# Patient Record
Sex: FEMALE | Race: BLACK OR AFRICAN AMERICAN | NOT HISPANIC OR LATINO | Employment: UNEMPLOYED | URBAN - METROPOLITAN AREA
[De-identification: names, ages, dates, MRNs, and addresses within clinical notes are randomized per-mention and may not be internally consistent; named-entity substitution may affect disease eponyms.]

---

## 2017-01-19 ENCOUNTER — OFFICE VISIT (OUTPATIENT)
Dept: URGENT CARE | Facility: URGENT CARE | Age: 52
End: 2017-01-19
Payer: COMMERCIAL

## 2017-01-19 ENCOUNTER — RADIANT APPOINTMENT (OUTPATIENT)
Dept: GENERAL RADIOLOGY | Facility: CLINIC | Age: 52
End: 2017-01-19
Attending: FAMILY MEDICINE
Payer: COMMERCIAL

## 2017-01-19 VITALS
HEART RATE: 76 BPM | WEIGHT: 160.3 LBS | BODY MASS INDEX: 28.4 KG/M2 | SYSTOLIC BLOOD PRESSURE: 110 MMHG | OXYGEN SATURATION: 98 % | DIASTOLIC BLOOD PRESSURE: 76 MMHG | TEMPERATURE: 98 F

## 2017-01-19 DIAGNOSIS — M25.512 ACUTE PAIN OF LEFT SHOULDER: Primary | ICD-10-CM

## 2017-01-19 PROCEDURE — 73030 X-RAY EXAM OF SHOULDER: CPT | Mod: LT

## 2017-01-19 PROCEDURE — 96372 THER/PROPH/DIAG INJ SC/IM: CPT | Performed by: FAMILY MEDICINE

## 2017-01-19 PROCEDURE — 99214 OFFICE O/P EST MOD 30 MIN: CPT | Mod: 25 | Performed by: FAMILY MEDICINE

## 2017-01-19 RX ORDER — HYDROCODONE BITARTRATE AND ACETAMINOPHEN 5; 325 MG/1; MG/1
1 TABLET ORAL EVERY 6 HOURS PRN
Qty: 12 TABLET | Refills: 0 | Status: SHIPPED | OUTPATIENT
Start: 2017-01-19 | End: 2017-01-22

## 2017-01-19 RX ORDER — KETOROLAC TROMETHAMINE 30 MG/ML
60 INJECTION, SOLUTION INTRAMUSCULAR; INTRAVENOUS ONCE
Qty: 2 ML | Refills: 0 | OUTPATIENT
Start: 2017-01-19 | End: 2017-01-19

## 2017-01-19 NOTE — MR AVS SNAPSHOT
After Visit Summary   1/19/2017    Graciela Castorena    MRN: [id number]           Patient Information     Date Of Birth          1965        Visit Information        Provider Department      1/19/2017 2:30 PM Tulio Mahajan,  [facility]        Today's Diagnoses     Acute pain of left shoulder    -  1        Follow-ups after your visit        Additional Services     ORTHOPEDICS ADULT REFERRAL       Your provider has referred you to: [facility]: Orthopaedic Clinic [facility] [phone]   [email or web address]      Please be aware that coverage of these services is subject to the terms and limitations of your health insurance plan.  Call member services at your health plan with any benefit or coverage questions.      Please bring the following to your appointment:    >>   Any x-rays, CTs or MRIs which have been performed.  Contact the facility where they were done to arrange for  prior to your scheduled appointment.    >>   List of current medications   >>   This referral request   >>   Any documents/labs given to you for this referral                  Your next 10 appointments already scheduled     Feb 17, 2017 10:00 AM   LAB with [facility]   [facility]    [address] [address] [phone]           Patient must bring picture ID.  Patient should be prepared to give a urine specimen  Please do not eat 10-12 hours before your appointment if you are coming in fasting for labs on lipids, cholesterol, or glucose (sugar).  Pregnant women should follow their Care Team instructions. Water with medications is okay. Do not drink coffee or other fluids.   If you have concerns about taking  your medications, please ask at office or if scheduling via [employer], send a message by clicking on Secure Messaging, Message Your Care Team.            Feb 24,  "  2:30 PM   PHYSICAL with Clarice Gilman MD   [facility] ([facility]    [address] [address] [phone]              Who to contact     If you have questions or need follow up information about today's clinic visit or your schedule please contact [facility] directly at [phone].  Normal or non-critical lab and imaging results will be communicated to you by Picotek INChart, letter or phone within 4 business days after the clinic has received the results. If you do not hear from us within 7 days, please contact the clinic through MyChart or phone. If you have a critical or abnormal lab result, we will notify you by phone as soon as possible.  Submit refill requests through [employer] or call your pharmacy and they will forward the refill request to us. Please allow 3 business days for your refill to be completed.          Additional Information About Your Visit        Adaptly Information     Adaptly lets you send messages to your doctor, view your test results, renew your prescriptions, schedule appointments and more. To sign up, go to [email or web address] . Click on \"Log in\" on the left side of the screen, which will take you to the Welcome page. Then click on \"Sign up Now\" on the right side of the page.     You will be asked to enter the access code listed below, as well as some personal information. Please follow the directions to create your username and password.     Your access code is: [email or web address]  Expires: 2017  8:27 PM     Your access code will  in 90 days. If you need help or a new code, please call your [facility] clinic or [phone].        Care EveryWhere ID     This is your Care EveryWhere ID. This could be used by other organizations to access your [facility] medical records  [id number]        Your Vitals Were     Pulse Temperature Pulse Oximetry             76 98  F (36.7  C) " (Oral) 98%          Blood Pressure from Last 3 Encounters:   01/19/17 110/76   11/22/16 145/83   06/21/16 108/68    Weight from Last 3 Encounters:   01/19/17 160 lb 4.8 oz (72.712 kg)   11/22/16 163 lb 2.3 oz (74 kg)   06/21/16 165 lb (74.844 kg)              We Performed the Following     INJECTION INTRAMUSCULAR OR SUB-Q     KETOROLAC TROMETHAMINE 15MG     ORTHOPEDICS ADULT REFERRAL     XR Shoulder Left G/E 3 Views          Today's Medication Changes          These changes are accurate as of: 1/19/17  3:28 PM.  If you have any questions, ask your nurse or doctor.               Start taking these medicines.        Dose/Directions    ketorolac 60 MG/2ML Soln injection   Commonly known as:  TORADOL   Used for:  Acute pain of left shoulder   Started by:  Tulio Mahajan DO        Dose:  60 mg   Inject 2 mLs (60 mg) into the muscle once for 1 dose   Quantity:  2 mL   Refills:  0         These medicines have changed or have updated prescriptions.        Dose/Directions    * HYDROcodone-acetaminophen 5-325 MG per tablet   Commonly known as:  NORCO   This may have changed:  Another medication with the same name was added. Make sure you understand how and when to take each.        Dose:  1-2 tablet   Take 1-2 tablets by mouth every 4 hours as needed   Quantity:  30 tablet   Refills:  0       * HYDROcodone-acetaminophen 5-325 MG per tablet   Commonly known as:  NORCO   This may have changed:  Another medication with the same name was added. Make sure you understand how and when to take each.   Used for:  Closed nondisplaced fracture of distal phalanx of right ring finger, initial encounter, Swelling of limb, Finger injury, right, initial encounter   Changed by:  Steven Cody PA-C        Dose:  1 tablet   Take 1 tablet by mouth every 6 hours as needed   Quantity:  15 tablet   Refills:  0       * HYDROcodone-acetaminophen 5-325 MG per tablet   Commonly known as:  NORCO   This may have changed:  You were already taking a  medication with the same name, and this prescription was added. Make sure you understand how and when to take each.   Used for:  Acute pain of left shoulder   Changed by:  Tulio Mahajan,         Dose:  1 tablet   Take 1 tablet by mouth every 6 hours as needed   Quantity:  12 tablet   Refills:  0       * Notice:  This list has 3 medication(s) that are the same as other medications prescribed for you. Read the directions carefully, and ask your doctor or other care provider to review them with you.         Where to get your medicines      Some of these will need a paper prescription and others can be bought over the counter.  Ask your nurse if you have questions.     Bring a paper prescription for each of these medications    - HYDROcodone-acetaminophen 5-325 MG per tablet    You don't need a prescription for these medications    - ketorolac 60 MG/2ML Soln injection             Primary Care Provider Office Phone # Fax #    Clarice Gilman [phone]       [facility] [address] [facility] [address]        Thank you!     Thank you for choosing [facility]  for your care. Our goal is always to provide you with excellent care. Hearing back from our patients is one way we can continue to improve our services. Please take a few minutes to complete the written survey that you may receive in the mail after your visit with us. Thank you!             Your Updated Medication List - Protect others around you: Learn how to safely use, store and throw away your medicines at www.disposemymeds.org.          This list is accurate as of: 1/19/17  3:28 PM.  Always use your most recent med list.                   Brand Name Dispense Instructions for use    Calcium Carb-Cholecalciferol 1000-800 MG-UNIT Tabs    CALCIUM 1000 + D    100 tablet    Take 1 tablet by mouth daily TAKE WITH FOOD, FOR BONE HEALTH AND LOW VITAMIN D (SAKINA ORTIZ)        cholecalciferol 64669 UNITS capsule    VITAMIN D3    40 capsule    Take 1 capsule (50,000 Units) by mouth every 14 days SIG: TAKE ONE CAP EVERY OTHER WEEK, INDICATION: TO TREAT VITAMIN D DEFICIENCY (1ST AND 15TH OF EACH MONTH)       Cyanocobalamin 5000 MCG/ML Liqd    B-12 SUPER STRENGTH    2 Bottle    Place 1 mL under the tongue daily FOR VITAMIN B12 SUPPLEMENTATION, PLEASE PLACE UNDER THE TONGUE       * HYDROcodone-acetaminophen 5-325 MG per tablet    NORCO    30 tablet    Take 1-2 tablets by mouth every 4 hours as needed       * HYDROcodone-acetaminophen 5-325 MG per tablet    NORCO    15 tablet    Take 1 tablet by mouth every 6 hours as needed       * HYDROcodone-acetaminophen 5-325 MG per tablet    NORCO    12 tablet    Take 1 tablet by mouth every 6 hours as needed       ibuprofen 200 MG tablet    ADVIL/MOTRIN    60 tablet    Take 2 tablets (400 mg) by mouth every 8 hours as needed for pain       ketorolac 60 MG/2ML Soln injection    TORADOL    2 mL    Inject 2 mLs (60 mg) into the muscle once for 1 dose       naproxen 500 MG tablet    NAPROSYN    60 tablet    Take 1 tablet (500 mg) by mouth 2 times daily as needed for moderate pain       polyethylene glycol powder    MIRALAX    1 Bottle    Take 17 g (1 capful) by mouth daily INDICATION: CONSTIPATION, TO ACHIEVE 1-2 SOFT BMs PER DAY       senna-docusate 8.6-50 MG per tablet    SENOKOT-S;PERICOLACE    120 tablet    Take 1-2 tablets by mouth 2 times daily INDICATION: CONSTIPATION, TO ACHIEVE 1-2 SOFT BMs PER DAY       * Notice:  This list has 3 medication(s) that are the same as other medications prescribed for you. Read the directions carefully, and ask your doctor or other care provider to review them with you.

## 2017-01-19 NOTE — NURSING NOTE
"Chief Complaint   Patient presents with     Shoulder Pain     C/o left shoulder/ upper back pain, radiating into upper left arm with numbness and tingling, and decreased ROM x 4 + month. Seen in ER on 11/22/16 for same issue.        Initial /76 mmHg  Pulse 76  Temp(Src) 98  F (36.7  C) (Oral)  Wt 160 lb 4.8 oz (72.712 kg)  SpO2 98% Estimated body mass index is 28.4 kg/(m^2) as calculated from the following:    Height as of 6/21/16: 5' 3\" (1.6 m).    Weight as of this encounter: 160 lb 4.8 oz (72.712 kg).  BP completed using cuff size: chacho Gipson CMA      "

## 2017-01-19 NOTE — PROGRESS NOTES
SUBJECTIVE:  Chief Complaint   Patient presents with     Shoulder Pain     C/o left shoulder/ upper back pain, radiating into upper left arm with numbness and tingling, and decreased ROM x 4 + month. Seen in ER on 11/22/16 for same issue.    .tree who presents with a chief complaint of  left shoulder pain.  Symptoms began 1 month(s) ago , are severe andstill present.  Context:Injury: no  Pain exacerbated by movement   Relieved bynothing She treated it initially with Ibuprofen.   This is the first time this type of injury has occurred to this patient.     Past Medical History   Diagnosis Date     Cholelithiasis 2013     Other malaise and fatigue 4/1/2014       History reviewed. No pertinent past surgical history.    Family History   Problem Relation Age of Onset     Family history unknown: Yes       Social History   Substance Use Topics     Smoking status: Never Smoker      Smokeless tobacco: Never Used     Alcohol Use: No       ROS:CONSTITUTIONAL:NEGATIVE for fever, chills, change in weight  INTEGUMENTARY/SKIN: NEGATIVE for worrisome rashes, moles or lesions  RESP:NEGATIVE for significant cough or SOB  CV: NEGATIVE for chest pain, palpitations or peripheral edema    EXAM: /76 mmHg  Pulse 76  Temp(Src) 98  F (36.7  C) (Oral)  Wt 160 lb 4.8 oz (72.712 kg)  SpO2 98%   NAD  Exam:shoulder  tenderness to palpation and pain with rom  GENERAL APPEARANCE: healthy, alert and no distress  EXTREMITIES: peripheral pulses normal  SKIN: no suspicious lesions or rashes  NEURO: Normal strength and tone, sensory exam grossly normal, mentation intact and speech normal    Xray without acute findings, read by Tulio Mahajan D.O.    ASSESSMENT:     ICD-10-CM    1. Acute pain of left shoulder M25.512 ketorolac (TORADOL) 60 MG/2ML SOLN injection     XR Shoulder Left G/E 3 Views     KETOROLAC TROMETHAMINE 15MG     INJECTION INTRAMUSCULAR OR SUB-Q     HYDROcodone-acetaminophen (NORCO) 5-325 MG per tablet     ORTHOPEDICS ADULT  REFERRAL     [place] better after Toradol shot  Cont ice

## 2017-02-16 ENCOUNTER — DOCUMENTATION ONLY (OUTPATIENT)
Dept: LAB | Facility: CLINIC | Age: 52
End: 2017-02-16

## 2017-02-16 DIAGNOSIS — E55.9 VITAMIN D DEFICIENCY: ICD-10-CM

## 2017-02-16 DIAGNOSIS — Z13.6 CARDIOVASCULAR SCREENING; LDL GOAL LESS THAN 160: ICD-10-CM

## 2017-02-16 DIAGNOSIS — E53.8 VITAMIN B12 DEFICIENCY WITHOUT ANEMIA: Primary | ICD-10-CM

## 2017-02-17 ENCOUNTER — DOCUMENTATION ONLY (OUTPATIENT)
Dept: LAB | Facility: CLINIC | Age: 52
End: 2017-02-17

## 2017-02-20 DIAGNOSIS — E53.8 VITAMIN B12 DEFICIENCY WITHOUT ANEMIA: ICD-10-CM

## 2017-02-20 DIAGNOSIS — E55.9 VITAMIN D DEFICIENCY: ICD-10-CM

## 2017-02-20 DIAGNOSIS — Z13.6 CARDIOVASCULAR SCREENING; LDL GOAL LESS THAN 160: ICD-10-CM

## 2017-02-20 LAB
ALBUMIN SERPL-MCNC: 4 G/DL (ref 3.4–5)
ALP SERPL-CCNC: 76 U/L (ref 40–150)
ALT SERPL W P-5'-P-CCNC: 18 U/L (ref 0–50)
ANION GAP SERPL CALCULATED.3IONS-SCNC: 5 MMOL/L (ref 3–14)
AST SERPL W P-5'-P-CCNC: 15 U/L (ref 0–45)
BILIRUB SERPL-MCNC: 0.5 MG/DL (ref 0.2–1.3)
BUN SERPL-MCNC: 11 MG/DL (ref 7–30)
CALCIUM SERPL-MCNC: 9.3 MG/DL (ref 8.5–10.1)
CHLORIDE SERPL-SCNC: 106 MMOL/L (ref 94–109)
CHOLEST SERPL-MCNC: 182 MG/DL
CO2 SERPL-SCNC: 30 MMOL/L (ref 20–32)
CREAT SERPL-MCNC: 0.76 MG/DL (ref 0.52–1.04)
DEPRECATED CALCIDIOL+CALCIFEROL SERPL-MC: 41 UG/L (ref 20–75)
GFR SERPL CREATININE-BSD FRML MDRD: 81 ML/MIN/1.7M2
GLUCOSE SERPL-MCNC: 95 MG/DL (ref 70–99)
HDLC SERPL-MCNC: 52 MG/DL
LDLC SERPL CALC-MCNC: 115 MG/DL
NONHDLC SERPL-MCNC: 130 MG/DL
POTASSIUM SERPL-SCNC: 4.3 MMOL/L (ref 3.4–5.3)
PROT SERPL-MCNC: 7.6 G/DL (ref 6.8–8.8)
SODIUM SERPL-SCNC: 141 MMOL/L (ref 133–144)
T4 FREE SERPL-MCNC: 0.94 NG/DL (ref 0.76–1.46)
TRIGL SERPL-MCNC: 76 MG/DL
TSH SERPL DL<=0.05 MIU/L-ACNC: 2.36 MU/L (ref 0.4–4)
VIT B12 SERPL-MCNC: 501 PG/ML (ref 193–986)

## 2017-02-20 PROCEDURE — 80061 LIPID PANEL: CPT | Performed by: INTERNAL MEDICINE

## 2017-02-20 PROCEDURE — 84439 ASSAY OF FREE THYROXINE: CPT | Performed by: INTERNAL MEDICINE

## 2017-02-20 PROCEDURE — 84443 ASSAY THYROID STIM HORMONE: CPT | Performed by: INTERNAL MEDICINE

## 2017-02-20 PROCEDURE — 80053 COMPREHEN METABOLIC PANEL: CPT | Performed by: INTERNAL MEDICINE

## 2017-02-20 PROCEDURE — 36415 COLL VENOUS BLD VENIPUNCTURE: CPT | Performed by: INTERNAL MEDICINE

## 2017-02-20 PROCEDURE — 82306 VITAMIN D 25 HYDROXY: CPT | Performed by: INTERNAL MEDICINE

## 2017-02-20 PROCEDURE — 82607 VITAMIN B-12: CPT | Performed by: INTERNAL MEDICINE

## 2017-03-24 ENCOUNTER — OFFICE VISIT (OUTPATIENT)
Dept: INTERNAL MEDICINE | Facility: CLINIC | Age: 52
End: 2017-03-24
Payer: MEDICAID

## 2017-03-24 VITALS
TEMPERATURE: 97.5 F | SYSTOLIC BLOOD PRESSURE: 120 MMHG | HEIGHT: 64 IN | DIASTOLIC BLOOD PRESSURE: 80 MMHG | OXYGEN SATURATION: 100 % | BODY MASS INDEX: 26.55 KG/M2 | WEIGHT: 155.5 LBS | HEART RATE: 72 BPM

## 2017-03-24 DIAGNOSIS — G89.29 CHRONIC LEFT SHOULDER PAIN: ICD-10-CM

## 2017-03-24 DIAGNOSIS — Z12.11 SCREENING FOR COLON CANCER: ICD-10-CM

## 2017-03-24 DIAGNOSIS — Z00.01 ENCOUNTER FOR ROUTINE ADULT MEDICAL EXAM WITH ABNORMAL FINDINGS: Primary | ICD-10-CM

## 2017-03-24 DIAGNOSIS — M25.512 CHRONIC LEFT SHOULDER PAIN: ICD-10-CM

## 2017-03-24 DIAGNOSIS — K80.20 CALCULUS OF GALLBLADDER WITHOUT CHOLECYSTITIS WITHOUT OBSTRUCTION: ICD-10-CM

## 2017-03-24 PROCEDURE — 99213 OFFICE O/P EST LOW 20 MIN: CPT | Mod: 25 | Performed by: INTERNAL MEDICINE

## 2017-03-24 PROCEDURE — T1013 SIGN LANG/ORAL INTERPRETER: HCPCS | Mod: U3 | Performed by: INTERNAL MEDICINE

## 2017-03-24 PROCEDURE — 99396 PREV VISIT EST AGE 40-64: CPT | Performed by: INTERNAL MEDICINE

## 2017-03-24 NOTE — NURSING NOTE
"Chief Complaint   Patient presents with     Establish Care     Physical     Pt is fasting today.       Initial /80 (BP Location: Left arm, Patient Position: Chair, Cuff Size: Adult Regular)  Pulse 72  Temp 97.5  F (36.4  C) (Oral)  Ht 5' 4\" (1.626 m)  Wt 155 lb 8 oz (70.5 kg)  SpO2 100%  BMI 26.69 kg/m2 Estimated body mass index is 26.69 kg/(m^2) as calculated from the following:    Height as of this encounter: 5' 4\" (1.626 m).    Weight as of this encounter: 155 lb 8 oz (70.5 kg).  Medication Reconciliation: complete     Jong NDIAYE      "

## 2017-03-24 NOTE — PATIENT INSTRUCTIONS
No need for high-dose vitamin D anymore.    Recommend daily vitamin D 1000-2000units/day. Can get over the counter.    ---    For left shoulder, recommend a course of physical therapy.    They will contact you to schedule an appointment.     ---    You will be contact to schedule a colonoscopy for colon cancer screening.

## 2017-03-24 NOTE — PROGRESS NOTES
SUBJECTIVE:                                                      HPI: Graciela Castorena is a pleasant 52 year old female who presents for a physical.    [employer]  utilized.     Patient complains of left shoulder pain:  - ongoing for the last 6-8 months  - no precipitating trauma or unusual exertion  - indicates that the pain is located posterior shoulder and left trapezius  - is unable to reach overhead to the other shoulder or behind her back due to pain  - also finding it uncomfortable to sleep at night, especially on her left side    - no left upper extremity numbness, tingling, or weakness  - no prior left upper extremity procedures or surgeries  - has not been treated for this condition yet (i.e. physical therapy)    Patient is also interested in possibly getting a cholecystectomy:  - was recommended last year  - was too nervous to proceed with surgery at that time  - would like to be re-referred back to surgery to discuss again    ROS:  Constitutional: denies unintentional weight loss or gain; denies fevers, chills, or sweats     Cardiovascular: denies chest pain, palpitations, or edema  Respiratory: denies cough, wheezing, shortness of breath, or dyspnea on exertion  Gastrointestinal: denies nausea, vomiting, constipation, diarrhea, or abdominal pain  Genitourinary: denies urinary frequency, urgency, dysuria, or hematuria  Integumentary: denies rash or pruritus  Musculoskeletal: denies back pain; see above  Neurologic: denies focal weakness, numbness, or tingling  Hematologic/Immunologic: denies history of anemia or blood transfusions  Endocrine: denies heat or cold intolerance; denies polyuria, polydipsia  Psychiatric: denies anxiety; see preventative health below    Past Medical History:   Diagnosis Date     NO ACTIVE PROBLEMS      Past Surgical History:   Procedure Laterality Date     NO HISTORY OF SURGERY       Family History   Problem Relation Age of Onset     DIABETES No family hx of       "Myocardial Infarction No family hx of      CEREBROVASCULAR DISEASE No family hx of      Coronary Artery Disease Early Onset No family hx of      CANCER No family hx of      no breast, ovarian, colon CA     Occupational History     [employer]      Social History Main Topics     Smoking status: Never Smoker     Smokeless tobacco: Never Used     Alcohol use No     Drug use: No     Sexual activity: Yes     Partners: Male     Social History Narrative    .    8 kids - 7 are here. 1 still in [place].     Walks a lot.      Allergies   Allergen Reactions     Ciprofloxacin Itching     Current Outpatient Prescriptions   Medication Sig     polyethylene glycol (MIRALAX) powder Take 17 g (1 capful) by mouth daily INDICATION: CONSTIPATION, TO ACHIEVE 1-2 SOFT BMs PER DAY     Immunization History   Administered Date(s) Administered     Influenza (IIV3) 10/08/2013     Influenza Vaccine IM 3yrs+ 4 Valent IIV4 09/29/2015     TDAP Vaccine (Adacel) 06/09/2014     OBJECTIVE:                                                      /80 (BP Location: Left arm, Patient Position: Chair, Cuff Size: Adult Regular)  Pulse 72  Temp 97.5  F (36.4  C) (Oral)  Ht 5' 4\" (1.626 m)  Wt 155 lb 8 oz (70.5 kg)  SpO2 100%  BMI 26.69 kg/m2  Constitutional: well-appearing  Eyes: normal conjunctivae and lids; pupils equal, round, and reactive to light  Ears, Nose, Mouth, and Throat: normal ears and nose; tympanic membranes visualized and normal; normal lips, teeth, and gums; no oropharyngeal lesions or ulcers  Neck: supple and symmetric; no lymphadenopathy; no thyromegaly or masses  Respiratory: normal respiratory effort; clear to auscultation bilaterally  Cardiovascular: regular rate and rhythm; pedal pulses palpable; no edema  Breasts: normal appearance; no masses or skin retraction; no nipple discharge or bleeding; no axillary lymphadenopathy  Gastrointestinal: soft, non-tender, non-distended, and bowel sounds present; no organomegaly or " masses  Musculoskeletal: normal gait and station; left trapezius tension and tenderness to palpation  Left shoulder:  Inspection: no swelling, bruising, discoloration, or obvious deformity or asymmetry  Non-tender: AC joint, acromion, anterior capsule, proximal bicep tendon, supraspinatus  and infraspinatus  Active ROM: decreased in all directions due to pain  Strength: normal  Psych: normal judgment and insight; normal mood and affect; recent and remote memory intact; oriented to time, place, and person    PREVENTATIVE HEALTH                                                      BMI: overweight  Blood pressure: within normal limits   Mammogram: last mammogram performed 12/16; report reviewed and was normal  Pap: last pap performed 3/15; report reviewed - ASCUS with neg HR HPV; repeat due in 3/18  Colonoscopy: DUE  Dexa: not medically indicated at this time   Screening HCV: not medically indicated at this time   Screening cholesterol: up to date   Screening diabetes: up to date   STD testing: no risk factors present  Depression screening: PHQ-2 assessment completed and reviewed - no intervention indicated at this time  Alcohol misuse screening: alcohol use reviewed - no intervention indicated at this time  Immunizations: reviewed; up to date     ASSESSMENT/PLAN:                                                       (Z00.01) Encounter for routine adult medical exam with abnormal findings  (primary encounter diagnosis)  Comment: PMH, PSH, FH, SH, medications, allergies, immunizations, and preventative health measures reviewed.    Plan: see below for plans.    (Z12.11) Screening for colon cancer  Plan: colonoscopy ordered - patient to schedule.     (M25.512,  G89.29) Chronic left shoulder pain  Comment:    - suspect adhesive capsulitis or rotator cuff tendonitis, but no tear.   - also suspect component of left trapezius muscle tension.    Plan: referred to PT for further evaluation and treatment.     (K80.20) Calculus  of gallbladder without cholecystitis without obstruction  Comment: patient is reconsidering gallbladder surgery.   Plan: re-referred back to surgery.     The instructions on the AVS were discussed and explained to the patient. Patient expressed understanding of instructions.    Genna Magallanes MD   [address] [address]  T: [phone], F: [phone]

## 2017-03-24 NOTE — MR AVS SNAPSHOT
After Visit Summary   3/24/2017    Graciela Castorena    MRN: [id number]           Patient Information     Date Of Birth          1965        Visit Information        Provider Department      3/24/2017 10:15 AM Genna Magallanes MD; [employer] [facility]        Today's Diagnoses     Screening for colon cancer    -  1    Chronic left shoulder pain          Care Instructions    No need for high-dose vitamin D anymore.    Recommend daily vitamin D 1000-2000units/day. Can get over the counter.    ---    For left shoulder, recommend a course of physical therapy.    They will contact you to schedule an appointment.     ---    You will be contact to schedule a colonoscopy for colon cancer screening.         Follow-ups after your visit        Additional Services     GASTROENTEROLOGY ADULT REF PROCEDURE ONLY       Last Lab Result: Creatinine (mg/dL)       Date                     Value                 02/20/2017               0.76             ----------  Body mass index is 26.69 kg/(m^2).      Patient will be contacted to schedule procedure.     Please be aware that coverage of these services is subject to the terms and limitations of your health insurance plan.  Call member services at your health plan with any benefit or coverage questions.  Any procedures must be performed at a [facility] facility OR coordinated by your clinic's referral office.    Please bring the following with you to your appointment:    (1) Any X-Rays, CTs or MRIs which have been performed.  Contact the facility where they were done to arrange for  prior to your scheduled appointment.    (2) List of current medications   (3) This referral request   (4) Any documents/labs given to you for this referral            [facility] PT, HAND, AND CHIROPRACTIC REFERRAL       **This order will print in the [facility] Scheduling Office**    Physical Therapy, Hand Therapy and Chiropractic Care are available  "through:    *[facility]  *[facility]  *[facility]    Call one number to schedule at any of the above locations: [phone].    Your provider has referred you to: Physical Therapy at [facility] or [facility]    Indication/Reason for Referral: Shoulder Pain  Onset of Illness: 6-8 months  Therapy Orders: Evaluate and Treat  Special Programs: None  Special Request: None    Ronny Jefferson      Additional Comments for the Therapist or Chiropractor: none    Please be aware that coverage of these services is subject to the terms and limitations of your health insurance plan.  Call member services at your health plan with any benefit or coverage questions.      Please bring the following to your appointment:    *Your personal calendar for scheduling future appointments  *Comfortable clothing                  Who to contact     If you have questions or need follow up information about today's clinic visit or your schedule please contact [facility] directly at [phone].  Normal or non-critical lab and imaging results will be communicated to you by MyChart, letter or phone within 4 business days after the clinic has received the results. If you do not hear from us within 7 days, please contact the clinic through [employer] or phone. If you have a critical or abnormal lab result, we will notify you by phone as soon as possible.  Submit refill requests through swabr or call your pharmacy and they will forward the refill request to us. Please allow 3 business days for your refill to be completed.          Additional Information About Your Visit        swabr Information     swabr lets you send messages to your doctor, view your test results, renew your prescriptions, schedule appointments and more. To sign up, go to [email or web address] . Click on \"Log in\" on the left side of the screen, which will take you to the Welcome page. Then click on \"Sign up " "Now\" on the right side of the page.     You will be asked to enter the access code listed below, as well as some personal information. Please follow the directions to create your username and password.     Your access code is: [email or web address]  Expires: 2017  9:18 AM     Your access code will  in 90 days. If you need help or a new code, please call your [facility] or [phone].        Care EveryWhere ID     This is your Care EveryWhere ID. This could be used by other organizations to access your [facility] medical records  [id number]        Your Vitals Were     Pulse Temperature Height Pulse Oximetry BMI (Body Mass Index)       72 97.5  F (36.4  C) (Oral) 5' 4\" (1.626 m) 100% 26.69 kg/m2        Blood Pressure from Last 3 Encounters:   17 120/80   17 110/76   16 145/83    Weight from Last 3 Encounters:   17 155 lb 8 oz (70.5 kg)   17 160 lb 4.8 oz (72.7 kg)   16 163 lb 2.3 oz (74 kg)              We Performed the Following     GASTROENTEROLOGY ADULT REF PROCEDURE ONLY     [facility] PT, HAND, AND CHIROPRACTIC REFERRAL        Primary Care Provider Office Phone # Fax #    Clarice Gilman [phone]       [facility] [address] [facility] [address]        Thank you!     Thank you for choosing [facility]  for your care. Our goal is always to provide you with excellent care. Hearing back from our patients is one way we can continue to improve our services. Please take a few minutes to complete the written survey that you may receive in the mail after your visit with us. Thank you!             Your Updated Medication List - Protect others around you: Learn how to safely use, store and throw away your medicines at www.disposemymeds.org.          This list is accurate as of: 3/24/17 11:02 AM.  Always use your most recent med list.                   Brand Name Dispense Instructions for use    polyethylene glycol powder    " MIRALAX    1 Bottle    Take 17 g (1 capful) by mouth daily INDICATION: CONSTIPATION, TO ACHIEVE 1-2 SOFT BMs PER DAY

## 2017-03-30 ENCOUNTER — THERAPY VISIT (OUTPATIENT)
Dept: PHYSICAL THERAPY | Facility: CLINIC | Age: 52
End: 2017-03-30
Payer: MEDICAID

## 2017-03-30 DIAGNOSIS — M75.00 ADHESIVE CAPSULITIS: Primary | ICD-10-CM

## 2017-03-30 PROCEDURE — 97110 THERAPEUTIC EXERCISES: CPT | Mod: GP | Performed by: PHYSICAL THERAPIST

## 2017-03-30 PROCEDURE — 97161 PT EVAL LOW COMPLEX 20 MIN: CPT | Mod: GP | Performed by: PHYSICAL THERAPIST

## 2017-03-30 NOTE — PROGRESS NOTES
Subjective:                                               Current occupation is Assembly.    Primary job tasks include:  Prolonged standing and lifting.                                Objective:    System    Physical Exam    General     ROS    Assessment/Plan:

## 2017-03-30 NOTE — LETTER
DEPARTMENT OF HEALTH AND HUMAN SERVICES  CENTERS FOR MEDICARE & MEDICAID SERVICES    PLAN/UPDATED PLAN OF PROGRESS FOR OUTPATIENT REHABILITATION    PATIENTS NAME:  Graciela Castorena   : 1965  PROVIDER NUMBER:    [id number]  [id number]  PROVIDER NAME: [facility]  MEDICAL RECORD NUMBER: [id number]   START OF CARE DATE:  SOC Date: 17   TYPE:  PT  PRIMARY/TREATMENT DIAGNOSIS: (Pertinent Medical Diagnosis)  Adhesive capsulitis    VISITS FROM START OF CARE:  Rxs Used: 1     Subjective:  Graciela Castorena is a 52 year old female with a left shoulder condition.  Condition occurred: for unknown reasons.  This is a chronic condition  Pt presents with complaints of left shoulder (UE) pain. Pt reported insidious onset of sx's 4-5 months ago; ER visit on 16 and MD office visit on 17.  Pt reported no previous issues with left shoulder. 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 In addition to shoulder complaints, pt also reports sx's into L UE-to the hand. Pt reported occasional N/T; currently absence of sx's at exam.    Patient reports pain:  Posterior.  Radiates to:  Upper arm.  Pain is described as aching and is constant and reported as 6/10.  Associated symptoms:  Loss of motion/stiffness. Pain is worse during the day and worse during the night.  Symptoms are exacerbated by using arm overhead, using arm behind back, using arm at shoulder level and lying on extremity and relieved by rest.  Since onset symptoms are unchanged.  Special tests:  X-ray.  General health as reported by patient is excellent.  Barriers include:  None as reported by the patient.  Red flags:  None as reported by the patient.  Current occupation is Assembly. Primary job tasks include:  Prolonged standing and  lifting.    Objective:  System  Cervical/Thoracic Evaluation  Cervical AROM: normal     Cervical Myotomes:  normal  DTR's:  normal  Shoulder Evaluation:  ROM:  AROM:    Flexion:  Left:  110      Abduction:  Left: 85     Flexion/External Rotation:  Left:  Able to reach hand to ear      Extension/Internal Rotation:  Left:  Able to reach hand to outside edge of pelvis            PROM:    Flexion:  Left:  110        Abduction:  Left:  85      Internal Rotation:  Left:  45      External Rotation:  Left:  30      Strength:    Internal Rotation:  Left:5/5     Pain:    Right: 5/5     Pain:  External Rotation:   Left:5/5     Pain:   Right:5/5     Pain:      ROS  Assessment/Plan:    Patient is a 52 year old female with left side shoulder complaints.    Patient has the following significant findings with corresponding treatment plan.                Diagnosis 1:  Adhesive capsulitis  Pain -  manual therapy, self management, education and home program  Decreased ROM/flexibility - manual therapy and therapeutic exercise  Decreased joint mobility - manual therapy and therapeutic exercise  Decreased function - therapeutic activities    Therapy Evaluation Codes:   1) History comprised of:   Personal factors that impact the plan of care:      Time since onset of symptoms.    Comorbidity factors that impact the plan of care are:      None.     Medications impacting care: None.  2) Examination of Body Systems comprised of:   Body structures and functions that impact the plan of care:      Shoulder.   Activity limitations that impact the plan of care are:      Sleeping and reaching overhead, behind the back.  3) Clinical presentation characteristics are:   Stable/Uncomplicated.  4) Decision-Making    Low complexity using standardized patient assessment instrument and/or   measureable assessment of functional outcome.  Cumulative Therapy Evaluation is: Low complexity.    Previous and current functional limitations:  (See Goal Flow Sheet  "for this information)    Short term and Long term goals: (See Goal Flow Sheet for this information)   Communication ability:  Patient has an  for communication clarity.  Treatment Explanation - The following has been discussed with the patient:   RX ordered/plan of care  Anticipated outcomes  Possible risks and side effects  This patient would benefit from PT intervention to resume normal activities.   Rehab potential is good.        Frequency:  1 X week, once daily  Duration:  for 6 weeks  Discharge Plan:  Independent in home treatment program.  Reach maximal therapeutic benefit.    Caregiver Signature/Credentials _____________________________ Date ________       Treating Provider: Keri Ramirez, PT   I have reviewed and certified the need for these services and plan of treatment while under my care.        PHYSICIAN'S SIGNATURE:   __________________________________  Date___________     Genna Magallanes    Certification period:  Beginning of Cert date period: 03/30/17 to  End of Cert period date: 06/28/17     Functional Level Progress Report: Please see attached \"Goal Flow sheet for Functional level.\"    ____X____ Continue Services or       ________ DC Services                Service dates: From  SOC Date: 03/30/17 date to present                         "

## 2017-03-30 NOTE — MR AVS SNAPSHOT
"              After Visit Summary   3/30/2017    Graciela Castorena    MRN: [id number]           Patient Information     Date Of Birth          1965        Visit Information        Provider Department      3/30/2017 9:30 AM Keri Ramirez PT [facility]note 2] Physical Therapy        Today's Diagnoses     Adhesive capsulitis    -  1       Follow-ups after your visit        Your next 10 appointments already scheduled     Apr 13, 2017 10:10 AM CDT   EVELIN Extremity with Keri Ramirez PT   [facility] 2[facility]    [address] [id number] [phone]              Who to contact     If you have questions or need follow up information about today's clinic visit or your schedule please contact [facility] directly at [phone].  Normal or non-critical lab and imaging results will be communicated to you by MyChart, letter or phone within 4 business days after the clinic has received the results. If you do not hear from us within 7 days, please contact the clinic through MyChart or phone. If you have a critical or abnormal lab result, we will notify you by phone as soon as possible.  Submit refill requests through Calxeda or call your pharmacy and they will forward the refill request to us. Please allow 3 business days for your refill to be completed.          Additional Information About Your Visit        MyChart Information     Calxeda lets you send messages to your doctor, view your test results, renew your prescriptions, schedule appointments and more. To sign up, go to [email or web address] . Click on \"Log in\" on the left side of the screen, which will take you to the Welcome page. Then click on \"Sign up Now\" on the right side of the page.     You will be asked to enter the access code listed below, as well as some personal information. " Please follow the directions to create your username and password.     Your access code is: [email or web address]  Expires: 2017  9:18 AM     Your access code will  in 90 days. If you need help or a new code, please call your [place] clinic or [phone].        Care EveryWhere ID     This is your Care EveryWhere ID. This could be used by other organizations to access your [place] medical records  [id number]         Blood Pressure from Last 3 Encounters:   17 120/80   17 110/76   16 145/83    Weight from Last 3 Encounters:   17 70.5 kg (155 lb 8 oz)   17 72.7 kg (160 lb 4.8 oz)   16 74 kg (163 lb 2.3 oz)              We Performed the Following     EVELIN CERT REPORT     EVELIN Inital Eval Report     PT Eval, Low Complexity ([id number])     Therapeutic Exercises        Primary Care Provider Office Phone # Fax #    Clarice Gilman [phone]       [facility] [address] [facility] [address]        Thank you!     Thank you for choosing [facility] PHYSICAL THERAPY  for your care. Our goal is always to provide you with excellent care. Hearing back from our patients is one way we can continue to improve our services. Please take a few minutes to complete the written survey that you may receive in the mail after your visit with us. Thank you!             Your Updated Medication List - Protect others around you: Learn how to safely use, store and throw away your medicines at www.disposemymeds.org.          This list is accurate as of: 3/30/17 11:14 AM.  Always use your most recent med list.                   Brand Name Dispense Instructions for use    polyethylene glycol powder    MIRALAX    1 Bottle    Take 17 g (1 capful) by mouth daily INDICATION: CONSTIPATION, TO ACHIEVE 1-2 SOFT BMs PER DAY

## 2017-03-30 NOTE — PROGRESS NOTES
Subjective:    Graciela Castorena is a 52 year old female with a left shoulder condition.    Condition occurred: for unknown reasons.  This is a chronic condition  Pt presents with complaints of left shoulder (UE) pain. Pt reported insidious onset of sx's 4-5 months ago; ER visit on 11-22-16 and MD office visit on 1-22-17.  Pt reported no previous issues with left shoulder. 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 In addition to shoulder complaints, pt also reports sx's into L UE-to the hand. Pt reported occasional N/T; currently absence of sx's at exam.    Patient reports pain:  Posterior.  Radiates to:  Upper arm.  Pain is described as aching and is constant and reported as 6/10.  Associated symptoms:  Loss of motion/stiffness. Pain is worse during the day and worse during the night.  Symptoms are exacerbated by using arm overhead, using arm behind back, using arm at shoulder level and lying on extremity and relieved by rest.  Since onset symptoms are unchanged.  Special tests:  X-ray.      General health as reported by patient is excellent.                  Barriers include:  None as reported by the patient.    Red flags:  None as reported by the patient.                        Objective:    System              Cervical/Thoracic Evaluation  Cervical AROM: normal           Cervical Myotomes:  normal                  DTR's:  normal                           Shoulder Evaluation:  ROM:  AROM:    Flexion:  Left:  110        Abduction:  Left: 85                   Flexion/External Rotation:  Left:  Able to reach hand to ear      Extension/Internal Rotation:  Left:  Able to reach hand to outside edge of pelvis        PROM:    Flexion:  Left:  110          Abduction:  Left:  85        Internal Rotation:  Left:  45      External Rotation:  Left:  30                         Strength:            Internal Rotation:  Left:5/5     Pain:    Right: 5/5     Pain:  External Rotation:   Left:5/5     Pain:   Right:5/5     Pain:                                                     General     ROS    Assessment/Plan:      Patient is a 52 year old female with left side shoulder complaints.    Patient has the following significant findings with corresponding treatment plan.                Diagnosis 1:  Adhesive capsulitis  Pain -  manual therapy, self management, education and home program  Decreased ROM/flexibility - manual therapy and therapeutic exercise  Decreased joint mobility - manual therapy and therapeutic exercise  Decreased function - therapeutic activities    Therapy Evaluation Codes:   1) History comprised of:   Personal factors that impact the plan of care:      Time since onset of symptoms.    Comorbidity factors that impact the plan of care are:      None.     Medications impacting care: None.  2) Examination of Body Systems comprised of:   Body structures and functions that impact the plan of care:      Shoulder.   Activity limitations that impact the plan of care are:      Sleeping and reaching overhead, behind the back.  3) Clinical presentation characteristics are:   Stable/Uncomplicated.  4) Decision-Making    Low complexity using standardized patient assessment instrument and/or measureable assessment of functional outcome.  Cumulative Therapy Evaluation is: Low complexity.    Previous and current functional limitations:  (See Goal Flow Sheet for this information)    Short term and Long term goals: (See Goal Flow Sheet for this information)     Communication ability:  Patient has an  for communication clarity.  Treatment Explanation - The following has been discussed with the patient:   RX ordered/plan of care  Anticipated outcomes  Possible risks and side effects  This patient would benefit from PT intervention to resume normal activities.   Rehab potential is  good.    Frequency:  1 X week, once daily  Duration:  for 6 weeks  Discharge Plan:  Independent in home treatment program.  Reach maximal therapeutic benefit.    Please refer to the daily flowsheet for treatment today, total treatment time and time spent performing 1:1 timed codes.

## 2017-04-06 ENCOUNTER — OFFICE VISIT (OUTPATIENT)
Dept: URGENT CARE | Facility: URGENT CARE | Age: 52
End: 2017-04-06
Payer: MEDICAID

## 2017-04-06 VITALS
OXYGEN SATURATION: 99 % | SYSTOLIC BLOOD PRESSURE: 112 MMHG | BODY MASS INDEX: 26.78 KG/M2 | WEIGHT: 156 LBS | HEART RATE: 76 BPM | DIASTOLIC BLOOD PRESSURE: 78 MMHG | TEMPERATURE: 98.2 F

## 2017-04-06 DIAGNOSIS — R52 BODY ACHES: ICD-10-CM

## 2017-04-06 DIAGNOSIS — R68.83 CHILLS (WITHOUT FEVER): ICD-10-CM

## 2017-04-06 DIAGNOSIS — R07.0 THROAT PAIN: Primary | ICD-10-CM

## 2017-04-06 DIAGNOSIS — R05.9 COUGH: ICD-10-CM

## 2017-04-06 LAB
DEPRECATED S PYO AG THROAT QL EIA: NORMAL
MICRO REPORT STATUS: NORMAL
SPECIMEN SOURCE: NORMAL

## 2017-04-06 PROCEDURE — 99213 OFFICE O/P EST LOW 20 MIN: CPT | Performed by: PHYSICIAN ASSISTANT

## 2017-04-06 PROCEDURE — 87081 CULTURE SCREEN ONLY: CPT | Performed by: PHYSICIAN ASSISTANT

## 2017-04-06 PROCEDURE — 87880 STREP A ASSAY W/OPTIC: CPT | Performed by: PHYSICIAN ASSISTANT

## 2017-04-06 RX ORDER — NAPROXEN 500 MG/1
500 TABLET ORAL 2 TIMES DAILY PRN
Qty: 30 TABLET | Refills: 1 | Status: SHIPPED | OUTPATIENT
Start: 2017-04-06 | End: 2018-01-16

## 2017-04-06 RX ORDER — CODEINE PHOSPHATE AND GUAIFENESIN 10; 100 MG/5ML; MG/5ML
5-10 SOLUTION ORAL
Qty: 120 ML | Refills: 0 | Status: SHIPPED | OUTPATIENT
Start: 2017-04-06 | End: 2017-09-20

## 2017-04-06 NOTE — NURSING NOTE
"Chief Complaint   Patient presents with     Cough     cough, throat pain, pain in ears, running stuffy nose and fever for 2-3 days.        Initial /78 (BP Location: Right arm, Patient Position: Chair, Cuff Size: Adult Regular)  Pulse 76  Temp 98.2  F (36.8  C) (Oral)  Wt 156 lb (70.8 kg)  SpO2 99%  Breastfeeding? No  BMI 26.78 kg/m2 Estimated body mass index is 26.78 kg/(m^2) as calculated from the following:    Height as of 3/24/17: 5' 4\" (1.626 m).    Weight as of this encounter: 156 lb (70.8 kg).  Medication Reconciliation: complete    "

## 2017-04-06 NOTE — MR AVS SNAPSHOT
"              After Visit Summary   4/6/2017    Graciela Castorena    MRN: [id number]           Patient Information     Date Of Birth          1965        Visit Information        Provider Department      4/6/2017 12:30 PM Steven Cody PA-C [facility]        Today's Diagnoses     Throat pain    -  1    Body aches        Chills (without fever)        Cough           Follow-ups after your visit        Your next 10 appointments already scheduled     Apr 13, 2017 10:10 AM CDT   EVELIN Extremity with Keri Ramirez PT   [facility] ([facility]  )    [address] [id number] [phone]              Who to contact     If you have questions or need follow up information about today's clinic visit or your schedule please contact [facility] directly at [phone].  Normal or non-critical lab and imaging results will be communicated to you by MyChart, letter or phone within 4 business days after the clinic has received the results. If you do not hear from us within 7 days, please contact the clinic through [employer] or phone. If you have a critical or abnormal lab result, we will notify you by phone as soon as possible.  Submit refill requests through [employer] or call your pharmacy and they will forward the refill request to us. Please allow 3 business days for your refill to be completed.          Additional Information About Your Visit        [employer] Information     [employer] lets you send messages to your doctor, view your test results, renew your prescriptions, schedule appointments and more. To sign up, go to [email or web address] . Click on \"Log in\" on the left side of the screen, which will take you to the Welcome page. Then click on \"Sign up Now\" on the right side of the page.     You will be asked to enter the access code listed below, as well as some personal " information. Please follow the directions to create your username and password.     Your access code is: [email or web address]  Expires: 2017  9:18 AM     Your access code will  in 90 days. If you need help or a new code, please call your [place] clinic or [phone].        Care EveryWhere ID     This is your Care EveryWhere ID. This could be used by other organizations to access your [facility] medical records  [id number]        Your Vitals Were     Pulse Temperature Pulse Oximetry Breastfeeding? BMI (Body Mass Index)       76 98.2  F (36.8  C) (Oral) 99% No 26.78 kg/m2        Blood Pressure from Last 3 Encounters:   17 112/78   17 120/80   17 110/76    Weight from Last 3 Encounters:   17 156 lb (70.8 kg)   17 155 lb 8 oz (70.5 kg)   17 160 lb 4.8 oz (72.7 kg)              We Performed the Following     Beta strep group A culture     Strep, Rapid Screen          Today's Medication Changes          These changes are accurate as of: 17 11:59 PM.  If you have any questions, ask your nurse or doctor.               Start taking these medicines.        Dose/Directions    guaiFENesin-codeine 100-10 MG/5ML Soln solution   Commonly known as:  ROBITUSSIN AC   Used for:  Cough   Started by:  Steven Cody PA-C        Dose:  5-10 mL   Take 5-10 mLs by mouth nightly as needed for cough   Quantity:  120 mL   Refills:  0       naproxen 500 MG tablet   Commonly known as:  NAPROSYN   Used for:  Throat pain, Body aches, Chills (without fever)   Started by:  Steven Cody PA-C        Dose:  500 mg   Take 1 tablet (500 mg) by mouth 2 times daily as needed for moderate pain   Quantity:  30 tablet   Refills:  1            Where to get your medicines      Some of these will need a paper prescription and others can be bought over the counter.  Ask your nurse if you have questions.     Bring a paper prescription for each of these medications     guaiFENesin-codeine 100-10 MG/5ML Soln  solution    naproxen 500 MG tablet                Primary Care Provider Office Phone # Fax #    Clarice Gilamn -[phone]       [facility] ([facility] [address] [facility] [address]        Thank you!     Thank you for choosing [facility]  for your care. Our goal is always to provide you with excellent care. Hearing back from our patients is one way we can continue to improve our services. Please take a few minutes to complete the written survey that you may receive in the mail after your visit with us. Thank you!             Your Updated Medication List - Protect others around you: Learn how to safely use, store and throw away your medicines at www.disposemymeds.org.          This list is accurate as of: 4/6/17 11:59 PM.  Always use your most recent med list.                   Brand Name Dispense Instructions for use    guaiFENesin-codeine 100-10 MG/5ML Soln solution    ROBITUSSIN AC    120 mL    Take 5-10 mLs by mouth nightly as needed for cough       naproxen 500 MG tablet    NAPROSYN    30 tablet    Take 1 tablet (500 mg) by mouth 2 times daily as needed for moderate pain       polyethylene glycol powder    MIRALAX    1 Bottle    Take 17 g (1 capful) by mouth daily INDICATION: CONSTIPATION, TO ACHIEVE 1-2 SOFT BMs PER DAY       VITAMIN D (CHOLECALCIFEROL) PO      Take by mouth daily

## 2017-04-07 NOTE — PROGRESS NOTES
SUBJECTIVE:   Graciela Castorena is a 52 year old female presenting with a chief complaint of coughing, congestion, throat pain.  Onset of symptoms was few days ago.  Course of illness is same.    Severity moderate  Current and Associated symptoms: throat pain, sinus congestion  Treatment measures tried include OTC meds.  Predisposing factors include recent illness.    Past Medical History:   Diagnosis Date     NO ACTIVE PROBLEMS         Allergies   Allergen Reactions     Ciprofloxacin Itching         Social History   Substance Use Topics     Smoking status: Never Smoker     Smokeless tobacco: Never Used     Alcohol use No       ROS:  CONSTITUTIONAL:NEGATIVE for fever, chills, change in weight  INTEGUMENTARY/SKIN: NEGATIVE for worrisome rashes, moles or lesions  ENT/MOUTH: POSITIVE for nasal congestion, throat pain  RESP:POSITIVE for cough-non productive  CV: NEGATIVE for chest pain, palpitations or peripheral edema  MUSCULOSKELETAL: NEGATIVE for significant arthralgias or myalgia  NEURO: NEGATIVE for weakness, dizziness or paresthesias    OBJECTIVE  :/78 (BP Location: Right arm, Patient Position: Chair, Cuff Size: Adult Regular)  Pulse 76  Temp 98.2  F (36.8  C) (Oral)  Wt 156 lb (70.8 kg)  SpO2 99%  Breastfeeding? No  BMI 26.78 kg/m2  GENERAL APPEARANCE: healthy, alert and no distress  HENT: ear canals and TM's normal.  Nose and mouth without ulcers, erythema or lesions  NECK: supple, nontender, no lymphadenopathy  RESP: lungs clear to auscultation - no rales, rhonchi or wheezes  CV: regular rates and rhythm, normal S1 S2, no murmur noted  NEURO: Normal strength and tone, sensory exam grossly normal,  normal speech and mentation  SKIN: no suspicious lesions or rashes    Results for orders placed or performed in visit on 04/06/17   Strep, Rapid Screen   Result Value Ref Range    Specimen Description Throat     Rapid Strep A Screen       NEGATIVE: No Group A streptococcal antigen detected by immunoassay,  await   culture report.      Micro Report Status FINAL 04/06/2017        ASSESSMENT/PLAN:      ICD-10-CM    1. Throat pain R07.0 Strep, Rapid Screen     Beta strep group A culture     naproxen (NAPROSYN) 500 MG tablet   2. Body aches R52 naproxen (NAPROSYN) 500 MG tablet   3. Chills (without fever) R68.83 naproxen (NAPROSYN) 500 MG tablet   4. Cough R05 guaiFENesin-codeine (ROBITUSSIN AC) 100-10 MG/5ML SOLN solution       Strep culture pending  Fluids, rest  Follow up as needed

## 2017-04-08 LAB
BACTERIA SPEC CULT: NORMAL
MICRO REPORT STATUS: NORMAL
SPECIMEN SOURCE: NORMAL

## 2017-04-24 DIAGNOSIS — M79.10 MYALGIA: ICD-10-CM

## 2017-08-22 ENCOUNTER — APPOINTMENT (OUTPATIENT)
Dept: CT IMAGING | Facility: CLINIC | Age: 52
End: 2017-08-22
Attending: EMERGENCY MEDICINE
Payer: COMMERCIAL

## 2017-08-22 ENCOUNTER — HOSPITAL ENCOUNTER (EMERGENCY)
Facility: CLINIC | Age: 52
Discharge: HOME | End: 2017-08-22
Attending: EMERGENCY MEDICINE | Admitting: EMERGENCY MEDICINE
Payer: COMMERCIAL

## 2017-08-22 VITALS
SYSTOLIC BLOOD PRESSURE: 123 MMHG | DIASTOLIC BLOOD PRESSURE: 84 MMHG | TEMPERATURE: 97.7 F | OXYGEN SATURATION: 99 % | HEIGHT: 63 IN | WEIGHT: 160 LBS | BODY MASS INDEX: 28.35 KG/M2 | RESPIRATION RATE: 16 BRPM

## 2017-08-22 DIAGNOSIS — K80.50 BILIARY COLIC: ICD-10-CM

## 2017-08-22 DIAGNOSIS — R10.9 ABDOMINAL PAIN IN FEMALE: ICD-10-CM

## 2017-08-22 DIAGNOSIS — R19.7 DIARRHEA, UNSPECIFIED TYPE: ICD-10-CM

## 2017-08-22 LAB
ALBUMIN SERPL-MCNC: 3.5 G/DL (ref 3.4–5)
ALBUMIN UR-MCNC: NEGATIVE MG/DL
ALP SERPL-CCNC: 69 U/L (ref 40–150)
ALT SERPL W P-5'-P-CCNC: 27 U/L (ref 0–50)
ANION GAP SERPL CALCULATED.3IONS-SCNC: 9 MMOL/L (ref 3–14)
APPEARANCE UR: CLEAR
AST SERPL W P-5'-P-CCNC: 19 U/L (ref 0–45)
BASOPHILS # BLD AUTO: 0 10E9/L (ref 0–0.2)
BASOPHILS NFR BLD AUTO: 0.3 %
BILIRUB SERPL-MCNC: 0.3 MG/DL (ref 0.2–1.3)
BILIRUB UR QL STRIP: NEGATIVE
BUN SERPL-MCNC: 13 MG/DL (ref 7–30)
CALCIUM SERPL-MCNC: 8.7 MG/DL (ref 8.5–10.1)
CHLORIDE SERPL-SCNC: 107 MMOL/L (ref 94–109)
CO2 SERPL-SCNC: 26 MMOL/L (ref 20–32)
COLOR UR AUTO: YELLOW
CREAT SERPL-MCNC: 0.63 MG/DL (ref 0.52–1.04)
DIFFERENTIAL METHOD BLD: NORMAL
EOSINOPHIL # BLD AUTO: 0.3 10E9/L (ref 0–0.7)
EOSINOPHIL NFR BLD AUTO: 3.6 %
ERYTHROCYTE [DISTWIDTH] IN BLOOD BY AUTOMATED COUNT: 13.2 % (ref 10–15)
GFR SERPL CREATININE-BSD FRML MDRD: >90 ML/MIN/1.7M2
GLUCOSE SERPL-MCNC: 72 MG/DL (ref 70–99)
GLUCOSE UR STRIP-MCNC: NEGATIVE MG/DL
HCT VFR BLD AUTO: 39 % (ref 35–47)
HGB BLD-MCNC: 13.3 G/DL (ref 11.7–15.7)
HGB UR QL STRIP: ABNORMAL
IMM GRANULOCYTES # BLD: 0 10E9/L (ref 0–0.4)
IMM GRANULOCYTES NFR BLD: 0.3 %
KETONES UR STRIP-MCNC: NEGATIVE MG/DL
LEUKOCYTE ESTERASE UR QL STRIP: ABNORMAL
LIPASE SERPL-CCNC: 114 U/L (ref 73–393)
LYMPHOCYTES # BLD AUTO: 1.9 10E9/L (ref 0.8–5.3)
LYMPHOCYTES NFR BLD AUTO: 24.5 %
MCH RBC QN AUTO: 30.2 PG (ref 26.5–33)
MCHC RBC AUTO-ENTMCNC: 34.1 G/DL (ref 31.5–36.5)
MCV RBC AUTO: 88 FL (ref 78–100)
MONOCYTES # BLD AUTO: 0.6 10E9/L (ref 0–1.3)
MONOCYTES NFR BLD AUTO: 7.7 %
NEUTROPHILS # BLD AUTO: 4.8 10E9/L (ref 1.6–8.3)
NEUTROPHILS NFR BLD AUTO: 63.6 %
NITRATE UR QL: NEGATIVE
NRBC # BLD AUTO: 0 10*3/UL
NRBC BLD AUTO-RTO: 0 /100
PH UR STRIP: 6 PH (ref 5–7)
PLATELET # BLD AUTO: 217 10E9/L (ref 150–450)
POTASSIUM SERPL-SCNC: 3.8 MMOL/L (ref 3.4–5.3)
PROT SERPL-MCNC: 7.1 G/DL (ref 6.8–8.8)
RBC # BLD AUTO: 4.41 10E12/L (ref 3.8–5.2)
RBC #/AREA URNS AUTO: NORMAL /HPF
SODIUM SERPL-SCNC: 142 MMOL/L (ref 133–144)
SOURCE: ABNORMAL
SP GR UR STRIP: 1.01 (ref 1–1.03)
UROBILINOGEN UR STRIP-ACNC: 0.2 EU/DL (ref 0.2–1)
WBC # BLD AUTO: 7.6 10E9/L (ref 4–11)
WBC #/AREA URNS AUTO: NORMAL /HPF

## 2017-08-22 PROCEDURE — 99284 EMERGENCY DEPT VISIT MOD MDM: CPT | Mod: 25

## 2017-08-22 PROCEDURE — 74176 CT ABD & PELVIS W/O CONTRAST: CPT

## 2017-08-22 PROCEDURE — 80053 COMPREHEN METABOLIC PANEL: CPT | Performed by: EMERGENCY MEDICINE

## 2017-08-22 PROCEDURE — 96360 HYDRATION IV INFUSION INIT: CPT

## 2017-08-22 PROCEDURE — 25000128 H RX IP 250 OP 636: Performed by: EMERGENCY MEDICINE

## 2017-08-22 PROCEDURE — 81001 URINALYSIS AUTO W/SCOPE: CPT | Performed by: EMERGENCY MEDICINE

## 2017-08-22 PROCEDURE — 83690 ASSAY OF LIPASE: CPT | Performed by: EMERGENCY MEDICINE

## 2017-08-22 PROCEDURE — 85025 COMPLETE CBC W/AUTO DIFF WBC: CPT | Performed by: EMERGENCY MEDICINE

## 2017-08-22 RX ORDER — SODIUM CHLORIDE 9 MG/ML
1000 INJECTION, SOLUTION INTRAVENOUS CONTINUOUS
Status: DISCONTINUED | OUTPATIENT
Start: 2017-08-22 | End: 2017-08-22 | Stop reason: HOSPADM

## 2017-08-22 RX ADMIN — SODIUM CHLORIDE 1000 ML: 9 INJECTION, SOLUTION INTRAVENOUS at 19:15

## 2017-08-22 ASSESSMENT — ENCOUNTER SYMPTOMS
SHORTNESS OF BREATH: 0
ANAL BLEEDING: 1
DYSURIA: 1
VOMITING: 0
HEADACHES: 0
DIARRHEA: 1
ABDOMINAL PAIN: 0
BACK PAIN: 1

## 2017-08-22 NOTE — ED AVS SNAPSHOT
Emergency Department    [address] [facility] [address]    Phone:  [phone]    Fax:  [phone]                                       Graciela Castorena   MRN: [id number]    Department:   Emergency Department   Date of Visit:  8/22/2017           Patient Information     Date Of Birth          1965        Your diagnoses for this visit were:     Abdominal pain in female     Diarrhea, unspecified type     Biliary colic        You were seen by Mac Luque DO.      Follow-up Information     Follow up with Clarice Gilman MD In 2 days.    Specialties:  Internal Medicine, Pediatrics    Contact information:    [address] [facility] [address] [phone]          Follow up with Santiago Cano MD In 1 week.    Specialty:  Surgery    Contact information:    [address]  [address]  [facility] [id number] [phone]          Follow up with  Emergency Department.    Specialty:  EMERGENCY MEDICINE    Why:  If symptoms worsen    Contact information:    [address] [facility] [address] [phone]        Discharge Instructions         Abdominal Pain    Abdominal pain is pain in the stomach or belly area. Everyone has this pain from time to time. In many cases it goes away on its own. But abdominal pain can sometimes be due to a serious problem, such as appendicitis. So it s important to know when to seek help.  Causes of abdominal pain  There are many possible causes of abdominal pain. Common causes in adults include:    Constipation, diarrhea, or gas    Stomach acid flowing back up into the esophagus (acid reflux or heartburn)    Severe acid reflux, called GERD (gastroesophageal reflux disease)    A sore in the lining of the stomach or small intestine (peptic ulcer)    Inflammation of the gallbladder, liver, or pancreas    Gallstones or kidney stones    Appendicitis     Intestinal blockage     An internal organ pushing through a muscle or other tissue  (hernia)    Urinary tract infections    In women, menstrual cramps, fibroids, or endometriosis    Inflammation or infection of the intestines  Diagnosing the cause of abdominal pain  Your healthcare provider will do a physical exam help find the cause of your pain. If needed, tests will be ordered. Belly pain has many possible causes. So it can be hard to find the reason for your pain. Giving details about your pain can help. Tell your provider where and when you feel the pain, and what makes it better or worse. Also let your provider know if you have other symptoms such as:    Fever    Tiredness    Upset stomach (nausea)    Vomiting    Changes in bathroom habits  Treating abdominal pain  Some causes of pain need emergency medical treatment right away. These include appendicitis or a bowel blockage. Other problems can be treated with rest, fluids, or medicines. Your healthcare provider can give you specific instructions for treatment or self-care based on what is causing your pain.  If you have vomiting or diarrhea, sip water or other clear fluids. When you are ready to eat solid foods again, start with small amounts of easy-to-digest, low-fat foods. These include apple sauce, toast, or crackers.   When to seek medical care  Call 911 or go to the hospital right away if you:    Can t pass stool and are vomiting    Are vomiting blood or have bloody diarrhea or black, tarry diarrhea    Have chest, neck, or shoulder pain    Feel like you might pass out    Have pain in your shoulder blades with nausea    Have sudden, severe belly pain    Have new, severe pain unlike any you have felt before    Have a belly that is rigid, hard, and tender to touch  Call your healthcare provider if you have:    Pain for more than 5 days    Bloating for more than 2 days    Diarrhea for more than 5 days    A fever of 100.4 F (38.0 C) or higher, or as directed by your provider    Pain that gets worse    Weight loss for no reason    Continued  lack of appetite    Blood in your stool  How to prevent abdominal pain  Here are some tips to help prevent abdominal pain:    Eat smaller amounts of food at one time.    Avoid greasy, fried, or other high-fat foods.    Avoid foods that give you gas.    Exercise regularly.    Drink plenty of fluids.  To help prevent GERD symptoms:    Quit smoking.    Reduce alcohol and certain foods that increase stomach acid.    Avoid aspirin and over-the-counter pain and fever medicines (NSAIDS or nonsteroidal anti-inflammatory drugs), if possible    Lose extra weight.    Finish eating at least 2 hours before you go to bed or lie down.    Raise the head of your bed.  Date Last Reviewed: 7/1/2016 2000-2017 [employer]. [address] [place] [address], [place] [address]. All rights reserved. This information is not intended as a substitute for professional medical care. Always follow your healthcare professional's instructions.          Uncertain Causes of Diarrhea (Adult)    Diarrhea is when stools are loose and watery. This can be caused by:    Viral infections    Bacterial infections    Food poisoning    Parasites    Irritable bowel syndrome (IBS)    Inflammatory bowel diseases such as ulcerative colitis, Crohn's disease, and celiac disease    Food intolerance, such as to lactose, the sugar found in milk and milk products    Reaction to medicines like antibiotics, laxatives, cancer drugs, and antacids  Along with diarrhea, you may also have:    Abdominal pain and cramping    Nausea and vomiting    Loss of bowel control    Fever and chills    Bloody stools  In some cases, antibiotics may help to treat diarrhea. You may have a stool sample test. This is done to see what is causing your diarrhea, and if antibiotics will help treat it. The results of a stool sample test may take up to 2 days. The healthcare provider may not give you antibiotics until he or she has the stool test results.  Diarrhea can cause dehydration. This  is the loss of too much water and other fluids from the body. When this occurs, body fluid must be replaced. This can be done with oral rehydration solutions. Oral rehydration solutions are available at drugstores and grocery stores without a prescription.  Home care  Follow all instructions given by your healthcare provider. Rest at home for the next 24 hours, or until you feel better. Avoid caffeine, tobacco, and alcohol. These can make diarrhea, cramping, and pain worse.  If taking medicines:    Don t take over-the-counter diarrhea or nausea medicines unless your healthcare provider tells you to.    You may use acetaminophen or NSAID medicines like ibuprofen or naproxen to reduce pain and fever. Don t use these if you have chronic liver or kidney disease, or ever had a stomach ulcer or gastrointestinal bleeding. Don't use NSAID medicines if you are already taking one for another condition (like arthritis) or are on daily aspirin therapy (such as for heart disease or after a stroke). Talk with your healthcare provider first.    If antibiotics were prescribed, be sure you take them until they are finished. Don t stop taking them even when you feel better. Antibiotics must be taken as a full course.  To prevent the spread of illness:    Remember that washing with soap and water and using alcohol-based  is the best way to prevent the spread of infection.    Clean the toilet after each use.    Wash your hands before eating.    Wash your hands before and after preparing food. Keep in mind that people with diarrhea or vomiting should not prepare food for others.    Wash your hands after using cutting boards, countertops, and knives that have been in contact with raw foods.    Wash and then peel fruits and vegetables.    Keep uncooked meats away from cooked and ready-to-eat foods.    Use a food thermometer when cooking. Cook poultry to at least 165 F (74 C). Cook ground meat (beef, veal, pork, lamb) to at least  160 F (71 C). Cook fresh beef, veal, lamb, and pork to at least 145 F (63 C).    Don t eat raw or undercooked eggs (poached or kirt side up), poultry, meat, or unpasteurized milk and juices.  Food and drinks  The main goal while treating vomiting or diarrhea is to prevent dehydration. This is done by taking small amounts of liquids often.    Keep in mind that liquids are more important than food right now.    Drink only small amounts of liquids at a time.    Don t force yourself to eat, especially if you are having cramping, vomiting, or diarrhea. Don t eat large amounts at a time, even if you are hungry.    If you eat, avoid fatty, greasy, spicy, or fried foods.    Don t eat dairy foods or drink milk if you have diarrhea. These can make diarrhea worse.  During the first 24 hours you can try:    Oral rehydration solutions. Do not use sports drinks. They have too much sugar and not enough electrolytes.    Soft drinks without caffeine    Ginger ale    Water (plain or flavored)    Decaf tea or coffee    Clear broth, consommé, or bouillon    Gelatin, popsicles, or frozen fruit juice bars  The second 24 hours, if you are feeling better, you can add:    Hot cereal, plain toast, bread, rolls, or crackers    Plain noodles, rice, mashed potatoes, chicken noodle soup, or rice soup    Unsweetened canned fruit (no pineapple)    Bananas  As you recover:    Limit fat intake to less than 15 grams per day. Don t eat margarine, butter, oils, mayonnaise, sauces, gravies, fried foods, peanut butter, meat, poultry, or fish.    Limit fiber. Don t eat raw or cooked vegetables, fresh fruits except bananas, or bran cereals.    Limit caffeine and chocolate.    Limit dairy.    Don t use spices or seasonings except salt.    Go back to your normal diet over time, as you feel better and your symptoms improve.    If the symptoms come back, go back to a simple diet or clear liquids.  Follow-up care  Follow up with your healthcare provider, or as  advised. If a stool sample was taken or cultures were done, call the healthcare provider for the results as instructed.  Call 911  Call 911 if you have any of these symptoms:    Trouble breathing    Confusion    Extreme drowsiness or trouble walking    Loss of consciousness    Rapid heart rate    Chest pain    Stiff neck    Seizure  When to seek medical advice  Call your healthcare provider right away if any of these occur:    Abdominal pain that gets worse    Constant lower right abdominal pain    Continued vomiting and inability to keep liquids down    Diarrhea more than 5 times a day    Blood in vomit or stool    Dark urine or no urine for 8 hours, dry mouth and tongue, tiredness, weakness, or dizziness    Drowsiness    New rash    You don t get better in 2 to 3 days    Fever of 100.4 F (38 C) or higher that doesn t get lower with medicine  Date Last Reviewed: 1/3/2016    8125-9792 [employer]. [address] [place] [address]. All rights reserved. This information is not intended as a substitute for professional medical care. Always follow your healthcare professional's instructions.        Possible Gallstone with Biliary Colic (Presumed)    Your abdominal pain is may be due to spasm of the gallbladder. This is called gallbladder or biliary colic. The gallbladder is a small sac under the liver, which stores and releases bile. Bile is a fluid that aids in the digestion of fat. Eating fatty food stimulates the gallbladder to contract, and release the bile. A gallstone may form in this sac. Although most people do not have symptoms, when the stone moves and blocks the passage of bile out of the bladder, it can cause pain and even an infection.  To be more certain of the diagnosis, you may need to have an ultrasound, CT-scan or other special test.  A number of things increase the risk for developing gallstones:    Women are more likely    Obesity    Increasing age    Losing or gaining weight  quickly    High calorie diet    Pregnancy    Hormone therapy    Diabetes  The most common symptoms are:    Abdominal pain, cramping, aching    Nausea, vomiting    Fever  Many illnesses can cause these symptoms. This pain usually starts in the upper right side of your abdomen. Sometimes it can radiate to your right shoulder, back and arm. It usually starts suddenly, becomes more intense quickly, and then gradually decreases and disappears over a couple of hours. Elderly people and diabetics may have difficulty showing where the pain is exactly.  Home care    Rest in bed and follow a clear liquid diet until feeling better. If pain or nausea medicine was given to help with your symptoms, take these as directed.    You can take acetaminophen or ibuprofen for pain, unless you were given a different pain medicine to use.Note: If you have chronic liver or kidney disease or ever had a stomach ulcer or GI bleeding or are taking blood thinner medications, talk with your doctor before using these medicines.    Fat in your diet makes the gallbladder contract and may cause increased pain. Therefore, avoid fat in your diet over the next two days and follow a low-fat diet after that. If you are overweight, a low fat diet will help you lose weight.  Follow-up care  If a test was already scheduled for you, keep this appointment. Be sure you know how to prepare yourself for the test. Usually, you will be asked not to eat or drink anything for at least 8 hours before the test. Schedule an appointment with your own doctor after your test is complete to discuss the findings.  When to seek medical advice  Call your healthcare provider if any of the following occur:    Pain gets worse or moves to the right lower abdomen    Repeated vomiting    Swelling of the abdomen    Pain lasts over 6 hours    Fever of 100.4  F (38  C) or higher, or as directed by your healthcare provider    Weakness, dizziness    Dark urine or light colored  stools    Yellow color of the skin or eyes    Chest, arm, back, neck or jaw pain  Call 911  Get emergency medical care right away if any of these occur:    Trouble breathing    Very confused    Very drowsy or trouble awakening    Fainting or loss of consciousness    Rapid heart rate  Date Last Reviewed: 8/23/2015 2000-2017 [employer]. [address] [place] [address]. All rights reserved. This information is not intended as a substitute for professional medical care. Always follow your healthcare professional's instructions.          24 Hour Appointment Hotline       To make an appointment at any [facility], call [phone] ([phone]). If you don't have a family doctor or clinic, we will help you find one. [facility] clinics are conveniently located to serve the needs of you and your family.             Review of your medicines      Our records show that you are taking the medicines listed below. If these are incorrect, please call your family doctor or clinic.        Dose / Directions Last dose taken    Calcium Carb-Cholecalciferol 1000-800 MG-UNIT Tabs   Quantity:  100 tablet        TAKE 1 TABLET BY MOUTH DAILY WITH FOOD   Refills:  3        guaiFENesin-codeine 100-10 MG/5ML Soln solution   Commonly known as:  ROBITUSSIN AC   Dose:  5-10 mL   Quantity:  120 mL        Take 5-10 mLs by mouth nightly as needed for cough   Refills:  0        naproxen 500 MG tablet   Commonly known as:  NAPROSYN   Dose:  500 mg   Quantity:  30 tablet        Take 1 tablet (500 mg) by mouth 2 times daily as needed for moderate pain   Refills:  1        polyethylene glycol powder   Commonly known as:  MIRALAX   Dose:  1 capful   Quantity:  1 Bottle        Take 17 g (1 capful) by mouth daily INDICATION: CONSTIPATION, TO ACHIEVE 1-2 SOFT BMs PER DAY   Refills:  4        VITAMIN D (CHOLECALCIFEROL) PO        Take by mouth daily   Refills:  0                Procedures and tests performed during your  visit     *UA reflex to Microscopic (ED Lab POCT Only 3-11)    CBC with platelets differential    CT Abdomen Pelvis w/o Contrast (stone protocol)    Comprehensive metabolic panel    Lipase    Urine Microscopic      Orders Needing Specimen Collection     None      Pending Results     Date and Time Order Name Status Description    8/22/2017 1856 CT Abdomen Pelvis w/o Contrast (stone protocol) Preliminary             Pending Culture Results     No orders found from 8/20/2017 to 8/23/2017.            Pending Results Instructions     If you had any lab results that were not finalized at the time of your Discharge, you can call the ED Lab Result RN at [phone]. You will be contacted by this team for any positive Lab results or changes in treatment. The nurses are available 7 days a week from 10A to 6:30P.  You can leave a message 24 hours per day and they will return your call.        Test Results From Your Hospital Stay        8/22/2017  7:20 PM      Component Results     Component Value Ref Range & Units Status    WBC 7.6 4.0 - 11.0 10e9/L Final    RBC Count 4.41 3.8 - 5.2 10e12/L Final    Hemoglobin 13.3 11.7 - 15.7 g/dL Final    Hematocrit 39.0 35.0 - 47.0 % Final    MCV 88 78 - 100 fl Final    MCH 30.2 26.5 - 33.0 pg Final    MCHC 34.1 31.5 - 36.5 g/dL Final    RDW 13.2 10.0 - 15.0 % Final    Platelet Count 217 150 - 450 10e9/L Final    Diff Method Automated Method  Final    % Neutrophils 63.6 % Final    % Lymphocytes 24.5 % Final    % Monocytes 7.7 % Final    % Eosinophils 3.6 % Final    % Basophils 0.3 % Final    % Immature Granulocytes 0.3 % Final    Nucleated RBCs 0 0 /100 Final    Absolute Neutrophil 4.8 1.6 - 8.3 10e9/L Final    Absolute Lymphocytes 1.9 0.8 - 5.3 10e9/L Final    Absolute Monocytes 0.6 0.0 - 1.3 10e9/L Final    Absolute Eosinophils 0.3 0.0 - 0.7 10e9/L Final    Absolute Basophils 0.0 0.0 - 0.2 10e9/L Final    Abs Immature Granulocytes 0.0 0 - 0.4 10e9/L Final    Absolute Nucleated RBC 0.0   Final         8/22/2017  7:37 PM      Component Results     Component Value Ref Range & Units Status    Sodium 142 133 - 144 mmol/L Final    Potassium 3.8 3.4 - 5.3 mmol/L Final    Chloride 107 94 - 109 mmol/L Final    Carbon Dioxide 26 20 - 32 mmol/L Final    Anion Gap 9 3 - 14 mmol/L Final    Glucose 72 70 - 99 mg/dL Final    Urea Nitrogen 13 7 - 30 mg/dL Final    Creatinine 0.63 0.52 - 1.04 mg/dL Final    GFR Estimate >90 >60 mL/min/1.7m2 Final    Non  GFR Calc    GFR Estimate If Black >90 >60 mL/min/1.7m2 Final    African American GFR Calc    Calcium 8.7 8.5 - 10.1 mg/dL Final    Bilirubin Total 0.3 0.2 - 1.3 mg/dL Final    Albumin 3.5 3.4 - 5.0 g/dL Final    Protein Total 7.1 6.8 - 8.8 g/dL Final    Alkaline Phosphatase 69 40 - 150 U/L Final    ALT 27 0 - 50 U/L Final    AST 19 0 - 45 U/L Final         8/22/2017  7:35 PM      Component Results     Component Value Ref Range & Units Status    Lipase 114 73 - 393 U/L Final         8/22/2017  7:46 PM      Narrative     CT ABDOMEN AND PELVIS WITHOUT CONTRAST   8/22/2017 7:25 PM     HISTORY: Right-sided flank pain.    COMPARISON: 12/9/2015.    TECHNIQUE: Without intravenous or oral contrast, helical sections were  acquired from the top of the diaphragm through the pubic symphysis.  Coronal reconstructions were generated. Radiation dose for this scan  was reduced using automated exposure control, adjustment of the mA  and/or kV according to the patient's size, or iterative reconstruction  technique. (Renal stone protocol)    FINDINGS:     Right urinary tract: No renal or ureteral calculi. No dilatation of  the intrarenal collecting system or ureter.    Left urinary tract: No renal or ureteral calculi. No dilatation of the  intrarenal collecting system or ureter.    Urinary bladder: No visualized calculi.    Remainder of the abdomen and pelvis: The liver, spleen, pancreas and  adrenal glands are unremarkable to the limits of a noncontrast CT  scan. A  few small gallstones in the gallbladder. The small and large  bowel are normal in caliber. A few colonic diverticula are present  without evidence of diverticulitis. The appendix is unremarkable. No  bowel wall thickening, pneumatosis or free intraperitoneal gas. The  uterus is present. No enlarged lymph nodes or free fluid in the  abdomen or pelvis.    Scan through the lower chest is unremarkable.        Impression     IMPRESSION:   1. No renal or ureteral calculi or evidence of urinary obstruction.  2. No other cause of acute pain identified in the abdomen or pelvis.  The appendix is unremarkable.  3. Cholelithiasis.  4. A few colonic diverticula are present without evidence of  diverticulitis.         8/22/2017  8:07 PM      Component Results     Component Value Ref Range & Units Status    Color Urine Yellow  Final    Appearance Urine Clear  Final    Glucose Urine Negative NEG^Negative mg/dL Final    Bilirubin Urine Negative NEG^Negative Final    Ketones Urine Negative NEG^Negative mg/dL Final    Specific Gravity Urine 1.010 1.003 - 1.035 Final    Blood Urine Small (A) NEG^Negative Final    pH Urine 6.0 5.0 - 7.0 pH Final    Protein Albumin Urine Negative NEG^Negative mg/dL Final    Urobilinogen Urine 0.2 0.2 - 1.0 EU/dL Final    Nitrite Urine Negative NEG^Negative Final    Leukocyte Esterase Urine Trace (A) NEG^Negative Final    Source Midstream Urine  Final         8/22/2017  8:09 PM      Component Results     Component Value Ref Range & Units Status    WBC Urine O - 2 OTO2^O - 2 /HPF Final    RBC Urine O - 2 OTO2^O - 2 /HPF Final                Clinical Quality Measure: Blood Pressure Screening     Your blood pressure was checked while you were in the emergency department today. The last reading we obtained was  BP: 123/84 . Please read the guidelines below about what these numbers mean and what you should do about them.  If your systolic blood pressure (the top number) is less than 120 and your diastolic  "blood pressure (the bottom number) is less than 80, then your blood pressure is normal. There is nothing more that you need to do about it.  If your systolic blood pressure (the top number) is 120-139 or your diastolic blood pressure (the bottom number) is 80-89, your blood pressure may be higher than it should be. You should have your blood pressure rechecked within a year by a primary care provider.  If your systolic blood pressure (the top number) is 140 or greater or your diastolic blood pressure (the bottom number) is 90 or greater, you may have high blood pressure. High blood pressure is treatable, but if left untreated over time it can put you at risk for heart attack, stroke, or kidney failure. You should have your blood pressure rechecked by a primary care provider within the next 4 weeks.  If your provider in the emergency department today gave you specific instructions to follow-up with your doctor or provider even sooner than that, you should follow that instruction and not wait for up to 4 weeks for your follow-up visit.        Thank you for choosing [facility]       Thank you for choosing [facility] for your care. Our goal is always to provide you with excellent care. Hearing back from our patients is one way we can continue to improve our services. Please take a few minutes to complete the written survey that you may receive in the mail after you visit with us. Thank you!        [employer] Information     [employer] lets you send messages to your doctor, view your test results, renew your prescriptions, schedule appointments and more. To sign up, go to [email or web address] Click on \"Log in\" on the left side of the screen, which will take you to the Welcome page. Then click on \"Sign up Now\" on the right side of the page.     You will be asked to enter the access code listed below, as well as some personal information. Please follow the directions to create your username and password.     Your access code " is: [email or web address]  Expires: 2017  8:28 PM     Your access code will  in 90 days. If you need help or a new code, please call your [facility] clinic or [phone].        Care EveryWhere ID     This is your Care EveryWhere ID. This could be used by other organizations to access your [facility] medical records  [id number]        Equal Access to Services     OMARI DUFFY : Hadii kaley reyeso Socindy, waaxda luqadaha, qaybta kaalmada adeegyada, becky multani . So [facility] [phone].    ATENCIÓN: Si habla cindy, tiene a banuelos disposición servicios gratuitos de asistencia lingüística. Llame al [phone].    We comply with applicable federal civil rights laws and [place] laws. We do not discriminate on the basis of race, color, national origin, age, disability sex, sexual orientation or gender identity.            After Visit Summary       This is your record. Keep this with you and show to your community pharmacist(s) and doctor(s) at your next visit.

## 2017-08-22 NOTE — ED PROVIDER NOTES
"  History   Chief Complaint:  Abdominal Pain    HPI   HPI was translated by the daughter.   Graciela Castorena is a 52 year old female with a history of a hemorraghic hemorrhoid and kidney stones brought to the ED by her daughter for evaluation of right sided abdominal pain and diarrhea. The patient reports her hemorrhoid has been bleeding because she has been having some diarrhea over the past 2 days; she is having dysuria and lower back pain too. Her abdominal pain is more in the RUQ. The patient reports she has kidney stones still in her kidney. She denies vomiting, chest pain, shortness of breath, headache, vision loss, or abdominal surgery.    Allergies:  Cipro     Medications:    Calcium Carb-Cholecalciferol 1000-800 MG-UNIT TABS   VITAMIN D, CHOLECALCIFEROL, PO   naproxen (NAPROSYN) 500 MG tablet   guaiFENesin-codeine (ROBITUSSIN AC) 100-10 MG/5ML SOLN solution   polyethylene glycol (MIRALAX) powder     Past Medical History:    Bleeding hemorrhoid  Adhesive capsulitis  Kidney stone    Past Surgical History:    History reviewed. No pertinent surgical history.    Family History:    History reviewed. No pertinent family history.     Social History:  Marital Status:   [2]  PCP Clarice Gilman  Smoking status: no  Alcohol use: no  Arrived to ED with daughter    Review of Systems   Eyes: Negative for visual disturbance.   Respiratory: Negative for shortness of breath.    Cardiovascular: Negative for chest pain.   Gastrointestinal: Positive for anal bleeding and diarrhea. Negative for abdominal pain and vomiting.   Genitourinary: Positive for dysuria.   Musculoskeletal: Positive for back pain.   Neurological: Negative for headaches.   All other systems reviewed and are negative.    Physical Exam   Patient Vitals for the past 24 hrs:   BP Temp Temp src Heart Rate Resp SpO2 Height Weight   08/22/17 1710 112/70 97.7  F (36.5  C) Temporal 81 18 100 % 1.6 m (5' 3\") 72.6 kg (160 lb)     Physical Exam  Physical Exam "   General:  Sitting on bed with daughter at bedside.   HENT:  No obvious trauma to head  Right Ear:  External ear normal.   Left Ear:  External ear normal.   Nose:  Nose normal.   Eyes:  Conjunctivae and EOM are normal. Pupils are equal, round, and reactive.   Neck: Normal range of motion. Neck supple. No tracheal deviation present.   CV:  Normal heart sounds. No murmur heard.  Pulm/Chest: Effort normal and breath sounds normal.   Abd: Soft. No distension. There is no tenderness. There is no rigidity, no rebound and no guarding. positive Grover's sign.  M/S: Normal range of motion. Right CVA tenderness/   Neuro: Alert. GCS 15.  Skin: Skin is warm and dry. No rash noted. Not diaphoretic.   Psych: Normal mood and affect. Behavior is normal.     Emergency Department Course   Imaging:  Radiographic findings were communicated with the patient and family who voiced understanding of the findings.  CT abdomen pelvis w/o contrast:  1. No renal or ureteral calculi or evidence of urinary obstruction.  2. No other cause of acute pain identified in the abdomen or pelvis. The appendix is unremarkable.  3. Cholelithiasis.  4. A few colonic diverticula are present without evidence of diverticulitis.  As read by Radiology.    Laboratory:  CBC: WNL (WBC 7.6, HGB 13.3, )   CMP: WNL (Creatinine 0.63)  Lipase: 114  UA: blood small(A), leukocytes esterase trace(A) o/w WNL    Emergency Department Course:  Past medical records, nursing notes, and vitals reviewed.  1850: I performed an exam of the patient and obtained history, as documented above.  IV inserted and blood drawn.  The patient was sent for an abdominal CT scan while in the emergency department, findings above.  2030: I rechecked the patient.  Findings and plan explained to the Patient and daughter. Patient discharged home with instructions regarding supportive care, medications, and reasons to return. The importance of close follow-up was reviewed.     Impression & Plan     Medical Decision Making:  Graciela Castorena is a very pleasant 52 year old female who presents for evaluation of nausea and diarrhea with mild abdominal pain in a nonfocal abdominal exam.  There are no ill contacts.  I considered a broad differential diagnosis for this patient including viral gastroenteritis, bacterial infection of the large intestine (salmonella, shigella, campylobacter, e coli, etc), bowel obstruction, intra-abdominal infection such as colitis, food poisoning, cholecystitis, UTI, pyelonephritis, appendicitis, kidney stone, etc.  There are no signs of worrisome intra-abdominal pathologies detected during the visit today.  She has a completely benign abdominal exam without rebound, guarding, or marked tenderness to palpation. An abdominal CT scan was obtained for concern of possible kidney stone which is negative. There is evidence of cholelithiasis and the patient has a history of this. I reviewed with her her symptoms may be related to biliary colic. The patient feels significantly better now. I recommended she maintain a bland diet and follow-up with a general surgeon. Supportive outpatient management is therefore indicated.  Abdominal pain precautions are given for home.   No indication for stool studies at this time. She passed oral challenge here in ED. It was discussed to return to the ED for blood in stool, increasing pain, or fevers more than 102.  Incidentally the patient has chronic hemorrhoids and with having the diarrhea she has had slight more blood from the hemorrhoids when she wipes only. She is hemodynamically stable and a hemoglobin is stable as well. I do not believe this represents a GI bleed. I recommended she try over-the-counter medication such as Preparation H. Feels much improved after interventions in ED.     The treatment plan was discussed with the patient and they expressed understanding of this plan and consented to the plan.  In addition, the patient will return to the  emergency department if their symptoms persist, worsen, if new symptoms arise or if there is any concern as other pathology may be present that is not evident at this time. They also understand the importance of close follow up in the clinic and if unable to do so will return to the emergency department for a reevaluation. All questions were answered.    Diagnosis:    ICD-10-CM    1. Abdominal pain in female R10.9    2. Diarrhea, unspecified type R19.7    3. Biliary colic K80.50        Disposition:  Discharge to home with daughter.     Discharge Medications:  New Prescriptions    No medications on file       Lyndsey Echeverria  8/22/2017    EMERGENCY DEPARTMENT    ILyndsey, am serving as a scribe at 6:49 PM on 8/22/2017 to document services personally performed by Mac Luque DO based on my observations and the provider's statements to me.        Mac Luque DO  08/22/17 2042

## 2017-08-22 NOTE — ED AVS SNAPSHOT
Emergency Department    [address] [place] [id number]    Phone:  [phone]    Fax:  [phone]                                       Graciela Castorena   MRN: [id number]    Department:   Emergency Department   Date of Visit:  8/22/2017           After Visit Summary Signature Page     I have received my discharge instructions, and my questions have been answered. I have discussed any challenges I see with this plan with the nurse or doctor.    ..........................................................................................................................................  Patient/Patient Representative Signature      ..........................................................................................................................................  Patient Representative Print Name and Relationship to Patient    ..................................................               ................................................  Date                                            Time    ..........................................................................................................................................  Reviewed by Signature/Title    ...................................................              ..............................................  Date                                                            Time

## 2017-08-23 NOTE — DISCHARGE INSTRUCTIONS
Abdominal Pain    Abdominal pain is pain in the stomach or belly area. Everyone has this pain from time to time. In many cases it goes away on its own. But abdominal pain can sometimes be due to a serious problem, such as appendicitis. So it s important to know when to seek help.  Causes of abdominal pain  There are many possible causes of abdominal pain. Common causes in adults include:    Constipation, diarrhea, or gas    Stomach acid flowing back up into the esophagus (acid reflux or heartburn)    Severe acid reflux, called GERD (gastroesophageal reflux disease)    A sore in the lining of the stomach or small intestine (peptic ulcer)    Inflammation of the gallbladder, liver, or pancreas    Gallstones or kidney stones    Appendicitis     Intestinal blockage     An internal organ pushing through a muscle or other tissue (hernia)    Urinary tract infections    In women, menstrual cramps, fibroids, or endometriosis    Inflammation or infection of the intestines  Diagnosing the cause of abdominal pain  Your healthcare provider will do a physical exam help find the cause of your pain. If needed, tests will be ordered. Belly pain has many possible causes. So it can be hard to find the reason for your pain. Giving details about your pain can help. Tell your provider where and when you feel the pain, and what makes it better or worse. Also let your provider know if you have other symptoms such as:    Fever    Tiredness    Upset stomach (nausea)    Vomiting    Changes in bathroom habits  Treating abdominal pain  Some causes of pain need emergency medical treatment right away. These include appendicitis or a bowel blockage. Other problems can be treated with rest, fluids, or medicines. Your healthcare provider can give you specific instructions for treatment or self-care based on what is causing your pain.  If you have vomiting or diarrhea, sip water or other clear fluids. When you are ready to eat solid foods again,  start with small amounts of easy-to-digest, low-fat foods. These include apple sauce, toast, or crackers.   When to seek medical care  Call 911 or go to the hospital right away if you:    Can t pass stool and are vomiting    Are vomiting blood or have bloody diarrhea or black, tarry diarrhea    Have chest, neck, or shoulder pain    Feel like you might pass out    Have pain in your shoulder blades with nausea    Have sudden, severe belly pain    Have new, severe pain unlike any you have felt before    Have a belly that is rigid, hard, and tender to touch  Call your healthcare provider if you have:    Pain for more than 5 days    Bloating for more than 2 days    Diarrhea for more than 5 days    A fever of 100.4 F (38.0 C) or higher, or as directed by your provider    Pain that gets worse    Weight loss for no reason    Continued lack of appetite    Blood in your stool  How to prevent abdominal pain  Here are some tips to help prevent abdominal pain:    Eat smaller amounts of food at one time.    Avoid greasy, fried, or other high-fat foods.    Avoid foods that give you gas.    Exercise regularly.    Drink plenty of fluids.  To help prevent GERD symptoms:    Quit smoking.    Reduce alcohol and certain foods that increase stomach acid.    Avoid aspirin and over-the-counter pain and fever medicines (NSAIDS or nonsteroidal anti-inflammatory drugs), if possible    Lose extra weight.    Finish eating at least 2 hours before you go to bed or lie down.    Raise the head of your bed.  Date Last Reviewed: 7/1/2016 2000-2017 [employer]. [address] [place] [address], [place] [address]. All rights reserved. This information is not intended as a substitute for professional medical care. Always follow your healthcare professional's instructions.          Uncertain Causes of Diarrhea (Adult)    Diarrhea is when stools are loose and watery. This can be caused by:    Viral infections    Bacterial infections    Food  poisoning    Parasites    Irritable bowel syndrome (IBS)    Inflammatory bowel diseases such as ulcerative colitis, Crohn's disease, and celiac disease    Food intolerance, such as to lactose, the sugar found in milk and milk products    Reaction to medicines like antibiotics, laxatives, cancer drugs, and antacids  Along with diarrhea, you may also have:    Abdominal pain and cramping    Nausea and vomiting    Loss of bowel control    Fever and chills    Bloody stools  In some cases, antibiotics may help to treat diarrhea. You may have a stool sample test. This is done to see what is causing your diarrhea, and if antibiotics will help treat it. The results of a stool sample test may take up to 2 days. The healthcare provider may not give you antibiotics until he or she has the stool test results.  Diarrhea can cause dehydration. This is the loss of too much water and other fluids from the body. When this occurs, body fluid must be replaced. This can be done with oral rehydration solutions. Oral rehydration solutions are available at drugstores and grocery stores without a prescription.  Home care  Follow all instructions given by your healthcare provider. Rest at home for the next 24 hours, or until you feel better. Avoid caffeine, tobacco, and alcohol. These can make diarrhea, cramping, and pain worse.  If taking medicines:    Don t take over-the-counter diarrhea or nausea medicines unless your healthcare provider tells you to.    You may use acetaminophen or NSAID medicines like ibuprofen or naproxen to reduce pain and fever. Don t use these if you have chronic liver or kidney disease, or ever had a stomach ulcer or gastrointestinal bleeding. Don't use NSAID medicines if you are already taking one for another condition (like arthritis) or are on daily aspirin therapy (such as for heart disease or after a stroke). Talk with your healthcare provider first.    If antibiotics were prescribed, be sure you take them  until they are finished. Don t stop taking them even when you feel better. Antibiotics must be taken as a full course.  To prevent the spread of illness:    Remember that washing with soap and water and using alcohol-based  is the best way to prevent the spread of infection.    Clean the toilet after each use.    Wash your hands before eating.    Wash your hands before and after preparing food. Keep in mind that people with diarrhea or vomiting should not prepare food for others.    Wash your hands after using cutting boards, countertops, and knives that have been in contact with raw foods.    Wash and then peel fruits and vegetables.    Keep uncooked meats away from cooked and ready-to-eat foods.    Use a food thermometer when cooking. Cook poultry to at least 165 F (74 C). Cook ground meat (beef, veal, pork, lamb) to at least 160 F (71 C). Cook fresh beef, veal, lamb, and pork to at least 145 F (63 C).    Don t eat raw or undercooked eggs (poached or kirt side up), poultry, meat, or unpasteurized milk and juices.  Food and drinks  The main goal while treating vomiting or diarrhea is to prevent dehydration. This is done by taking small amounts of liquids often.    Keep in mind that liquids are more important than food right now.    Drink only small amounts of liquids at a time.    Don t force yourself to eat, especially if you are having cramping, vomiting, or diarrhea. Don t eat large amounts at a time, even if you are hungry.    If you eat, avoid fatty, greasy, spicy, or fried foods.    Don t eat dairy foods or drink milk if you have diarrhea. These can make diarrhea worse.  During the first 24 hours you can try:    Oral rehydration solutions. Do not use sports drinks. They have too much sugar and not enough electrolytes.    Soft drinks without caffeine    Ginger ale    Water (plain or flavored)    Decaf tea or coffee    Clear broth, consommé, or bouillon    Gelatin, popsicles, or frozen fruit juice  bars  The second 24 hours, if you are feeling better, you can add:    Hot cereal, plain toast, bread, rolls, or crackers    Plain noodles, rice, mashed potatoes, chicken noodle soup, or rice soup    Unsweetened canned fruit (no pineapple)    Bananas  As you recover:    Limit fat intake to less than 15 grams per day. Don t eat margarine, butter, oils, mayonnaise, sauces, gravies, fried foods, peanut butter, meat, poultry, or fish.    Limit fiber. Don t eat raw or cooked vegetables, fresh fruits except bananas, or bran cereals.    Limit caffeine and chocolate.    Limit dairy.    Don t use spices or seasonings except salt.    Go back to your normal diet over time, as you feel better and your symptoms improve.    If the symptoms come back, go back to a simple diet or clear liquids.  Follow-up care  Follow up with your healthcare provider, or as advised. If a stool sample was taken or cultures were done, call the healthcare provider for the results as instructed.  Call 911  Call 911 if you have any of these symptoms:    Trouble breathing    Confusion    Extreme drowsiness or trouble walking    Loss of consciousness    Rapid heart rate    Chest pain    Stiff neck    Seizure  When to seek medical advice  Call your healthcare provider right away if any of these occur:    Abdominal pain that gets worse    Constant lower right abdominal pain    Continued vomiting and inability to keep liquids down    Diarrhea more than 5 times a day    Blood in vomit or stool    Dark urine or no urine for 8 hours, dry mouth and tongue, tiredness, weakness, or dizziness    Drowsiness    New rash    You don t get better in 2 to 3 days    Fever of 100.4 F (38 C) or higher that doesn t get lower with medicine  Date Last Reviewed: 1/3/2016    3179-1208 [employer]. [address] [place] [address], [place] [address]. All rights reserved. This information is not intended as a substitute for professional medical care. Always follow your  healthcare professional's instructions.        Possible Gallstone with Biliary Colic (Presumed)    Your abdominal pain is may be due to spasm of the gallbladder. This is called gallbladder or biliary colic. The gallbladder is a small sac under the liver, which stores and releases bile. Bile is a fluid that aids in the digestion of fat. Eating fatty food stimulates the gallbladder to contract, and release the bile. A gallstone may form in this sac. Although most people do not have symptoms, when the stone moves and blocks the passage of bile out of the bladder, it can cause pain and even an infection.  To be more certain of the diagnosis, you may need to have an ultrasound, CT-scan or other special test.  A number of things increase the risk for developing gallstones:    Women are more likely    Obesity    Increasing age    Losing or gaining weight quickly    High calorie diet    Pregnancy    Hormone therapy    Diabetes  The most common symptoms are:    Abdominal pain, cramping, aching    Nausea, vomiting    Fever  Many illnesses can cause these symptoms. This pain usually starts in the upper right side of your abdomen. Sometimes it can radiate to your right shoulder, back and arm. It usually starts suddenly, becomes more intense quickly, and then gradually decreases and disappears over a couple of hours. Elderly people and diabetics may have difficulty showing where the pain is exactly.  Home care    Rest in bed and follow a clear liquid diet until feeling better. If pain or nausea medicine was given to help with your symptoms, take these as directed.    You can take acetaminophen or ibuprofen for pain, unless you were given a different pain medicine to use.Note: If you have chronic liver or kidney disease or ever had a stomach ulcer or GI bleeding or are taking blood thinner medications, talk with your doctor before using these medicines.    Fat in your diet makes the gallbladder contract and may cause increased  pain. Therefore, avoid fat in your diet over the next two days and follow a low-fat diet after that. If you are overweight, a low fat diet will help you lose weight.  Follow-up care  If a test was already scheduled for you, keep this appointment. Be sure you know how to prepare yourself for the test. Usually, you will be asked not to eat or drink anything for at least 8 hours before the test. Schedule an appointment with your own doctor after your test is complete to discuss the findings.  When to seek medical advice  Call your healthcare provider if any of the following occur:    Pain gets worse or moves to the right lower abdomen    Repeated vomiting    Swelling of the abdomen    Pain lasts over 6 hours    Fever of 100.4  F (38  C) or higher, or as directed by your healthcare provider    Weakness, dizziness    Dark urine or light colored stools    Yellow color of the skin or eyes    Chest, arm, back, neck or jaw pain  Call 911  Get emergency medical care right away if any of these occur:    Trouble breathing    Very confused    Very drowsy or trouble awakening    Fainting or loss of consciousness    Rapid heart rate  Date Last Reviewed: 8/23/2015 2000-2017 [employer]. [address] [place] [address]. All rights reserved. This information is not intended as a substitute for professional medical care. Always follow your healthcare professional's instructions.

## 2017-09-12 ENCOUNTER — APPOINTMENT (OUTPATIENT)
Dept: GENERAL RADIOLOGY | Facility: CLINIC | Age: 52
End: 2017-09-12
Attending: PHYSICIAN ASSISTANT
Payer: COMMERCIAL

## 2017-09-12 ENCOUNTER — APPOINTMENT (OUTPATIENT)
Dept: CT IMAGING | Facility: CLINIC | Age: 52
End: 2017-09-12
Attending: PHYSICIAN ASSISTANT
Payer: COMMERCIAL

## 2017-09-12 ENCOUNTER — HOSPITAL ENCOUNTER (EMERGENCY)
Facility: CLINIC | Age: 52
Discharge: HOME | End: 2017-09-12
Attending: PHYSICIAN ASSISTANT | Admitting: PHYSICIAN ASSISTANT
Payer: COMMERCIAL

## 2017-09-12 VITALS
RESPIRATION RATE: 16 BRPM | HEIGHT: 63 IN | TEMPERATURE: 97.3 F | HEART RATE: 83 BPM | DIASTOLIC BLOOD PRESSURE: 52 MMHG | BODY MASS INDEX: 28.35 KG/M2 | WEIGHT: 160 LBS | SYSTOLIC BLOOD PRESSURE: 118 MMHG | OXYGEN SATURATION: 98 %

## 2017-09-12 DIAGNOSIS — S09.90XA CLOSED HEAD INJURY, INITIAL ENCOUNTER: ICD-10-CM
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
## 2017-09-12 DIAGNOSIS — R07.89 CHEST WALL PAIN: ICD-10-CM

## 2017-09-12 DIAGNOSIS — S00.83XA FACIAL CONTUSION, INITIAL ENCOUNTER: ICD-10-CM

## 2017-09-12 DIAGNOSIS — M25.511 ACUTE PAIN OF RIGHT SHOULDER: ICD-10-CM

## 2017-09-12 PROCEDURE — 70486 CT MAXILLOFACIAL W/O DYE: CPT

## 2017-09-12 PROCEDURE — 99285 EMERGENCY DEPT VISIT HI MDM: CPT | Mod: 25

## 2017-09-12 PROCEDURE — 73030 X-RAY EXAM OF SHOULDER: CPT | Mod: RT

## 2017-09-12 PROCEDURE — 70450 CT HEAD/BRAIN W/O DYE: CPT

## 2017-09-12 PROCEDURE — 25000132 ZZH RX MED GY IP 250 OP 250 PS 637: Performed by: PHYSICIAN ASSISTANT

## 2017-09-12 PROCEDURE — 71101 X-RAY EXAM UNILAT RIBS/CHEST: CPT | Mod: RT

## 2017-09-12 RX ORDER — ACETAMINOPHEN 325 MG/1
650 TABLET ORAL ONCE
Status: COMPLETED | OUTPATIENT
Start: 2017-09-12 | End: 2017-09-12

## 2017-09-12 RX ADMIN — ACETAMINOPHEN 650 MG: 325 TABLET, FILM COATED ORAL at 21:43
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
## 2017-09-12 NOTE — ED AVS SNAPSHOT
Emergency Department    [address] [place] [address]    Phone:  [phone]    Fax:  [phone]                                       Graciela Castorena   MRN: [id number]    Department:   Emergency Department   Date of Visit:  9/12/2017           After Visit Summary Signature Page     I have received my discharge instructions, and my questions have been answered. I have discussed any challenges I see with this plan with the nurse or doctor.    ..........................................................................................................................................  Patient/Patient Representative Signature      ..........................................................................................................................................  Patient Representative Print Name and Relationship to Patient    ..................................................               ................................................  Date                                            Time    ..........................................................................................................................................  Reviewed by Signature/Title    ...................................................              ..............................................  Date                                                            Time
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
## 2017-09-13 NOTE — DISCHARGE INSTRUCTIONS
Rest, ice, tylenol or ibuprofen for pain, follow up with primary care in 3 to 5 days for recheck to ensure improving.   Return if worsening pain, vomiting, fevers, vision loss or any other new concerns.     Discharge Instructions  Head Injury    You have been seen today for a head injury. You were checked for serious problems, like bleeding on the brain, but these problems cannot always be found right away.  Due to this risk, you should not be alone for 24 hours after your injury.  Follow up with your regular physician in 3 days.     Return to the Emergency Department if:    You are confused, have amnesia, or you are not acting right.    Your headache gets worse or you start to have a really bad headache even with your recommended treatment plan.    You vomit more than once.    You have a convulsion or seizure.    You have trouble walking.    You have weakness or paralysis in an arm or a leg.    You have blood or fluid coming from your ears or nose.    You have new symptoms or anything that worries you.    Sleeping:  It is okay for you to sleep, but someone should wake you up as instructed by your doctor, and someone should check on you at your usual time to wake up.     Activity:    Do not drive for at least 24 hours.    Do not drive if you have dizzy spells or trouble concentrating, or remembering things.    Do not return to any contact sports until cleared by your regular doctor.     Follow-up:  It is very important that you make an appointment with your clinic and go to the appointment.  If you do not follow-up with your regular doctor, it may result in missing an important development which could result in permanent injury or disability and/or lasting pain.  If there is any problem keeping your appointment, call your doctor or return to the Emergency Department.    MORE INFORMATION:    Concussion:  A concussion is a minor head injury that may cause temporary problems with the way your brain works.  Some  symptoms include:  confusion, amnesia, nausea and vomiting, dizziness, fatigue, memory or concentration problems, irritability and sleep problems.    CT Scans: Your evaluation today may have included a CT scan (CAT scan) to look for things like bleeding or a skull fracture (break).  CT scans involve radiation and too many CT scans can cause serious health problems like cancer, especially in children.  Because of this, your doctor may not have ordered a CT scan today if they think you are at low risk for a serious or life threatening problem.    If you were given a prescription for medicine here today, be sure to read all of the information (including the package insert) that comes with your prescription.  This will include important information about the medicine, its side effects, and any warnings that you need to know about.  The pharmacist who fills the prescription can provide more information and answer questions you may have about the medicine.  If you have questions or concerns that the pharmacist cannot address, please call or return to the Emergency Department.       Remember that you can always come back to the Emergency Department if you are not able to see your regular doctor in the amount of time listed above, if you get any new symptoms, or if there is anything that worries you.

## 2017-09-20 ENCOUNTER — OFFICE VISIT (OUTPATIENT)
Dept: SURGERY | Facility: CLINIC | Age: 52
End: 2017-09-20
Payer: COMMERCIAL

## 2017-09-20 VITALS
SYSTOLIC BLOOD PRESSURE: 110 MMHG | DIASTOLIC BLOOD PRESSURE: 70 MMHG | HEIGHT: 63 IN | BODY MASS INDEX: 28.35 KG/M2 | WEIGHT: 160 LBS | HEART RATE: 65 BPM

## 2017-09-20 DIAGNOSIS — K80.50 RECURRENT BILIARY COLIC: Primary | ICD-10-CM

## 2017-09-20 PROCEDURE — 99214 OFFICE O/P EST MOD 30 MIN: CPT | Performed by: SURGERY

## 2017-09-20 NOTE — MR AVS SNAPSHOT
"              After Visit Summary   9/20/2017    Graciela Castorena    MRN: [id number]           Patient Information     Date Of Birth          1965        Visit Information        Provider Department      9/20/2017 2:45 PM Santiago Cano MD; [employer] [facility] General Surgery       Follow-ups after your visit        Your next 10 appointments already scheduled     Sep 27, 2017  1:30 PM CDT   Office Visit with Genna Magallanes MD   [facility] ([facility]    [address] [address] [phone]           Bring a current list of meds and any records pertaining to this visit. For Physicals, please bring immunization records and any forms needing to be filled out. Please arrive 10 minutes early to complete paperwork.              Who to contact     If you have questions or need follow up information about today's clinic visit or your schedule please contact [facility] directly at [phone].  Normal or non-critical lab and imaging results will be communicated to you by Hexagohart, letter or phone within 4 business days after the clinic has received the results. If you do not hear from us within 7 days, please contact the clinic through AdverseEventst or phone. If you have a critical or abnormal lab result, we will notify you by phone as soon as possible.  Submit refill requests through Operative Mind or call your pharmacy and they will forward the refill request to us. Please allow 3 business days for your refill to be completed.          Additional Information About Your Visit        MyChart Information     Operative Mind lets you send messages to your doctor, view your test results, renew your prescriptions, schedule appointments and more. To sign up, go to [email or web address] . Click on \"Log in\" on the left side of the screen, which will take you to the Welcome page. Then " "click on \"Sign up Now\" on the right side of the page.     You will be asked to enter the access code listed below, as well as some personal information. Please follow the directions to create your username and password.     Your access code is: [email or web address]  Expires: 2017  8:28 PM     Your access code will  in 90 days. If you need help or a new code, please call your [facility] clinic or [phone].        Care EveryWhere ID     This is your Care EveryWhere ID. This could be used by other organizations to access your [facility] medical records  [id number]        Your Vitals Were     Pulse Height BMI (Body Mass Index)             65 5' 3\" (1.6 m) 28.34 kg/m2          Blood Pressure from Last 3 Encounters:   17 110/70   17 118/52   17 123/84    Weight from Last 3 Encounters:   17 160 lb (72.6 kg)   17 160 lb (72.6 kg)   17 160 lb (72.6 kg)              Today, you had the following     No orders found for display       Primary Care Provider Office Phone # Fax #    Clarice Gilman [phone]       [address] [place] [address]        Equal Access to Services     [facility] AH: Hadii kaley campos hadravindero Socindy, waaxda luqadaha, qaybta kaalmada adeegyada, becky multani . So [facility] [phone].    ATENCIÓN: Si habla español, tiene a banuelos disposición servicios gratuitos de asistencia lingüística. Llame al [phone].    We comply with applicable federal civil rights laws and [place] laws. We do not discriminate on the basis of race, color, national origin, age, disability sex, sexual orientation or gender identity.            Thank you!     Thank you for choosing [facility]  for your care. Our goal is always to provide you with excellent care. Hearing back from our patients is one way we can continue to improve our services. Please take a few minutes to complete the written survey that you may receive in the " mail after your visit with us. Thank you!             Your Updated Medication List - Protect others around you: Learn how to safely use, store and throw away your medicines at www.disposemymeds.org.          This list is accurate as of: 9/20/17  4:05 PM.  Always use your most recent med list.                   Brand Name Dispense Instructions for use Diagnosis    Calcium Carb-Cholecalciferol 1000-800 MG-UNIT Tabs     100 tablet    TAKE 1 TABLET BY MOUTH DAILY WITH FOOD    Myalgia       naproxen 500 MG tablet    NAPROSYN    30 tablet    Take 1 tablet (500 mg) by mouth 2 times daily as needed for moderate pain    Throat pain, Body aches, Chills (without fever)       polyethylene glycol powder    MIRALAX    1 Bottle    Take 17 g (1 capful) by mouth daily INDICATION: CONSTIPATION, TO ACHIEVE 1-2 SOFT BMs PER DAY    Constipation, unspecified constipation type       VITAMIN D (CHOLECALCIFEROL) PO      Take by mouth daily

## 2017-09-20 NOTE — LETTER
" 2017    RE: Graciela Castorena-:  [birth year]    HISTORY OF PRESENT ILLNESS:  Graciela Castorena is a 52 year old female who is seen in consultation at the request of Dr. Luque for evaluation of Recurrent biliary colic secondary to cholelithiasis.  For years Mrs. Castorena has been having biliary colic, she is known to have gallstones but has been afraid to proceed with surgical treatment.  Now she feels the attacks are happening too frequently and is more ready to proceed with surgical management.     REVIEW OF SYSTEMS:  Constitutional:  Negative for chills, fatigue, fever and weight change.  Eyes:  Negative for new vision problems.  ENT:  Negative for ENT pain.  Cardiovascular:  Negative for chest pain, palpitations.  Respiratory:  Negative for cough, dyspnea.  Gastrointestinal:  Postprandial abdominal pain, fearful of eating/anorexia  Musculoskeletal:  Negative for new arthralgias or myalgias.     Vitals: /70  Pulse 65  Ht 5' 3\" (1.6 m)  Wt 160 lb (72.6 kg)  BMI 28.34 kg/m2  BMI= Body mass index is 28.34 kg/(m^2).     EXAM:  GENERAL: healthy, alert and no distress   HEENT: moist mucus membranes, no scleral icterus  CARDIOVASCULAR:  RRR, No JVD  RESPIRATORY: non labored breathing  NECK: Neck supple. No noticeable masses.  ABDOMEN: benign  SKIN: No suspicious lesions or rashes        LABS/Imaging: CT scan and ultrasound were reviewed.     ASSESSMENT:  Graciela Castorena suffers from Recurrent biliary colic secondary to cholelithiasis.     PLAN:  Laparoscopic cholecystectomy.  Graciela Castorena understands the risk, benefits, hopeful outcomes, and possible complications, both in the short and in the long term.  All her questions answered, she will like to proceed with the propose procedure in the near future.     It is my pleasure to participate in the care of Graciela Castorena. Thank you for this consultation.      If you have any questions please give me a call.     Best regards,    Santiago Cano, " MD

## 2017-09-21 NOTE — PROGRESS NOTES
[facility] General Surgery Clinic Consultation    CHIEF COMPLAINT:  Chief Complaint   Patient presents with     Consult     Gallbladder        HISTORY OF PRESENT ILLNESS:  Graciela Castorena is a 52 year old female who is seen in consultation at the request of Dr. Luque for evaluation of Recurrent biliary colic secondary to cholelithiasis.  For years Mrs. Castorena has been having biliary colic, she is known to have gallstones but has been afraid to proceed with surgical treatment.  Now she feels the attacks are happening too frequently and is more ready to proceed with surgical management.    REVIEW OF SYSTEMS:  Constitutional:  Negative for chills, fatigue, fever and weight change.  Eyes:  Negative for new vision problems.  ENT:  Negative for ENT pain.  Cardiovascular:  Negative for chest pain, palpitations.  Respiratory:  Negative for cough, dyspnea.  Gastrointestinal:  Postprandial abdominal pain, fearful of eating/anorexia  Musculoskeletal:  Negative for new arthralgias or myalgias.    Past Medical History:   Diagnosis Date     Bleeding hemorrhoid      NO ACTIVE PROBLEMS        Past Surgical History:   Procedure Laterality Date     NO HISTORY OF SURGERY         Family History   Problem Relation Age of Onset     DIABETES No family hx of      Myocardial Infarction No family hx of      CEREBROVASCULAR DISEASE No family hx of      Coronary Artery Disease Early Onset No family hx of      CANCER No family hx of      no breast, ovarian, colon CA       Social History   Substance Use Topics     Smoking status: Never Smoker     Smokeless tobacco: Never Used     Alcohol use No       Patient Active Problem List   Diagnosis     Adhesive capsulitis       Allergies   Allergen Reactions     Ciprofloxacin Itching       Current Outpatient Prescriptions   Medication Sig Dispense Refill     Calcium Carb-Cholecalciferol 1000-800 MG-UNIT TABS TAKE 1 TABLET BY MOUTH DAILY WITH FOOD 100 tablet 3     VITAMIN D, CHOLECALCIFEROL, PO Take by  "mouth daily       naproxen (NAPROSYN) 500 MG tablet Take 1 tablet (500 mg) by mouth 2 times daily as needed for moderate pain 30 tablet 1     polyethylene glycol (MIRALAX) powder Take 17 g (1 capful) by mouth daily INDICATION: CONSTIPATION, TO ACHIEVE 1-2 SOFT BMs PER DAY 1 Bottle 4       Vitals: /70  Pulse 65  Ht 5' 3\" (1.6 m)  Wt 160 lb (72.6 kg)  BMI 28.34 kg/m2  BMI= Body mass index is 28.34 kg/(m^2).    EXAM:  GENERAL: healthy, alert and no distress   HEENT: moist mucus membranes, no scleral icterus  CARDIOVASCULAR:  RRR, No JVD  RESPIRATORY: non labored breathing  NECK: Neck supple. No noticeable masses.  ABDOMEN: benign  SKIN: No suspicious lesions or rashes      LABS/Imaging: CT scan and ultrasound were reviewed.    ASSESSMENT:  Graciela Castorena suffers from Recurrent biliary colic secondary to cholelithiasis.    PLAN:  Laparoscopic cholecystectomy.  Graciela Castorena understands the risk, benefits, hopeful outcomes, and possible complications, both in the short and in the long term.  All her questions answered, she will like to proceed with the propose procedure in the near future.    It is my pleasure to participate in the care of Graciela Castorena. Thank you for this consultation.     If you have any questions please give me a call.    Best regards,  Santiago Cano MD    Please route or send letter to:  Primary Care Provider (PCP), Referring Provider and Include Progress Note    Total time with patient visit: 30 minutes more than half spent in counseling, explanation of procedures and coordination of care.    "

## 2017-09-27 ENCOUNTER — OFFICE VISIT (OUTPATIENT)
Dept: INTERNAL MEDICINE | Facility: CLINIC | Age: 52
End: 2017-09-27
Payer: COMMERCIAL

## 2017-09-27 VITALS
WEIGHT: 157.8 LBS | HEART RATE: 102 BPM | DIASTOLIC BLOOD PRESSURE: 60 MMHG | BODY MASS INDEX: 27.96 KG/M2 | TEMPERATURE: 98.4 F | SYSTOLIC BLOOD PRESSURE: 102 MMHG | HEIGHT: 63 IN | OXYGEN SATURATION: 96 %

## 2017-09-27 DIAGNOSIS — R42 LIGHT HEADEDNESS: ICD-10-CM

## 2017-09-27 DIAGNOSIS — G44.041 INTRACTABLE CHRONIC PAROXYSMAL HEMICRANIA: Primary | ICD-10-CM

## 2017-09-27 PROCEDURE — 99214 OFFICE O/P EST MOD 30 MIN: CPT | Performed by: INTERNAL MEDICINE

## 2017-09-27 RX ORDER — METHYLPREDNISOLONE 4 MG
TABLET, DOSE PACK ORAL
Qty: 21 TABLET | Refills: 0 | Status: SHIPPED | OUTPATIENT
Start: 2017-09-27 | End: 2018-01-10

## 2017-09-27 NOTE — MR AVS SNAPSHOT
"              After Visit Summary   9/27/2017    Graciela Castorena    MRN: [id number]           Patient Information     Date Of Birth          1965        Visit Information        Provider Department      9/27/2017 1:15 PM Genna Magallanes MD; [employer] [facility]        Today's Diagnoses     Nonintractable episodic headache, unspecified headache type    -  1    Light headedness          Care Instructions    Please schedule head CT on way out.    ---    Medrol dose pack - use as directed.           Follow-ups after your visit        Future tests that were ordered for you today     Open Future Orders        Priority Expected Expires Ordered    CT Head w/o Contrast Routine  9/27/2018 9/27/2017            Who to contact     If you have questions or need follow up information about today's clinic visit or your schedule please contact [facility] directly at [phone].  Normal or non-critical lab and imaging results will be communicated to you by MyChart, letter or phone within 4 business days after the clinic has received the results. If you do not hear from us within 7 days, please contact the clinic through Daylight Digitalhart or phone. If you have a critical or abnormal lab result, we will notify you by phone as soon as possible.  Submit refill requests through ShopReply or call your pharmacy and they will forward the refill request to us. Please allow 3 business days for your refill to be completed.          Additional Information About Your Visit        MyChart Information     ShopReply lets you send messages to your doctor, view your test results, renew your prescriptions, schedule appointments and more. To sign up, go to [email or web address] . Click on \"Log in\" on the left side of the screen, which will take you to the Welcome page. Then click on \"Sign up Now\" on the right side of the page.     You will be asked to enter the access code listed below, " "as well as some personal information. Please follow the directions to create your username and password.     Your access code is: [email or web address]  Expires: 2017  8:28 PM     Your access code will  in 90 days. If you need help or a new code, please call your [facility] clinic or [phone].        Care EveryWhere ID     This is your Care EveryWhere ID. This could be used by other organizations to access your [facility] medical records  [id number]        Your Vitals Were     Pulse Temperature Height Pulse Oximetry BMI (Body Mass Index)       102 98.4  F (36.9  C) (Oral) 5' 3\" (1.6 m) 96% 27.95 kg/m2        Blood Pressure from Last 3 Encounters:   17 102/60   17 110/70   17 118/52    Weight from Last 3 Encounters:   17 157 lb 12.8 oz (71.6 kg)   17 160 lb (72.6 kg)   17 160 lb (72.6 kg)                 Today's Medication Changes          These changes are accurate as of: 17  2:00 PM.  If you have any questions, ask your nurse or doctor.               Start taking these medicines.        Dose/Directions    methylPREDNISolone 4 MG tablet   Commonly known as:  MEDROL DOSEPAK   Used for:  Nonintractable episodic headache, unspecified headache type, Light headedness   Started by:  Genna Magallanes MD        Follow package instructions   Quantity:  21 tablet   Refills:  0            Where to get your medicines      These medications were sent to [facility]/pharmacy #[id number] [address] [address]     Phone:  [phone]     methylPREDNISolone 4 MG tablet                Primary Care Provider Office Phone # Fax #    Clarice Gilman [phone]       [address] [address]        Equal Access to Services     [facility] : Ruperto Flores, waaxda luqadaha, qaybta kaalmabecky brooks. So [facility] [phone].    ATENCIÓN: Si lavelle white, " tiene a banuelos disposición servicios gratuitos de asistencia lingüística. Pretty pederson [phone].    We comply with applicable federal civil rights laws and [place] laws. We do not discriminate on the basis of race, color, national origin, age, disability sex, sexual orientation or gender identity.            Thank you!     Thank you for choosing [facility]  for your care. Our goal is always to provide you with excellent care. Hearing back from our patients is one way we can continue to improve our services. Please take a few minutes to complete the written survey that you may receive in the mail after your visit with us. Thank you!             Your Updated Medication List - Protect others around you: Learn how to safely use, store and throw away your medicines at www.disposemymeds.org.          This list is accurate as of: 9/27/17  2:00 PM.  Always use your most recent med list.                   Brand Name Dispense Instructions for use Diagnosis    Calcium Carb-Cholecalciferol 1000-800 MG-UNIT Tabs     100 tablet    TAKE 1 TABLET BY MOUTH DAILY WITH FOOD    Myalgia       methylPREDNISolone 4 MG tablet    MEDROL DOSEPAK    21 tablet    Follow package instructions    Nonintractable episodic headache, unspecified headache type, Light headedness       naproxen 500 MG tablet    NAPROSYN    30 tablet    Take 1 tablet (500 mg) by mouth 2 times daily as needed for moderate pain    Throat pain, Body aches, Chills (without fever)       polyethylene glycol powder    MIRALAX    1 Bottle    Take 17 g (1 capful) by mouth daily INDICATION: CONSTIPATION, TO ACHIEVE 1-2 SOFT BMs PER DAY    Constipation, unspecified constipation type       VITAMIN D (CHOLECALCIFEROL) PO      Take by mouth daily
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
## 2017-09-28 ENCOUNTER — HOSPITAL ENCOUNTER (OUTPATIENT)
Dept: CT IMAGING | Facility: CLINIC | Age: 52
Discharge: HOME | End: 2017-09-28
Attending: INTERNAL MEDICINE | Admitting: INTERNAL MEDICINE
Payer: COMMERCIAL

## 2017-09-28 DIAGNOSIS — R42 LIGHT HEADEDNESS: ICD-10-CM

## 2017-09-28 PROCEDURE — 70450 CT HEAD/BRAIN W/O DYE: CPT

## 2018-01-10 ENCOUNTER — OFFICE VISIT (OUTPATIENT)
Dept: URGENT CARE | Facility: URGENT CARE | Age: 53
End: 2018-01-10
Payer: COMMERCIAL

## 2018-01-10 ENCOUNTER — RADIANT APPOINTMENT (OUTPATIENT)
Dept: GENERAL RADIOLOGY | Facility: CLINIC | Age: 53
End: 2018-01-10
Attending: PHYSICIAN ASSISTANT
Payer: COMMERCIAL

## 2018-01-10 VITALS
DIASTOLIC BLOOD PRESSURE: 74 MMHG | BODY MASS INDEX: 28.34 KG/M2 | HEART RATE: 74 BPM | TEMPERATURE: 97.9 F | WEIGHT: 160 LBS | SYSTOLIC BLOOD PRESSURE: 120 MMHG | RESPIRATION RATE: 17 BRPM

## 2018-01-10 DIAGNOSIS — R10.11 ABDOMINAL PAIN, RIGHT UPPER QUADRANT: ICD-10-CM

## 2018-01-10 DIAGNOSIS — R11.2 NAUSEA AND VOMITING, INTRACTABILITY OF VOMITING NOT SPECIFIED, UNSPECIFIED VOMITING TYPE: ICD-10-CM

## 2018-01-10 DIAGNOSIS — R10.2 PELVIC PAIN IN FEMALE: ICD-10-CM

## 2018-01-10 DIAGNOSIS — N39.0 ACUTE UTI: Primary | ICD-10-CM

## 2018-01-10 LAB
ALBUMIN SERPL-MCNC: 3.5 G/DL (ref 3.4–5)
ALBUMIN UR-MCNC: NEGATIVE MG/DL
ALP SERPL-CCNC: 77 U/L (ref 40–150)
ALT SERPL W P-5'-P-CCNC: 24 U/L (ref 0–50)
AMYLASE SERPL-CCNC: 102 U/L (ref 30–110)
ANION GAP SERPL CALCULATED.3IONS-SCNC: 5 MMOL/L (ref 3–14)
APPEARANCE UR: CLEAR
AST SERPL W P-5'-P-CCNC: 20 U/L (ref 0–45)
BACTERIA #/AREA URNS HPF: ABNORMAL /HPF
BILIRUB SERPL-MCNC: 0.3 MG/DL (ref 0.2–1.3)
BILIRUB UR QL STRIP: NEGATIVE
BUN SERPL-MCNC: 11 MG/DL (ref 7–30)
CALCIUM SERPL-MCNC: 8.6 MG/DL (ref 8.5–10.1)
CHLORIDE SERPL-SCNC: 105 MMOL/L (ref 94–109)
CO2 SERPL-SCNC: 29 MMOL/L (ref 20–32)
COLOR UR AUTO: YELLOW
CREAT SERPL-MCNC: 0.65 MG/DL (ref 0.52–1.04)
GFR SERPL CREATININE-BSD FRML MDRD: >90 ML/MIN/1.7M2
GLUCOSE SERPL-MCNC: 105 MG/DL (ref 70–99)
GLUCOSE UR STRIP-MCNC: NEGATIVE MG/DL
HGB UR QL STRIP: ABNORMAL
KETONES UR STRIP-MCNC: NEGATIVE MG/DL
LEUKOCYTE ESTERASE UR QL STRIP: NEGATIVE
LIPASE SERPL-CCNC: 124 U/L (ref 73–393)
MUCOUS THREADS #/AREA URNS LPF: PRESENT /LPF
NITRATE UR QL: NEGATIVE
NON-SQ EPI CELLS #/AREA URNS LPF: ABNORMAL /LPF
PH UR STRIP: 6 PH (ref 5–7)
POTASSIUM SERPL-SCNC: 3.5 MMOL/L (ref 3.4–5.3)
PROT SERPL-MCNC: 7 G/DL (ref 6.8–8.8)
RBC #/AREA URNS AUTO: ABNORMAL /HPF
SODIUM SERPL-SCNC: 139 MMOL/L (ref 133–144)
SOURCE: ABNORMAL
SP GR UR STRIP: 1.02 (ref 1–1.03)
SPECIMEN SOURCE: NORMAL
UROBILINOGEN UR STRIP-ACNC: 0.2 EU/DL (ref 0.2–1)
WBC #/AREA URNS AUTO: ABNORMAL /HPF
WET PREP SPEC: NORMAL

## 2018-01-10 PROCEDURE — 80053 COMPREHEN METABOLIC PANEL: CPT | Performed by: PHYSICIAN ASSISTANT

## 2018-01-10 PROCEDURE — 82150 ASSAY OF AMYLASE: CPT | Performed by: PHYSICIAN ASSISTANT

## 2018-01-10 PROCEDURE — 83690 ASSAY OF LIPASE: CPT | Performed by: PHYSICIAN ASSISTANT

## 2018-01-10 PROCEDURE — 74019 RADEX ABDOMEN 2 VIEWS: CPT | Mod: FY

## 2018-01-10 PROCEDURE — 87086 URINE CULTURE/COLONY COUNT: CPT | Performed by: PHYSICIAN ASSISTANT

## 2018-01-10 PROCEDURE — 36415 COLL VENOUS BLD VENIPUNCTURE: CPT | Performed by: PHYSICIAN ASSISTANT

## 2018-01-10 PROCEDURE — 99214 OFFICE O/P EST MOD 30 MIN: CPT | Performed by: PHYSICIAN ASSISTANT

## 2018-01-10 PROCEDURE — 87210 SMEAR WET MOUNT SALINE/INK: CPT | Performed by: PHYSICIAN ASSISTANT

## 2018-01-10 PROCEDURE — 81001 URINALYSIS AUTO W/SCOPE: CPT | Performed by: PHYSICIAN ASSISTANT

## 2018-01-10 RX ORDER — ONDANSETRON 4 MG/1
4 TABLET, FILM COATED ORAL EVERY 8 HOURS PRN
Qty: 18 TABLET | Refills: 0 | Status: SHIPPED | OUTPATIENT
Start: 2018-01-10 | End: 2018-01-16

## 2018-01-10 RX ORDER — SULFAMETHOXAZOLE/TRIMETHOPRIM 800-160 MG
1 TABLET ORAL 2 TIMES DAILY
Qty: 6 TABLET | Refills: 0 | Status: SHIPPED | OUTPATIENT
Start: 2018-01-10 | End: 2018-01-13

## 2018-01-10 NOTE — PATIENT INSTRUCTIONS
(N39.0) Acute UTI  (primary encounter diagnosis)  Comment:   Plan: sulfamethoxazole-trimethoprim (BACTRIM         DS/SEPTRA DS) 800-160 MG per tablet            (R10.2) Pelvic pain in female  Comment:   Plan: UA with Microscopic reflex to Culture, Urine         Culture Aerobic Bacterial, Wet prep            (R10.11) Abdominal pain, right upper quadrant  Comment: in patient with history of gallstones.  Plan: Comprehensive metabolic panel (BMP + Alb, Alk         Phos, ALT, AST, Total. Bili, TP), Lipase,         Amylase, XR Abdomen 2 Views            (R11.2) Nausea and vomiting, intractability of vomiting not specified, unspecified vomiting type  Comment:   Plan: ondansetron (ZOFRAN) 4 MG tablet              What are Gallstones?    The gallbladder stores bile, a fluid made by the liver. Bile helps digest fats in the foods you eat. Gallstones form when certain substances in the bile crystallize and become solid. In some cases, the stones don t cause any symptoms. In others, they irritate the walls of the gallbladder. More serious problems can occur if stones move into nearby ducts (such as the common bile duct) and cause blockages. This can block the flow of bile and lead to pain, nausea, and infection.  Common symptoms  Gallbladder problems can cause painful attacks, often after a meal. Some people have only one attack. Others have many. Common symptoms include:    Severe, steady pain or aching in the upper right belly (abdomen), back, or right shoulder blade, lasting from 30 minutes to several hours    A dull ache beneath the ribs or breastbone    Nausea, upset stomach, or vomiting    A buildup of too much bile in the blood (jaundice), which causes yellowing of the skin and eyes, dark urine, and itching. Call your healthcare provider right away if this occurs.  Risk factors for gallstones  You are more at risk for gallstones if you:    Are a woman    Are obese    Have a history of very fast (rapid) weight loss    Have  certain intestinal diseases, such as Crohn s disease    Have certain blood diseases, such as sickle cell anemia    Have a family background that includes Native Americans or Spanish Americans  Diagnosis  Ultrasound is often used to look at the gallbladder and measure the size and exact location of gallstone.  Blood tests are used to measure liver enzymes and bilirubin. Both of these may be high if the gallstones are blocking bile flow or irritating the liver.  Treating gallstones  If your stones are not causing symptoms, you may choose to delay treatment. But if you ve had one or more painful attacks, your healthcare provider will likely recommend removing your gallbladder. This prevents more stones from forming and causing attacks. It also helps prevent problems, such as stones passing into the ducts and causing infection or pancreatitis. After the gallbladder is removed, your liver will still make bile to aid digestion.  If you re pregnant  Hormone changes during pregnancy can make bile more likely to form stones. If your gallbladder needs to be removed, your doctor will talk with you about the timing for surgery. In some cases, it can be delayed until after childbirth. In others, you may have surgery during pregnancy. This helps protect you and your baby s health.   Date Last Reviewed: 12/1/2016 2000-2017 [employer]. [address] [place] [address], [place] [address]. All rights reserved. This information is not intended as a substitute for professional medical care. Always follow your healthcare professional's instructions.

## 2018-01-10 NOTE — PROGRESS NOTES
SUBJECTIVE  HPI:  Graciela Castorena is a 53 year old female who presents with the CC of abdominal/pelvic pain.  1) She reports that she has had some issues with her gallbladder in the past.    She complains of right mid and upper quadrant discomfort for the past month, worse this week.  Pain is worse after eating.    Intermittent nausea and vomiting after eating.    No fevers.    2) Some intermittent loose stools, not consistent.  Has felt constipated in the past few days.    Pain is sharp and intermittent.      3) Some urinary frequency and burning for the past 3 days.    Not menstruating for the past 3-5 years    Past Medical History:   Diagnosis Date     Bleeding hemorrhoid      NO ACTIVE PROBLEMS      Current Outpatient Prescriptions   Medication Sig Dispense Refill     Calcium Carb-Cholecalciferol 1000-800 MG-UNIT TABS TAKE 1 TABLET BY MOUTH DAILY WITH FOOD (Patient not taking: Reported on 1/10/2018) 100 tablet 3     VITAMIN D, CHOLECALCIFEROL, PO Take by mouth daily       naproxen (NAPROSYN) 500 MG tablet Take 1 tablet (500 mg) by mouth 2 times daily as needed for moderate pain (Patient not taking: Reported on 1/10/2018) 30 tablet 1     polyethylene glycol (MIRALAX) powder Take 17 g (1 capful) by mouth daily INDICATION: CONSTIPATION, TO ACHIEVE 1-2 SOFT BMs PER DAY (Patient not taking: Reported on 1/10/2018) 1 Bottle 4     Social History   Substance Use Topics     Smoking status: Never Smoker     Smokeless tobacco: Never Used     Alcohol use No       ROS:  CONSTITUTIONAL:NEGATIVE for fever, chills, change in weight  INTEGUMENTARY/SKIN: NEGATIVE for worrisome rashes, moles or lesions  ENT/MOUTH: NEGATIVE for ear, mouth and throat problems  RESP:NEGATIVE for significant cough or SOB  CV: NEGATIVE for chest pain, palpitations or peripheral edema  GI: as per HPI  : as per HPI  MUSCULOSKELETAL: NEGATIVE for significant arthralgias or myalgia  NEURO: NEGATIVE for weakness, dizziness or paresthesias  ENDOCRINE:  NEGATIVE for temperature intolerance, skin/hair changes    OBJECTIVE:  /74  Pulse 74  Temp 97.9  F (36.6  C) (Oral)  Resp 17  Wt 160 lb (72.6 kg)  Breastfeeding? No  BMI 28.34 kg/m2  GENERAL APPEARANCE: healthy, alert and no distress  EYES: EOMI,  PERRL, conjunctiva clear  HENT: ear canals and TM's normal.  Nose and mouth without ulcers, erythema or lesions  NECK: supple, nontender, no lymphadenopathy  RESP: lungs clear to auscultation - no rales, rhonchi or wheezes  CV: regular rates and rhythm, normal S1 S2, no murmur noted  ABDOMEN:  Soft, mild right upper quadrant tenderness, no rebound or masses.  NO pelvic tenderness or masses.   no HSM or masses and bowel sounds normal  NEURO: Normal strength and tone, sensory exam grossly normal,  normal speech and mentation  SKIN: no suspicious lesions or rashes    (N39.0) Acute UTI  (primary encounter diagnosis)  Comment:   Plan: sulfamethoxazole-trimethoprim (BACTRIM         DS/SEPTRA DS) 800-160 MG per tablet            (R10.2) Pelvic pain in female  Comment:   Plan: UA with Microscopic reflex to Culture, Urine         Culture Aerobic Bacterial, Wet prep            (R10.11) Abdominal pain, right upper quadrant  Comment: in patient with history of gallstones.  Plan: Comprehensive metabolic panel (BMP + Alb, Alk         Phos, ALT, AST, Total. Bili, TP), Lipase,         Amylase, XR Abdomen 2 Views            (R11.2) Nausea and vomiting, intractability of vomiting not specified, unspecified vomiting type  Comment:   Plan: ondansetron (ZOFRAN) 4 MG tablet          Follow up with PCP for re-check within 1 week.  Sooner should symptoms persist or worsen.    Patient expresses understanding and agreement with the assessment and plan as above.

## 2018-01-10 NOTE — NURSING NOTE
"Chief Complaint   Patient presents with     Abdominal Pain     RLQ abodminal pain and vomiting for a few month, but worse this week.        Initial /74  Pulse 74  Temp 97.9  F (36.6  C) (Oral)  Resp 17  Wt 160 lb (72.6 kg)  Breastfeeding? No  BMI 28.34 kg/m2 Estimated body mass index is 28.34 kg/(m^2) as calculated from the following:    Height as of 9/27/17: 5' 3\" (1.6 m).    Weight as of this encounter: 160 lb (72.6 kg).  Medication Reconciliation: complete    "

## 2018-01-10 NOTE — MR AVS SNAPSHOT
After Visit Summary   1/10/2018    Graciela Castorena    MRN: [id number]           Patient Information     Date Of Birth          1965        Visit Information        Provider Department      1/10/2018 10:50 AM Ronda Schilling PA-C [facility]        Today's Diagnoses     Acute UTI    -  1    Pelvic pain in female        Abdominal pain, right upper quadrant        Nausea and vomiting, intractability of vomiting not specified, unspecified vomiting type          Care Instructions    (N39.0) Acute UTI  (primary encounter diagnosis)  Comment:   Plan: sulfamethoxazole-trimethoprim (BACTRIM         DS/SEPTRA DS) 800-160 MG per tablet            (R10.2) Pelvic pain in female  Comment:   Plan: UA with Microscopic reflex to Culture, Urine         Culture Aerobic Bacterial, Wet prep            (R10.11) Abdominal pain, right upper quadrant  Comment: in patient with history of gallstones.  Plan: Comprehensive metabolic panel (BMP + Alb, Alk         Phos, ALT, AST, Total. Bili, TP), Lipase,         Amylase, XR Abdomen 2 Views            (R11.2) Nausea and vomiting, intractability of vomiting not specified, unspecified vomiting type  Comment:   Plan: ondansetron (ZOFRAN) 4 MG tablet              What are Gallstones?    The gallbladder stores bile, a fluid made by the liver. Bile helps digest fats in the foods you eat. Gallstones form when certain substances in the bile crystallize and become solid. In some cases, the stones don t cause any symptoms. In others, they irritate the walls of the gallbladder. More serious problems can occur if stones move into nearby ducts (such as the common bile duct) and cause blockages. This can block the flow of bile and lead to pain, nausea, and infection.  Common symptoms  Gallbladder problems can cause painful attacks, often after a meal. Some people have only one attack. Others have many. Common symptoms include:    Severe, steady pain or  aching in the upper right belly (abdomen), back, or right shoulder blade, lasting from 30 minutes to several hours    A dull ache beneath the ribs or breastbone    Nausea, upset stomach, or vomiting    A buildup of too much bile in the blood (jaundice), which causes yellowing of the skin and eyes, dark urine, and itching. Call your healthcare provider right away if this occurs.  Risk factors for gallstones  You are more at risk for gallstones if you:    Are a woman    Are obese    Have a history of very fast (rapid) weight loss    Have certain intestinal diseases, such as Crohn s disease    Have certain blood diseases, such as sickle cell anemia    Have a family background that includes Native Americans or Barbadian Americans  Diagnosis  Ultrasound is often used to look at the gallbladder and measure the size and exact location of gallstone.  Blood tests are used to measure liver enzymes and bilirubin. Both of these may be high if the gallstones are blocking bile flow or irritating the liver.  Treating gallstones  If your stones are not causing symptoms, you may choose to delay treatment. But if you ve had one or more painful attacks, your healthcare provider will likely recommend removing your gallbladder. This prevents more stones from forming and causing attacks. It also helps prevent problems, such as stones passing into the ducts and causing infection or pancreatitis. After the gallbladder is removed, your liver will still make bile to aid digestion.  If you re pregnant  Hormone changes during pregnancy can make bile more likely to form stones. If your gallbladder needs to be removed, your doctor will talk with you about the timing for surgery. In some cases, it can be delayed until after childbirth. In others, you may have surgery during pregnancy. This helps protect you and your baby s health.   Date Last Reviewed: 12/1/2016 2000-2017 [employer]. [address] [place] [address], [place] [address]. All  "rights reserved. This information is not intended as a substitute for professional medical care. Always follow your healthcare professional's instructions.                Follow-ups after your visit        Who to contact     If you have questions or need follow up information about today's clinic visit or your schedule please contact [facility] directly at [phone].  Normal or non-critical lab and imaging results will be communicated to you by MyChart, letter or phone within 4 business days after the clinic has received the results. If you do not hear from us within 7 days, please contact the clinic through [employer] or phone. If you have a critical or abnormal lab result, we will notify you by phone as soon as possible.  Submit refill requests through [employer]Clip or call your pharmacy and they will forward the refill request to us. Please allow 3 business days for your refill to be completed.          Additional Information About Your Visit        MyChart Information     [employer] lets you send messages to your doctor, view your test results, renew your prescriptions, schedule appointments and more. To sign up, go to [email or web address] . Click on \"Log in\" on the left side of the screen, which will take you to the Welcome page. Then click on \"Sign up Now\" on the right side of the page.     You will be asked to enter the access code listed below, as well as some personal information. Please follow the directions to create your username and password.     Your access code is: [email or web address]  Expires: 4/10/2018  1:35 PM     Your access code will  in 90 days. If you need help or a new code, please call your [facility] clinic or [phone].        Care EveryWhere ID     This is your Care EveryWhere ID. This could be used by other organizations to access your [facility] medical records  [id number]        Your Vitals Were     Pulse Temperature Respirations Breastfeeding? BMI (Body Mass " Index)       74 97.9  F (36.6  C) (Oral) 17 No 28.34 kg/m2        Blood Pressure from Last 3 Encounters:   01/10/18 120/74   09/27/17 102/60   09/20/17 110/70    Weight from Last 3 Encounters:   01/10/18 160 lb (72.6 kg)   09/27/17 157 lb 12.8 oz (71.6 kg)   09/20/17 160 lb (72.6 kg)              We Performed the Following     Amylase     Comprehensive metabolic panel (BMP + Alb, Alk Phos, ALT, AST, Total. Bili, TP)     Lipase     UA with Microscopic reflex to Culture     Urine Culture Aerobic Bacterial     Wet prep          Today's Medication Changes          These changes are accurate as of: 1/10/18  1:35 PM.  If you have any questions, ask your nurse or doctor.               Start taking these medicines.        Dose/Directions    ondansetron 4 MG tablet   Commonly known as:  ZOFRAN   Used for:  Nausea and vomiting, intractability of vomiting not specified, unspecified vomiting type   Started by:  Ronda Schilling PA-C        Dose:  4 mg   Take 1 tablet (4 mg) by mouth every 8 hours as needed   Quantity:  18 tablet   Refills:  0       sulfamethoxazole-trimethoprim 800-160 MG per tablet   Commonly known as:  BACTRIM DS/SEPTRA DS   Used for:  Acute UTI   Started by:  Ronda Schilling PA-C        Dose:  1 tablet   Take 1 tablet by mouth 2 times daily for 3 days   Quantity:  6 tablet   Refills:  0         Stop taking these medicines if you haven't already. Please contact your care team if you have questions.     methylPREDNISolone 4 MG tablet   Commonly known as:  MEDROL DOSEPAK   Stopped by:  Ronda Schilling PA-C                Where to get your medicines      These medications were sent to [facility]/pharmacy #[id number] - [place], [place] - [address]  [address] [address]     Phone:  [phone]     ondansetron 4 MG tablet    sulfamethoxazole-trimethoprim 800-160 MG per tablet                Primary Care Provider    Mehul Vo       No address on file        Equal  Access to Services     [facility]: Hadii kaley campos jeannine Flores, wadionda luqadaha, qaybta kanathanbecky brooks. So [facility] [phone].    ATENCIÓN: Si habla cindy, tiene a banuelos disposición servicios gratuitos de asistencia lingüística. Llame al [phone].    We comply with applicable federal civil rights laws and [place] laws. We do not discriminate on the basis of race, color, national origin, age, disability, sex, sexual orientation, or gender identity.            Thank you!     Thank you for choosing [facility]  for your care. Our goal is always to provide you with excellent care. Hearing back from our patients is one way we can continue to improve our services. Please take a few minutes to complete the written survey that you may receive in the mail after your visit with us. Thank you!             Your Updated Medication List - Protect others around you: Learn how to safely use, store and throw away your medicines at www.disposemymeds.org.          This list is accurate as of: 1/10/18  1:35 PM.  Always use your most recent med list.                   Brand Name Dispense Instructions for use Diagnosis    Calcium Carb-Cholecalciferol 1000-800 MG-UNIT Tabs     100 tablet    TAKE 1 TABLET BY MOUTH DAILY WITH FOOD    Myalgia       naproxen 500 MG tablet    NAPROSYN    30 tablet    Take 1 tablet (500 mg) by mouth 2 times daily as needed for moderate pain    Throat pain, Body aches, Chills (without fever)       ondansetron 4 MG tablet    ZOFRAN    18 tablet    Take 1 tablet (4 mg) by mouth every 8 hours as needed    Nausea and vomiting, intractability of vomiting not specified, unspecified vomiting type       polyethylene glycol powder    MIRALAX    1 Bottle    Take 17 g (1 capful) by mouth daily INDICATION: CONSTIPATION, TO ACHIEVE 1-2 SOFT BMs PER DAY    Constipation, unspecified constipation type       sulfamethoxazole-trimethoprim 800-160  MG per tablet    BACTRIM DS/SEPTRA DS    6 tablet    Take 1 tablet by mouth 2 times daily for 3 days    Acute UTI       VITAMIN D (CHOLECALCIFEROL) PO      Take by mouth daily

## 2018-01-11 LAB
BACTERIA SPEC CULT: NORMAL
SPECIMEN SOURCE: NORMAL

## 2018-01-16 ENCOUNTER — RADIANT APPOINTMENT (OUTPATIENT)
Dept: MAMMOGRAPHY | Facility: CLINIC | Age: 53
End: 2018-01-16
Attending: INTERNAL MEDICINE
Payer: COMMERCIAL

## 2018-01-16 ENCOUNTER — OFFICE VISIT (OUTPATIENT)
Dept: INTERNAL MEDICINE | Facility: CLINIC | Age: 53
End: 2018-01-16
Payer: MEDICAID

## 2018-01-16 VITALS
SYSTOLIC BLOOD PRESSURE: 110 MMHG | DIASTOLIC BLOOD PRESSURE: 66 MMHG | HEIGHT: 63 IN | TEMPERATURE: 98.5 F | BODY MASS INDEX: 28.56 KG/M2 | HEART RATE: 90 BPM | OXYGEN SATURATION: 100 % | WEIGHT: 161.2 LBS

## 2018-01-16 DIAGNOSIS — R42 DIZZINESS: Primary | ICD-10-CM

## 2018-01-16 DIAGNOSIS — R92.8 ABNORMAL MAMMOGRAM: Primary | ICD-10-CM

## 2018-01-16 DIAGNOSIS — R11.0 NAUSEA: ICD-10-CM

## 2018-01-16 DIAGNOSIS — Z12.39 SCREENING FOR BREAST CANCER: ICD-10-CM

## 2018-01-16 DIAGNOSIS — Z12.11 SCREENING FOR COLON CANCER: ICD-10-CM

## 2018-01-16 DIAGNOSIS — Z76.0 ENCOUNTER FOR MEDICATION REFILL: ICD-10-CM

## 2018-01-16 DIAGNOSIS — Z12.31 VISIT FOR SCREENING MAMMOGRAM: ICD-10-CM

## 2018-01-16 PROCEDURE — 99214 OFFICE O/P EST MOD 30 MIN: CPT | Performed by: INTERNAL MEDICINE

## 2018-01-16 PROCEDURE — 77067 SCR MAMMO BI INCL CAD: CPT | Mod: TC

## 2018-01-16 NOTE — MR AVS SNAPSHOT
After Visit Summary   1/16/2018    Graciela Castorena    MRN: [id number]           Patient Information     Date Of Birth          1965        Visit Information        Provider Department      1/16/2018 12:45 PM Genna Magallanes MD; [employer] [facility]        Today's Diagnoses     Dizziness    -  1    Nausea        Screening for colon cancer        Screening for breast cancer        Encounter for medication refill           Follow-ups after your visit        Additional Services     GASTROENTEROLOGY ADULT REF PROCEDURE ONLY       Last Lab Result: Creatinine (mg/dL)       Date                     Value                 01/10/2018               0.65             ----------  Body mass index is 28.56 kg/(m^2).      Patient will be contacted to schedule procedure.     Please be aware that coverage of these services is subject to the terms and limitations of your health insurance plan.  Call member services at your health plan with any benefit or coverage questions.  Any procedures must be performed at a [facility] facility OR coordinated by your clinic's referral office.    Please bring the following with you to your appointment:    (1) Any X-Rays, CTs or MRIs which have been performed.  Contact the facility where they were done to arrange for  prior to your scheduled appointment.    (2) List of current medications   (3) This referral request   (4) Any documents/labs given to you for this referral                  Your next 10 appointments already scheduled     Mar 05, 2018 10:30 AM CST   PHYSICAL with Genna [facility]anes [facility]    [address] [place] [id number] [phone]              Who to contact     If you have questions or need follow up information about today's clinic visit or your schedule please contact [facility] directly at  "[phone].  Normal or non-critical lab and imaging results will be communicated to you by MyChart, letter or phone within 4 business days after the clinic has received the results. If you do not hear from us within 7 days, please contact the clinic through [employer] or phone. If you have a critical or abnormal lab result, we will notify you by phone as soon as possible.  Submit refill requests through [employer] or call your pharmacy and they will forward the refill request to us. Please allow 3 business days for your refill to be completed.          Additional Information About Your Visit        [employer] Information     [employer]oft lets you send messages to your doctor, view your test results, renew your prescriptions, schedule appointments and more. To sign up, go to [email or web address] . Click on \"Log in\" on the left side of the screen, which will take you to the Welcome page. Then click on \"Sign up Now\" on the right side of the page.     You will be asked to enter the access code listed below, as well as some personal information. Please follow the directions to create your username and password.     Your access code is: [email or web address]  Expires: 4/10/2018  1:35 PM     Your access code will  in 90 days. If you need help or a new code, please call your [place] [facility] clinic or [phone].        Care EveryWhere ID     This is your Care EveryWhere ID. This could be used by other organizations to access your [facility] medical records  [id number]        Your Vitals Were     Pulse Temperature Height Pulse Oximetry BMI (Body Mass Index)       90 98.5  F (36.9  C) (Oral) 5' 3\" (1.6 m) 100% 28.56 kg/m2        Blood Pressure from Last 3 Encounters:   18 110/66   01/10/18 120/74   17 102/60    Weight from Last 3 Encounters:   18 161 lb 3.2 oz (73.1 kg)   01/10/18 160 lb (72.6 kg)   17 157 lb 12.8 oz (71.6 kg)              We Performed the Following     GASTROENTEROLOGY ADULT REF PROCEDURE ONLY        "   Today's Medication Changes          These changes are accurate as of: 1/16/18  2:09 PM.  If you have any questions, ask your nurse or doctor.               Start taking these medicines.        Dose/Directions    calcium carbonate-vitamin D 600-400 MG-UNIT Chew   Used for:  Encounter for medication refill   Started by:  Genna Magallanes MD        Dose:  2 tablet   Take 2 tablets by mouth daily   Quantity:  60 tablet   Refills:  11            Where to get your medicines      These medications were sent to [facility]/pharmacy #[id number] - [place] [id number] [address]     Phone:  [phone]     calcium carbonate-vitamin D 600-400 MG-UNIT Chew                Primary Care Provider    Mehul Vo       No address on file        Equal Access to Services     [employer] : Ruperto campos hadravindero Soyuliaali, waaxda luqadaha, qaybta kaalmada adeegyada, waxay josee multani . So [facility] [phone].    ATENCIÓN: Si habla español, tiene a banuelos disposición servicios gratuitos de asistencia lingüística. [facility] [phone].    We comply with applicable federal civil rights laws and [place] laws. We do not discriminate on the basis of race, color, national origin, age, disability, sex, sexual orientation, or gender identity.            Thank you!     Thank you for choosing [facility]  for your care. Our goal is always to provide you with excellent care. Hearing back from our patients is one way we can continue to improve our services. Please take a few minutes to complete the written survey that you may receive in the mail after your visit with us. Thank you!             Your Updated Medication List - Protect others around you: Learn how to safely use, store and throw away your medicines at www.disposemymeds.org.          This list is accurate as of: 1/16/18  2:09 PM.  Always use your most recent med list.                   Brand Name Dispense  Instructions for use Diagnosis    calcium carbonate-vitamin D 600-400 MG-UNIT Chew     60 tablet    Take 2 tablets by mouth daily    Encounter for medication refill

## 2018-01-16 NOTE — PROGRESS NOTES
"  SUBJECTIVE:                                                      HPI: Graciela Castorena is a pleasant 53 year old female who presents for urgent care follow-up:    [facility]  utilized.    Was seen in urgent care last week for abdominal and pelvic pain, loose stools, and urinary symptoms.    CMP, wet prep, and x-ray of her abdomen were all within normal limits.    UA was mildly abnormal, but urine culture was negative for infection. She completed a course of Bactrim, nonetheless.    Patient reports continued dizziness and nausea, though these were not specifically addressed at her urgent care visit.    Further describes dizziness as lightheadedness, but denies presyncope or syncope.    No vomiting, constipation, or diarrhea and denies abdominal pain today.    No fevers or chills.    Patient's PO intake consists of 2 meals each day, no snacks, and minimal to no fluids.    Lengthy discussion with patient that dehydration and hypoglycemia may cause dizziness and nausea.  Discussed importance of either regular meals a regular snacks and adequate hydration throughout the day.    ---    Of note, patient is also due for her screening colonoscopy and mammogram.    ---    Patient also requests refill of calcium and vitamin D supplement..    ---    The medication, allergy, and problem lists have been reviewed and updated as appropriate.       OBJECTIVE:                                                      /66 (BP Location: Left arm, Patient Position: Chair, Cuff Size: Adult Large)  Pulse 90  Temp 98.5  F (36.9  C) (Oral)  Ht 5' 3\" (1.6 m)  Wt 161 lb 3.2 oz (73.1 kg)  SpO2 100%  BMI 28.56 kg/m2  Constitutional: well-appearing  Respiratory: normal respiratory effort; clear to auscultation bilaterally  Cardiovascular: regular rate and rhythm; no edema  Gastrointestinal: soft, non-tender, non-distended, and bowel sounds present; no organomegaly or masses   Musculoskeletal: normal gait and station  Psych: normal " judgment and insight; normal mood and affect; recent and remote memory intact      ASSESSMENT/PLAN:                                                      (R42) Dizziness  (primary encounter diagnosis)  (R11.0) Nausea  Comment:    - recent labs generally unremarkable.   - suspect that symptoms may be due to dehydration and/or episodes of hypoglycemia.  Plan:    - patient encouraged to remain well-hydrated throughout the day.   - patient also encouraged to eat meals or snacks regularly throughout the day.    - no additional testing indicated at this time.    - if symptoms worsen, change, or do not improve, patient to contact MD.    (Z12.11) Screening for colon cancer  Plan: Screening colonoscopy ordered - patient will be contacted to schedule.    (Z12.31) Screening for breast cancer  Plan: mammogram ordered - patient to schedule.    (Z76.0) Encounter for medication refill  Plan: calcium carbonate-vitamin D 600-400, 2 tablets daily, refilled.     The instructions on the AVS were discussed and explained to the patient. Patient expressed understanding of instructions.    A total of 25 minutes were spent face-to-face with this patient during this encounter and over half of that time was spent on counseling and coordination of care re: above diagnoses and plans of care.     (Chart documentation was completed, in part, with [employer] voice-recognition software. Even though reviewed, some grammatical, spelling, and word errors may remain.)    Genna Magallanes MD   [address] [address]  T: [phone], F: [phone]

## 2018-01-28 RX ORDER — ONDANSETRON 2 MG/ML
4 INJECTION INTRAMUSCULAR; INTRAVENOUS
Status: CANCELLED | OUTPATIENT
Start: 2018-01-28

## 2018-01-28 RX ORDER — LIDOCAINE 40 MG/G
CREAM TOPICAL
Status: CANCELLED | OUTPATIENT
Start: 2018-01-28

## 2018-01-29 ENCOUNTER — SURGERY (OUTPATIENT)
Age: 53
End: 2018-01-29

## 2018-01-29 ENCOUNTER — HOSPITAL ENCOUNTER (OUTPATIENT)
Facility: CLINIC | Age: 53
Discharge: HOME | End: 2018-01-29
Attending: INTERNAL MEDICINE | Admitting: INTERNAL MEDICINE
Payer: MEDICAID

## 2018-02-01 ENCOUNTER — OFFICE VISIT (OUTPATIENT)
Dept: URGENT CARE | Facility: URGENT CARE | Age: 53
End: 2018-02-01
Payer: MEDICAID

## 2018-02-01 ENCOUNTER — RADIANT APPOINTMENT (OUTPATIENT)
Dept: GENERAL RADIOLOGY | Facility: CLINIC | Age: 53
End: 2018-02-01
Attending: PEDIATRICS
Payer: MEDICAID

## 2018-02-01 VITALS
HEART RATE: 80 BPM | SYSTOLIC BLOOD PRESSURE: 114 MMHG | OXYGEN SATURATION: 97 % | TEMPERATURE: 97.1 F | RESPIRATION RATE: 20 BRPM | DIASTOLIC BLOOD PRESSURE: 73 MMHG | BODY MASS INDEX: 28.8 KG/M2 | WEIGHT: 162.6 LBS

## 2018-02-01 DIAGNOSIS — R07.1 PAINFUL RESPIRATION: ICD-10-CM

## 2018-02-01 DIAGNOSIS — R07.1 PAINFUL RESPIRATION: Primary | ICD-10-CM

## 2018-02-01 PROCEDURE — 71046 X-RAY EXAM CHEST 2 VIEWS: CPT | Mod: FY

## 2018-02-01 PROCEDURE — 99213 OFFICE O/P EST LOW 20 MIN: CPT | Performed by: PEDIATRICS

## 2018-02-01 NOTE — MR AVS SNAPSHOT
"              After Visit Summary   2/1/2018    Graciela Castorena    MRN: [id number]           Patient Information     Date Of Birth          1965        Visit Information        Provider Department      2/1/2018 5:20 PM Isaiah Duran MD [facility]        Today's Diagnoses     Painful respiration    -  1       Follow-ups after your visit        Your next 10 appointments already scheduled     Feb 12, 2018   Procedure with Sabas Villegas MD   [facility] ([facility])    [address]  [place] [address]   [phone]           [facility] is located at [address] [place]            Feb 15, 2018 10:15 AM CST   (Arrive by 10:00 AM)   MA DIAGNOSTIC DIGITAL LEFT with [id number]   [facility] ([facility])    [address]  [place] [address] [phone]           Do not use any powder, lotion or deodorant under your arms or on your breast. If you do, we will ask you to remove it before your exam.  Wear comfortable, two-piece clothing.  If you have any allergies, tell your care team.  Bring any previous mammograms from other facilities or have them mailed to the breast center.  Three-dimensional (3D) mammograms are available at [facility] locations in [facility], [facility], [facility], [place], [place], and [place]. [facility] locations include [place] and [facility] in [place]. Benefits of 3D mammograms include: - Improved rate of cancer detection - Decreases your chance of having to go back for more tests, which means fewer: - \"False-positive\" results (This means that there is an abnormal area but it isn't cancer.) - Invasive testing procedures, such as a biopsy or surgery - Can provide clearer images of the breast if you have dense breast tissue. 3D mammography is an optional exam that anyone can have with a 2D mammogram. It " "doesn't replace or take the place of a 2D mammogram. 2D mammograms remain an effective screening test for all women.  Not all insurance companies cover the cost of a 3D mammogram. Check with your insurance.            Mar 05, 2018 10:30 AM CST   PHYSICAL with Genna Magallanes MD   [facility]    [address] [id number] [phone]              Who to contact     If you have questions or need follow up information about today's clinic visit or your schedule please contact [facility] directly at [phone].  Normal or non-critical lab and imaging results will be communicated to you by MyChart, letter or phone within 4 business days after the clinic has received the results. If you do not hear from us within 7 days, please contact the clinic through [employer] or phone. If you have a critical or abnormal lab result, we will notify you by phone as soon as possible.  Submit refill requests through eshtery or call your pharmacy and they will forward the refill request to us. Please allow 3 business days for your refill to be completed.          Additional Information About Your Visit        MyChart Information     eshtery lets you send messages to your doctor, view your test results, renew your prescriptions, schedule appointments and more. To sign up, go to [email or web address] . Click on \"Log in\" on the left side of the screen, which will take you to the Welcome page. Then click on \"Sign up Now\" on the right side of the page.     You will be asked to enter the access code listed below, as well as some personal information. Please follow the directions to create your username and password.     Your access code is: [email or web address]  Expires: 4/10/2018  1:35 PM     Your access code will  in 90 days. If you need help or a new code, please call your [place] clinic or [phone].        Care EveryWhere " ID     This is your Care EveryWhere ID. This could be used by other organizations to access your [facility] medical records  [id number]        Your Vitals Were     Pulse Temperature Respirations Pulse Oximetry BMI (Body Mass Index)       80 97.1  F (36.2  C) (Oral) 20 97% 28.8 kg/m2        Blood Pressure from Last 3 Encounters:   02/01/18 114/73   01/16/18 110/66   01/10/18 120/74    Weight from Last 3 Encounters:   02/01/18 162 lb 9.6 oz (73.8 kg)   01/16/18 161 lb 3.2 oz (73.1 kg)   01/10/18 160 lb (72.6 kg)               Primary Care Provider    Mehul Vo       No address on file        Equal Access to Services     JACOBO DUFFY : Ruperto reyeso Socindy, waaxda luqadaha, qaybta kaalmada adeegyada, becky multani . So [facility] [phone].    ATENCIÓN: Si habla español, tiene a banuelos disposición servicios gratuitos de asistencia lingüística. Llame al [phone].    We comply with applicable federal civil rights laws and [place] laws. We do not discriminate on the basis of race, color, national origin, age, disability, sex, sexual orientation, or gender identity.            Thank you!     Thank you for choosing [facility]  for your care. Our goal is always to provide you with excellent care. Hearing back from our patients is one way we can continue to improve our services. Please take a few minutes to complete the written survey that you may receive in the mail after your visit with us. Thank you!             Your Updated Medication List - Protect others around you: Learn how to safely use, store and throw away your medicines at www.disposemymeds.org.          This list is accurate as of 2/1/18  6:20 PM.  Always use your most recent med list.                   Brand Name Dispense Instructions for use Diagnosis    calcium carbonate-vitamin D 600-400 MG-UNIT Chew     60 tablet    Take 2 tablets by mouth daily    Encounter for medication refill

## 2018-02-01 NOTE — PROGRESS NOTES
SUBJECTIVE:  Chief Complaint   Patient presents with     Breathing Problem     Rt side chest pain when taking deep breath x today      Graciela Castorena is a 53 year old female presents with a chief complaint of right shoulder pain.  The injury occurred 8 hour(s) ago.   The injury happened while at home. How: as aboveimmediate pain.  The patient complained of moderate pain  and has not had decreased ROM.  Pain exacerbated by breathing.  Relieved by nothing.  She treated it initially with no therapy. This is the first time this type of injury has occurred to this patient.     Past Medical History:   Diagnosis Date     NO ACTIVE PROBLEMS      Current Outpatient Prescriptions   Medication Sig Dispense Refill     calcium carbonate-vitamin D 600-400 MG-UNIT CHEW Take 2 tablets by mouth daily 60 tablet 11     Social History   Substance Use Topics     Smoking status: Never Smoker     Smokeless tobacco: Never Used     Alcohol use No       ROS:  CONSTITUTIONAL:NEGATIVE for fever, chills, change in weight  RESP:NEGATIVE for significant cough or SOB    EXAM:   /73  Pulse 80  Temp 97.1  F (36.2  C) (Oral)  Resp 20  Wt 162 lb 9.6 oz (73.8 kg)  SpO2 97%  BMI 28.8 kg/m2  Gen: healthy,alert,no distress  Extremity: shoulder has point tenderness subscapularly.   There is not compromise to the distal circulation.  Pulses are +2 and CRT is brisk  GENERAL APPEARANCE: healthy, alert and no distress  EXTREMITIES: upside down can test positive, lag tests positive  SKIN: no suspicious lesions or rashes  NEURO: Normal strength and tone, sensory exam grossly normal, mentation intact and speech normal    X-RAY was done.    ASSESSMENT:   sprain/strain of shoulder    PLAN:  1) Rest, Ice, Compress, Elevate and Ibuprofen q 6 hrs for 3-5 days    Isaiah Duran MD, MPH

## 2018-02-12 ENCOUNTER — HOSPITAL ENCOUNTER (OUTPATIENT)
Facility: CLINIC | Age: 53
Discharge: HOME | End: 2018-02-12
Attending: INTERNAL MEDICINE | Admitting: INTERNAL MEDICINE
Payer: MEDICAID

## 2018-02-12 VITALS
SYSTOLIC BLOOD PRESSURE: 114 MMHG | RESPIRATION RATE: 11 BRPM | DIASTOLIC BLOOD PRESSURE: 75 MMHG | OXYGEN SATURATION: 100 %

## 2018-02-12 LAB — COLONOSCOPY: NORMAL

## 2018-02-12 PROCEDURE — G0121 COLON CA SCRN NOT HI RSK IND: HCPCS | Performed by: INTERNAL MEDICINE

## 2018-02-12 PROCEDURE — 45378 DIAGNOSTIC COLONOSCOPY: CPT | Performed by: INTERNAL MEDICINE

## 2018-02-12 PROCEDURE — G0500 MOD SEDAT ENDO SERVICE >5YRS: HCPCS | Performed by: INTERNAL MEDICINE

## 2018-02-12 PROCEDURE — 25000128 H RX IP 250 OP 636: Performed by: INTERNAL MEDICINE

## 2018-02-12 RX ORDER — ONDANSETRON 2 MG/ML
4 INJECTION INTRAMUSCULAR; INTRAVENOUS
Status: DISCONTINUED | OUTPATIENT
Start: 2018-02-12 | End: 2018-02-12 | Stop reason: HOSPADM

## 2018-02-12 RX ORDER — LIDOCAINE 40 MG/G
CREAM TOPICAL
Status: DISCONTINUED | OUTPATIENT
Start: 2018-02-12 | End: 2018-02-12 | Stop reason: HOSPADM

## 2018-02-12 RX ORDER — FENTANYL CITRATE 50 UG/ML
INJECTION, SOLUTION INTRAMUSCULAR; INTRAVENOUS PRN
Status: DISCONTINUED | OUTPATIENT
Start: 2018-02-12 | End: 2018-02-12 | Stop reason: HOSPADM

## 2018-02-12 NOTE — H&P
[facility]  Pre-Endoscopy History and Physical     Graciela Castorena MRN# [id number]   YOB: 1965 Age: 53 year old     Date of Procedure: 2/12/2018  Primary care provider: Mehul Vo  Type of Endoscopy: colonoscopy  Reason for Procedure: Screening colonoscopy  Type of Anesthesia Anticipated: Moderate Sedation    HPI:    Grcaiela is a 53 year old female who will be undergoing the above procedure.      A history and physical has been performed. The patient's medications and allergies have been reviewed. The risks and benefits of the procedure and the sedation options and risks were discussed with the patient.  All questions were answered and informed consent was obtained.      She denies a personal or family history of anesthesia complications or bleeding disorders.     Allergies   Allergen Reactions     Ciprofloxacin Itching        Prior to Admission Medications   Prescriptions Last Dose Informant Patient Reported? Taking?   calcium carbonate-vitamin D 600-400 MG-UNIT CHEW 2/11/2018  No Yes   Sig: Take 2 tablets by mouth daily      Facility-Administered Medications: None       Patient Active Problem List   Diagnosis     Adhesive capsulitis        Past Medical History:   Diagnosis Date     NO ACTIVE PROBLEMS         Past Surgical History:   Procedure Laterality Date     NO HISTORY OF SURGERY         Social History   Substance Use Topics     Smoking status: Never Smoker     Smokeless tobacco: Never Used     Alcohol use No       Family History   Problem Relation Age of Onset     DIABETES No family hx of      Myocardial Infarction No family hx of      CEREBROVASCULAR DISEASE No family hx of      Coronary Artery Disease Early Onset No family hx of      CANCER No family hx of      no breast, ovarian, colon CA       REVIEW OF SYSTEMS:     5 point ROS negative except as noted above in HPI, including Gen., Resp., CV, GI &  system review.      PHYSICAL EXAM:   /64  Resp 16  SpO2 98%  "Estimated body mass index is 28.8 kg/(m^2) as calculated from the following:    Height as of 1/16/18: 1.6 m (5' 3\").    Weight as of 2/1/18: 73.8 kg (162 lb 9.6 oz).   GENERAL APPEARANCE: healthy, alert and no distress  MENTAL STATUS: alert  AIRWAY EXAM: Mallampatti Class I (visualization of the soft palate, fauces, uvula, anterior and posterior pillars)  RESP: lungs clear to auscultation - no rales, rhonchi or wheezes  CV: normal S1 S2, no S3 or S4      DIAGNOSTICS:    Not indicated      IMPRESSION   ASA Class 2 - Mild systemic disease        PLAN:       Plan for colonoscopy. We discussed the risks, benefits and alternatives and the patient wished to proceed.    The above has been forwarded to the consulting provider.      Signed Electronically by: Sabas Villegas MD  February 12, 2018  "

## 2018-03-08 ENCOUNTER — OFFICE VISIT (OUTPATIENT)
Dept: INTERNAL MEDICINE | Facility: CLINIC | Age: 53
End: 2018-03-08
Payer: MEDICAID

## 2018-03-08 VITALS
RESPIRATION RATE: 16 BRPM | TEMPERATURE: 97.8 F | WEIGHT: 159.8 LBS | SYSTOLIC BLOOD PRESSURE: 98 MMHG | HEIGHT: 63 IN | HEART RATE: 72 BPM | DIASTOLIC BLOOD PRESSURE: 70 MMHG | OXYGEN SATURATION: 98 % | BODY MASS INDEX: 28.31 KG/M2

## 2018-03-08 DIAGNOSIS — R92.8 ABNORMAL MAMMOGRAM: ICD-10-CM

## 2018-03-08 DIAGNOSIS — Z00.00 ROUTINE HISTORY AND PHYSICAL EXAMINATION OF ADULT: Primary | ICD-10-CM

## 2018-03-08 DIAGNOSIS — L29.9 EAR ITCHING: ICD-10-CM

## 2018-03-08 PROCEDURE — T1013 SIGN LANG/ORAL INTERPRETER: HCPCS | Mod: U3 | Performed by: INTERNAL MEDICINE

## 2018-03-08 PROCEDURE — 99396 PREV VISIT EST AGE 40-64: CPT | Performed by: INTERNAL MEDICINE

## 2018-03-08 RX ORDER — NEOMYCIN SULFATE, POLYMYXIN B SULFATE AND HYDROCORTISONE 10; 3.5; 1 MG/ML; MG/ML; [USP'U]/ML
3 SUSPENSION/ DROPS AURICULAR (OTIC) 4 TIMES DAILY
Qty: 1 BOTTLE | Refills: 3 | Status: SHIPPED | OUTPATIENT
Start: 2018-03-08 | End: 2018-04-04

## 2018-03-08 NOTE — PROGRESS NOTES
SUBJECTIVE:                                                      HPI: Graciela Castorena is a pleasant 53 year old female who presents for a physical.    [place]  utilized, though patient's English is fair to good.     Patient complains of itchy left ear. No pain or pressure. No drainage. No hearing loss or tinnitus    Of note, patient had abnormal screening mammogram last month. She has not yet obtained follow-up imaging.    She is also due for a Pap smear, but would like to defer this for now.    ROS:  Constitutional: denies unintentional weight loss or gain; denies fevers, chills, or sweats     Cardiovascular: denies chest pain, palpitations, or edema  Respiratory: denies cough, wheezing, shortness of breath, or dyspnea on exertion  Gastrointestinal: denies nausea, vomiting, constipation, diarrhea, or abdominal pain  Genitourinary: denies urinary frequency, urgency, dysuria, or hematuria  Integumentary: denies rash or pruritus  Musculoskeletal: denies back pain, muscle pain, joint pain, or joint swelling  Neurologic: denies focal weakness, numbness, or tingling  Hematologic/Immunologic: denies history of anemia or blood transfusions  Endocrine: denies heat or cold intolerance; denies polyuria, polydipsia  Psychiatric: denies anxiety; see preventative health below    Past Medical History:   Diagnosis Date     NO ACTIVE PROBLEMS      Past Surgical History:   Procedure Laterality Date     COLONOSCOPY N/A 2/12/2018    Procedure: COLONOSCOPY;  colonoscopy;  Surgeon: Sabas Villegas MD;  Location:  GI     NO HISTORY OF SURGERY       Family History   Problem Relation Age of Onset     DIABETES No family hx of      Myocardial Infarction No family hx of      CEREBROVASCULAR DISEASE No family hx of      Coronary Artery Disease Early Onset No family hx of      CANCER No family hx of      no breast, ovarian, colon CA     Occupational History     [employer]      Social History Main Topics     Smoking status:  "Never Smoker     Smokeless tobacco: Never Used     Alcohol use No     Drug use: No     Sexual activity: Yes     Partners: Male     Social History Narrative    .    8 kids - 7 are here. 1 still in [place].     Walks a lot.      Allergies   Allergen Reactions     Ciprofloxacin Itching     MEDS: None    Immunization History   Administered Date(s) Administered     Influenza (IIV3) PF 10/08/2013, 10/01/2017     Influenza Vaccine IM 3yrs+ 4 Valent IIV4 09/29/2015     TDAP Vaccine (Adacel) 06/09/2014     OBJECTIVE:                                                      BP 98/70  Pulse 72  Temp 97.8  F (36.6  C) (Oral)  Resp 16  Ht 5' 3\" (1.6 m)  Wt 159 lb 12.8 oz (72.5 kg)  SpO2 98%  BMI 28.31 kg/m2  Constitutional: well-appearing  Eyes: normal conjunctivae and lids; pupils equal, round, and reactive to light  Ears, Nose, Mouth, and Throat: normal ears and nose; tympanic membranes visualized and normal; normal lips, teeth, and gums; no oropharyngeal lesions or ulcers  Neck: supple and symmetric; no lymphadenopathy; no thyromegaly or masses  Respiratory: normal respiratory effort; clear to auscultation bilaterally  Cardiovascular: regular rate and rhythm; pedal pulses palpable; no edema  Breasts: normal appearance; no masses or skin retraction; no nipple discharge or bleeding; no axillary lymphadenopathy  Gastrointestinal: soft, non-tender, non-distended, and bowel sounds present; no organomegaly or masses  Musculoskeletal: normal gait and station  Psych: normal judgment and insight; normal mood and affect; recent and remote memory intact; oriented to time, place, and person    PREVENTATIVE HEALTH                                                      BMI: overweight  Blood pressure: within normal limits   Breast CA screening: see above  Cervical CA screening: last pap performed 2015; report reviewed and was normal; repeat DUE  Colon CA screening: last performed 2018; report reviewed; repeat due in 2028  Lung CA " screening: n/a    Dexa: not medically indicated at this time   Screening HCV: n/a   Screening cholesterol: up to date   Screening diabetes: up to date   STD testing: no risk factors present  Depression screening: PHQ-2 assessment completed and reviewed - no intervention indicated at this time  Alcohol misuse screening: alcohol use reviewed - no intervention indicated at this time  Immunizations: reviewed; up to date     ASSESSMENT/PLAN:                                                       (Z00.00) Routine history and physical examination of adult  (primary encounter diagnosis)  Comment: PMH, PSH, FH, SH, medications, allergies, immunizations, and preventative health measures reviewed.   Plan: patient encouraged to return for pap smear when able.    (R92.8) Abnormal mammogram  Comment: left sided asymmetry.  Plan: left-sided diagnostic mammogram and focused ultrasound ordered - patient will be contacted to schedule.    (L29.9) Ear itching  Comment: Normal exam.  Plan: TRIAL of Cortisporin eardrops.    The instructions on the AVS were discussed and explained to the patient. Patient expressed understanding of instructions.    (Chart documentation was completed, in part, with [employer] voice-recognition software. Even though reviewed, some grammatical, spelling, and word errors may remain.)    Genna Magallanes MD   [address] [address]  T: [phone], F: [phone]

## 2018-03-08 NOTE — MR AVS SNAPSHOT
"              After Visit Summary   3/8/2018    Graciela Castorena    MRN: [id number]           Patient Information     Date Of Birth          1965        Visit Information        Provider Department      3/8/2018 11:15 AM Genna Magallanes MD; [employer] [facility]        Today's Diagnoses     Abnormal mammogram    -  1    Ear itching          Care Instructions    We will call you to schedule left breast imaging this afternoon.    ---    Please come back for pap smear in the near future.     ---    Ear drops sent to pharmacy - 3 drops to left ear 4x/day as needed.           Follow-ups after your visit        Future tests that were ordered for you today     Open Future Orders        Priority Expected Expires Ordered    MA Diagnostic Digital Left Routine  3/8/2019 3/8/2018    US Breast Left Limited 1-3 Quadrants Routine  3/8/2019 3/8/2018            Who to contact     If you have questions or need follow up information about today's clinic visit or your schedule please contact [facility] directly at [phone].  Normal or non-critical lab and imaging results will be communicated to you by MyChart, letter or phone within 4 business days after the clinic has received the results. If you do not hear from us within 7 days, please contact the clinic through Modebohart or phone. If you have a critical or abnormal lab result, we will notify you by phone as soon as possible.  Submit refill requests through iSkoot or call your pharmacy and they will forward the refill request to us. Please allow 3 business days for your refill to be completed.          Additional Information About Your Visit        Modebohart Information     iSkoot lets you send messages to your doctor, view your test results, renew your prescriptions, schedule appointments and more. To sign up, go to [email or web address] . Click on \"Log in\" on the left side of the screen, which will " "take you to the Welcome page. Then click on \"Sign up Now\" on the right side of the page.     You will be asked to enter the access code listed below, as well as some personal information. Please follow the directions to create your username and password.     Your access code is: [email or web address]  Expires: 4/10/2018  1:35 PM     Your access code will  in 90 days. If you need help or a new code, please call your [place] clinic or [phone].        Care EveryWhere ID     This is your Care EveryWhere ID. This could be used by other organizations to access your [place] medical records  [id number]        Your Vitals Were     Pulse Temperature Respirations Height Pulse Oximetry BMI (Body Mass Index)    72 97.8  F (36.6  C) (Oral) 16 5' 3\" (1.6 m) 98% 28.31 kg/m2       Blood Pressure from Last 3 Encounters:   18 98/70   18 114/75   18 114/73    Weight from Last 3 Encounters:   18 159 lb 12.8 oz (72.5 kg)   18 162 lb 9.6 oz (73.8 kg)   18 161 lb 3.2 oz (73.1 kg)                 Today's Medication Changes          These changes are accurate as of 3/8/18 12:08 PM.  If you have any questions, ask your nurse or doctor.               Start taking these medicines.        Dose/Directions    neomycin-polymyxin-hydrocortisone 3.5-90773-6 otic suspension   Commonly known as:  CORTISPORIN   Used for:  Ear itching   Started by:  Genna Magallanes MD        Dose:  3 drop   Place 3 drops Into the left ear 4 times daily   Quantity:  1 Bottle   Refills:  3            Where to get your medicines      These medications were sent to [facility]/pharmacy #[id number] [place], [place] - [address] [address]     Phone:  [phone]     neomycin-polymyxin-hydrocortisone 3.5-94237-3 otic suspension                Primary Care Provider    Mehul Vo       No address on file        Equal Access to Services     JACOBO DUFFY AH: pamela Pizano, " heidi sancheznathanbola brooksrambo fredrickin hayaan adeeg kharash la'aan ah. So [facility] [phone].    ATENCIÓN: Si lavelle white, tiene a banuelos disposición servicios gratuitos de asistencia lingüística. Pretty al [phone].    We comply with applicable federal civil rights laws and [place] laws. We do not discriminate on the basis of race, color, national origin, age, disability, sex, sexual orientation, or gender identity.            Thank you!     Thank you for choosing [facility]  for your care. Our goal is always to provide you with excellent care. Hearing back from our patients is one way we can continue to improve our services. Please take a few minutes to complete the written survey that you may receive in the mail after your visit with us. Thank you!             Your Updated Medication List - Protect others around you: Learn how to safely use, store and throw away your medicines at www.disposemymeds.org.          This list is accurate as of 3/8/18 12:08 PM.  Always use your most recent med list.                   Brand Name Dispense Instructions for use Diagnosis    neomycin-polymyxin-hydrocortisone 3.5-03922-7 otic suspension    CORTISPORIN    1 Bottle    Place 3 drops Into the left ear 4 times daily    Ear itching

## 2018-03-08 NOTE — PATIENT INSTRUCTIONS
We will call you to schedule left breast imaging this afternoon.    ---    Please come back for pap smear in the near future.     ---    Ear drops sent to pharmacy - 3 drops to left ear 4x/day as needed.

## 2018-03-12 ENCOUNTER — HOSPITAL ENCOUNTER (OUTPATIENT)
Dept: MAMMOGRAPHY | Facility: CLINIC | Age: 53
Discharge: HOME | End: 2018-03-12
Attending: INTERNAL MEDICINE | Admitting: INTERNAL MEDICINE
Payer: MEDICAID

## 2018-03-12 DIAGNOSIS — R92.8 ABNORMAL MAMMOGRAM: ICD-10-CM

## 2018-03-12 PROCEDURE — 77065 DX MAMMO INCL CAD UNI: CPT | Mod: LT

## 2018-03-13 ENCOUNTER — HOSPITAL ENCOUNTER (OUTPATIENT)
Dept: MAMMOGRAPHY | Facility: CLINIC | Age: 53
Discharge: HOME | End: 2018-03-13
Attending: INTERNAL MEDICINE | Admitting: INTERNAL MEDICINE
Payer: MEDICAID

## 2018-03-13 ENCOUNTER — HOSPITAL ENCOUNTER (OUTPATIENT)
Dept: MAMMOGRAPHY | Facility: CLINIC | Age: 53
End: 2018-03-13
Attending: INTERNAL MEDICINE
Payer: MEDICAID

## 2018-03-13 DIAGNOSIS — R92.8 ABNORMAL MAMMOGRAM: ICD-10-CM

## 2018-03-13 PROCEDURE — 88342 IMHCHEM/IMCYTCHM 1ST ANTB: CPT | Mod: 26,XU | Performed by: RADIOLOGY

## 2018-03-13 PROCEDURE — 88341 IMHCHEM/IMCYTCHM EA ADD ANTB: CPT | Mod: 26,XU | Performed by: RADIOLOGY

## 2018-03-13 PROCEDURE — 88360 TUMOR IMMUNOHISTOCHEM/MANUAL: CPT | Mod: 26 | Performed by: RADIOLOGY

## 2018-03-13 PROCEDURE — 88360 TUMOR IMMUNOHISTOCHEM/MANUAL: CPT | Performed by: RADIOLOGY

## 2018-03-13 PROCEDURE — 88305 TISSUE EXAM BY PATHOLOGIST: CPT | Performed by: RADIOLOGY

## 2018-03-13 PROCEDURE — 40000986 MA POST PROCEDURE LEFT

## 2018-03-13 PROCEDURE — 88341 IMHCHEM/IMCYTCHM EA ADD ANTB: CPT | Performed by: RADIOLOGY

## 2018-03-13 PROCEDURE — 88305 TISSUE EXAM BY PATHOLOGIST: CPT | Mod: 26 | Performed by: RADIOLOGY

## 2018-03-13 PROCEDURE — 27210207 MA STEREOTACTIC BREAST BIOPSY VACUUM LT

## 2018-03-13 PROCEDURE — 88342 IMHCHEM/IMCYTCHM 1ST ANTB: CPT | Mod: XU | Performed by: RADIOLOGY

## 2018-03-13 PROCEDURE — T1013 SIGN LANG/ORAL INTERPRETER: HCPCS | Mod: U3

## 2018-03-13 PROCEDURE — 25000125 ZZHC RX 250: Performed by: INTERNAL MEDICINE

## 2018-03-13 RX ORDER — LIDOCAINE HYDROCHLORIDE 10 MG/ML
5 INJECTION, SOLUTION INFILTRATION; PERINEURAL ONCE
Status: COMPLETED | OUTPATIENT
Start: 2018-03-13 | End: 2018-03-13

## 2018-03-13 RX ADMIN — LIDOCAINE HYDROCHLORIDE 5 ML: 10 INJECTION, SOLUTION INFILTRATION; PERINEURAL at 13:40

## 2018-03-13 RX ADMIN — LIDOCAINE HYDROCHLORIDE 30 ML: 10; .005 INJECTION, SOLUTION EPIDURAL; INFILTRATION; INTRACAUDAL; PERINEURAL at 13:40

## 2018-03-13 NOTE — DISCHARGE INSTRUCTIONS
After Your Breast Biopsy    Bleeding or bruising: Slight bruising is normal.  If you bleed through the bandage, put direct pressure on the breast.  If you are still bleeding after 20 minutes, call the doctor who ordered the exam.    Bandages: Keep your bandage in place until tomorrow morning.  Do not get it wet.  Leave the tape in place for two days.  On the second day, cover it with a Band-Aid.    Activity: You may shower the morning after the exam.  No heavy activity (lifting, vacuuming) for 24 hours.    Discomfort: Wear your bra overnight to support the breast.  You may take Tylenol (acetaminophen) for pain.  If you had a stereotactic of MR-directed biopsy, you may take aspirin or ibuprofen (Advil, Motrin) the morning after your biopsy, unless your doctor tells you not to.    Infection: Infection is rare.  Symptoms include fever, redness, increasing pain and fluid draining from the biopsy site.  If you have any of these symptoms, please call the doctor who ordered your exam.    Results: Results may take up to three business days.  If you have not heard your results in three days, call the Breast Center Nurse at [phone] or [phone].  In rare cases, we may need to do another biopsy.    Call the doctor who ordered your exam if:    You have bleeding that lasts more than 20 minutes.    You have pain that cannot be controlled.    You have signs of infection (fever, redness, drainage or other signs).    You have not had your results within three days.    Nurse navigator: Our nurse navigator is here to answer your questions and help you set up future clinic visits.  Please call [phone].    Thank you for choosing [facility].  Please call us if you have questions or concerns about your biopsy.

## 2018-03-15 ENCOUNTER — TELEPHONE (OUTPATIENT)
Dept: MAMMOGRAPHY | Facility: CLINIC | Age: 53
End: 2018-03-15

## 2018-03-15 LAB — COPATH REPORT: NORMAL

## 2018-03-15 NOTE — TELEPHONE ENCOUNTER
Spoke with patient via  and pt agrees to come in 3/21 to discuss questions regarding breast cancer treatment . This is a 15 min appt and was the only one available if an earlier one opens up please notify pt .Jesika Christianson RN

## 2018-03-15 NOTE — TELEPHONE ENCOUNTER
Please CALL patient.    Will need a [facility] .    I understand she has some reservations about moving forward with treatment for newly diagnosed breast cancer.    Can she come in with a family member and  to discuss further and answer/address any questions or concerns she might have?    Thank you.

## 2018-03-15 NOTE — TELEPHONE ENCOUNTER
MALIGNANT path  Pathology report reviewed with our breast radiologist Dr. Liana Canales.  With the help of a Zipfit , I phoned and informed patient of results showing Left Breast Ductal Carcinoma, intermediate nuclear grade.  Patient states no problems with biopsy site.  Recommended follow up is Surgical Consultation.  Patient states she is not ready to see a surgeon, and does not want to schedule an appointment at this time.  Patient will agree to let me call her next Monday, March 19, 2018 to follow up with her and see if we can schedule a surgical consultation.   Questions were answered, and I gave patient my phone number to call me back if she has any questions or concerns.   I will follow up with the patient. She has my phone number if she has further questions. Ordering provider notified.  LAURA CoreaN, RN

## 2018-03-19 ENCOUNTER — OFFICE VISIT (OUTPATIENT)
Dept: INTERNAL MEDICINE | Facility: CLINIC | Age: 53
End: 2018-03-19
Payer: MEDICAID

## 2018-03-19 VITALS
SYSTOLIC BLOOD PRESSURE: 124 MMHG | OXYGEN SATURATION: 100 % | RESPIRATION RATE: 21 BRPM | HEIGHT: 63 IN | WEIGHT: 163.9 LBS | TEMPERATURE: 98.7 F | BODY MASS INDEX: 29.04 KG/M2 | HEART RATE: 83 BPM | DIASTOLIC BLOOD PRESSURE: 80 MMHG

## 2018-03-19 DIAGNOSIS — C50.912 MALIGNANT NEOPLASM OF LEFT FEMALE BREAST, UNSPECIFIED ESTROGEN RECEPTOR STATUS, UNSPECIFIED SITE OF BREAST (H): Primary | ICD-10-CM

## 2018-03-19 PROCEDURE — 99213 OFFICE O/P EST LOW 20 MIN: CPT | Performed by: INTERNAL MEDICINE

## 2018-03-19 PROCEDURE — T1013 SIGN LANG/ORAL INTERPRETER: HCPCS | Mod: U3 | Performed by: INTERNAL MEDICINE

## 2018-03-19 ASSESSMENT — PAIN SCALES - GENERAL: PAINLEVEL: NO PAIN (0)

## 2018-03-19 NOTE — MR AVS SNAPSHOT
After Visit Summary   3/19/2018    Graciela Castorena    MRN: [id number]           Patient Information     Date Of Birth          1965        Visit Information        Provider Department      3/19/2018 11:15 AM Genna Magallanes MD; [employer] [facility]        Today's Diagnoses     Malignant neoplasm of left female breast, unspecified estrogen receptor status, unspecified site of breast (H)    -  1      Care Instructions    Referral placed - please call to schedule.           Follow-ups after your visit        Additional Services     GENERAL SURG ADULT REFERRAL       Your provider has referred you to: [facility]: [facility] [place] [phone]   [email or web address]    Please be aware that coverage of these services is subject to the terms and limitations of your health insurance plan.  Call member services at your health plan with any benefit or coverage questions.      Please bring the following with you to your appointment:    (1) Any X-Rays, CTs or MRIs which have been performed.  Contact the facility where they were done to arrange for  prior to your scheduled appointment.   (2) List of current medications   (3) This referral request   (4) Any documents/labs given to you for this referral                  Your next 10 appointments already scheduled     Mar 21, 2018 10:30 AM CDT   SHORT with Genna Magallanes MD   [facility]    [address] [address] [phone]              Who to contact     If you have questions or need follow up information about today's clinic visit or your schedule please contact [facility] directly at [phone].  Normal or non-critical lab and imaging results will be communicated to you by MyChart, letter or phone within 4 business days after the  "clinic has received the results. If you do not hear from us within 7 days, please contact the clinic through Zenedy or phone. If you have a critical or abnormal lab result, we will notify you by phone as soon as possible.  Submit refill requests through Zenedy or call your pharmacy and they will forward the refill request to us. Please allow 3 business days for your refill to be completed.          Additional Information About Your Visit        FrogramsharCogency Software Information     Zenedy lets you send messages to your doctor, view your test results, renew your prescriptions, schedule appointments and more. To sign up, go to [email or web address] . Click on \"Log in\" on the left side of the screen, which will take you to the Welcome page. Then click on \"Sign up Now\" on the right side of the page.     You will be asked to enter the access code listed below, as well as some personal information. Please follow the directions to create your username and password.     Your access code is: [email or web address]  Expires: 4/10/2018  2:35 PM     Your access code will  in 90 days. If you need help or a new code, please call your [place] clinic or [phone].        Care EveryWhere ID     This is your Care EveryWhere ID. This could be used by other organizations to access your [place] medical records  [id number]        Your Vitals Were     Pulse Temperature Respirations Height Pulse Oximetry BMI (Body Mass Index)    83 98.7  F (37.1  C) (Oral) 21 5' 3\" (1.6 m) 100% 29.03 kg/m2       Blood Pressure from Last 3 Encounters:   18 124/80   18 98/70   18 114/75    Weight from Last 3 Encounters:   18 163 lb 14.4 oz (74.3 kg)   18 159 lb 12.8 oz (72.5 kg)   18 162 lb 9.6 oz (73.8 kg)              We Performed the Following     GENERAL SURG ADULT REFERRAL        Primary Care Provider Office Phone # Fax #    Genna Magallanes [phone]       [address] [place] [address]      "   Equal Access to Services     [facility] : Hadii aad ku hadravinderbrea Danielali, wadionda luqadaha, qacliffordta danielnathanbecky brooks. So [facility] [phone].    ATENCIÓN: Si habla español, tiene a banuelos disposición servicios gratuitos de asistencia lingüística. Llame al [phone].    We comply with applicable federal civil rights laws and [place] laws. We do not discriminate on the basis of race, color, national origin, age, disability, sex, sexual orientation, or gender identity.            Thank you!     Thank you for choosing [facility]  for your care. Our goal is always to provide you with excellent care. Hearing back from our patients is one way we can continue to improve our services. Please take a few minutes to complete the written survey that you may receive in the mail after your visit with us. Thank you!             Your Updated Medication List - Protect others around you: Learn how to safely use, store and throw away your medicines at www.disposemymeds.org.          This list is accurate as of 3/19/18 11:55 AM.  Always use your most recent med list.                   Brand Name Dispense Instructions for use Diagnosis    neomycin-polymyxin-hydrocortisone 3.5-02901-7 otic suspension    CORTISPORIN    1 Bottle    Place 3 drops Into the left ear 4 times daily    Ear itching

## 2018-03-19 NOTE — PROGRESS NOTES
"  SUBJECTIVE:                                                      HPI: Graciela Castorena is a pleasant 53 year old female who presents for discussion re: newly diagnosed breast cancer:    Nepalese  utilized.    Accompanied by 2 daughters.    Patient was noted to have a left-sided abnormality on screening mammogram in January.    Follow-up left-sided imaging redemonstrated abnormality.    Biopsy was performed and was positive for ductal carcinoma, intermediate nuclear grade.    Patient was contacted regarding results and referred to breast surgery for further evaluation and management.  She however declines a referral.    I contacted the patient last week and asked her to come in to discuss this again and reconsider seeing a breast specialist.    Discussed pathology findings with patient and her daughters.    Extent of breast cancer is unknown at this time - follow-up imaging is needed.    Whether her breast cancer is local or widespread there are excellent treatments available.  If her cancer is local it is potentially curable with treatment.  If her cancer is widespread the treatments can help improve the quality and length of her life.  If her cancer is local, evaluation and treatment sooner rather than later is recommended to avoid it becoming widespread.  This is why encouraged breast surgery evaluation and management as soon as possible.    Answered patient's questions and concerns to the best of my ability.    Patient is now agreeable to seeing a breast surgeon for further evaluation and management.    ---    The medication, allergy, and problem lists have been reviewed and updated as appropriate.       OBJECTIVE:                                                      /80 (BP Location: Left arm, Patient Position: Chair, Cuff Size: Adult Regular)  Pulse 83  Temp 98.7  F (37.1  C) (Oral)  Resp 21  Ht 5' 3\" (1.6 m)  Wt 163 lb 14.4 oz (74.3 kg)  SpO2 100%  BMI 29.03 kg/m2  Constitutional: " well-appearing  Psych: normal judgment and insight; normal mood and affect; recent and remote memory intact      ASSESSMENT/PLAN:                                                      (C50.580) Malignant neoplasm of left female breast, unspecified estrogen receptor status, unspecified site of breast (H)  (primary encounter diagnosis)  Comment: see discussion above   Plan: patient referred to general surgery/breast surgery Center for further evaluation and management - she will call and schedule today.    The instructions on the AVS were discussed and explained to the patient. Patient expressed understanding of instructions.    (Chart documentation was completed, in part, with V[employer] voice-recognition software. Even though reviewed, some grammatical, spelling, and word errors may remain.)    Genna Magallanes MD   [address] [address]  T: [phone], F: [phone]

## 2018-03-20 NOTE — TELEPHONE ENCOUNTER
Patient is now scheduled to see Dr. Meli Rodriguez for surgical consultation regarding her left breast cancer on Friday, March 23rd @ 0930 @ [facility] office- [address].  I informed patient to check in at 0900, and instructed her on how to get to clinic.  Gave patient the phone number to [facility] in case she needs to call with questions, or change appointment.  LAURA CoreaN, RN

## 2018-03-23 ENCOUNTER — OFFICE VISIT (OUTPATIENT)
Dept: SURGERY | Facility: CLINIC | Age: 53
End: 2018-03-23
Payer: MEDICAID

## 2018-03-23 ENCOUNTER — TELEPHONE (OUTPATIENT)
Dept: SURGERY | Facility: CLINIC | Age: 53
End: 2018-03-23

## 2018-03-23 VITALS
DIASTOLIC BLOOD PRESSURE: 79 MMHG | BODY MASS INDEX: 28.88 KG/M2 | HEIGHT: 63 IN | HEART RATE: 78 BPM | WEIGHT: 163 LBS | SYSTOLIC BLOOD PRESSURE: 128 MMHG

## 2018-03-23 DIAGNOSIS — D05.12 DUCTAL CARCINOMA IN SITU (DCIS) OF LEFT BREAST: Primary | ICD-10-CM

## 2018-03-23 PROCEDURE — 99244 OFF/OP CNSLTJ NEW/EST MOD 40: CPT | Performed by: SURGERY

## 2018-03-23 PROCEDURE — T1013 SIGN LANG/ORAL INTERPRETER: HCPCS | Mod: U3 | Performed by: SURGERY

## 2018-03-23 NOTE — TELEPHONE ENCOUNTER
Type of surgery: Seed localized left breast lumpectomy  Location of surgery: [facility]  Date and time of surgery: 4/18/18 at 2:20pm  Anesthesia: General  Surgical Soap: Given to patient  Surgeon: Dr. Meli Rodriguez  Pre-Op Appt Date: Patient to schedule  Post-Op Appt Date: Patient to schedule   Packet sent out: Yes  Pre-cert/Authorization completed:  Not Applicable  Date: 3/23/18    Seed localization at 1pm (Scheduled with Abbie)

## 2018-03-23 NOTE — NURSING NOTE
Breast Patients    BREAST PATIENTS (ALL)    1-Do you have any of the following symptoms? Lump(s) or Mass(es)  2-In which breast are you having the symptoms? left  3-Do you use hormones?  No  4-Have you had a Mammogram? Yes  Where[facility]   Date: 3/13/18  5-Have you ever had a breast cyst drained? No  6-Have you ever had a breast biopsy? Yes  Side: left  Date: 3/13/18  7-Have you ever had a Breast Cancer? No   8-Is there a history of Breast Cancer in your family? No  9-Have you ever had Ovarian Cancer? No  10-Is there a history of Ovarian Cancer in your family? No  11-Summarize your caffeine intake (i.e. coffee, tea, chocolate, soda etc.): 1 cup coffee    BREAST PATIENTS (FEMALE)    12-What age did your periods begin? 16    13-Date your last menstrual period began? 9 years ago  14-Number of full-term pregnancies: 8  15-Your age when your first child was born? 18  16-Did you nurse your children? No  17-Are you pregnant now? No  18-Have you begun menopause? Yes  Age Menopause began:  45  19-Have you had either ovary removed?No  20-Do you have breast implants? No

## 2018-03-23 NOTE — LETTER
2018    Re: Graciela Castorena - 1965    Graciela Castorena is a 53 year old female who is seen in consultation at the request of Dr. 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 She had a screening mammogram back in January which revealed an area of microcalcifications which were concerning and biopsy was recommended. She underwent a stereotactic biopsy in March and this revealed ductal carcinoma in situ, intermediate grade.  She tolerated the biopsy well.  She has no history of prior breast concerns.  She is otherwise very healthy.  No prior surgeries.     She reached menarche at 16 years old.  She had 8 children and was 18 years old with her first child. She went through menopause beginning at 45 years old.  She did not have any need for infertility treatment and she has not used hormone wrist replacement medications.     She has no family history for breast cancer, ovarian cancer, colon cancer, prostate cancer or melanoma.     Imaging:   Recent Results (from the past 744 hour(s))   MA Diagnostic Digital Left     Narrative     DIAGNOSTIC MAMMOGRAM LEFT DIGITAL w/CAD , 3/12/2018 11:15 AM.     HISTORY: ; Abnormal mammogram     BREAST DENSITY: Scattered fibroglandular densities.     FINDINGS: Indeterminant cluster of microcalcifications in the  retroareolar portion of the left breast at about 11:00 involving an  area of about 1 cm.     Impression     IMPRESSION: BI-RADS CATEGORY: 4 - Suspicious.     RECOMMENDED FOLLOW-UP: Biopsy.      ANGELA ROSENBERG MD MA Stereotactic Breast Biopsy Vacuum Lt     Addendum: 3/16/2018     Pathology Results:  Intermediate nuclear grade ductal carcinoma in situ.  Please see full pathology report for further details.      Results are concordant with imaging findings.      Recommendation: Surgical consultation.     JOSE NG MD     Narrative     STEREOTACTIC-GUIDED LEFT BREAST CORE BIOPSY;   SPECIMEN RADIOGRAPH;   CLIP PLACEMENT;   POSTBIOPSY DIGITAL  MAMMOGRAM LEFT BREAST;  3/13/2018 1:53 PM     INDICATION FOR PROCEDURE: Left breast calcifications at 11:00.     PROCEDURE: Following discussion of risks and benefits, consent was  obtained. The patient was placed on the stereotactic table and the  calcifications were identified and targeted in the craniocaudal  projection. 1% lidocaine without and with epinephrine were  administered for local anesthesia. Digital images were obtained to  assure correct targeting before needle introduction. A skin nick was  made and 9-gauge Suros biopsy needle was introduced via superior  approach. Repeat digital images showed needle tip in appropriate  position relative to the lesion. A series of 8 vacuum-assisted samples  were obtained.  There was less than 5 cc of blood loss.     Specimen radiograph showed calcifications within the obtained core  tissue samples.  A metal tissue marker clip was then deployed to aida  the site. The patient tolerated the procedure without difficulty and  there was no significant pain or immediate complication at the end of  the procedure.      Postbiopsy digital mammogram showed the marker clip at the expected  biopsy site.     Impression     IMPRESSION: Successful stereotactic biopsy of left breast  calcifications.  Final pathology is pending.       PEARL MERCEDES MD MA Post Procedure Left     Narrative     STEREOTACTIC-GUIDED LEFT BREAST CORE BIOPSY;   SPECIMEN RADIOGRAPH;   CLIP PLACEMENT;   POSTBIOPSY DIGITAL MAMMOGRAM LEFT BREAST;  3/13/2018 1:53 PM     INDICATION FOR PROCEDURE: Left breast calcifications at 11:00.     PROCEDURE: Following discussion of risks and benefits, consent was  obtained. The patient was placed on the stereotactic table and the  calcifications were identified and targeted in the craniocaudal  projection. 1% lidocaine without and with epinephrine were  administered for local anesthesia. Digital images were obtained to  assure correct targeting before needle introduction.  A skin nick was  made and 9-gauge Suros biopsy needle was introduced via superior  approach. Repeat digital images showed needle tip in appropriate  position relative to the lesion. A series of 8 vacuum-assisted samples  were obtained.  There was less than 5 cc of blood loss.     Specimen radiograph showed calcifications within the obtained core  tissue samples.  A metal tissue marker clip was then deployed to aida  the site. The patient tolerated the procedure without difficulty and  there was no significant pain or immediate complication at the end of  the procedure.      Postbiopsy digital mammogram showed the marker clip at the expected  biopsy site.     Impression     IMPRESSION: Successful stereotactic biopsy of left breast  calcifications.  Final pathology is pending.       PEARL MERCEDES MD      Percutaneous core needle biopsy, left: Ductal carcinoma in situ, intermediate grade, estrogen receptor positive and progesterone receptor positive.      Past Medical History:  Has a past medical history of NO ACTIVE PROBLEMS.      Current Outpatient Prescriptions:      neomycin-polymyxin-hydrocortisone (CORTISPORIN) 3.5-29610-6 otic suspension, Place 3 drops Into the left ear 4 times daily, Disp: 1 Bottle, Rfl: 3     ROS:  The 10 point review of systems is negative other than noted in the HPI and above.     PE:  Vitals: There were no vitals taken for this visit.  General appearance: well-nourished, sitting comfortably, no apparent distress  Psych: normal affect, pleasant  HEENT:  Head normocephalic and atraumatic, pupils equal and round, conjunctivae clear, mucous membranes moist, external ears and nose normal  Neck: Supple without thyromegaly or masses  Lungs: Respirations unlabored  Lymphatic: No cervical, or supraclavicular lymphadenopathy  Extremities: Without edema  Musculoskeletal:  Normal station and gait  Neurologic: nonfocal, grossly intact times four extremities, alert and oriented times three  Psychiatric:  Mood and affect are appropriate  Skin: Without lesions or rashes     Breast:  A bilateral breast exam was performed in the supine position.. Bilateral breasts were palpated in a circumferential clockwise fashion including the supraclavicular and axillary areas.   Breasts: Breasts are symmetrical with no skin or nipple changes.  Contour is normal.  Parenchyma is moderately dense throughout.  There is a biopsy site on the left breast at 10:00 near the lateral aspect of the areola with a small hematoma surrounding.      Lymph:    No supraclavicular/infraclavicular adenopathy.                   Axillary adenopathy: none     Assessment and Plan:      Ms. Castorena is a very pleasant 53-year-old female who has been found to have DCIS in the left breast associated with a small area of calcification seen on mammogram.  I reviewed the imaging and pathology reports with her and explained the findings.  We talked about the fact that this is non-invasive, or stage 0, breast cancer that was found on screening mammogram.  We also talked about the fact that the tumor has favorable features (ER strongly positive) and should be quite treatable and not life threatening.     We next discussed the surgical options for treatment.  I described the procedures for lumpectomy and mastectomy including the details of the procedures, the risks, anesthesia and expected recovery.  We talked about post-lumpectomy radiation, the course and usual side effects.       We also talked about post-mastectomy reconstruction and the stages involved.  I reviewed the data regarding lumpectomy and radiation vs mastectomy that shows that the local recurrence risk is slightly higher for lumpectomy and radiation vs mastectomy (5-10% vs. 1-2%), but the survival at 20 years is the same.  I advised that lymph node biopsy is recommended whenever we are dealing with invasive breast cancer, but that if this is just DCIS, no lymph node biopsy is needed.  If she were to  have a lumpectomy, I would not do a lymph node biopsy, but if she were to have a mastectomy I would because I would not be able to go back and do a sentinel lymph node biopsy in the event that invasive cancer is found on final pathology.  I described the procedure for sentinel lymph node biopsy.  We talked about the risk for lymphedema which is small with removal of only a few nodes, but certainly not zero.     She would like to proceed with scheduling a seed localized left breast lumpectomy and we will plan to do this within the next month.        Meli Rodriguez MD

## 2018-03-23 NOTE — PROGRESS NOTES
[facility] Breast Surgery Consultation    HPI:   Graciela Castorena is a 53 year old female who is seen in consultation at the request of Dr. 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 She had a screening mammogram back in January which revealed an area of microcalcifications which were concerning and biopsy was recommended.  She underwent a stereotactic biopsy in March and this revealed ductal carcinoma in situ, intermediate grade.  She tolerated the biopsy well.  She has no history of prior breast concerns.  She is otherwise very healthy.  No prior surgeries.    She reached menarche at 16 years old.  She had 8 children and was 18 years old with her first child.  She went through menopause beginning at 45 years old.  She did not have any need for infertility treatment and she has not used hormone wrist replacement medications.    She has no family history for breast cancer, ovarian cancer, colon cancer, prostate cancer or melanoma.    Imaging:     Recent Results (from the past 744 hour(s))   MA Diagnostic Digital Left    Narrative    DIAGNOSTIC MAMMOGRAM LEFT DIGITAL w/CAD , 3/12/2018 11:15 AM.    HISTORY: ; Abnormal mammogram    BREAST DENSITY: Scattered fibroglandular densities.    FINDINGS: Indeterminant cluster of microcalcifications in the  retroareolar portion of the left breast at about 11:00 involving an  area of about 1 cm.      Impression    IMPRESSION: BI-RADS CATEGORY: 4 - Suspicious.    RECOMMENDED FOLLOW-UP: Biopsy.        ANGELA ROSENBERG MD MA Stereotactic Breast Biopsy Vacuum Lt    Addendum: 3/16/2018    Pathology Results:  Intermediate nuclear grade ductal carcinoma in situ.  Please see full pathology report for further details.     Results are concordant with imaging findings.     Recommendation: Surgical consultation.    JOSE NG MD      Narrative    STEREOTACTIC-GUIDED LEFT BREAST CORE BIOPSY;   SPECIMEN RADIOGRAPH;   CLIP PLACEMENT;   POSTBIOPSY DIGITAL MAMMOGRAM  LEFT BREAST;  3/13/2018 1:53 PM    INDICATION FOR PROCEDURE: Left breast calcifications at 11:00.    PROCEDURE: Following discussion of risks and benefits, consent was  obtained. The patient was placed on the stereotactic table and the  calcifications were identified and targeted in the craniocaudal  projection. 1% lidocaine without and with epinephrine were  administered for local anesthesia. Digital images were obtained to  assure correct targeting before needle introduction. A skin nick was  made and 9-gauge Suros biopsy needle was introduced via superior  approach. Repeat digital images showed needle tip in appropriate  position relative to the lesion. A series of 8 vacuum-assisted samples  were obtained.  There was less than 5 cc of blood loss.    Specimen radiograph showed calcifications within the obtained core  tissue samples.  A metal tissue marker clip was then deployed to aida  the site. The patient tolerated the procedure without difficulty and  there was no significant pain or immediate complication at the end of  the procedure.     Postbiopsy digital mammogram showed the marker clip at the expected  biopsy site.      Impression    IMPRESSION: Successful stereotactic biopsy of left breast  calcifications.  Final pathology is pending.      PEARL MERCEDES MD MA Post Procedure Left    Narrative    STEREOTACTIC-GUIDED LEFT BREAST CORE BIOPSY;   SPECIMEN RADIOGRAPH;   CLIP PLACEMENT;   POSTBIOPSY DIGITAL MAMMOGRAM LEFT BREAST;  3/13/2018 1:53 PM    INDICATION FOR PROCEDURE: Left breast calcifications at 11:00.    PROCEDURE: Following discussion of risks and benefits, consent was  obtained. The patient was placed on the stereotactic table and the  calcifications were identified and targeted in the craniocaudal  projection. 1% lidocaine without and with epinephrine were  administered for local anesthesia. Digital images were obtained to  assure correct targeting before needle introduction. A skin nick  was  made and 9-gauge Suros biopsy needle was introduced via superior  approach. Repeat digital images showed needle tip in appropriate  position relative to the lesion. A series of 8 vacuum-assisted samples  were obtained.  There was less than 5 cc of blood loss.    Specimen radiograph showed calcifications within the obtained core  tissue samples.  A metal tissue marker clip was then deployed to aida  the site. The patient tolerated the procedure without difficulty and  there was no significant pain or immediate complication at the end of  the procedure.     Postbiopsy digital mammogram showed the marker clip at the expected  biopsy site.      Impression    IMPRESSION: Successful stereotactic biopsy of left breast  calcifications.  Final pathology is pending.      PEARL MERCEDES MD       Percutaneous core needle biopsy, left: Ductal carcinoma in situ, intermediate grade, estrogen receptor positive and progesterone receptor positive.      Past Medical History:   has a past medical history of NO ACTIVE PROBLEMS.      Current Outpatient Prescriptions:      neomycin-polymyxin-hydrocortisone (CORTISPORIN) 3.5-15111-2 otic suspension, Place 3 drops Into the left ear 4 times daily, Disp: 1 Bottle, Rfl: 3    Past Surgical History:  Past Surgical History:   Procedure Laterality Date     COLONOSCOPY N/A 2/12/2018    Procedure: COLONOSCOPY;  colonoscopy;  Surgeon: Sabas Villegas MD;  Location:  GI     NO HISTORY OF SURGERY             Allergies   Allergen Reactions     Ciprofloxacin Itching         Social History:  Social History     Social History     Marital status:      Spouse name: N/A     Number of children: 9     Years of education: N/A     Occupational History     [employer]      Social History Main Topics     Smoking status: Never Smoker     Smokeless tobacco: Never Used     Alcohol use No     Drug use: No     Sexual activity: Yes     Partners: Male     Other Topics Concern     Not on file     Social  History Narrative    .    8 kids - 7 are here. 1 still in [place].     Walks a lot.             ROS:  The 10 point review of systems is negative other than noted in the HPI and above.    PE:  Vitals: There were no vitals taken for this visit.  General appearance: well-nourished, sitting comfortably, no apparent distress  Psych: normal affect, pleasant  HEENT:  Head normocephalic and atraumatic, pupils equal and round, conjunctivae clear, mucous membranes moist, external ears and nose normal  Neck: Supple without thyromegaly or masses  Lungs: Respirations unlabored  Lymphatic: No cervical, or supraclavicular lymphadenopathy  Extremities: Without edema  Musculoskeletal:  Normal station and gait  Neurologic: nonfocal, grossly intact times four extremities, alert and oriented times three  Psychiatric: Mood and affect are appropriate  Skin: Without lesions or rashes    Breast:  A bilateral breast exam was performed in the supine position.. Bilateral breasts were palpated in a circumferential clockwise fashion including the supraclavicular and axillary areas.   Breasts: Breasts are symmetrical with no skin or nipple changes.  Contour is normal.  Parenchyma is moderately dense throughout.  There is a biopsy site on the left breast at 10:00 near the lateral aspect of the areola with a small hematoma surrounding.     Lymph:       No supraclavicular/infraclavicular adenopathy.   Axillary adenopathy: none    Assessment and Plan:     Ms. Castorena is a very pleasant 53-year-old female who has been found to have DCIS in the left breast associated with a small area of calcification seen on mammogram.  I reviewed the imaging and pathology reports with her and explained the findings.  We talked about the fact that this is non-invasive, or stage 0, breast cancer that was found on screening mammogram.  We also talked about the fact that the tumor has favorable features (ER strongly positive) and should be quite treatable and not  life threatening.    We next discussed the surgical options for treatment.  I described the procedures for lumpectomy and mastectomy including the details of the procedures, the risks, anesthesia and expected recovery.  We talked about post-lumpectomy radiation, the course and usual side effects.       We also talked about post-mastectomy reconstruction and the stages involved.  I reviewed the data regarding lumpectomy and radiation vs mastectomy that shows that the local recurrence risk is slightly higher for lumpectomy and radiation vs mastectomy (5-10% vs. 1-2%), but the survival at 20 years is the same.  I advised that lymph node biopsy is recommended whenever we are dealing with invasive breast cancer, but that if this is just DCIS, no lymph node biopsy is needed.  If she were to have a lumpectomy, I would not do a lymph node biopsy, but if she were to have a mastectomy I would because I would not be able to go back and do a sentinel lymph node biopsy in the event that invasive cancer is found on final pathology.  I described the procedure for sentinel lymph node biopsy.  We talked about the risk for lymphedema which is small with removal of only a few nodes, but certainly not zero.    She would like to proceed with scheduling a seed localized left breast lumpectomy and we will plan to do this within the next month.      Meli Rodriguez MD      Please route or send letter to:  Primary Care Provider (PCP) and Referring Provider

## 2018-03-23 NOTE — MR AVS SNAPSHOT
"              After Visit Summary   3/23/2018    Graciela Castorena    MRN: [id number]           Patient Information     Date Of Birth          1965        Visit Information        Provider Department      3/23/2018 9:15 AM Meli Rodriguez MD; [employer] [facility]      Today's Diagnoses     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 breast    -  1       Follow-ups after your visit        Future tests that were ordered for you today     Open Future Orders        Priority Expected Expires Ordered    MA Radioactive Seed Placement, 1St Lesion Left Routine 3/23/2018 3/23/2019 3/23/2018    MA Breast Specimen Left OR Routine 3/23/2018 3/23/2019 3/23/2018            Who to contact     If you have questions or need follow up information about today's clinic visit or your schedule please contact [facility] directly at [phone].  Normal or non-critical lab and imaging results will be communicated to you by [employer], letter or phone within 4 business days after the clinic has received the results. If you do not hear from us within 7 days, please contact the clinic through [employer] or phone. If you have a critical or abnormal lab result, we will notify you by phone as soon as possible.  Submit refill requests through [employer] or call your pharmacy and they will forward the refill request to us. Please allow 3 business days for your refill to be completed.          Additional Information About Your Visit        MyChart Information     [employer] lets you send messages to your doctor, view your test results, renew your prescriptions, schedule appointments and more. To sign up, go to [email or web address] . Click on \"Log in\" on the left side of the screen, which will take you to the Welcome page. Then click on \"Sign up Now\" on the right side of the page.     You will be asked to enter the access code listed below, as well as some " "personal information. Please follow the directions to create your username and password.     Your access code is: [email or web address]  Expires: 4/10/2018  2:35 PM     Your access code will  in 90 days. If you need help or a new code, please call your [facility] clinic or [phone].        Care EveryWhere ID     This is your Care EveryWhere ID. This could be used by other organizations to access your [facility] medical records  [id number]        Your Vitals Were     Pulse Height BMI (Body Mass Index)             78 5' 3\" (1.6 m) 28.87 kg/m2          Blood Pressure from Last 3 Encounters:   18 128/79   18 124/80   18 98/70    Weight from Last 3 Encounters:   18 163 lb (73.9 kg)   18 163 lb 14.4 oz (74.3 kg)   18 159 lb 12.8 oz (72.5 kg)               Primary Care Provider Office Phone # Fax #    Genna Magallanes [phone]       [address] [facility] [address]        Equal Access to Services     OMARI DUFFY : Hadii aad ku hadasho Soomaali, waaxda luqadaha, qaybta kaalmada adeegyada, becky toddn yamila multani ah. So [facility] [phone].    ATENCIÓN: Si habla español, tiene a banuelos disposición servicios gratuitos de asistencia lingüística. Ll[facility] [phone].    We comply with applicable federal civil rights laws and [place] laws. We do not discriminate on the basis of race, color, national origin, age, disability, sex, sexual orientation, or gender identity.            Thank you!     Thank you for choosing [facility]  for your care. Our goal is always to provide you with excellent care. Hearing back from our patients is one way we can continue to improve our services. Please take a few minutes to complete the written survey that you may receive in the mail after your visit with us. Thank you!             Your Updated Medication List - Protect others around you: Learn how to safely use, store and throw away your medicines at " www.disposemymeds.org.          This list is accurate as of 3/23/18 10:16 AM.  Always use your most recent med list.                   Brand Name Dispense Instructions for use Diagnosis    neomycin-polymyxin-hydrocortisone 3.5-84077-3 otic suspension    CORTISPORIN    1 Bottle    Place 3 drops Into the left ear 4 times daily    Ear itching

## 2018-03-26 ENCOUNTER — TELEPHONE (OUTPATIENT)
Dept: SURGERY | Facility: CLINIC | Age: 53
End: 2018-03-26

## 2018-03-26 NOTE — TELEPHONE ENCOUNTER
----- Message from Kailey Moreland sent at 3/26/2018 12:29 PM CDT -----  Graciela Espino saw you last week and scheduled a lumpectomy for April 18th.  She is calling today to talk to you about the possibilty of postponing surgery until May or June.      Her son is having some medical issues right now and her daughter is graduating soon, so she would prefer to focus on her children if you think she can hold off on surgery.     She can be reached at #[phone]    Thanks,  Kailey

## 2018-03-26 NOTE — TELEPHONE ENCOUNTER
I called Graciela and left her a voicemail. She had called wondering if she could postpone her surgery for lumpectomy for DCIS until May or June. She had her initial mammogram in January and follow up imaging and biopsy in March and there was no change. With her biopsy the calcifications were largely removed. I would prefer she keep her scheduled surgery date in April, but the likelihood of her disease progressing prior to May is low. It would be fine if she needs to re-schedule to May. I would not recommend waiting beyond that.     Meli Rodriguez MD  [facility]  [phone]

## 2018-04-04 ENCOUNTER — OFFICE VISIT (OUTPATIENT)
Dept: INTERNAL MEDICINE | Facility: CLINIC | Age: 53
End: 2018-04-04
Payer: MEDICAID

## 2018-04-04 VITALS
HEIGHT: 63 IN | DIASTOLIC BLOOD PRESSURE: 80 MMHG | RESPIRATION RATE: 20 BRPM | BODY MASS INDEX: 28.58 KG/M2 | WEIGHT: 161.3 LBS | SYSTOLIC BLOOD PRESSURE: 120 MMHG | OXYGEN SATURATION: 100 % | TEMPERATURE: 97.8 F | HEART RATE: 77 BPM

## 2018-04-04 DIAGNOSIS — D05.12 DUCTAL CARCINOMA IN SITU (DCIS) OF LEFT BREAST: ICD-10-CM

## 2018-04-04 DIAGNOSIS — Z01.818 PREOPERATIVE EXAMINATION: Primary | ICD-10-CM

## 2018-04-04 DIAGNOSIS — R30.0 DYSURIA: ICD-10-CM

## 2018-04-04 DIAGNOSIS — R31.29 MICROSCOPIC HEMATURIA: Primary | ICD-10-CM

## 2018-04-04 PROBLEM — M75.00 ADHESIVE CAPSULITIS: Status: RESOLVED | Noted: 2017-03-30 | Resolved: 2018-04-04

## 2018-04-04 LAB
ALBUMIN SERPL-MCNC: 3.9 G/DL (ref 3.4–5)
ALBUMIN UR-MCNC: NEGATIVE MG/DL
ALP SERPL-CCNC: 78 U/L (ref 40–150)
ALT SERPL W P-5'-P-CCNC: 17 U/L (ref 0–50)
ANION GAP SERPL CALCULATED.3IONS-SCNC: 4 MMOL/L (ref 3–14)
APPEARANCE UR: CLEAR
AST SERPL W P-5'-P-CCNC: 17 U/L (ref 0–45)
BILIRUB SERPL-MCNC: 0.4 MG/DL (ref 0.2–1.3)
BILIRUB UR QL STRIP: NEGATIVE
BUN SERPL-MCNC: 12 MG/DL (ref 7–30)
CALCIUM SERPL-MCNC: 9.2 MG/DL (ref 8.5–10.1)
CHLORIDE SERPL-SCNC: 106 MMOL/L (ref 94–109)
CO2 SERPL-SCNC: 31 MMOL/L (ref 20–32)
COLOR UR AUTO: YELLOW
CREAT SERPL-MCNC: 0.63 MG/DL (ref 0.52–1.04)
ERYTHROCYTE [DISTWIDTH] IN BLOOD BY AUTOMATED COUNT: 13.1 % (ref 10–15)
GFR SERPL CREATININE-BSD FRML MDRD: >90 ML/MIN/1.7M2
GLUCOSE SERPL-MCNC: 92 MG/DL (ref 70–99)
GLUCOSE UR STRIP-MCNC: NEGATIVE MG/DL
HCT VFR BLD AUTO: 41.8 % (ref 35–47)
HGB BLD-MCNC: 13.8 G/DL (ref 11.7–15.7)
HGB UR QL STRIP: ABNORMAL
KETONES UR STRIP-MCNC: NEGATIVE MG/DL
LEUKOCYTE ESTERASE UR QL STRIP: NEGATIVE
MCH RBC QN AUTO: 30.2 PG (ref 26.5–33)
MCHC RBC AUTO-ENTMCNC: 33 G/DL (ref 31.5–36.5)
MCV RBC AUTO: 92 FL (ref 78–100)
MRSA DNA SPEC QL NAA+PROBE: NEGATIVE
MUCOUS THREADS #/AREA URNS LPF: PRESENT /LPF
NITRATE UR QL: NEGATIVE
PH UR STRIP: 6.5 PH (ref 5–7)
PLATELET # BLD AUTO: 216 10E9/L (ref 150–450)
POTASSIUM SERPL-SCNC: 3.9 MMOL/L (ref 3.4–5.3)
PROT SERPL-MCNC: 7.7 G/DL (ref 6.8–8.8)
RBC # BLD AUTO: 4.57 10E12/L (ref 3.8–5.2)
RBC #/AREA URNS AUTO: ABNORMAL /HPF
SODIUM SERPL-SCNC: 141 MMOL/L (ref 133–144)
SOURCE: ABNORMAL
SP GR UR STRIP: 1.01 (ref 1–1.03)
SPECIMEN SOURCE: NORMAL
UROBILINOGEN UR STRIP-ACNC: 0.2 EU/DL (ref 0.2–1)
WBC # BLD AUTO: 6.8 10E9/L (ref 4–11)
WBC #/AREA URNS AUTO: ABNORMAL /HPF

## 2018-04-04 PROCEDURE — 80053 COMPREHEN METABOLIC PANEL: CPT | Performed by: INTERNAL MEDICINE

## 2018-04-04 PROCEDURE — 36415 COLL VENOUS BLD VENIPUNCTURE: CPT | Performed by: INTERNAL MEDICINE

## 2018-04-04 PROCEDURE — 85027 COMPLETE CBC AUTOMATED: CPT | Performed by: INTERNAL MEDICINE

## 2018-04-04 PROCEDURE — 87640 STAPH A DNA AMP PROBE: CPT | Mod: 59 | Performed by: INTERNAL MEDICINE

## 2018-04-04 PROCEDURE — 87641 MR-STAPH DNA AMP PROBE: CPT | Performed by: INTERNAL MEDICINE

## 2018-04-04 PROCEDURE — T1013 SIGN LANG/ORAL INTERPRETER: HCPCS | Mod: U3 | Performed by: INTERNAL MEDICINE

## 2018-04-04 PROCEDURE — 81001 URINALYSIS AUTO W/SCOPE: CPT | Performed by: INTERNAL MEDICINE

## 2018-04-04 PROCEDURE — 99214 OFFICE O/P EST MOD 30 MIN: CPT | Mod: 25 | Performed by: INTERNAL MEDICINE

## 2018-04-04 PROCEDURE — 93000 ELECTROCARDIOGRAM COMPLETE: CPT | Performed by: INTERNAL MEDICINE

## 2018-04-04 ASSESSMENT — PAIN SCALES - GENERAL: PAINLEVEL: MILD PAIN (2)

## 2018-04-04 NOTE — MR AVS SNAPSHOT
After Visit Summary   4/4/2018    Graciela Castorena    MRN: [id number]           Patient Information     Date Of Birth          1965        Visit Information        Provider Department      4/4/2018 10:15 AM Genna Magallanes MD; [employer] [facility]        Today's Diagnoses     Dysuria    -  1    Preoperative examination          Care Instructions    EKG and nasal swab today.    ---    Labs downstairs.    ---              Follow-ups after your visit        Your next 10 appointments already scheduled     Apr 18, 2018  1:00 PM CDT   MA RADIOACTIVE SEED LOCALIZATION LEFT with SHBCMA3,  BREAST NURSE, SH BREAST RAD   [facility] ([facility])    [address], [address]  [facility] [id number] [phone]           Do not use any powder, lotion or deodorant under your arms or on your breast. If you do, we will ask you to remove it before your exam.  Wear comfortable clothing and a well-fitted bra to the clinic. (A sports bra is best.) Bring a list of your medicines, including vitamins, minerals and over-the-counter drugs. It is best to leave valuables at home.  Tell your care team if:   You have any allergies.   You are taking aspirin, ibuprofen, naproxen or any drug that could increase bleeding.  Bring any previous mammograms from other facilities or have them mailed to the breast center.    Stop taking Coumadin (warfarin) 5 days before treatment. Restart the day after treatment.   If you take Plavix, Ticlid, Pletal or Persantine, ask your doctor if you should stop these medicines. You may need extra tests on the morning of your scan.            Apr 18, 2018  1:30 PM CDT   MA POST PROCEDURE LEFT with SHBCMA6   [facility] ([facility])    [address], [address]  [place] [id number] [phone]           Do not use any powder, lotion or deodorant under your  arms or on your breast. If you do, we will ask you to remove it before your exam.  Wear comfortable, two-piece clothing.  If you have any allergies, tell your care team.  Bring any previous mammograms from other facilities or have them mailed to the breast center.            Apr 18, 2018   Procedure with Meli Rodriguez MD   [facility] (--)    [address], [address]  [facility] [id number] [phone]            Apr 18, 2018  2:00 PM CDT   MA BREAST SPECIMEN LEFT OR with SHMASP2   [facility] ([facility])    [address] [place] [address], [address]  [facility] [id number] [phone]           Do not use any powder, lotion or deodorant under your arms or on your breast. If you do, we will ask you to remove it before your exam.  Wear comfortable, two-piece clothing.  If you have any allergies, tell your care team.  Bring any previous mammograms from other facilities or have them mailed to the breast center.            Apr 18, 2018  2:15 PM CDT   [facility] Same Day Surgery with Meli Rodriguez MD   [facility]    [facility]   [phone]            Apr 19, 2018  1:15 PM CDT   (Arrive by 1:00 PM)   US BREAST RADIOACTIVE SEED LOCALIZATION INITIAL LEFT with SHBCUS1,  BREAST NURSE, SH BREAST RAD   [facility] ([facility])    [address] [place] [address], [address]  [facility] [id number] [phone]           Please bring a list of your medicines (including vitamins, minerals and over-the-counter drugs). Also, tell your doctor about any allergies you may have. Wear comfortable clothes and leave your valuables at home.  You do not need to do anything special to prepare for your exam.  Please call the Imaging Department at your exam site with any questions.              Who to contact     If you have  "questions or need follow up information about today's clinic visit or your schedule please contact [facility] directly at [phone].  Normal or non-critical lab and imaging results will be communicated to you by MyChart, letter or phone within 4 business days after the clinic has received the results. If you do not hear from us within 7 days, please contact the clinic through [employer] or phone. If you have a critical or abnormal lab result, we will notify you by phone as soon as possible.  Submit refill requests through [employer] or call your pharmacy and they will forward the refill request to us. Please allow 3 business days for your refill to be completed.          Additional Information About Your Visit        [employer] Information     [employer] lets you send messages to your doctor, view your test results, renew your prescriptions, schedule appointments and more. To sign up, go to [email or web address] . Click on \"Log in\" on the left side of the screen, which will take you to the Welcome page. Then click on \"Sign up Now\" on the right side of the page.     You will be asked to enter the access code listed below, as well as some personal information. Please follow the directions to create your username and password.     Your access code is: [email or web address]  Expires: 4/10/2018  2:35 PM     Your access code will  in 90 days. If you need help or a new code, please call your [place] clinic or [phone].        Care EveryWhere ID     This is your Care EveryWhere ID. This could be used by other organizations to access your [place] medical records  [id number]        Your Vitals Were     Pulse Temperature Respirations Height Pulse Oximetry BMI (Body Mass Index)    77 97.8  F (36.6  C) (Oral) 20 5' 3\" (1.6 m) 100% 28.57 kg/m2       Blood Pressure from Last 3 Encounters:   18 120/80   18 128/79   18 124/80    Weight from Last 3 Encounters:   18 161 lb 4.8 oz (73.2 " kg)   03/23/18 163 lb (73.9 kg)   03/19/18 163 lb 14.4 oz (74.3 kg)              We Performed the Following     CBC with platelets     Comprehensive metabolic panel     EKG 12-lead complete w/read - Clinics     Methicillin Resist/Sens S. aureus PCR     UA reflex to Microscopic and Culture        Primary Care Provider Office Phone # Fax #    Genna Magallanes [phone]       [address] [facility] [address]        Equal Access to Services     JACOBO DUFFY : Hadii aad ku hadasho Soomaali, waaxda luqadaha, qaybta kaalmada adeegyada, waxay idiin hayaan adeeg jeninferaraana multani . So [facility] [phone].    ATENCIÓN: Si habla español, tiene a banuelos disposición servicios gratuitos de asistencia lingüística. [facility] [phone].    We comply with applicable federal civil rights laws and [place] laws. We do not discriminate on the basis of race, color, national origin, age, disability, sex, sexual orientation, or gender identity.            Thank you!     Thank you for choosing [facility]  for your care. Our goal is always to provide you with excellent care. Hearing back from our patients is one way we can continue to improve our services. Please take a few minutes to complete the written survey that you may receive in the mail after your visit with us. Thank you!             Your Updated Medication List - Protect others around you: Learn how to safely use, store and throw away your medicines at www.disposemymeds.org.      Notice  As of 4/4/2018 11:00 AM    You have not been prescribed any medications.

## 2018-04-04 NOTE — PROGRESS NOTES
SUBJECTIVE:                                                      HPI: Graciela Castorena is a pleasant 53 year old female who presents for preoperative evaluation.    [employer]  utilized.    Surgeon: Jennifer  Date: 4/18/18  Location: [facility]  Surgery: left lumpectomy with seed localization (low risk)  Indication: DCIS of left breast    Past Medical History:   Diagnosis Date     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 breast 2018     Personal or family history of excessive or prolonged bleeding? No  Personal or family history of blood clots? No  History of snoring/Sleep Apnea? Snores, but has never been tested for MALLORY  History of blood transfusions? No    Clinical Predictors of Increased Risk: none    Past Surgical History:   Procedure Laterality Date     NO HISTORY OF SURGERY       Artificial assistive devices, such as pacemakers, artificial cardiac valves, or artificial joints? No    Allergies   Allergen Reactions     Ciprofloxacin Itching     Personal or family history of allergy to anesthesia? No  Allergy to local or topical analgesics? No  Allergy to latex? No  Allergy to adhesives/skin preparations (chlorhexadine)? No    MEDS: None    Over the counter medications? No  Vitamin Supplements? No  Herbal Supplements? No    Family History   Problem Relation Age of Onset     DIABETES No family hx of      Myocardial Infarction No family hx of      CEREBROVASCULAR DISEASE No family hx of      Coronary Artery Disease Early Onset No family hx of      Colon Cancer No family hx of      Breast Cancer No family hx of      Ovarian Cancer No family hx of      Occupational History     [employer]      Social History Main Topics     Smoking status: Never Smoker     Smokeless tobacco: Never Used     Alcohol use No     Drug use: No     Sexual activity: Yes     Partners: Male     Social History Narrative    .    8 kids - 7 are here. 1 still in [place].     Walks a lot.      Functional capacity: Can do heavy work around the  "house such as scrubbing floors or lifting or moving heavy furniture (4-10 METs)    ROS:  Constitutional: denies unintentional weight loss or gain; denies fevers, chills, or sweats     Cardiovascular: denies chest pain, palpitations, or edema  Respiratory: denies cough, wheezing, shortness of breath, or dyspnea on exertion  Gastrointestinal: denies nausea, vomiting, constipation, diarrhea, or abdominal pain  Genitourinary: endorses dysuria; denies urinary frequency, urgency, or hematuria  Integumentary: denies rash or pruritus  Musculoskeletal: denies back pain, muscle pain, joint pain, or joint swelling  Neurologic: denies focal weakness, numbness, or tingling  Hematologic/Immunologic: denies history of anemia or blood transfusions  Endocrine: denies heat or cold intolerance; denies polyuria, polydipsia  Psychiatric: denies depression or anxiety    OBJECTIVE:                                                      /80 (BP Location: Left arm, Patient Position: Chair, Cuff Size: Adult Large)  Pulse 77  Temp 97.8  F (36.6  C) (Oral)  Resp 20  Ht 5' 3\" (1.6 m)  Wt 161 lb 4.8 oz (73.2 kg)  SpO2 100%  BMI 28.57 kg/m2  Constitutional: well-appearing  Eyes: normal conjunctivae and lids; pupils equal, round, and reactive to light  Ears, Nose, Mouth, and Throat: normal ears and nose; tympanic membranes visualized and normal; normal lips, teeth, and gums; no oropharyngeal lesions or ulcers  Neck: supple and symmetric; no lymphadenopathy; no thyromegaly or masses  Respiratory: normal respiratory effort; clear to auscultation bilaterally  Cardiovascular: regular rate and rhythm; pedal pulses palpable; no edema  Gastrointestinal: soft, non-tender, non-distended, and bowel sounds present; no organomegaly or masses  Musculoskeletal: normal gait and station  Psych: normal judgment and insight; normal mood and affect; recent and remote memory intact; oriented to time, place, and person    ASSESSMENT/PLAN:                    "                                   Graciela Castorena is a pleasant 53 year old female who presents for a preoperative evaluation. She is at low clinical risk for a low risk procedure.    (Z01.818) Preoperative examination  (primary encounter diagnosis)  (D05.12) 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 breast  (R30.0) Dysuria  Comment: see above for pertinent positives.  Plan:    - MRSA nasal swab, EKG, and UA reflex today.   - CBC and CMP today.    RECOMMEND PROCEEDING WITH SURGERY: YES.     Thank you for referring Graciela Castorena to me for consultation and allowing me to participate in her care.    The instructions on the AVS were discussed and explained to the patient. Patient expressed understanding of instructions.    (Chart documentation was completed, in part, with Open[employer] voice-recognition software. Even though reviewed, some grammatical, spelling, and word errors may remain.)    Genna Magallanes MD   [address] [address]  T: [phone], F: [phone]

## 2018-04-09 ENCOUNTER — HOSPITAL ENCOUNTER (OUTPATIENT)
Dept: CT IMAGING | Facility: CLINIC | Age: 53
Discharge: HOME | End: 2018-04-09
Attending: INTERNAL MEDICINE | Admitting: INTERNAL MEDICINE
Payer: MEDICAID

## 2018-04-09 DIAGNOSIS — R31.29 MICROSCOPIC HEMATURIA: ICD-10-CM

## 2018-04-09 PROCEDURE — 25000128 H RX IP 250 OP 636: Performed by: INTERNAL MEDICINE

## 2018-04-09 PROCEDURE — 74178 CT ABD&PLV WO CNTR FLWD CNTR: CPT

## 2018-04-09 PROCEDURE — 25000125 ZZHC RX 250: Performed by: INTERNAL MEDICINE

## 2018-04-09 RX ORDER — IOPAMIDOL 755 MG/ML
100 INJECTION, SOLUTION INTRAVASCULAR ONCE
Status: COMPLETED | OUTPATIENT
Start: 2018-04-09 | End: 2018-04-09

## 2018-04-09 RX ADMIN — SODIUM CHLORIDE 60 ML: 9 INJECTION, SOLUTION INTRAVENOUS at 15:57

## 2018-04-09 RX ADMIN — IOPAMIDOL 100 ML: 755 INJECTION, SOLUTION INTRAVENOUS at 15:57

## 2018-04-10 ENCOUNTER — OFFICE VISIT (OUTPATIENT)
Dept: UROLOGY | Facility: CLINIC | Age: 53
End: 2018-04-10
Attending: INTERNAL MEDICINE
Payer: MEDICAID

## 2018-04-10 VITALS
HEART RATE: 105 BPM | HEIGHT: 63 IN | DIASTOLIC BLOOD PRESSURE: 74 MMHG | BODY MASS INDEX: 28.53 KG/M2 | SYSTOLIC BLOOD PRESSURE: 98 MMHG | WEIGHT: 161 LBS | OXYGEN SATURATION: 97 %

## 2018-04-10 DIAGNOSIS — R31.21 ASYMPTOMATIC MICROSCOPIC HEMATURIA: Primary | ICD-10-CM

## 2018-04-10 LAB
ALBUMIN UR-MCNC: NEGATIVE MG/DL
APPEARANCE UR: CLEAR
BILIRUB UR QL STRIP: NEGATIVE
COLOR UR AUTO: YELLOW
GLUCOSE UR STRIP-MCNC: NEGATIVE MG/DL
HGB UR QL STRIP: ABNORMAL
HYALINE CASTS #/AREA URNS LPF: 1 /LPF (ref 0–2)
KETONES UR STRIP-MCNC: NEGATIVE MG/DL
LEUKOCYTE ESTERASE UR QL STRIP: NEGATIVE
MUCOUS THREADS #/AREA URNS LPF: PRESENT /LPF
NITRATE UR QL: NEGATIVE
PH UR STRIP: 5.5 PH (ref 5–7)
RBC #/AREA URNS AUTO: 10 /HPF (ref 0–2)
SOURCE: ABNORMAL
SP GR UR STRIP: 1.02 (ref 1–1.03)
SQUAMOUS #/AREA URNS AUTO: 1 /HPF (ref 0–1)
UROBILINOGEN UR STRIP-ACNC: 0.2 EU/DL (ref 0.2–1)
WBC #/AREA URNS AUTO: 1 /HPF (ref 0–5)

## 2018-04-10 PROCEDURE — 99202 OFFICE O/P NEW SF 15 MIN: CPT | Performed by: PHYSICIAN ASSISTANT

## 2018-04-10 PROCEDURE — 81003 URINALYSIS AUTO W/O SCOPE: CPT | Performed by: PHYSICIAN ASSISTANT

## 2018-04-10 PROCEDURE — 88112 CYTOPATH CELL ENHANCE TECH: CPT | Performed by: PHYSICIAN ASSISTANT

## 2018-04-10 ASSESSMENT — PAIN SCALES - GENERAL: PAINLEVEL: NO PAIN (0)

## 2018-04-10 NOTE — LETTER
4/10/2018       RE: Graciela Castorena  [address]   [facility] [address]     Dear Colleague,    Thank you for referring your patient, Graciela Castorena, to the [facility] at [facility]. Please see a copy of my visit note below.    CC: Hematuria.    HPI: It is a pleasure to see Ms. Graciela Castorena, a 53 year old female seen today in the urology clinic in consultation from Dr. Magallanes for evaluation of micro hematuria. This was first detected on her pre-op.    The patient denies any gross hematuria. no smoking history. Voids without difficulty, and reports nocturia of 0.  She currently denies any dysuria, pyuria, hesitancy, intermittency, feelings of incomplete emptying, or any recent history of urinary tract infections or stones.    Has mastectomy planned for 4/18/18.    The patient presents today with an  who aids in obtaining the history.    Hematuria Risk Factors:  Age >40: Yes   Smoking history: no  Occupational exposure to chemicals or dyes (ie, benzenes, aromatic amines): no  History of urologic disorder or disease: no  History of irritative voiding symptoms: no  History of urinary tract infection: no  Analgesic abuse: no  History of pelvic irradiation: no    Past Medical History:   Diagnosis Date     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 breast 2018     Past Surgical History:   Procedure Laterality Date     COLONOSCOPY N/A 2/12/2018    Procedure: COLONOSCOPY;  colonoscopy;  Surgeon: Sabas Villegas MD;  Location:  GI     NO HISTORY OF SURGERY       No current outpatient prescriptions on file.     Allergies   Allergen Reactions     Ciprofloxacin Itching     FAMILY HISTORY: There is no reported history of genitourinary carcinoma.  There is no history of urolithiasis.      SOCIAL HISTORY: The patient does not smoke cigarettes. Denies ETOH and illicit drug abuse.      ROS: A comprehensive 14 point ROS was  "obtained and  otherwise negative except for that outlined above in the HPI.    PHYSICAL EXAM:   Vitals:    04/10/18 1021   BP: 98/74   BP Location: Left arm   Patient Position: Sitting   Cuff Size: Adult Regular   Pulse: 105   SpO2: 97%   Weight: 73 kg (161 lb)   Height: 1.6 m (5' 3\")     GENERAL: Well groomed, well developed, well nourished female in NAD.  HEENT: AT, NC, EOMI bilaterally.  SKIN: Warm to touch, dry.  No visible rashes or lesions on examined areas.  RESP: No increased respiratory effort.  MS: Full ROM in extremities.  : Deferred for now.  CARLOS: Deferred for now.  NEURO: Alert and oriented x 3.  PSYCH: Normal mood and affect, pleasant and agreeable during interview and exam.    LABS: moderate blood    IMAGING:     ASSESSMENT and PLAN:    Ms. Graciela Castorena is a pleasant 53 year old female with micro hematuria.      At this time, recommend proceeding with comprehensive hematuria evaluation to include:  - Urinalysis and micro.   - Urine cytology to look for cells concerning for malignancy.  - CT urogram for upper tract imaging reviewed with her.   - Cystoscopy with the first available urologist to evaluate the interior of the bladder. Follow up for hematuria as recommended by urologist performing cystoscopic evaluation.    Thank you for allowing me to participate in Ms. Castorena's care.      20 minutes were spent with the patient today, > 50% in counseling and coordination of care of hematuria.      Again, thank you for allowing me to participate in the care of your patient.      Sincerely,    Lorena Cabrera PA-C, CODY      "

## 2018-04-10 NOTE — NURSING NOTE
Chief Complaint   Patient presents with     Hematuria     Pt. here today for microhematuria         UA RESULTS:  Recent Labs   Lab Test  04/10/18   1013  04/04/18   1126   COLOR  Yellow  Yellow   APPEARANCE  Clear  Clear   URINEGLC  Negative  Negative   URINEBILI  Negative  Negative   URINEKETONE  Negative  Negative   SG  1.025  1.015   UBLD  Moderate*  Moderate*   URINEPH  5.5  6.5   PROTEIN  Negative  Negative   UROBILINOGEN  0.2  0.2   NITRITE  Negative  Negative   LEUKEST  Negative  Negative   RBCU   --   25-50*   WBCU   --   0 - 5     Ua done today; urine showed moderate blood.  Michelle Miller, CMA

## 2018-04-10 NOTE — MR AVS SNAPSHOT
After Visit Summary   4/10/2018    Graciela Castorena    MRN: [id number]           Patient Information     Date Of Birth          1965        Visit Information        Provider Department      4/10/2018 10:15 AM Lorena Cabrera PA-C; [employer] [facility] [place]        Today's Diagnoses     Hematuria    -  1      Care Instructions     -Urinalysis and micro.   - Urine cytology to look for abnormal cells.  - CT scan (done)  - Cystoscopy with the first available urologist to evaluate the interior of the bladder. Follow up as recommended by urologist performing evaluation.            Follow-ups after your visit        Your next 10 appointments already scheduled     Apr 18, 2018  1:00 PM CDT   MA RADIOACTIVE SEED LOCALIZATION LEFT with RICK3,  BREAST NURSE,  BREAST RAD   [facility] ([facility])    [address] [place] [address], [address]  [facility] [address] [phone]           Do not use any powder, lotion or deodorant under your arms or on your breast. If you do, we will ask you to remove it before your exam.  Wear comfortable clothing and a well-fitted bra to the clinic. (A sports bra is best.) Bring a list of your medicines, including vitamins, minerals and over-the-counter drugs. It is best to leave valuables at home.  Tell your care team if:   You have any allergies.   You are taking aspirin, ibuprofen, naproxen or any drug that could increase bleeding.  Bring any previous mammograms from other facilities or have them mailed to the breast center.    Stop taking Coumadin (warfarin) 5 days before treatment. Restart the day after treatment.   If you take Plavix, Ticlid, Pletal or Persantine, ask your doctor if you should stop these medicines. You may need extra tests on the morning of your scan.            Apr 18, 2018  1:30 PM CDT   MA POST PROCEDURE LEFT with SHBCMA6   [facility]  ([facility])    [address], [address]  [facility] [address]   [phone]           Do not use any powder, lotion or deodorant under your arms or on your breast. If you do, we will ask you to remove it before your exam.  Wear comfortable, two-piece clothing.  If you have any allergies, tell your care team.  Bring any previous mammograms from other facilities or have them mailed to the breast center.            Apr 18, 2018   Procedure with Meli Rodriguez MD   [facility] (--)    [address], [address]  [facility] [address]   [phone]            Apr 18, 2018  2:00 PM CDT   MA BREAST SPECIMEN LEFT OR with SHMASP2   [facility] ([facility])    [address] [place] [address], [address]  [facility] [address] [phone]           Do not use any powder, lotion or deodorant under your arms or on your breast. If you do, we will ask you to remove it before your exam.  Wear comfortable, two-piece clothing.  If you have any allergies, tell your care team.  Bring any previous mammograms from other facilities or have them mailed to the breast center.            Apr 18, 2018  2:15 PM CDT   [facility] Same Day Surgery with Meli Rodriguez MD   [facility] ([facility])    [facility] ([facility])   [phone]            Apr 19, 2018  1:15 PM CDT   (Arrive by 1:00 PM)   US BREAST RADIOACTIVE SEED LOCALIZATION INITIAL LEFT with SHBCUS1,  BREAST NURSE, SH BREAST RAD   [facility] ([facility])    [address], [address]  [facility] [address]   [phone]           Please bring a list of your medicines (including vitamins, minerals and over-the-counter drugs). Also, tell your doctor about any allergies you may have. Wear comfortable clothes and leave your valuables at home.  You do not need to  "do anything special to prepare for your exam.  Please call the Imaging Department at your exam site with any questions.              Who to contact     If you have questions or need follow up information about today's clinic visit or your schedule please contact [facility] [place] directly at [phone].  Normal or non-critical lab and imaging results will be communicated to you by MyChart, letter or phone within 4 business days after the clinic has received the results. If you do not hear from us within 7 days, please contact the clinic through [employer] or phone. If you have a critical or abnormal lab result, we will notify you by phone as soon as possible.  Submit refill requests through [employer] or call your pharmacy and they will forward the refill request to us. Please allow 3 business days for your refill to be completed.          Additional Information About Your Visit        MyChart Information     [employer] lets you send messages to your doctor, view your test results, renew your prescriptions, schedule appointments and more. To sign up, go to [email or web address] . Click on \"Log in\" on the left side of the screen, which will take you to the Welcome page. Then click on \"Sign up Now\" on the right side of the page.     You will be asked to enter the access code listed below, as well as some personal information. Please follow the directions to create your username and password.     Your access code is: [email or web address]  Expires: 4/10/2018  2:35 PM     Your access code will  in 90 days. If you need help or a new code, please call your [facility] clinic or [phone].        Care EveryWhere ID     This is your Care EveryWhere ID. This could be used by other organizations to access your [facility] medical records  [id number]        Your Vitals Were     Pulse Height Pulse Oximetry BMI (Body Mass Index)          105 1.6 m (5' 3\") 97% 28.52 kg/m2         Blood Pressure from Last " 3 Encounters:   04/10/18 98/74   04/04/18 120/80   03/23/18 128/79    Weight from Last 3 Encounters:   04/10/18 73 kg (161 lb)   04/04/18 73.2 kg (161 lb 4.8 oz)   03/23/18 73.9 kg (163 lb)              We Performed the Following     Cytology non gyn     UA without Microscopic     Urine Micro Urologic Phys        Primary Care Provider Office Phone # Fax #    Genna Magallanes [phone]       [address] [facility] [address]        Equal Access to Services     [facility]: Hadii aad ku hadasho Soomaali, waaxda luqadaha, qaybta kaalmada karlie, becky multani . So [facility] [phone].    ATENCIÓN: Si habla español, tiene a banuelos disposición servicios gratuitos de asistencia lingüística. Llame al [phone].    We comply with applicable federal civil rights laws and [place] laws. We do not discriminate on the basis of race, color, national origin, age, disability, sex, sexual orientation, or gender identity.            Thank you!     Thank you for choosing [facility] [place]  for your care. Our goal is always to provide you with excellent care. Hearing back from our patients is one way we can continue to improve our services. Please take a few minutes to complete the written survey that you may receive in the mail after your visit with us. Thank you!             Your Updated Medication List - Protect others around you: Learn how to safely use, store and throw away your medicines at www.disposemymeds.org.      Notice  As of 4/10/2018 10:48 AM    You have not been prescribed any medications.

## 2018-04-10 NOTE — PATIENT INSTRUCTIONS
-Urinalysis and micro.   - Urine cytology to look for abnormal cells.  - CT scan (done)  - Cystoscopy with the first available urologist to evaluate the interior of the bladder. Follow up as recommended by urologist performing evaluation.

## 2018-04-10 NOTE — PROGRESS NOTES
"CC: Hematuria.    HPI: It is a pleasure to see Ms. Graciela Castorena, a 53 year old female seen today in the urology clinic in consultation from Dr. Magallanes for evaluation of micro hematuria. This was first detected on her pre-op.    The patient denies any gross hematuria. no smoking history. Voids without difficulty, and reports nocturia of 0.  She currently denies any dysuria, pyuria, hesitancy, intermittency, feelings of incomplete emptying, or any recent history of urinary tract infections or stones.    Has mastectomy planned for 4/18/18.    The patient presents today with an  who aids in obtaining the history.    Hematuria Risk Factors:  Age >40: Yes   Smoking history: no  Occupational exposure to chemicals or dyes (ie, benzenes, aromatic amines): no  History of urologic disorder or disease: no  History of irritative voiding symptoms: no  History of urinary tract infection: no  Analgesic abuse: no  History of pelvic irradiation: no    Past Medical History:   Diagnosis Date     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 breast 2018     Past Surgical History:   Procedure Laterality Date     COLONOSCOPY N/A 2/12/2018    Procedure: COLONOSCOPY;  colonoscopy;  Surgeon: Sabas Villegas MD;  Location: [facility]     NO HISTORY OF SURGERY       No current outpatient prescriptions on file.     Allergies   Allergen Reactions     Ciprofloxacin Itching     FAMILY HISTORY: There is no reported history of genitourinary carcinoma.  There is no history of urolithiasis.      SOCIAL HISTORY: The patient does not smoke cigarettes. Denies ETOH and illicit drug abuse.      ROS: A comprehensive 14 point ROS was obtained and  otherwise negative except for that outlined above in the HPI.    PHYSICAL EXAM:   Vitals:    04/10/18 1021   BP: 98/74   BP Location: Left arm   Patient Position: Sitting   Cuff Size: Adult Regular   Pulse: 105   SpO2: 97%   Weight: 73 kg (161 lb)   Height: 1.6 m (5' 3\")     GENERAL: Well groomed, well " developed, well nourished female in NAD.  HEENT: AT, NC, EOMI bilaterally.  SKIN: Warm to touch, dry.  No visible rashes or lesions on examined areas.  RESP: No increased respiratory effort.  MS: Full ROM in extremities.  : Deferred for now.  CARLOS: Deferred for now.  NEURO: Alert and oriented x 3.  PSYCH: Normal mood and affect, pleasant and agreeable during interview and exam.    LABS: moderate blood    IMAGING:     ASSESSMENT and PLAN:    Ms. Graciela Castorena is a pleasant 53 year old female with micro hematuria.      At this time, recommend proceeding with comprehensive hematuria evaluation to include:  - Urinalysis and micro.   - Urine cytology to look for cells concerning for malignancy.  - CT urogram for upper tract imaging reviewed with her.   - Cystoscopy with the first available urologist to evaluate the interior of the bladder. Follow up for hematuria as recommended by urologist performing cystoscopic evaluation.    Thank you for allowing me to participate in Ms. Castorena's care.      20 minutes were spent with the patient today, > 50% in counseling and coordination of care of hematuria.    Lorena Cabrera PA-C  [facility]

## 2018-04-12 LAB — COPATH REPORT: NORMAL

## 2018-04-13 NOTE — H&P (VIEW-ONLY)
SUBJECTIVE:                                                      HPI: Graciela Castorena is a pleasant 53 year old female who presents for preoperative evaluation.    [employer]  utilized.    Surgeon: Jennifer  Date: 4/18/18  Location: [facility]  Surgery: left lumpectomy with seed localization (low risk)  Indication: DCIS of left breast    Past Medical History:   Diagnosis Date     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 breast 2018     Personal or family history of excessive or prolonged bleeding? No  Personal or family history of blood clots? No  History of snoring/Sleep Apnea? Snores, but has never been tested for MALLORY  History of blood transfusions? No    Clinical Predictors of Increased Risk: none    Past Surgical History:   Procedure Laterality Date     NO HISTORY OF SURGERY       Artificial assistive devices, such as pacemakers, artificial cardiac valves, or artificial joints? No    Allergies   Allergen Reactions     Ciprofloxacin Itching     Personal or family history of allergy to anesthesia? No  Allergy to local or topical analgesics? No  Allergy to latex? No  Allergy to adhesives/skin preparations (chlorhexadine)? No    MEDS: None    Over the counter medications? No  Vitamin Supplements? No  Herbal Supplements? No    Family History   Problem Relation Age of Onset     DIABETES No family hx of      Myocardial Infarction No family hx of      CEREBROVASCULAR DISEASE No family hx of      Coronary Artery Disease Early Onset No family hx of      Colon Cancer No family hx of      Breast Cancer No family hx of      Ovarian Cancer No family hx of      Occupational History     [employer]      Social History Main Topics     Smoking status: Never Smoker     Smokeless tobacco: Never Used     Alcohol use No     Drug use: No     Sexual activity: Yes     Partners: Male     Social History Narrative    .    8 kids - 7 are here. 1 still in [place].     Walks a lot.      Functional capacity: Can do heavy work around the  "house such as scrubbing floors or lifting or moving heavy furniture (4-10 METs)    ROS:  Constitutional: denies unintentional weight loss or gain; denies fevers, chills, or sweats     Cardiovascular: denies chest pain, palpitations, or edema  Respiratory: denies cough, wheezing, shortness of breath, or dyspnea on exertion  Gastrointestinal: denies nausea, vomiting, constipation, diarrhea, or abdominal pain  Genitourinary: endorses dysuria; denies urinary frequency, urgency, or hematuria  Integumentary: denies rash or pruritus  Musculoskeletal: denies back pain, muscle pain, joint pain, or joint swelling  Neurologic: denies focal weakness, numbness, or tingling  Hematologic/Immunologic: denies history of anemia or blood transfusions  Endocrine: denies heat or cold intolerance; denies polyuria, polydipsia  Psychiatric: denies depression or anxiety    OBJECTIVE:                                                      /80 (BP Location: Left arm, Patient Position: Chair, Cuff Size: Adult Large)  Pulse 77  Temp 97.8  F (36.6  C) (Oral)  Resp 20  Ht 5' 3\" (1.6 m)  Wt 161 lb 4.8 oz (73.2 kg)  SpO2 100%  BMI 28.57 kg/m2  Constitutional: well-appearing  Eyes: normal conjunctivae and lids; pupils equal, round, and reactive to light  Ears, Nose, Mouth, and Throat: normal ears and nose; tympanic membranes visualized and normal; normal lips, teeth, and gums; no oropharyngeal lesions or ulcers  Neck: supple and symmetric; no lymphadenopathy; no thyromegaly or masses  Respiratory: normal respiratory effort; clear to auscultation bilaterally  Cardiovascular: regular rate and rhythm; pedal pulses palpable; no edema  Gastrointestinal: soft, non-tender, non-distended, and bowel sounds present; no organomegaly or masses  Musculoskeletal: normal gait and station  Psych: normal judgment and insight; normal mood and affect; recent and remote memory intact; oriented to time, place, and person    ASSESSMENT/PLAN:                    "                                   Graciela Castorena is a pleasant 53 year old female who presents for a preoperative evaluation. She is at low clinical risk for a low risk procedure.    (Z01.818) Preoperative examination  (primary encounter diagnosis)  (D05.12) 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 breast  (R30.0) Dysuria  Comment: see above for pertinent positives.  Plan:    - MRSA nasal swab, EKG, and UA reflex today.   - CBC and CMP today.    RECOMMEND PROCEEDING WITH SURGERY: YES.     Thank you for referring Graciela Castorena to me for consultation and allowing me to participate in her care.    The instructions on the AVS were discussed and explained to the patient. Patient expressed understanding of instructions.    (Chart documentation was completed, in part, with [employer] voice-recognition software. Even though reviewed, some grammatical, spelling, and word errors may remain.)    Genna Magallanes MD   [address] [address]  T: [phone], F: [phone]

## 2018-04-18 ENCOUNTER — ANESTHESIA (OUTPATIENT)
Dept: SURGERY | Facility: CLINIC | Age: 53
End: 2018-04-18
Payer: MEDICAID

## 2018-04-18 ENCOUNTER — SURGERY (OUTPATIENT)
Age: 53
End: 2018-04-18

## 2018-04-18 ENCOUNTER — HOSPITAL ENCOUNTER (OUTPATIENT)
Dept: MAMMOGRAPHY | Facility: CLINIC | Age: 53
End: 2018-04-18
Attending: SURGERY
Payer: MEDICAID

## 2018-04-18 ENCOUNTER — ANESTHESIA EVENT (OUTPATIENT)
Dept: SURGERY | Facility: CLINIC | Age: 53
End: 2018-04-18
Payer: MEDICAID

## 2018-04-18 ENCOUNTER — APPOINTMENT (OUTPATIENT)
Dept: SURGERY | Facility: PHYSICIAN GROUP | Age: 53
End: 2018-04-18
Payer: MEDICAID

## 2018-04-18 ENCOUNTER — HOSPITAL ENCOUNTER (OUTPATIENT)
Facility: CLINIC | Age: 53
Discharge: HOME | End: 2018-04-18
Attending: SURGERY | Admitting: SURGERY
Payer: MEDICAID

## 2018-04-18 VITALS
DIASTOLIC BLOOD PRESSURE: 86 MMHG | OXYGEN SATURATION: 98 % | RESPIRATION RATE: 14 BRPM | WEIGHT: 161 LBS | TEMPERATURE: 96.8 F | SYSTOLIC BLOOD PRESSURE: 126 MMHG | BODY MASS INDEX: 28.52 KG/M2

## 2018-04-18 DIAGNOSIS — D05.12 DUCTAL CARCINOMA IN SITU (DCIS) OF LEFT BREAST: ICD-10-CM

## 2018-04-18 DIAGNOSIS — D05.12 DUCTAL CARCINOMA IN SITU (DCIS) OF LEFT BREAST: Primary | ICD-10-CM

## 2018-04-18 PROCEDURE — 25000128 H RX IP 250 OP 636: Performed by: SURGERY

## 2018-04-18 PROCEDURE — 36000054 ZZH SURGERY LEVEL 2 W FLUORO 1ST 30 MIN: Performed by: SURGERY

## 2018-04-18 PROCEDURE — 25000128 H RX IP 250 OP 636: Performed by: NURSE ANESTHETIST, CERTIFIED REGISTERED

## 2018-04-18 PROCEDURE — 71000012 ZZH RECOVERY PHASE 1 LEVEL 1 FIRST HR: Performed by: SURGERY

## 2018-04-18 PROCEDURE — 27210995 ZZH RX 272: Performed by: SURGERY

## 2018-04-18 PROCEDURE — 19301 PARTIAL MASTECTOMY: CPT | Performed by: SURGERY

## 2018-04-18 PROCEDURE — 25000132 ZZH RX MED GY IP 250 OP 250 PS 637: Performed by: SURGERY

## 2018-04-18 PROCEDURE — 40000170 ZZH STATISTIC PRE-PROCEDURE ASSESSMENT II: Performed by: SURGERY

## 2018-04-18 PROCEDURE — 37000009 ZZH ANESTHESIA TECHNICAL FEE, EACH ADDTL 15 MIN: Performed by: SURGERY

## 2018-04-18 PROCEDURE — 40000986 MA POST PROCEDURE LEFT

## 2018-04-18 PROCEDURE — 37000008 ZZH ANESTHESIA TECHNICAL FEE, 1ST 30 MIN: Performed by: SURGERY

## 2018-04-18 PROCEDURE — 88307 TISSUE EXAM BY PATHOLOGIST: CPT | Mod: 26 | Performed by: SURGERY

## 2018-04-18 PROCEDURE — 25000125 ZZHC RX 250: Performed by: NURSE ANESTHETIST, CERTIFIED REGISTERED

## 2018-04-18 PROCEDURE — 40000268 MA BREAST SPECIMEN LEFT OR

## 2018-04-18 PROCEDURE — 27210794 ZZH OR GENERAL SUPPLY STERILE: Performed by: SURGERY

## 2018-04-18 PROCEDURE — A4641 RADIOPHARM DX AGENT NOC: HCPCS

## 2018-04-18 PROCEDURE — 88305 TISSUE EXAM BY PATHOLOGIST: CPT | Performed by: SURGERY

## 2018-04-18 PROCEDURE — 36000052 ZZH SURGERY LEVEL 2 EA 15 ADDTL MIN: Performed by: SURGERY

## 2018-04-18 PROCEDURE — 88341 IMHCHEM/IMCYTCHM EA ADD ANTB: CPT | Mod: 26 | Performed by: SURGERY

## 2018-04-18 PROCEDURE — 88342 IMHCHEM/IMCYTCHM 1ST ANTB: CPT | Performed by: SURGERY

## 2018-04-18 PROCEDURE — 88341 IMHCHEM/IMCYTCHM EA ADD ANTB: CPT | Performed by: SURGERY

## 2018-04-18 PROCEDURE — 88305 TISSUE EXAM BY PATHOLOGIST: CPT | Mod: 26 | Performed by: SURGERY

## 2018-04-18 PROCEDURE — 25000125 ZZHC RX 250: Performed by: SURGERY

## 2018-04-18 PROCEDURE — 38525 BIOPSY/REMOVAL LYMPH NODES: CPT | Mod: 51 | Performed by: SURGERY

## 2018-04-18 PROCEDURE — 71000027 ZZH RECOVERY PHASE 2 EACH 15 MINS: Performed by: SURGERY

## 2018-04-18 PROCEDURE — 88342 IMHCHEM/IMCYTCHM 1ST ANTB: CPT | Mod: 26 | Performed by: SURGERY

## 2018-04-18 PROCEDURE — 88307 TISSUE EXAM BY PATHOLOGIST: CPT | Performed by: SURGERY

## 2018-04-18 PROCEDURE — 71000013 ZZH RECOVERY PHASE 1 LEVEL 1 EA ADDTL HR: Performed by: SURGERY

## 2018-04-18 PROCEDURE — 25000128 H RX IP 250 OP 636: Performed by: ANESTHESIOLOGY

## 2018-04-18 RX ORDER — HYDROCODONE BITARTRATE AND ACETAMINOPHEN 5; 325 MG/1; MG/1
1-2 TABLET ORAL EVERY 4 HOURS PRN
Qty: 15 TABLET | Refills: 0 | Status: SHIPPED | OUTPATIENT
Start: 2018-04-18 | End: 2018-05-10

## 2018-04-18 RX ORDER — DEXAMETHASONE SODIUM PHOSPHATE 4 MG/ML
INJECTION, SOLUTION INTRA-ARTICULAR; INTRALESIONAL; INTRAMUSCULAR; INTRAVENOUS; SOFT TISSUE PRN
Status: DISCONTINUED | OUTPATIENT
Start: 2018-04-18 | End: 2018-04-18

## 2018-04-18 RX ORDER — FENTANYL CITRATE 50 UG/ML
25-50 INJECTION, SOLUTION INTRAMUSCULAR; INTRAVENOUS
Status: DISCONTINUED | OUTPATIENT
Start: 2018-04-18 | End: 2018-04-18 | Stop reason: HOSPADM

## 2018-04-18 RX ORDER — SODIUM CHLORIDE, SODIUM LACTATE, POTASSIUM CHLORIDE, CALCIUM CHLORIDE 600; 310; 30; 20 MG/100ML; MG/100ML; MG/100ML; MG/100ML
INJECTION, SOLUTION INTRAVENOUS CONTINUOUS PRN
Status: DISCONTINUED | OUTPATIENT
Start: 2018-04-18 | End: 2018-04-18

## 2018-04-18 RX ORDER — FENTANYL CITRATE 50 UG/ML
INJECTION, SOLUTION INTRAMUSCULAR; INTRAVENOUS PRN
Status: DISCONTINUED | OUTPATIENT
Start: 2018-04-18 | End: 2018-04-18

## 2018-04-18 RX ORDER — CEFAZOLIN SODIUM 1 G/3ML
1 INJECTION, POWDER, FOR SOLUTION INTRAMUSCULAR; INTRAVENOUS SEE ADMIN INSTRUCTIONS
Status: DISCONTINUED | OUTPATIENT
Start: 2018-04-18 | End: 2018-04-18 | Stop reason: HOSPADM

## 2018-04-18 RX ORDER — NALOXONE HYDROCHLORIDE 0.4 MG/ML
.1-.4 INJECTION, SOLUTION INTRAMUSCULAR; INTRAVENOUS; SUBCUTANEOUS
Status: DISCONTINUED | OUTPATIENT
Start: 2018-04-18 | End: 2018-04-18 | Stop reason: HOSPADM

## 2018-04-18 RX ORDER — HYDROMORPHONE HYDROCHLORIDE 1 MG/ML
.3-.5 INJECTION, SOLUTION INTRAMUSCULAR; INTRAVENOUS; SUBCUTANEOUS EVERY 10 MIN PRN
Status: DISCONTINUED | OUTPATIENT
Start: 2018-04-18 | End: 2018-04-18 | Stop reason: HOSPADM

## 2018-04-18 RX ORDER — ONDANSETRON 4 MG/1
4 TABLET, ORALLY DISINTEGRATING ORAL EVERY 30 MIN PRN
Status: DISCONTINUED | OUTPATIENT
Start: 2018-04-18 | End: 2018-04-18 | Stop reason: HOSPADM

## 2018-04-18 RX ORDER — AMOXICILLIN 250 MG
1-2 CAPSULE ORAL 2 TIMES DAILY
Qty: 30 TABLET | Refills: 0 | Status: SHIPPED | OUTPATIENT
Start: 2018-04-18 | End: 2018-05-10

## 2018-04-18 RX ORDER — LIDOCAINE HYDROCHLORIDE 20 MG/ML
INJECTION, SOLUTION INFILTRATION; PERINEURAL PRN
Status: DISCONTINUED | OUTPATIENT
Start: 2018-04-18 | End: 2018-04-18

## 2018-04-18 RX ORDER — BUPIVACAINE HYDROCHLORIDE 2.5 MG/ML
INJECTION, SOLUTION EPIDURAL; INFILTRATION; INTRACAUDAL
Status: DISCONTINUED
Start: 2018-04-18 | End: 2018-04-18 | Stop reason: HOSPADM

## 2018-04-18 RX ORDER — MEPERIDINE HYDROCHLORIDE 25 MG/ML
12.5 INJECTION INTRAMUSCULAR; INTRAVENOUS; SUBCUTANEOUS
Status: DISCONTINUED | OUTPATIENT
Start: 2018-04-18 | End: 2018-04-18 | Stop reason: HOSPADM

## 2018-04-18 RX ORDER — MAGNESIUM HYDROXIDE 1200 MG/15ML
LIQUID ORAL PRN
Status: DISCONTINUED | OUTPATIENT
Start: 2018-04-18 | End: 2018-04-18 | Stop reason: HOSPADM

## 2018-04-18 RX ORDER — HYDROCODONE BITARTRATE AND ACETAMINOPHEN 5; 325 MG/1; MG/1
1 TABLET ORAL
Status: DISCONTINUED | OUTPATIENT
Start: 2018-04-18 | End: 2018-04-18 | Stop reason: HOSPADM

## 2018-04-18 RX ORDER — ONDANSETRON 2 MG/ML
INJECTION INTRAMUSCULAR; INTRAVENOUS PRN
Status: DISCONTINUED | OUTPATIENT
Start: 2018-04-18 | End: 2018-04-18

## 2018-04-18 RX ORDER — SODIUM CHLORIDE, SODIUM LACTATE, POTASSIUM CHLORIDE, CALCIUM CHLORIDE 600; 310; 30; 20 MG/100ML; MG/100ML; MG/100ML; MG/100ML
INJECTION, SOLUTION INTRAVENOUS CONTINUOUS
Status: DISCONTINUED | OUTPATIENT
Start: 2018-04-18 | End: 2018-04-18 | Stop reason: HOSPADM

## 2018-04-18 RX ORDER — LIDOCAINE HYDROCHLORIDE 10 MG/ML
INJECTION, SOLUTION EPIDURAL; INFILTRATION; INTRACAUDAL; PERINEURAL
Status: DISCONTINUED
Start: 2018-04-18 | End: 2018-04-18 | Stop reason: HOSPADM

## 2018-04-18 RX ORDER — PROPOFOL 10 MG/ML
INJECTION, EMULSION INTRAVENOUS CONTINUOUS PRN
Status: DISCONTINUED | OUTPATIENT
Start: 2018-04-18 | End: 2018-04-18

## 2018-04-18 RX ORDER — PROPOFOL 10 MG/ML
INJECTION, EMULSION INTRAVENOUS PRN
Status: DISCONTINUED | OUTPATIENT
Start: 2018-04-18 | End: 2018-04-18

## 2018-04-18 RX ORDER — HYDROCODONE BITARTRATE AND ACETAMINOPHEN 5; 325 MG/1; MG/1
1 TABLET ORAL ONCE
Status: COMPLETED | OUTPATIENT
Start: 2018-04-18 | End: 2018-04-18

## 2018-04-18 RX ORDER — CEFAZOLIN SODIUM 2 G/100ML
2 INJECTION, SOLUTION INTRAVENOUS
Status: COMPLETED | OUTPATIENT
Start: 2018-04-18 | End: 2018-04-18

## 2018-04-18 RX ORDER — ONDANSETRON 2 MG/ML
4 INJECTION INTRAMUSCULAR; INTRAVENOUS EVERY 30 MIN PRN
Status: DISCONTINUED | OUTPATIENT
Start: 2018-04-18 | End: 2018-04-18 | Stop reason: HOSPADM

## 2018-04-18 RX ORDER — FENTANYL CITRATE 50 UG/ML
25-50 INJECTION, SOLUTION INTRAMUSCULAR; INTRAVENOUS EVERY 5 MIN PRN
Status: DISCONTINUED | OUTPATIENT
Start: 2018-04-18 | End: 2018-04-18 | Stop reason: HOSPADM

## 2018-04-18 RX ADMIN — DEXAMETHASONE SODIUM PHOSPHATE 4 MG: 4 INJECTION, SOLUTION INTRA-ARTICULAR; INTRALESIONAL; INTRAMUSCULAR; INTRAVENOUS; SOFT TISSUE at 14:37

## 2018-04-18 RX ADMIN — LIDOCAINE HYDROCHLORIDE 5 ML: 10 INJECTION, SOLUTION INFILTRATION; PERINEURAL at 13:31

## 2018-04-18 RX ADMIN — PROPOFOL 200 MCG/KG/MIN: 10 INJECTION, EMULSION INTRAVENOUS at 14:30

## 2018-04-18 RX ADMIN — BUPIVACAINE HYDROCHLORIDE 20 ML: 2.5 INJECTION, SOLUTION EPIDURAL; INFILTRATION; INTRACAUDAL; PERINEURAL at 15:19

## 2018-04-18 RX ADMIN — MIDAZOLAM 2 MG: 1 INJECTION INTRAMUSCULAR; INTRAVENOUS at 14:29

## 2018-04-18 RX ADMIN — LIDOCAINE HYDROCHLORIDE 100 MG: 20 INJECTION, SOLUTION INFILTRATION; PERINEURAL at 14:30

## 2018-04-18 RX ADMIN — SODIUM CHLORIDE, POTASSIUM CHLORIDE, SODIUM LACTATE AND CALCIUM CHLORIDE: 600; 310; 30; 20 INJECTION, SOLUTION INTRAVENOUS at 16:29

## 2018-04-18 RX ADMIN — FENTANYL CITRATE 50 MCG: 50 INJECTION, SOLUTION INTRAMUSCULAR; INTRAVENOUS at 14:43

## 2018-04-18 RX ADMIN — SODIUM CHLORIDE 1000 ML: 900 IRRIGANT IRRIGATION at 14:56

## 2018-04-18 RX ADMIN — ONDANSETRON 4 MG: 2 INJECTION INTRAMUSCULAR; INTRAVENOUS at 14:37

## 2018-04-18 RX ADMIN — CEFAZOLIN SODIUM 2 G: 2 INJECTION, SOLUTION INTRAVENOUS at 14:35

## 2018-04-18 RX ADMIN — HYDROMORPHONE HYDROCHLORIDE 0.5 MG: 1 INJECTION, SOLUTION INTRAMUSCULAR; INTRAVENOUS; SUBCUTANEOUS at 16:14

## 2018-04-18 RX ADMIN — HYDROCODONE BITARTRATE AND ACETAMINOPHEN 1 TABLET: 5; 325 TABLET ORAL at 16:27

## 2018-04-18 RX ADMIN — FENTANYL CITRATE 50 MCG: 50 INJECTION, SOLUTION INTRAMUSCULAR; INTRAVENOUS at 14:29

## 2018-04-18 RX ADMIN — PROPOFOL 200 MG: 10 INJECTION, EMULSION INTRAVENOUS at 14:30

## 2018-04-18 RX ADMIN — SODIUM CHLORIDE, POTASSIUM CHLORIDE, SODIUM LACTATE AND CALCIUM CHLORIDE: 600; 310; 30; 20 INJECTION, SOLUTION INTRAVENOUS at 14:29

## 2018-04-18 NOTE — IP AVS SNAPSHOT
[facility]    [address]    [place] [address]    Phone:  [phone]    Fax:  [phone]                                       After Visit Summary   4/18/2018    Graciela Castorena    MRN: [id number]           After Visit Summary Signature Page     I have received my discharge instructions, and my questions have been answered. I have discussed any challenges I see with this plan with the nurse or doctor.    ..........................................................................................................................................  Patient/Patient Representative Signature      ..........................................................................................................................................  Patient Representative Print Name and Relationship to Patient    ..................................................               ................................................  Date                                            Time    ..........................................................................................................................................  Reviewed by Signature/Title    ...................................................              ..............................................  Date                                                            Time

## 2018-04-18 NOTE — ANESTHESIA PREPROCEDURE EVALUATION
Anesthesia Evaluation     . Pt has had prior anesthetic. Type: MAC    No history of anesthetic complications          ROS/MED HX    ENT/Pulmonary:       Neurologic:       Cardiovascular:  - neg cardiovascular ROS       METS/Exercise Tolerance:     Hematologic:         Musculoskeletal:         GI/Hepatic:  - neg GI/hepatic ROS       Renal/Genitourinary:         Endo:         Psychiatric:         Infectious Disease:         Malignancy:   (+) Malignancy History of Breast  Breast CA status post Surgery.         Other:                     Physical Exam  Normal systems: cardiovascular, pulmonary and dental    Airway   Mallampati: I  TM distance: >3 FB  Neck ROM: full    Dental     Cardiovascular   Rhythm and rate: regular and normal      Pulmonary    breath sounds clear to auscultation                    Anesthesia Plan      History & Physical Review  History and physical reviewed and following examination; no interval change.    ASA Status:  1 .    NPO Status:  > 8 hours    Plan for General and LMA with Propofol induction. Maintenance will be TIVA.    PONV prophylaxis:  Ondansetron (or other 5HT-3) and Dexamethasone or Solumedrol       Postoperative Care  Postoperative pain management:  IV analgesics and Oral pain medications.      Consents  Anesthetic plan, risks, benefits and alternatives discussed with:  Patient..                          .

## 2018-04-18 NOTE — DISCHARGE INSTRUCTIONS
Same Day Surgery Discharge Instructions for  Sedation and General Anesthesia       It's not unusual to feel dizzy, light-headed or faint for up to 24 hours after surgery or while taking pain medication.  If you have these symptoms: sit for a few minutes before standing and have someone assist you when you get up to walk or use the bathroom.      You should rest and relax for the next 24 hours. We recommend you make arrangements to have an adult stay with you for at least 24 hours after your discharge.  Avoid hazardous and strenuous activity.      DO NOT DRIVE any vehicle or operate mechanical equipment for 24 hours following the end of your surgery.  Even though you may feel normal, your reactions may be affected by the medication you have received.      Do not drink alcoholic beverages for 24 hours following surgery.       Slowly progress to your regular diet as you feel able. It's not unusual to feel nauseated and/or vomit after receiving anesthesia.  If you develop these symptoms, drink clear liquids (apple juice, ginger ale, broth, 7-up, etc. ) until you feel better.  If your nausea and vomiting persists for 24 hours, please notify your surgeon.        All narcotic pain medications, along with inactivity and anesthesia, can cause constipation. Drinking plenty of liquids and increasing fiber intake will help.      For any questions of a medical nature, call your surgeon.      Do not make important decisions for 24 hours.      If you had general anesthesia, you may have a sore throat for a couple of days related to the breathing tube used during surgery.  You may use Cepacol lozenges to help with this discomfort.  If it worsens or if you develop a fever, contact your surgeon.       If you feel your pain is not well managed with the pain medications prescribed by your surgeon, please contact your surgeon's office to let them know so they can address your concerns.         [facility]  Discharge Instructions: Post-Operative Breast Surgery    ACTIVITY    Take frequent short walks and increase your activity gradually.      Avoid strenuous physical activity or heavy lifting greater than 15-20 lbs. for 1-2 weeks with arm on the surgery side.  You may climb stairs.    Gentle rotation and stretching of your arms and shoulders will prevent joint stiffness.    You may drive without restrictions when you are not using any prescription pain medication and feel comfortable in a car.    You may return to work/school when you are comfortable without any prescription pain medication.    WOUND CARE    You may remove your outer dressing and shower 48 hours after the surgery.  Pat your incisions dry and leave them open to air.  Re-apply dressing (Band-Aids or gauze/tape) as needed for drainage.    You may have steri-strips (looks like white tape) or Dermabond (looks like glue) on your incisions.  You may peel off the steri-strips 2 weeks after your surgery if they have not peeled off on their own.  If you have Dermabond, it will peel up and fall off on its own.    Do not soak your incisions in a tub or pool for 2 weeks.     Do not apply any lotions, creams, or ointments to your incisions.    A ridge under your incisions is normal and will gradually resolve.    Wear a supportive bra for 1-2 weeks, day and night.    DIET    Start with liquids, then gradually resume your regular diet as tolerated.     Drink plenty of liquids to stay hydrated.    PAIN    Expect some tenderness and discomfort at the incision site(s).  Use the prescribed pain medication at your discretion.  Expect gradual resolution of your pain over several days.    You may take ibuprofen with food (unless you have been told not to) instead of or in addition to your prescribed pain medication.  If you are taking Norco or Percocet, do not take any additional acetaminophen/APAP/Tylenol.    Do not drink alcohol or drive while you are taking  pain medications.    You may apply ice to your incisions in 20 minute intervals as needed for the next 48 hours.  After that time, consider switching to heat if you prefer.    EXPECTATIONS    Pain medications can cause constipation.  Limit use when possible.  Take over the counter stool softener/stimulant, such as Colace or Senna, 1-2 times a day with plenty of water.  You may take a mild over the counter laxative, such as Miralax or a suppository, as needed.      You may discontinue these medications once you are having regular bowel movements and/or are no longer taking your narcotic pain medication.      RETURN APPOINTMENT    Follow up with your surgeon in 2 weeks.  Please call the office at [phone] to schedule your appointment.      CALL OUR OFFICE [phone] IF YOU HAVE:     Chills or fever above 101 F.    Increased redness, warmth, or drainage at your incisions.    Significant bleeding.    Pain not relieved by your pain medication or rest.    Increasing pain after the first 48 hours.    Any other concerns or questions.    Revised January 2018

## 2018-04-18 NOTE — OP NOTE
[facility] Breast Surgery Operative Note      Pre-operative diagnosis: Left breast DCIS   Post-operative diagnosis: Left breast DCIS     Procedure: 1.  LEFT SEED LOCALIZED LUMPECTOMY       Surgeon: Meli Rodriguez MD   Assistant(s):  Danilo Mahajan PA-C  The PA s assistance was medically necessary to provide adequate exposure in the operating field, maintain hemostasis, cutting suture, clamping and ligating bleeding vessels, and visualization of anatomic structures throughout the surgical procedure.      Anesthesia: General    Estimated blood loss:   5 cc     Specimens:   ID Type Source Tests Collected by Time Destination   A : Left Breast Lumpectomy Tissue Breast, Left SURGICAL PATHOLOGY EXAM Meli Rodriguez MD 4/18/2018  3:03 PM    B : Left breast inferior margin Tissue Breast, Left SURGICAL PATHOLOGY EXAM Meli Rodriguez MD 4/18/2018  3:04 PM         INDICATION:  Ms. Castorena is a 53yof who presented after screening mammogram revealed an area of calcifications. This was biopsied and found to be intermediate grade DCIS. She elected to proceed with seed localized lumpectomy.   DESCRIPTION OF PROCEDURE: The patient was placed on the table in supine position. General anesthetic was induced. Perioperative antibiotics were given.  The left breast and axilla were prepped and draped in standard sterile fashion.  We used the seed placed in the [facility] as well as the post-seed mammograms to localize the area of interest. We made a periareolar incision centered at the 12 o'clock position. We carried the incision down using electrocautery into the breast tissue and excised the area of interest, including the seed.  The neoprobe was used to guide the dissection. There was a palpable mass present at the seed location which was directly retroareolar. The retroareolar ductal tissue was divided immediately posterior to the nipple/areola. The mass was removed in its entirety with some surrounding benign appearing breast  tissue.  After the specimen was removed it had a high signal with the neoprobe. Once the mass was removed, it was oriented with Yi dyes. A specimen mammogram was obtained and revealed the clip and seed in the middle of the specimen. The inferior border was somewhat thickened and looked close and this was excised. This was oriented with a long suture laterally, short at the new inferior margin and double tails anteriorly. The specimen were then sent to pathology for review.  The wound was then examined for bleeding and hemostasis was achieved using electrocautery.    Hemostasis was maintained throughout with electrocautery. The field was irrigated with sterile saline.     Clips were placed at the 12, 3, 6, and 9 o'clock positions of the lumpectomy cavity as well as posterior and anterior.  The lumpectomy cavity was reapproximated with several interrupted 3-0 vicryl sutures. The skin was closed with a deep dermal 3-0 vicryl and running 4-0 Monocryl subcuticular suture and steri strips.   The patient tolerated the procedure well.  Sponge and instrument counts were correct.    Meli Rodriguez MD  [facility]  [phone]

## 2018-04-18 NOTE — ANESTHESIA POSTPROCEDURE EVALUATION
Patient: Graciela Castorena    Procedure(s):  SEED LOCALIZED LEFT BREAST LUMPECTOMY  - Wound Class: I-Clean    Diagnosis:LEFT BREAST CANCER   Diagnosis Additional Information: No value filed.    Anesthesia Type:  General, LMA    Note:  Anesthesia Post Evaluation    Patient location during evaluation: PACU  Patient participation: Able to fully participate in evaluation  Level of consciousness: awake  Pain management: adequate  Airway patency: patent  Cardiovascular status: acceptable  Respiratory status: acceptable  Hydration status: acceptable  PONV: none     Anesthetic complications: None          Last vitals:  Vitals:    04/18/18 1351 04/18/18 1420 04/18/18 1529   BP:  144/81 119/67   Resp:  18 18   Temp: 36.7  C (98  F)  36.4  C (97.6  F)   SpO2:  100% 100%         Electronically Signed By: Matthew Howell MD  April 18, 2018  3:49 PM

## 2018-04-18 NOTE — IP AVS SNAPSHOT
MRN:[id number]                      After Visit Summary   4/18/2018    Graciela Castorena    MRN: [id number]           Thank you!     Thank you for choosing [facility] for your care. Our goal is always to provide you with excellent care. Hearing back from our patients is one way we can continue to improve our services. Please take a few minutes to complete the written survey that you may receive in the mail after you visit with us. Thank you!        Patient Information     Date Of Birth          1965        About your hospital stay     You were admitted on:  April 18, 2018 You last received care in the:  [facility] Same Day Surgery    You were discharged on:  April 18, 2018       Who to Call     For medical emergencies, please call 911.  For non-urgent questions about your medical care, please call your primary care provider or clinic, [phone]  For questions related to your surgery, please call your surgery clinic        Attending Provider     Provider Specialty    Meli Rodriguez MD Surgery       Primary Care Provider Office Phone # Fax #    Genna Magallanes [phone]      After Care Instructions     Diet Instructions       Resume pre-procedure diet            Discharge Instructions       Follow up with Dr. Rodriguez at [facility] in about 2 weeks.  Call [phone] to schedule an appointment.            Dressing       Keep dressing clean and dry.  Dressing / incisional care as instructed by RN and or Surgeon            Shower       No shower for 24 hours post procedure. May shower Postoperative Day (POD)  2                  Your next 10 appointments already scheduled     May 25, 2018 10:00 AM CDT   Cystoscopy with Sin Cummins MD, [facility] [place] ([facility])    [address]  [address]  [facility] [id number] [phone]              Further instructions from your care team        Same Day Surgery Discharge Instructions for  Sedation and General Anesthesia       It's not unusual to feel dizzy, light-headed or faint for up to 24 hours after surgery or while taking pain medication.  If you have these symptoms: sit for a few minutes before standing and have someone assist you when you get up to walk or use the bathroom.      You should rest and relax for the next 24 hours. We recommend you make arrangements to have an adult stay with you for at least 24 hours after your discharge.  Avoid hazardous and strenuous activity.      DO NOT DRIVE any vehicle or operate mechanical equipment for 24 hours following the end of your surgery.  Even though you may feel normal, your reactions may be affected by the medication you have received.      Do not drink alcoholic beverages for 24 hours following surgery.       Slowly progress to your regular diet as you feel able. It's not unusual to feel nauseated and/or vomit after receiving anesthesia.  If you develop these symptoms, drink clear liquids (apple juice, ginger ale, broth, 7-up, etc. ) until you feel better.  If your nausea and vomiting persists for 24 hours, please notify your surgeon.        All narcotic pain medications, along with inactivity and anesthesia, can cause constipation. Drinking plenty of liquids and increasing fiber intake will help.      For any questions of a medical nature, call your surgeon.      Do not make important decisions for 24 hours.      If you had general anesthesia, you may have a sore throat for a couple of days related to the breathing tube used during surgery.  You may use Cepacol lozenges to help with this discomfort.  If it worsens or if you develop a fever, contact your surgeon.       If you feel your pain is not well managed with the pain medications prescribed by your surgeon, please contact your surgeon's office to let them know so they can address your concerns.         [facility]  Discharge Instructions: Post-Operative Breast Surgery    ACTIVITY    Take frequent short walks and increase your activity gradually.      Avoid strenuous physical activity or heavy lifting greater than 15-20 lbs. for 1-2 weeks with arm on the surgery side.  You may climb stairs.    Gentle rotation and stretching of your arms and shoulders will prevent joint stiffness.    You may drive without restrictions when you are not using any prescription pain medication and feel comfortable in a car.    You may return to work/school when you are comfortable without any prescription pain medication.    WOUND CARE    You may remove your outer dressing and shower 48 hours after the surgery.  Pat your incisions dry and leave them open to air.  Re-apply dressing (Band-Aids or gauze/tape) as needed for drainage.    You may have steri-strips (looks like white tape) or Dermabond (looks like glue) on your incisions.  You may peel off the steri-strips 2 weeks after your surgery if they have not peeled off on their own.  If you have Dermabond, it will peel up and fall off on its own.    Do not soak your incisions in a tub or pool for 2 weeks.     Do not apply any lotions, creams, or ointments to your incisions.    A ridge under your incisions is normal and will gradually resolve.    Wear a supportive bra for 1-2 weeks, day and night.    DIET    Start with liquids, then gradually resume your regular diet as tolerated.     Drink plenty of liquids to stay hydrated.    PAIN    Expect some tenderness and discomfort at the incision site(s).  Use the prescribed pain medication at your discretion.  Expect gradual resolution of your pain over several days.    You may take ibuprofen with food (unless you have been told not to) instead of or in addition to your prescribed pain medication.  If you are taking Norco or Percocet, do not take any additional acetaminophen/APAP/Tylenol.    Do not drink alcohol or drive while you are taking  "pain medications.    You may apply ice to your incisions in 20 minute intervals as needed for the next 48 hours.  After that time, consider switching to heat if you prefer.    EXPECTATIONS    Pain medications can cause constipation.  Limit use when possible.  Take over the counter stool softener/stimulant, such as Colace or Senna, 1-2 times a day with plenty of water.  You may take a mild over the counter laxative, such as Miralax or a suppository, as needed.      You may discontinue these medications once you are having regular bowel movements and/or are no longer taking your narcotic pain medication.      RETURN APPOINTMENT    Follow up with your surgeon in 2 weeks.  Please call the office at [phone] to schedule your appointment.      CALL OUR OFFICE -[phone] IF YOU HAVE:     Chills or fever above 101 F.    Increased redness, warmth, or drainage at your incisions.    Significant bleeding.    Pain not relieved by your pain medication or rest.    Increasing pain after the first 48 hours.    Any other concerns or questions.    Revised January 2018        Pending Results     Date and Time Order Name Status Description    4/18/2018 1507 Surgical pathology exam In process             Admission Information     Date & Time Provider Department Dept. Phone    4/18/2018 Meli Rodriguez MD [facility] Same Day Surgery [phone]      Your Vitals Were     Blood Pressure Temperature Respirations Weight Pulse Oximetry BMI (Body Mass Index)    116/80 97.5  F (36.4  C) (Temporal) 15 73 kg (161 lb) 100% 28.52 kg/m2      [employer] Information     [employer] lets you send messages to your doctor, view your test results, renew your prescriptions, schedule appointments and more. To sign up, go to [email or web address] . Click on \"Log in\" on the left side of the screen, which will take you to the Welcome page. Then click on \"Sign up Now\" on the right side of the page.     You will be asked to enter the access code " listed below, as well as some personal information. Please follow the directions to create your username and password.     Your access code is: [email or web address]  Expires: 2018  4:29 PM     Your access code will  in 90 days. If you need help or a new code, please call your [place] clinic or [phone].        Care EveryWhere ID     This is your Care EveryWhere ID. This could be used by other organizations to access your [place] medical records  [id number]        Equal Access to Services     OMARI DUFFY : Hadii kaley campos hadasho Soomaali, waaxda luqadaha, qaybta kaalmada adeegyalisseth, becky multani . So [facility] [phone].    ATENCIÓN: Si habla español, tiene a banuelos disposición servicios gratuitos de asistencia lingüística. Llame al [phone].    We comply with applicable federal civil rights laws and [place] laws. We do not discriminate on the basis of race, color, national origin, age, disability, sex, sexual orientation, or gender identity.               Review of your medicines      START taking        Dose / Directions    HYDROcodone-acetaminophen 5-325 MG per tablet   Commonly known as:  NORCO   Used for:  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 breast   Notes to Patient:  You were given 1 tablet at 4:30 pm at the hospital.        Dose:  1-2 tablet   Take 1-2 tablets by mouth every 4 hours as needed for other (Moderate to Severe Pain)   Quantity:  15 tablet   Refills:  0       senna-docusate 8.6-50 MG per tablet   Commonly known as:  SENOKOT-S;PERICOLACE   Used for:  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 breast        Dose:  1-2 tablet   Take 1-2 tablets by mouth 2 times daily Take while on oral narcotics to prevent or treat constipation.   Quantity:  30 tablet   Refills:  0            Where to get your medicines      These medications were sent to [facility] - [address]  [address] [address]     Phone:  [phone]      senna-docusate 8.6-50 MG per tablet         Some of these will need a paper prescription and others can be bought over the counter. Ask your nurse if you have questions.     Bring a paper prescription for each of these medications     HYDROcodone-acetaminophen 5-325 MG per tablet                Protect others around you: Learn how to safely use, store and throw away your medicines at www.disposemymeds.org.        Information about OPIOIDS     PRESCRIPTION OPIOIDS: WHAT YOU NEED TO KNOW   You have a prescription for an opioid (narcotic) pain medicine. Opioids can cause addiction. If you have a history of chemical dependency of any type, you are at a higher risk of becoming addicted to opioids. Only take this medicine after all other options have been tried. Take it for as short a time and as few doses as possible.     Do not:    Drive. If you drive while taking these medicines, you could be arrested for driving under the influence (DUI).    Operate heavy machinery    Do any other dangerous activities while taking these medicines.     Drink any alcohol while taking these medicines.      Take with any other medicines that contain acetaminophen. Read all labels carefully. Look for the word  acetaminophen  or  Tylenol.  Ask your pharmacist if you have questions or are unsure.    Store your pills in a secure place, locked if possible. We will not replace any lost or stolen medicine. If you don t finish your medicine, please throw away (dispose) as directed by your pharmacist. The [place] Pollution Control Agency has more information about safe disposal: https://www.pca.[facility].[place].us/living-green/managing-unwanted-medications    All opioids tend to cause constipation. Drink plenty of water and eat foods that have a lot of fiber, such as fruits, vegetables, prune juice, apple juice and high-fiber cereal. Take a laxative (Miralax, milk of magnesia, Colace, Senna) if you don t move your bowels at least every other day.               Medication List: This is a list of all your medications and when to take them. Check marks below indicate your daily home schedule. Keep this list as a reference.      Medications           Morning Afternoon Evening Bedtime As Needed    HYDROcodone-acetaminophen 5-325 MG per tablet   Commonly known as:  NORCO   Take 1-2 tablets by mouth every 4 hours as needed for other (Moderate to Severe Pain)   Last time this was given:  1 tablet on 4/18/2018  4:27 PM   Notes to Patient:  You were given 1 tablet at 4:30 pm at the hospital.                                senna-docusate 8.6-50 MG per tablet   Commonly known as:  SENOKOT-S;PERICOLACE   Take 1-2 tablets by mouth 2 times daily Take while on oral narcotics to prevent or treat constipation.

## 2018-04-18 NOTE — OR NURSING
Pt discharged to the care of family.   Daughter verbalizes understanding of care.  VSS.  Meets discharge criteria.

## 2018-04-18 NOTE — ANESTHESIA CARE TRANSFER NOTE
Patient: Graciela Castorena    Procedure(s):  SEED LOCALIZED LEFT BREAST LUMPECTOMY  - Wound Class: I-Clean    Diagnosis: LEFT BREAST CANCER   Diagnosis Additional Information: No value filed.    Anesthesia Type:   General, LMA     Note:  Airway :Face Mask  Patient transferred to:PACU  Handoff Report: Identifed the Patient, Identified the Reponsible Provider, Reviewed the pertinent medical history, Discussed the surgical course, Reviewed Intra-OP anesthesia mangement and issues during anesthesia, Set expectations for post-procedure period and Allowed opportunity for questions and acknowledgement of understanding      Vitals: (Last set prior to Anesthesia Care Transfer)    CRNA VITALS  4/18/2018 1458 - 4/18/2018 1532      4/18/2018             Pulse: 103    SpO2: 97 %    Resp Rate (set): 10                Electronically Signed By: CHRIS Wiley CRNA  April 18, 2018  3:32 PM

## 2018-04-18 NOTE — PROGRESS NOTES
SBAR Seed Localization    SITUATION:  Patient to breast imaging center for imaging guided seed localizations before breast lumpectomy or excision biopsy with sentinel node injection.    BACKGROUND:  Breast imaging cancer, breast abnormality  Ordered procedure completed: Yes  Special needs identified: Yes     ASSESSMENT:  SBAR report called to patient care unit because of unexpected event in radiology: No  Allergies and medication list reviewed prior to procedure. Yes  Skin cleansed with ChloraPrep One-Step.  Anesthesia: approximately 5ml of 1% Lidocaine injection subcutaneous before seed insertion administered by the radiologist.   Gauze dressing over insertion site(s).  Post procedure mammogram completed: Yes    Patient tolerance:well    RECOMMENDATIONS:  Patient transferred to Same Day Surgery in stable condition via wheelchair with Breast Imaging Staff.  Copy of note given to patient and instructions to hand this note to surgery staff.    Please call [facility] [phone] if there are any questions.

## 2018-04-23 ENCOUNTER — TELEPHONE (OUTPATIENT)
Dept: SURGERY | Facility: CLINIC | Age: 53
End: 2018-04-23

## 2018-04-23 LAB — COPATH REPORT: NORMAL

## 2018-04-23 NOTE — TELEPHONE ENCOUNTER
Patient called requesting pathology results. Informed patient pathology is still pending. Dr. Rodriguez will call patient once pathology results have been finalized. Patient verbalized understanding.    Ashleigh Cervantes RN, BSN  Nurse Navigator   [facility]  [phone]

## 2018-04-23 NOTE — TELEPHONE ENCOUNTER
Name of caller: Patient    Reason for Call:  Looking for lab or pathology results    Surgeon:  Dr. Rodriguez    Recent Surgery:  Yes.    If yes, when & what type:  4/18/18 lumpectomy      Best phone number to reach pt at is: [phone]  Ok to leave a message with medical info? Yes.    Pharmacy preferred (if calling for a refill):

## 2018-04-24 ENCOUNTER — TELEPHONE (OUTPATIENT)
Dept: SURGERY | Facility: CLINIC | Age: 53
End: 2018-04-24

## 2018-04-24 NOTE — TELEPHONE ENCOUNTER
I called Graciela with her pathology result - it revealed DCIS. The margins are negative. There was no invasive cancer seen. She will follow up with me in clinic in 2 weeks to discuss next steps.     Meli Rodriguez MD  [facility]  [phone]

## 2018-05-01 ENCOUNTER — OFFICE VISIT (OUTPATIENT)
Dept: SURGERY | Facility: CLINIC | Age: 53
End: 2018-05-01

## 2018-05-01 DIAGNOSIS — D05.12 BREAST NEOPLASM, TIS (DCIS), LEFT: ICD-10-CM

## 2018-05-01 DIAGNOSIS — Z09 SURGERY FOLLOW-UP: Primary | ICD-10-CM

## 2018-05-01 PROCEDURE — 99024 POSTOP FOLLOW-UP VISIT: CPT | Performed by: SURGERY

## 2018-05-01 NOTE — PROGRESS NOTES
[facility] Breast Surgery Postoperative Note    S: Graciela is doing well after her left breast lumpectomy. No pain at breast incision. No concerns. She was seen with an interpretor today.     Breasts: left periareolar incision is healing well. No erythema or induration. No seroma/hematoma.     Pathology:   SPECIMEN(S):   A: Breast lumpectomy, left   B: Inferior margin, left breast     FINAL DIAGNOSIS:   A: Breast, left, lumpectomy: Ductal carcinoma in situ (DCIS) and biopsy   site changes identified.  See synoptic   summary.     % positive on initial biopsy.     B: Breast, left, inferior margin, excision: Negative for malignancy.       A/P  Graciela Castorena is recovering from a left breast lumpectomy for DCIS. She is doing well.   Referrals were placed to radiation oncology and oncology for follow up.   She should follow up with me in 6 months with left breast mammogram.     Thank you for the opportunity to help in her care.    Meli Rodriguez  [facility]  [phone]    Please route or send letter to:  Primary Care Provider (PCP) and Referring Provider

## 2018-05-01 NOTE — MR AVS SNAPSHOT
After Visit Summary   5/1/2018    Graciela Castorena    MRN: [id number]           Patient Information     Date Of Birth          1965        Visit Information        Provider Department      5/1/2018 9:45 AM Meli Rodriguez MD; [employer] [facility] [place] [facility] General Surgery      Today's Diagnoses     Surgery follow-up    -  1    Breast neoplasm, Tis (DCIS), left          Care Instructions    You are scheduled for the following appointments:   1) Dr. Samreen Barahona at [facility] on 5/10/18 at 9:20am   2) Dr. Shun Tracy at [facility] Radiation Therapy on 5/11/18 at 10am    Please contact Kailey at [facility] with any questions or concerns.          Follow-ups after your visit        Additional Services     ONC/HEME ADULT REFERRAL       Your provider has referred you to: [facility]: Cancer Care/Hematology (All Cancer Related Services) - [place] [phone]   [email or web address]    Please be aware that coverage of these services is subject to the terms and limitations of your health insurance plan.  Call member services at your health plan with any benefit or coverage questions.      Please bring the following with you to your appointment:    (1) Any X-Rays, CTs or MRIs which have been performed.  Contact the facility where they were done to arrange for  prior to your scheduled appointment.   (2) List of current medications  (3) This referral request   (4) Any documents/labs given to you for this referral            RAD ONCOLOGY REFERRAL       Your provider has referred you to: radiation oncology, Dr. Tracy    Please be aware that coverage of these services is subject to the terms and limitations of your health insurance plan.  Call member services at your health plan with any benefit or coverage questions.      Please bring the following with you to your  "appointment:    (1) Any X-Rays, CTs or MRIs which have been performed.  Contact the facility where they were done to arrange for  prior to your scheduled appointment.    (2) List of current medications   (3) This referral request   (4) Any documents/labs given to you for this referral                  Your next 10 appointments already scheduled     May 10, 2018  9:20 AM CDT   New Visit with Samreen Barahona MD   [facility] ([facility])    [facility]  [address]  [facility] [id number] [phone]            May 25, 2018 10:00 AM CDT   Cystoscopy with Sin Cummins MD, UA CYF   [facility] ([facility])    [address]  [address]  [facility] [id number] [phone]              Who to contact     If you have questions or need follow up information about today's clinic visit or your schedule please contact [facility] directly at [phone].  Normal or non-critical lab and imaging results will be communicated to you by [employer], letter or phone within 4 business days after the clinic has received the results. If you do not hear from us within 7 days, please contact the clinic through [employer] or phone. If you have a critical or abnormal lab result, we will notify you by phone as soon as possible.  Submit refill requests through [facility] or call your pharmacy and they will forward the refill request to us. Please allow 3 business days for your refill to be completed.          Additional Information About Your Visit        [facility] Information     [facility] lets you send messages to your doctor, view your test results, renew your prescriptions, schedule appointments and more. To sign up, go to [email or web address] . Click on \"Log in\" on the left side of the screen, which will take you to the Welcome page. Then click on \"Sign up Now\" on the right side of the page.     You " will be asked to enter the access code listed below, as well as some personal information. Please follow the directions to create your username and password.     Your access code is: [email or web address]  Expires: 2018  4:29 PM     Your access code will  in 90 days. If you need help or a new code, please call your [place] clinic or [phone].        Care EveryWhere ID     This is your Care EveryWhere ID. This could be used by other organizations to access your [place] medical records  [id number]         Blood Pressure from Last 3 Encounters:   18 126/86   04/10/18 98/74   18 120/80    Weight from Last 3 Encounters:   18 161 lb (73 kg)   04/10/18 161 lb (73 kg)   18 161 lb 4.8 oz (73.2 kg)              We Performed the Following     ONC/HEME ADULT REFERRAL     RAD ONCOLOGY REFERRAL        Primary Care Provider Office Phone # Fax #    Genna Magallanes [phone]       [address] [place] [address]        Equal Access to Services     [facility]: Hadii aad ku hadasho Socindy, waaxda luqadaha, qaybta kaalmada adebrooke, becky multani . So [facility] [phone].    ATENCIÓN: Si habla español, tiene a banuelos disposición servicios gratuitos de asistencia lingüística. [facility] [phone].    We comply with applicable federal civil rights laws and [place] laws. We do not discriminate on the basis of race, color, national origin, age, disability, sex, sexual orientation, or gender identity.            Thank you!     Thank you for choosing [facility]  for your care. Our goal is always to provide you with excellent care. Hearing back from our patients is one way we can continue to improve our services. Please take a few minutes to complete the written survey that you may receive in the mail after your visit with us. Thank you!             Your Updated Medication List - Protect others around you: Learn how to safely use, store and  throw away your medicines at www.disposemymeds.org.          This list is accurate as of 5/1/18 11:07 AM.  Always use your most recent med list.                   Brand Name Dispense Instructions for use Diagnosis    HYDROcodone-acetaminophen 5-325 MG per tablet    NORCO    15 tablet    Take 1-2 tablets by mouth every 4 hours as needed for other (Moderate to Severe Pain)    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 breast       senna-docusate 8.6-50 MG per tablet    SENOKOT-S;PERICOLACE    30 tablet    Take 1-2 tablets by mouth 2 times daily Take while on oral narcotics to prevent or treat constipation.    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 breast

## 2018-05-01 NOTE — LETTER
May 1, 2018    Re: Graceila Castorena, : 1965    [facility] Breast Surgery Postoperative Note     S: Graciela is doing well after her left breast lumpectomy. No pain at breast incision. No concerns. She was seen with an interpretor today.      Breasts: left periareolar incision is healing well. No erythema or induration. No seroma/hematoma.      Pathology:   SPECIMEN(S):   A: Breast lumpectomy, left   B: Inferior margin, left breast     FINAL DIAGNOSIS:   A: Breast, left, lumpectomy: Ductal carcinoma in situ (DCIS) and biopsy   site changes identified.  See synoptic   summary.      % positive on initial biopsy.     B: Breast, left, inferior margin, excision: Negative for malignancy.      A/P  Graciela Castorena is recovering from a left breast lumpectomy for DCIS. She is doing well.   Referrals were placed to radiation oncology and oncology for follow up.   She should follow up with me in 6 months with left breast mammogram.      Thank you for the opportunity to help in her care.     Meli Rodriguez  [facility]  [phone]

## 2018-05-01 NOTE — PATIENT INSTRUCTIONS
You are scheduled for the following appointments:   1) Dr. Samreen Barahona at [facility] on 5/10/18 at 9:20am   2) Dr. Shun Tracy at [facility] Radiation Therapy on 5/11/18 at 10am    Please contact Kailey at [facility] with any questions or concerns.

## 2018-05-09 ENCOUNTER — TELEPHONE (OUTPATIENT)
Dept: ONCOLOGY | Facility: CLINIC | Age: 53
End: 2018-05-09

## 2018-05-10 ENCOUNTER — ONCOLOGY VISIT (OUTPATIENT)
Dept: ONCOLOGY | Facility: CLINIC | Age: 53
End: 2018-05-10
Attending: SURGERY
Payer: COMMERCIAL

## 2018-05-10 VITALS
RESPIRATION RATE: 16 BRPM | HEART RATE: 76 BPM | TEMPERATURE: 97.8 F | DIASTOLIC BLOOD PRESSURE: 76 MMHG | BODY MASS INDEX: 29.41 KG/M2 | OXYGEN SATURATION: 99 % | WEIGHT: 166 LBS | SYSTOLIC BLOOD PRESSURE: 112 MMHG

## 2018-05-10 DIAGNOSIS — D05.12 DUCTAL CARCINOMA IN SITU (DCIS) OF LEFT BREAST: Primary | ICD-10-CM

## 2018-05-10 PROCEDURE — G0463 HOSPITAL OUTPT CLINIC VISIT: HCPCS

## 2018-05-10 PROCEDURE — 99204 OFFICE O/P NEW MOD 45 MIN: CPT | Performed by: INTERNAL MEDICINE

## 2018-05-10 RX ORDER — TAMOXIFEN CITRATE 20 MG/1
20 TABLET ORAL DAILY
Qty: 30 TABLET | Refills: 11 | Status: SHIPPED | OUTPATIENT
Start: 2018-05-10 | End: 2019-05-16

## 2018-05-10 ASSESSMENT — PAIN SCALES - GENERAL: PAINLEVEL: NO PAIN (0)

## 2018-05-10 NOTE — PATIENT INSTRUCTIONS
Side-effect of tamoxifen. Start tamoxifen 20 mg a day. DONE-AG  Follow up in 3 months.   Scheduled - Hattie    AVS given to patient - Hattie

## 2018-05-10 NOTE — LETTER
"    5/10/2018         RE: Graciela Castorena  [address]   [place] [facility] [place] [address]        Dear Colleague,    Thank you for referring your patient, Garciela Castorena, to the [facility]. Please see a copy of my visit note below.    Oncology Rooming Note    May 10, 2018 8:26 AM   Graciela Castorena is a 53 year old female who presents for:    Chief Complaint   Patient presents with     Oncology Clinic Visit     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 breast     Initial Vitals: /76 (BP Location: Left arm, Patient Position: Sitting, Cuff Size: Adult Large)  Pulse 76  Temp 97.8  F (36.6  C) (Oral)  Resp 16  Wt 75.3 kg (166 lb)  SpO2 99%  BMI 29.41 kg/m2 Estimated body mass index is 29.41 kg/(m^2) as calculated from the following:    Height as of 4/10/18: 1.6 m (5' 3\").    Weight as of this encounter: 75.3 kg (166 lb). Body surface area is 1.83 meters squared.  No Pain (0) Comment: Data Unavailable   No LMP recorded. Patient is postmenopausal.  Allergies reviewed: Yes  Medications reviewed: Yes    Medications: Medication refills not needed today.  Pharmacy name entered into [employer]: [id number] - [place], [place] - [address]    Clinical concerns: None                  4 minutes for nursing intake (face to face time)     Geena Pacheco MA          EDUCATION: Tamoxifen    I provided the patient with two copies of patient education from [employer] on Tamoxifen one in English and one in Somalian. I reviewed common and less common side effects. I encourage patient to call with any questions or concerns. Patient did not have any further questions.    Visit Date:   05/10/2018      This consult has been requested by Dr. Rodriguez for DCIS.      Mrs. Castorena is a 53-year-old female, originally from [place] [place] [facility], with DCIS.  She is status post lumpectomy.  Investigations are reviewed and summarized below.      1.  Screening mammogram on 01/16/2018 revealed a new calcification cluster " behind the left nipple.   2.  Left diagnostic mammogram on 2018 revealed an indeterminate cluster of microcalcification in retroareolar portion of left breast at 11:00.   3.  Stereotactic left breast biopsy on 2018 reveals intermediate grade DCIS with microcalcification and comedonecrosis.  % positive and  DE 25% positive.   4.  Patient underwent left breast lumpectomy on 2018.  Pathology reveals grade 2 DCIS measuring 3 mm.  Margins are negative.      Patient has recovered well from surgery.  She is not having any pain or nipple discharge.      Patient denies any previous history of breast cancer.  Denies any previous history of breast biopsy.  She had menarche at the age of 16.  She went into menopause around age of 47.  She was never on any hormonal or estrogen containing medications.      REVIEW OF SYSTEMS:    She is doing good.  No headache or dizziness.  No chest pain or difficulty breathing.  No abdominal pain, nausea or vomiting.  Appetite is good.  No urinary or bowel complaints.  No bleeding.  No fever, chills or night sweats.      All other review of systems negative.      ALLERGIES:  REVIEWED.        MEDICATIONS:  Reviewed.      PAST MEDICAL HISTORY:  Not significant.      PAST SURGICAL HISTORY:  Not significant.      SOCIAL HISTORY:     - No smoking.     - No alcohol.      FAMILY HISTORY:     - Father is .  Cause not known.   - Mother is living in [place].  Overall, doing well for her age.   - Patient has 2 brothers and 1 sister.  They have diabetes.   - She has 5 daughters and 3 sons.  They are in good health.   - No family history of breast or ovarian cancer or any other cancer.      PHYSICAL EXAMINATION:   GENERAL:  She was alert, oriented x3.   VITAL SIGNS:  Reviewed.      Rest of the systems not examined.      LABORATORY DATA:  Reviewed.  On 2018, CBC, CMP are all normal.      CT abdomen and pelvis on 2018 was essentially normal except for cholelithiasis.       ASSESSMENT:   1.  A 53-year-old female with left breast ductal carcinoma in situ status post lumpectomy on 2018.  Estrogen-receptor positive.   2.  Cholelithiasis.      RECOMMENDATIONS:   1.  I had a long discussion with the patient.  She was alone.  I reviewed the result of her mammogram and breast biopsy.  Surgical path was reviewed in detail.  I explained to her that she has ductal carcinoma in situ.  It is grade 2.  Difference between invasive and ductal carcinoma discussed.  Tumor is strongly ER positive.     Discussed with her regarding adjuvant treatment.  It is given to reduce the risk of recurrence.     She had lumpectomy.  She will benefit from radiation.  She has an appointment to see the radiation oncologist tomorrow.     Discussed regarding hormonal treatment.  Tumor is ER positive.  She will benefit from antiestrogen therapy. Discussed regarding tamoxifen.  It will help to reduce the risk of recurrence.  It is recommended for 5 years.  Side effects of tamoxifen including hot flashes, aches and pain, blood clot, vaginal bleeding, endometrial carcinoma and other side effects were discussed.  Patient agreeable for it.  She wants to start it after radiation.  Prescription of tamoxifen 20 mg a day sent to pharmacy.     2.  I will plan on seeing her in 3 months.  I advised her to call us if there is any significant side effects from tamoxifen.  Advised her to go to emergency room if she has chest pain, shortness of breath, pain/swelling/redness in the extremities or any other concerns.      Thanks for the consult.      Total face to face time spent 45 minutes, more than 50% of time spent in counseling and coordination of care.         JESSIE BENSON MD             D: 05/10/2018   T: 05/10/2018   MT: JOSE      Name:     MACKENZIE REAGAN   MRN:      [id number]        Account:      [id number]   :      1965           Visit Date:   05/10/2018      Document: [id number]       cc: Genna Magallanes  MD Meli Rodriguez MD       Again, thank you for allowing me to participate in the care of your patient.        Sincerely,        Samreen Barahona MD

## 2018-05-10 NOTE — PROGRESS NOTES
Visit Date:   05/10/2018      This consult has been requested by Dr. Rodriguez for DCIS.      Mrs. Castorena is a 53-year-old female, originally from [facility] [place], with DCIS.  She is status post lumpectomy.  Investigations are reviewed and summarized below.      1.  Screening mammogram on 2018 revealed a new calcification cluster behind the left nipple.   2.  Left diagnostic mammogram on 2018 revealed an indeterminate cluster of microcalcification in retroareolar portion of left breast at 11:00.   3.  Stereotactic left breast biopsy on 2018 reveals intermediate grade DCIS with microcalcification and comedonecrosis.  % positive and  FL 25% positive.   4.  Patient underwent left breast lumpectomy on 2018.  Pathology reveals grade 2 DCIS measuring 3 mm.  Margins are negative.      Patient has recovered well from surgery.  She is not having any pain or nipple discharge.      Patient denies any previous history of breast cancer.  Denies any previous history of breast biopsy.  She had menarche at the age of 16.  She went into menopause around age of 47.  She was never on any hormonal or estrogen containing medications.      REVIEW OF SYSTEMS:    She is doing good.  No headache or dizziness.  No chest pain or difficulty breathing.  No abdominal pain, nausea or vomiting.  Appetite is good.  No urinary or bowel complaints.  No bleeding.  No fever, chills or night sweats.      All other review of systems negative.      ALLERGIES:  REVIEWED.        MEDICATIONS:  Reviewed.      PAST MEDICAL HISTORY:  Not significant.      PAST SURGICAL HISTORY:  Not significant.      SOCIAL HISTORY:     - No smoking.     - No alcohol.      FAMILY HISTORY:     - Father is .  Cause not known.   - Mother is living in [facility] [place].  Overall, doing well for her age.   - Patient has 2 brothers and 1 sister.  They have diabetes.   - She has 5 daughters and 3 sons.  They are in good health.   - No family history of breast or  ovarian cancer or any other cancer.      PHYSICAL EXAMINATION:   GENERAL:  She was alert, oriented x3.   VITAL SIGNS:  Reviewed.      Rest of the systems not examined.      LABORATORY DATA:  Reviewed.  On 04/04/2018, CBC, CMP are all normal.      CT abdomen and pelvis on 04/09/2018 was essentially normal except for cholelithiasis.      ASSESSMENT:   1.  A 53-year-old female with left breast ductal carcinoma in situ status post lumpectomy on 04/18/2018.  Estrogen-receptor positive.   2.  Cholelithiasis.      RECOMMENDATIONS:   1.  I had a long discussion with the patient.  She was alone.  I reviewed the result of her mammogram and breast biopsy.  Surgical path was reviewed in detail.  I explained to her that she has ductal carcinoma in situ.  It is grade 2.  Difference between invasive and ductal carcinoma discussed.  Tumor is strongly ER positive.     Discussed with her regarding adjuvant treatment.  It is given to reduce the risk of recurrence.     She had lumpectomy.  She will benefit from radiation.  She has an appointment to see the radiation oncologist tomorrow.     Discussed regarding hormonal treatment.  Tumor is ER positive.  She will benefit from antiestrogen therapy. Discussed regarding tamoxifen.  It will help to reduce the risk of recurrence.  It is recommended for 5 years.  Side effects of tamoxifen including hot flashes, aches and pain, blood clot, vaginal bleeding, endometrial carcinoma and other side effects were discussed.  Patient agreeable for it.  She wants to start it after radiation.  Prescription of tamoxifen 20 mg a day sent to pharmacy.     2.  I will plan on seeing her in 3 months.  I advised her to call us if there is any significant side effects from tamoxifen.  Advised her to go to emergency room if she has chest pain, shortness of breath, pain/swelling/redness in the extremities or any other concerns.      Thanks for the consult.      Total face to face time spent 45 minutes, more than  50% of time spent in counseling and coordination of care.         JESSIE BENSON MD             D: 05/10/2018   T: 05/10/2018   MT: JOSE      Name:     MACKENZIE REAGAN   MRN:      [id number]        Account:      [id number]   :      1965           Visit Date:   05/10/2018      Document: [id number]       cc: Genna Rodriguez MD

## 2018-05-10 NOTE — PROGRESS NOTES
"Oncology Rooming Note    May 10, 2018 8:26 AM   Graciela Castorena is a 53 year old female who presents for:    Chief Complaint   Patient presents with     Oncology Clinic Visit     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 breast     Initial Vitals: /76 (BP Location: Left arm, Patient Position: Sitting, Cuff Size: Adult Large)  Pulse 76  Temp 97.8  F (36.6  C) (Oral)  Resp 16  Wt 75.3 kg (166 lb)  SpO2 99%  BMI 29.41 kg/m2 Estimated body mass index is 29.41 kg/(m^2) as calculated from the following:    Height as of 4/10/18: 1.6 m (5' 3\").    Weight as of this encounter: 75.3 kg (166 lb). Body surface area is 1.83 meters squared.  No Pain (0) Comment: Data Unavailable   No LMP recorded. Patient is postmenopausal.  Allergies reviewed: Yes  Medications reviewed: Yes    Medications: Medication refills not needed today.  Pharmacy name entered into [facility]: [id number] [address]    Clinical concerns: None                  4 minutes for nursing intake (face to face time)     Geena Pacheco MA          EDUCATION: Tamoxifen    I provided the patient with two copies of patient education from [employer] on Tamoxifen one in English and one in Somalian. I reviewed common and less common side effects. I encourage patient to call with any questions or concerns. Patient did not have any further questions.  "

## 2018-05-10 NOTE — MR AVS SNAPSHOT
After Visit Summary   5/10/2018    Graciela Castorena    MRN: [id number]           Patient Information     Date Of Birth          1965        Visit Information        Provider Department      5/10/2018 9:05 AM Samreen Barahona MD; [employer] [facility]        Today's Diagnoses     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 breast    -  1      Care Instructions    Side-effect of tamoxifen. Start tamoxifen 20 mg a day. DONE-AG  Follow up in 3 months.          Follow-ups after your visit        Your next 10 appointments already scheduled     May 25, 2018 10:00 AM CDT   Cystoscopy with Sin Cummins MD, [facility] [place] ([facility])    [address]  [address]  [facility] [id number] [phone]            Aug 13, 2018 10:40 AM CDT   Return Visit with Samreen Barahona MD   [facility])    [facility]  [address]  [facility] [id number] [phone]              Who to contact     If you have questions or need follow up information about today's clinic visit or your schedule please contact [facility] directly at [phone].  Normal or non-critical lab and imaging results will be communicated to you by Teez.mobihart, letter or phone within 4 business days after the clinic has received the results. If you do not hear from us within 7 days, please contact the clinic through Teez.mobihart or phone. If you have a critical or abnormal lab result, we will notify you by phone as soon as possible.  Submit refill requests through LogoGarden or call your pharmacy and they will forward the refill request to us. Please allow 3 business days for your refill to be completed.          Additional Information About Your Visit        Teez.mobiharSageCloud Information     LogoGarden lets you send messages to your doctor, view your test results, renew your prescriptions,  "schedule appointments and more. To sign up, go to [email or web address] . Click on \"Log in\" on the left side of the screen, which will take you to the Welcome page. Then click on \"Sign up Now\" on the right side of the page.     You will be asked to enter the access code listed below, as well as some personal information. Please follow the directions to create your username and password.     Your access code is: [email or web address]  Expires: 2018  4:29 PM     Your access code will  in 90 days. If you need help or a new code, please call your [place] clinic or [phone].        Care EveryWhere ID     This is your Care EveryWhere ID. This could be used by other organizations to access your [place] medical records  [id number]        Your Vitals Were     Pulse Temperature Respirations Pulse Oximetry BMI (Body Mass Index)       76 97.8  F (36.6  C) (Oral) 16 99% 29.41 kg/m2        Blood Pressure from Last 3 Encounters:   05/10/18 112/76   18 126/86   04/10/18 98/74    Weight from Last 3 Encounters:   05/10/18 75.3 kg (166 lb)   18 73 kg (161 lb)   04/10/18 73 kg (161 lb)              Today, you had the following     No orders found for display         Today's Medication Changes          These changes are accurate as of 5/10/18  9:13 AM.  If you have any questions, ask your nurse or doctor.               Start taking these medicines.        Dose/Directions    tamoxifen 20 MG tablet   Commonly known as:  NOLVADEX   Used for:  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 breast        Dose:  20 mg   Take 1 tablet (20 mg) by mouth daily   Quantity:  30 tablet   Refills:  11            Where to get your medicines      These medications were sent to [facility]/pharmacy #[id number] - [place], [place] - [address] [address]     Phone:  [phone]     tamoxifen 20 MG tablet                Primary Care Provider Office Phone # Fax #    Genna Magallanes [phone] "       [address] [facility] [address]        Equal Access to Services     JACOBO DUFFY : Hadii aad ku hadravinderbrea Flores, wadionlisseth josé, shonairina sancheznathanlisseth ziegler, becky rodriguezgurpreetana hendrickson. So [facility] [phone].    ATENCIÓN: Si habla español, tiene a banuelos disposición servicios gratuitos de asistencia lingüística. Llame al [phone].    We comply with applicable federal civil rights laws and [place] laws. We do not discriminate on the basis of race, color, national origin, age, disability, sex, sexual orientation, or gender identity.            Thank you!     Thank you for choosing [facility]  for your care. Our goal is always to provide you with excellent care. Hearing back from our patients is one way we can continue to improve our services. Please take a few minutes to complete the written survey that you may receive in the mail after your visit with us. Thank you!             Your Updated Medication List - Protect others around you: Learn how to safely use, store and throw away your medicines at www.disposemymeds.org.          This list is accurate as of 5/10/18  9:13 AM.  Always use your most recent med list.                   Brand Name Dispense Instructions for use Diagnosis    tamoxifen 20 MG tablet    NOLVADEX    30 tablet    Take 1 tablet (20 mg) by mouth daily    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 breast

## 2018-05-11 ENCOUNTER — TRANSFERRED RECORDS (OUTPATIENT)
Dept: HEALTH INFORMATION MANAGEMENT | Facility: CLINIC | Age: 53
End: 2018-05-11

## 2018-05-18 DIAGNOSIS — R31.9 HEMATURIA: Primary | ICD-10-CM

## 2018-05-25 ENCOUNTER — OFFICE VISIT (OUTPATIENT)
Dept: UROLOGY | Facility: CLINIC | Age: 53
End: 2018-05-25
Payer: COMMERCIAL

## 2018-05-25 VITALS
DIASTOLIC BLOOD PRESSURE: 88 MMHG | WEIGHT: 166 LBS | HEIGHT: 63 IN | SYSTOLIC BLOOD PRESSURE: 122 MMHG | BODY MASS INDEX: 29.41 KG/M2

## 2018-05-25 DIAGNOSIS — R31.1 BENIGN ESSENTIAL MICROSCOPIC HEMATURIA: ICD-10-CM

## 2018-05-25 LAB
ALBUMIN UR-MCNC: NEGATIVE MG/DL
APPEARANCE UR: CLEAR
BILIRUB UR QL STRIP: NEGATIVE
COLOR UR AUTO: YELLOW
GLUCOSE UR STRIP-MCNC: NEGATIVE MG/DL
HGB UR QL STRIP: ABNORMAL
KETONES UR STRIP-MCNC: NEGATIVE MG/DL
LEUKOCYTE ESTERASE UR QL STRIP: NEGATIVE
NITRATE UR QL: NEGATIVE
PH UR STRIP: 5.5 PH (ref 5–7)
SOURCE: ABNORMAL
SP GR UR STRIP: 1.02 (ref 1–1.03)
UROBILINOGEN UR STRIP-ACNC: 0.2 EU/DL (ref 0.2–1)

## 2018-05-25 PROCEDURE — 99212 OFFICE O/P EST SF 10 MIN: CPT | Mod: 25 | Performed by: UROLOGY

## 2018-05-25 PROCEDURE — 81003 URINALYSIS AUTO W/O SCOPE: CPT | Performed by: UROLOGY

## 2018-05-25 PROCEDURE — 52000 CYSTOURETHROSCOPY: CPT | Performed by: UROLOGY

## 2018-05-25 RX ORDER — ONDANSETRON 4 MG/1
TABLET, FILM COATED ORAL
COMMUNITY
Start: 2018-01-10 | End: 2018-07-10

## 2018-05-25 RX ORDER — METHYLPREDNISOLONE 4 MG
TABLET, DOSE PACK ORAL
COMMUNITY
Start: 2017-10-16 | End: 2018-07-10

## 2018-05-25 RX ORDER — NITROFURANTOIN 25; 75 MG/1; MG/1
100 CAPSULE ORAL ONCE
Qty: 1 CAPSULE | Refills: 0 | Status: SHIPPED | OUTPATIENT
Start: 2018-05-25 | End: 2018-05-25

## 2018-05-25 ASSESSMENT — PAIN SCALES - GENERAL: PAINLEVEL: NO PAIN (0)

## 2018-05-25 NOTE — LETTER
5/25/2018      RE: Graciela Castorena  [address]  [facility] [address]       Graciela Castorena is a pleasant 53-year-old female with recent surgery for ductal carcinoma in situ of the left breast. She is undergoing evaluation for microhematuria and has a normal CT scan and urine cytology. She returns for cystoscopy.  She has been postmenopausal for 5-6 years and has not used hormone replacement therapy  She notices vaginal and periurethral itching, rare vaginal discharge  Other past medical history: Nonsmoker  Medications: Tamoxifen  Allergies: Cipro  Exam: Normal appearance, alert and oriented. Normal external genitalia and urethral meatus  Flexible cystoscopy-mild erythema of the urethra with no polyps and no diverticulum. Normal bladder mucosa without tumors or stones or raised erythema. Both ureteral orifices normal  Assessment: Asymptomatic microhematuria-benign, ductal carcinoma in situ of breast  Plan: Macrobid ×1 today. If not contraindicated with her breast cancer, patient would benefit from 0.01% Estrace vaginal cream-1 g 3 times weekly long-term.  Will ask Dr. Barahona to decide; Dr. Magallanes can prescribe    Sin Cummins MD

## 2018-05-25 NOTE — PROGRESS NOTES
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 She is undergoing evaluation for microhematuria and has a normal CT scan and urine cytology. She returns for cystoscopy.  She has been postmenopausal for 5-6 years and has not used hormone replacement therapy  She notices vaginal and periurethral itching, rare vaginal discharge  Other past medical history: Nonsmoker  Medications: Tamoxifen  Allergies: Cipro  Exam: Normal appearance, alert and oriented. Normal external genitalia and urethral meatus  Flexible cystoscopy-mild erythema of the urethra with no polyps and no diverticulum. Normal bladder mucosa without tumors or stones or raised erythema. Both ureteral orifices normal  Assessment: Asymptomatic microhematuria-benign, ductal carcinoma in situ of breast  Plan: Macrobid ×1 today. If not contraindicated with her breast cancer, patient would benefit from 0.01% Estrace vaginal cream-1 g 3 times weekly long-term.  Will ask Dr. Barahona to decide; Dr. Magallanes can prescribe

## 2018-05-25 NOTE — MR AVS SNAPSHOT
"              After Visit Summary   5/25/2018    Graciela Castorena    MRN: [id number]           Patient Information     Date Of Birth          1965        Visit Information        Provider Department      5/25/2018 9:45 AM Sin Cummins MD; [employer]; [facility] [place]        Today's Diagnoses     Benign essential microscopic hematuria          Care Instructions         AFTER YOUR CYSTOSCOPY         You have just completed a cystoscopy, or \"cysto\", which allowed your physician to learn more about your bladder (or to remove a stent placed after surgery). We suggest that you continue to avoid caffeine, fruit juice, and alcohol for the next 24 hours, however, you are encouraged to return to your normal activities.       A few things that are considered normal after your cystoscopy:    * small amount of bleeding (or spotting) that clears within the next 24 hours    * slight burning sensation with urination    * sensation to of needing to avoid more frequently    * the feeling of \"air\" in your urine    * mild discomfort that is relieved with Tylonol        Please contact our office promptly if you:    * develop a fever above 101 degrees    * are unable to urinate    * develop bright red blood that does not stop    * severe pain or swelling        And of course, please contact our office with any concerns or questions [phone]                Follow-ups after your visit        Your next 10 appointments already scheduled     Aug 13, 2018 10:40 AM CDT   Return Visit with Samreen Barahona MD   [facility])    [facility]  [address]  [facility] [id number] [phone]              Who to contact     If you have questions or need follow up information about today's clinic visit or your schedule please contact [facility] directly at " "[phone].  Normal or non-critical lab and imaging results will be communicated to you by MyChart, letter or phone within 4 business days after the clinic has received the results. If you do not hear from us within 7 days, please contact the clinic through Motion Enginehart or phone. If you have a critical or abnormal lab result, we will notify you by phone as soon as possible.  Submit refill requests through Luxtera or call your pharmacy and they will forward the refill request to us. Please allow 3 business days for your refill to be completed.          Additional Information About Your Visit        Motion EngineharCRAiLAR Information     Luxtera lets you send messages to your doctor, view your test results, renew your prescriptions, schedule appointments and more. To sign up, go to [email or web address] . Click on \"Log in\" on the left side of the screen, which will take you to the Welcome page. Then click on \"Sign up Now\" on the right side of the page.     You will be asked to enter the access code listed below, as well as some personal information. Please follow the directions to create your username and password.     Your access code is: [email or web address]  Expires: 2018  4:29 PM     Your access code will  in 90 days. If you need help or a new code, please call your [place] clinic or [phone].        Care EveryWhere ID     This is your Care EveryWhere ID. This could be used by other organizations to access your [place] medical records  [id number]        Your Vitals Were     Height BMI (Body Mass Index)                1.6 m (5' 3\") 29.41 kg/m2           Blood Pressure from Last 3 Encounters:   18 122/88   05/10/18 112/76   18 126/86    Weight from Last 3 Encounters:   18 75.3 kg (166 lb)   05/10/18 75.3 kg (166 lb)   18 73 kg (161 lb)              We Performed the Following     CYSTOURETHROSCOPY (18808)     UA without Microscopic          Today's Medication Changes          These changes are " accurate as of 5/25/18 11:17 AM.  If you have any questions, ask your nurse or doctor.               Start taking these medicines.        Dose/Directions    nitroFURantoin (macrocrystal-monohydrate) 100 MG capsule   Commonly known as:  MACROBID   Used for:  Benign essential microscopic hematuria        Dose:  100 mg   Take 1 capsule (100 mg) by mouth once for 1 dose   Quantity:  1 capsule   Refills:  0            Where to get your medicines      These medications were sent to [facility]/pharmacy #[id number] [facility] [address] [address]     Phone:  [phone]     nitroFURantoin (macrocrystal-monohydrate) 100 MG capsule                Primary Care Provider Office Phone # Fax #    Genna Magallanes [phone]       [address] [facility] [address]        Equal Access to Services     OM[facility]I [facility] : Hadii kaley ku hadasho Soomaali, waaxda luqadaha, qaybta kaalmada adeegyada, becky alcalain hayaan yamila multani . So [facility] [phone].    ATENCIÓN: Si habla español, tiene a banuelos disposición servicios gratuitos de asistencia lingüística. Llame al [phone].    We comply with applicable federal civil rights laws and [place] laws. We do not discriminate on the basis of race, color, national origin, age, disability, sex, sexual orientation, or gender identity.            Thank you!     Thank you for choosing [facility] [place]  for your care. Our goal is always to provide you with excellent care. Hearing back from our patients is one way we can continue to improve our services. Please take a few minutes to complete the written survey that you may receive in the mail after your visit with us. Thank you!             Your Updated Medication List - Protect others around you: Learn how to safely use, store and throw away your medicines at www.disposemymeds.org.          This list is accurate as of 5/25/18 11:17 AM.  Always use  your most recent med list.                   Brand Name Dispense Instructions for use Diagnosis    methylPREDNISolone 4 MG tablet    MEDROL DOSEPAK          nitroFURantoin (macrocrystal-monohydrate) 100 MG capsule    MACROBID    1 capsule    Take 1 capsule (100 mg) by mouth once for 1 dose    Benign essential microscopic hematuria       ondansetron 4 MG tablet    ZOFRAN          tamoxifen 20 MG tablet    NOLVADEX    30 tablet    Take 1 tablet (20 mg) by mouth daily    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 breast

## 2018-05-25 NOTE — LETTER
5/25/2018     RE: Graciela Castorena  [address]  [facility] [address]     Dear Colleague,    Thank you for referring your patient, Graciela Castorena, to the [facility] [place] [facility]. Please see a copy of my visit note below.    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 She is undergoing evaluation for microhematuria and has a normal CT scan and urine cytology. She returns for cystoscopy.  She has been postmenopausal for 5-6 years and has not used hormone replacement therapy  She notices vaginal and periurethral itching, rare vaginal discharge  Other past medical history: Nonsmoker  Medications: Tamoxifen  Allergies: Cipro  Exam: Normal appearance, alert and oriented. Normal external genitalia and urethral meatus  Flexible cystoscopy-mild erythema of the urethra with no polyps and no diverticulum. Normal bladder mucosa without tumors or stones or raised erythema. Both ureteral orifices normal  Assessment: Asymptomatic microhematuria-benign, ductal carcinoma in situ of breast  Plan: Macrobid ×1 today. If not contraindicated with her breast cancer, patient would benefit from 0.01% Estrace vaginal cream-1 g 3 times weekly long-term.  Will ask Dr. Barahona to decide; Dr. Magallanes can prescribe    Again, thank you for allowing me to participate in the care of your patient.      Sincerely,    Sin Cummins MD

## 2018-05-25 NOTE — PATIENT INSTRUCTIONS
"     AFTER YOUR CYSTOSCOPY         You have just completed a cystoscopy, or \"cysto\", which allowed your physician to learn more about your bladder (or to remove a stent placed after surgery). We suggest that you continue to avoid caffeine, fruit juice, and alcohol for the next 24 hours, however, you are encouraged to return to your normal activities.       A few things that are considered normal after your cystoscopy:    * small amount of bleeding (or spotting) that clears within the next 24 hours    * slight burning sensation with urination    * sensation to of needing to avoid more frequently    * the feeling of \"air\" in your urine    * mild discomfort that is relieved with Tylonol        Please contact our office promptly if you:    * develop a fever above 101 degrees    * are unable to urinate    * develop bright red blood that does not stop    * severe pain or swelling        And of course, please contact our office with any concerns or questions [phone]        "

## 2018-05-25 NOTE — NURSING NOTE
Invasive Procedure Safety Checklist:    Procedure:     Action: Complete sections and checkboxes as appropriate.    Pre-procedure:  1. Patient ID Verified with 2 identifiers (Rhiannon and  or MRN) : YES    2. Procedure and site verified with patient/designee (when able) : YES    3. Accurate consent documentation in medical record : YES    4. H&P (or appropriate assessment) documented in medical record : YES  H&P must be up to 30 days prior to procedure an updated within 24 hours of                 Procedure as applicable.     5. Relevant diagnostic and radiology test results appropriately labeled and displayed as applicable : YES    6. Blood products, implants, devices, and/or special equipment available for the procedure as applicable : YES    7. Procedure site(s) marked with provider initials [Exclusions: N/A] : NO    8. Marking not required. Reason : Yes  Procedure does not require site marking    Time Out:     Time-Out performed immediately prior to starting procedure, including verbal and active participation of all team members addressing: YES    1. Correct patient identity.  2. Confirmed that the correct side and site are marked.  3. An accurate procedure to be done.  4. Agreement on the procedure to be done.  5. Correct patient position.  6. Relevant images and results are properly labeled and appropriately displayed.  7. The need to administer antibiotics or fluids for irrigation purposes during the procedure as applicable.  8. Safety precautions based on patient history or medication use.    During Procedure: Verification of correct person, site, and procedure occurs any time the responsibility for care of the patient is transferred to another member of the care team.    The following medication was given:     MEDICATION: Uro-jet   ROUTE: Urethral meatus   SITE: Urethra   DOSE: 5 mL  LOT #: [id number]  :  International medication systems, limited   EXPIRATION DATE:    NDC#:  [address]-3

## 2018-06-04 ENCOUNTER — HEALTH MAINTENANCE LETTER (OUTPATIENT)
Age: 53
End: 2018-06-04

## 2018-06-12 ENCOUNTER — TELEPHONE (OUTPATIENT)
Dept: BEHAVIORAL HEALTH | Facility: CLINIC | Age: 53
End: 2018-06-12

## 2018-06-27 ENCOUNTER — TRANSFERRED RECORDS (OUTPATIENT)
Dept: HEALTH INFORMATION MANAGEMENT | Facility: CLINIC | Age: 53
End: 2018-06-27

## 2018-06-30 ENCOUNTER — HOSPITAL ENCOUNTER (EMERGENCY)
Facility: CLINIC | Age: 53
Discharge: HOME | End: 2018-06-30
Attending: EMERGENCY MEDICINE | Admitting: EMERGENCY MEDICINE
Payer: COMMERCIAL

## 2018-06-30 ENCOUNTER — APPOINTMENT (OUTPATIENT)
Dept: GENERAL RADIOLOGY | Facility: CLINIC | Age: 53
End: 2018-06-30
Attending: EMERGENCY MEDICINE
Payer: COMMERCIAL

## 2018-06-30 VITALS
BODY MASS INDEX: 29.58 KG/M2 | OXYGEN SATURATION: 99 % | TEMPERATURE: 97.8 F | RESPIRATION RATE: 17 BRPM | WEIGHT: 167 LBS | DIASTOLIC BLOOD PRESSURE: 77 MMHG | SYSTOLIC BLOOD PRESSURE: 109 MMHG

## 2018-06-30 DIAGNOSIS — S39.012A STRAIN OF LUMBAR REGION, INITIAL ENCOUNTER: ICD-10-CM

## 2018-06-30 LAB
ALBUMIN SERPL-MCNC: 3.5 G/DL (ref 3.4–5)
ALBUMIN UR-MCNC: NEGATIVE MG/DL
ALP SERPL-CCNC: 60 U/L (ref 40–150)
ALT SERPL W P-5'-P-CCNC: 22 U/L (ref 0–50)
ANION GAP SERPL CALCULATED.3IONS-SCNC: 8 MMOL/L (ref 3–14)
APPEARANCE UR: CLEAR
AST SERPL W P-5'-P-CCNC: 14 U/L (ref 0–45)
BACTERIA #/AREA URNS HPF: ABNORMAL /HPF
BASOPHILS # BLD AUTO: 0 10E9/L (ref 0–0.2)
BASOPHILS NFR BLD AUTO: 0.5 %
BILIRUB SERPL-MCNC: 0.4 MG/DL (ref 0.2–1.3)
BILIRUB UR QL STRIP: NEGATIVE
BUN SERPL-MCNC: 13 MG/DL (ref 7–30)
CALCIUM SERPL-MCNC: 8.9 MG/DL (ref 8.5–10.1)
CHLORIDE SERPL-SCNC: 107 MMOL/L (ref 94–109)
CO2 SERPL-SCNC: 28 MMOL/L (ref 20–32)
COLOR UR AUTO: ABNORMAL
CREAT SERPL-MCNC: 0.64 MG/DL (ref 0.52–1.04)
DIFFERENTIAL METHOD BLD: NORMAL
EOSINOPHIL # BLD AUTO: 0.2 10E9/L (ref 0–0.7)
EOSINOPHIL NFR BLD AUTO: 5.5 %
ERYTHROCYTE [DISTWIDTH] IN BLOOD BY AUTOMATED COUNT: 12.9 % (ref 10–15)
GFR SERPL CREATININE-BSD FRML MDRD: >90 ML/MIN/1.7M2
GLUCOSE SERPL-MCNC: 146 MG/DL (ref 70–99)
GLUCOSE UR STRIP-MCNC: NEGATIVE MG/DL
HCG UR QL: NEGATIVE
HCT VFR BLD AUTO: 39.2 % (ref 35–47)
HGB BLD-MCNC: 13.2 G/DL (ref 11.7–15.7)
HGB UR QL STRIP: ABNORMAL
IMM GRANULOCYTES # BLD: 0 10E9/L (ref 0–0.4)
IMM GRANULOCYTES NFR BLD: 0 %
INR PPP: 1.01 (ref 0.86–1.14)
KETONES UR STRIP-MCNC: NEGATIVE MG/DL
LEUKOCYTE ESTERASE UR QL STRIP: NEGATIVE
LYMPHOCYTES # BLD AUTO: 0.9 10E9/L (ref 0.8–5.3)
LYMPHOCYTES NFR BLD AUTO: 23.1 %
MCH RBC QN AUTO: 30.1 PG (ref 26.5–33)
MCHC RBC AUTO-ENTMCNC: 33.7 G/DL (ref 31.5–36.5)
MCV RBC AUTO: 89 FL (ref 78–100)
MONOCYTES # BLD AUTO: 0.3 10E9/L (ref 0–1.3)
MONOCYTES NFR BLD AUTO: 7 %
NEUTROPHILS # BLD AUTO: 2.6 10E9/L (ref 1.6–8.3)
NEUTROPHILS NFR BLD AUTO: 63.9 %
NITRATE UR QL: NEGATIVE
NRBC # BLD AUTO: 0 10*3/UL
NRBC BLD AUTO-RTO: 0 /100
PH UR STRIP: 5 PH (ref 5–7)
PLATELET # BLD AUTO: 185 10E9/L (ref 150–450)
POTASSIUM SERPL-SCNC: 3.5 MMOL/L (ref 3.4–5.3)
PROT SERPL-MCNC: 6.8 G/DL (ref 6.8–8.8)
RBC # BLD AUTO: 4.39 10E12/L (ref 3.8–5.2)
RBC #/AREA URNS AUTO: 2 /HPF (ref 0–2)
SODIUM SERPL-SCNC: 143 MMOL/L (ref 133–144)
SOURCE: ABNORMAL
SP GR UR STRIP: 1 (ref 1–1.03)
SQUAMOUS #/AREA URNS AUTO: <1 /HPF (ref 0–1)
UROBILINOGEN UR STRIP-MCNC: NORMAL MG/DL (ref 0–2)
WBC # BLD AUTO: 4 10E9/L (ref 4–11)
WBC #/AREA URNS AUTO: 0 /HPF (ref 0–5)

## 2018-06-30 PROCEDURE — 81001 URINALYSIS AUTO W/SCOPE: CPT | Performed by: EMERGENCY MEDICINE

## 2018-06-30 PROCEDURE — 99284 EMERGENCY DEPT VISIT MOD MDM: CPT

## 2018-06-30 PROCEDURE — 25000132 ZZH RX MED GY IP 250 OP 250 PS 637: Performed by: EMERGENCY MEDICINE

## 2018-06-30 PROCEDURE — 80053 COMPREHEN METABOLIC PANEL: CPT | Performed by: EMERGENCY MEDICINE

## 2018-06-30 PROCEDURE — 81025 URINE PREGNANCY TEST: CPT | Performed by: EMERGENCY MEDICINE

## 2018-06-30 PROCEDURE — 85025 COMPLETE CBC W/AUTO DIFF WBC: CPT | Performed by: EMERGENCY MEDICINE

## 2018-06-30 PROCEDURE — 85610 PROTHROMBIN TIME: CPT | Performed by: EMERGENCY MEDICINE

## 2018-06-30 PROCEDURE — 72100 X-RAY EXAM L-S SPINE 2/3 VWS: CPT

## 2018-06-30 RX ORDER — HYDROCODONE BITARTRATE AND ACETAMINOPHEN 5; 325 MG/1; MG/1
2 TABLET ORAL ONCE
Status: COMPLETED | OUTPATIENT
Start: 2018-06-30 | End: 2018-06-30

## 2018-06-30 RX ORDER — HYDROCODONE BITARTRATE AND ACETAMINOPHEN 5; 325 MG/1; MG/1
1 TABLET ORAL EVERY 6 HOURS PRN
Qty: 10 TABLET | Refills: 0 | Status: SHIPPED | OUTPATIENT
Start: 2018-06-30 | End: 2018-07-10

## 2018-06-30 RX ADMIN — HYDROCODONE BITARTRATE AND ACETAMINOPHEN 2 TABLET: 5; 325 TABLET ORAL at 12:06

## 2018-06-30 ASSESSMENT — ENCOUNTER SYMPTOMS
ABDOMINAL PAIN: 0
BLOOD IN STOOL: 1
FEVER: 0
BACK PAIN: 1
HEADACHES: 1
WEAKNESS: 1
ANAL BLEEDING: 1

## 2018-06-30 NOTE — ED AVS SNAPSHOT
Emergency Department    [address] [place] [address]    Phone:  [phone]    Fax:  [phone]                                       Graciela Castorena   MRN: [id number]    Department:   Emergency Department   Date of Visit:  6/30/2018           After Visit Summary Signature Page     I have received my discharge instructions, and my questions have been answered. I have discussed any challenges I see with this plan with the nurse or doctor.    ..........................................................................................................................................  Patient/Patient Representative Signature      ..........................................................................................................................................  Patient Representative Print Name and Relationship to Patient    ..................................................               ................................................  Date                                            Time    ..........................................................................................................................................  Reviewed by Signature/Title    ...................................................              ..............................................  Date                                                            Time

## 2018-06-30 NOTE — ED PROVIDER NOTES
History     Chief Complaint:  Back Pain    HPI   Graciela Castorena is a 53 year old female with a history of breast cancer currently undergoing radiation and hemorrhoids who presents to the emergency department today for evaluation of back pain. The patient reports an onset of lower midline back pain with a headache and generalized weakness for the past two days. She notes she cannot move due to the pain especially when lifting objects. She has been taking tylenol without relief. Additionally, she has noticed yvette red blood in her stool since yesterday. This has happened in the past and was told this was hemorrhoids in the past and feels this is similar. She was concerned about the blood stools and back pain prompting her visit to the emergency department. She denies recent fall or trauma, fever, and abdominal pain.     Allergies:  Ciprofloxacin    Medications:    ondansetron (ZOFRAN) 4 MG tablet  tamoxifen (NOLVADEX) 20 MG tablet    Past Medical History:    Ductal carcinoma in situ of left breast    Past Surgical History:    Colonoscopy  Lumpectomy breast with seed localization    Family History:    Diabetes    Social History:  The patient was alone.  Smoking Status: Never  Smokeless Tobacco: Never  Alcohol Use: No  Marital Status:       Review of Systems   Constitutional: Negative for fever.   Gastrointestinal: Positive for anal bleeding and blood in stool. Negative for abdominal pain.   Musculoskeletal: Positive for back pain.   Neurological: Positive for weakness and headaches.   All other systems reviewed and are negative.    Physical Exam     Patient Vitals for the past 24 hrs:   BP Temp Heart Rate Resp SpO2 Weight   06/30/18 1158 109/77 - - - 99 % -   06/30/18 1114 - - - - 100 % -   06/30/18 1019 - - - - - 75.8 kg (167 lb)   06/30/18 1018 124/80 97.8  F (36.6  C) 82 17 97 % -         Physical Exam  General: Alert and Interactive.   Head: No signs of trauma.   Mouth/Throat: Oropharynx is clear and  moist.   Eyes: Conjunctivae are normal. Pupils are equal, round, and reactive to light.   Neck: Normal range of motion. No nuchal rigidity.   CV: Normal rate and regular rhythm.    Resp: Effort normal and breath sounds normal. No respiratory distress.   GI: Soft. There is no tenderness or guarding.   MSK: Normal range of motion. no edema. Low lumbar pain, no deformity.  Neuro: The patient is alert and oriented to person, place, and time.  PERRLA, EOMI, strength in upper/lower extremities normal and symmetrical.   Sensation normal. Speech normal.  GCS eye subscore is 4. GCS verbal subscore is 5. GCS motor subscore is 6.   Skin: Skin is warm and dry. No rash noted.   Psych: normal mood and affect. behavior is normal.     Emergency Department Course   Imaging:  Radiology findings were communicated with the patient who voiced understanding of the findings.  Lumbar spine XR, 2-3 views  IMPRESSION: There is some mild leftward lateral curvature the lumbar  spine which could just be positional. The exam is otherwise negative.  Report per radiology     Laboratory:  Laboratory findings were communicated with the patient who voiced understanding of the findings.  CBC: WNL. (WBC 4.0, HGB 13.2, )   CMP: glucose 146 (H) o/w WNL (Creatinine 0.64)  INR: 1.01  HCG Qualitative Urine: Negative  UA: clear, straw urine, blood small, bacteria few o/w WNL    Interventions:  1206 Norco 5-325mg 2 Tablets PO    Emergency Department Course:  Nursing notes and vitals reviewed.  IV was inserted and blood was drawn for laboratory testing, results above.  The patient provided a urine sample here in the emergency department. This was sent for laboratory testing, findings above.  The patient was sent for a Lumbar spine XR, 2-3 views while in the emergency department, results above.   1055: I performed an exam of the patient as documented above.   1246 Patient rechecked and updated.   Findings and plan explained to the Patient. Patient  discharged home with instructions regarding supportive care, medications, and reasons to return. The importance of close follow-up was reviewed. The patient was prescribed Norco.   I personally reviewed the laboratory and imaging results with the Patient and answered all related questions prior to discharge.    Impression & Plan    Medical Decision Making:  Graciela Castorena presented with back pain.  The pain has improved with interventions in the emergency department. She did not sustain any focal trauma. X-rays were obtained that were unremarkable for any acute findings. She has not had a fever, saddle/perineal anesthesia, bilateral foot numbness, or bowel or bladder dysfunction.  There is no clinical evidence of cauda equina syndrome, discitis, spinal/epidural space hematoma or abscess. The neurological exam is normal and her symptoms are consistent with a musculoskeletal strain. There is no current evidence of radiculopathy or myelopathy. The patient will be discharged with pain medications to use as directed.  Graciela was directed to avoid heavy lifting, bending or twisting. She was told to return for:  increasing pain, numbness, weakness, or bowel or bladder dysfunction.  She  was advised to schedule follow-up with his/her primary doctor within 3 days to re-assess symptoms. She was given a referral with a back specialist as well.     Diagnosis:    ICD-10-CM    1. Strain of lumbar region, initial encounter S39.012A        Disposition:  discharged to home    Discharge Medications:  New Prescriptions    HYDROCODONE-ACETAMINOPHEN (NORCO) 5-325 MG PER TABLET    Take 1 tablet by mouth every 6 hours as needed for severe pain       Scribe Disclosure:  Oskar SIERRA, am serving as a scribe at 10:16 AM on 6/30/2018 to document services personally performed by Abdiel Mondragon MD based on my observations and the provider's statements to me.     6/30/2018    EMERGENCY DEPARTMENT       Abdiel Mondragon,  MD  06/30/18 2197

## 2018-06-30 NOTE — ED AVS SNAPSHOT
Emergency Department    [address] [facility] [address]    Phone:  [phone]    Fax:  [phone]                                       Graciela Castorena   MRN: [id number]    Department:   Emergency Department   Date of Visit:  6/30/2018           Patient Information     Date Of Birth          1965        Your diagnoses for this visit were:     Strain of lumbar region, initial encounter        You were seen by Abdiel Mondragon MD.      Follow-up Information     Follow up with Silvano Hernández MD. Call in 2 days.    Specialty:  Physical Medicine and Rehab    Contact information:    SILVANO HERNÁNDEZ  [address] [facility] [address]  [facility] [address]  [phone]          Discharge Instructions         Understanding Lumbosacral Strain    Lumbosacral strain is a medical term for an injury that causes low back pain. The lumbosacral area (low back) is between the bottom of the ribcage and the top of the buttocks. A strain is tearing of muscles and tendons. These tears can be very small but still cause pain.  How a lumbosacral strain happens  Muscles and tendons connected to the spine can be strained in a number of ways:    Sitting or standing in the same position for long periods of time. This can harm the low back over time. Poor posture can make low back pain more likely.    Moving the muscles and tendons past their usual range of motion. This can cause a sudden injury. This can happen when you twist, bend over, or lift something heavy. Not using correct technique for sports or tasks like lifting can make back injury more likely.    Accidents or falls  Lumbosacral strain can be caused by other problems, but these are less common.  Symptoms of lumbosacral strain  Symptoms may include:    Pain in the back, often on one side    Pain that gets worse with movement and gets better with rest    Inability to move as freely as usual    Swelling, slight redness, and skin warmth in the painful area  Treatment for  lumbosacral strain  Low back pain often goes away by itself within several weeks. But it often comes back. Treatment focuses on reducing pain and avoiding further injury. Bed rest is usually not recommended for low back pain. Treatments may include:    Avoiding or changing the action that caused the problem. This helps prevent injuring the tissues again.    Prescription or over-the-counter pain medicines. These help reduce inflammation, swelling, and pain.    Cold or heat packs. These help reduce pain and swelling.    Stretching and other exercises. These improve flexibility and strength.    Physical therapy. This usually includes exercises and other treatments.    Injections of medicine. This may relieve symptoms.  If these treatments do not relieve symptoms, your healthcare provider may order imaging tests to learn more about the problem. Sometimes you may need surgery.  Possible complications of lumbosacral strain  If the cause of the pain is not addressed, symptoms may return or get worse. Follow your healthcare provider s instructions on lifestyle changes and treating your back.     When to call your healthcare provider  Call your healthcare provider right away if you have any of these:    Fever of 100.4 F (38 C) or higher, or as directed    Numbness, tingling, or weakness    Problems with bowel or bladder control, or problems having sex    Pain that does not go away, or gets worse    New symptoms   Date Last Reviewed: 3/10/2016    [id number] [employer]. [address] [place] [address], [place] [address]. All rights reserved. This information is not intended as a substitute for professional medical care. Always follow your healthcare professional's instructions.          Back Sprain or Strain    Injury to the muscles (strain) or ligaments (sprain) around the spine can be troubling. Injury may occur after a sudden forceful twisting or bending force such as in a car accident, after a simple awkward movement,  or after lifting something heavy with poor body positioning. In any case, muscle spasm is often present and adds to the pain.  Thankfully, most people feel better in 1 to 2 weeks, and most of the rest in 1 to 2 months. Most people can remain active. Unless you had a forceful or traumatic physical injury such as a car accident or fall, X-rays may not be ordered for the first evaluation of a back sprain or strain. If pain continues and does not respond to medical treatment, your healthcare provider may then order X-rays and other tests.  Home care  The following guidelines will help you care for your injury at home:    When in bed, try to find a comfortable position. A firm mattress is best. Try lying flat on your back with pillows under your knees. You can also try lying on your side with your knees bent up toward your chest and a pillow between your knees.    Don't sit for long periods. Try not to take long car rides or take other trips that have you sitting for a long time. This puts more stress on the lower back than standing or walking.    During the first 24 to 72 hours after an injury or flare-up, apply an ice pack to the painful area for 20 minutes. Then remove it for 20 minutes. Do this for 60 to 90 minutes, or several times a day. This will reduce swelling and pain. Be sure to wrap the ice pack in a thin towel or plastic to protect your skin.    You can start with ice, then switch to heat. Heat from a hot shower, hot bath, or heating pad reduces pain and works well for muscle spasms. Put heat on the painful area for 20 minutes, then remove for 20 minutes. Do this for 60 to 90 minutes, or several times a day. Do not use a heating pad while sleeping. It can burn the skin.    You can alternate the ice and heat. Talk with your healthcare provider to find out the best treatment or therapy for your back pain.    Therapeutic massage will help relax the back muscles without stretching them.    Be aware of safe  lifting methods. Do not lift anything over 15 pounds until all of the pain is gone.  Medicines  Talk to your healthcare provider before using medicines, especially if you have other health problems or are taking other medicines.    You may use acetaminophen or ibuprofen to control pain, unless another pain medicine was prescribed. If you have chronic conditions like diabetes, liver or kidney disease, stomach ulcers, or gastrointestinal bleeding, or are taking blood-thinner medicines, talk with your doctor before taking any medicines.    Be careful if you are given prescription medicines, narcotics, or medicine for muscle spasm. They can cause drowsiness, and affect your coordination, reflexes, and judgment. Do not drive or operate heavy machinery when taking these types of medicines. Only take pain medicine as prescribed by your healthcare provider.  Follow-up care  Follow up with your healthcare provider, or as advised. You may need physical therapy or more tests if your symptoms get worse.  If you had X-rays your healthcare provider may be checking for any broken bones, breaks, or fractures. Bruises and sprains can sometimes hurt as much as a fracture. These injuries can take time to heal completely. If your symptoms don t improve or they get worse, talk with your healthcare provider. You may need a repeat X-ray or other tests.  Call 911  Call 911 if any of the following occur:    Trouble breathing    Confused    Very drowsy or trouble awakening    Fainting or loss of consciousness    Rapid or very slow heart rate    Loss of bowel or bladder control  When to seek medical advice  Call your healthcare provider right away if any of the following occur:    Pain gets worse or spreads to your arms or legs    Weakness or numbness in one or both arms or legs    Numbness in the groin or genital area  Date Last Reviewed: 6/1/2016 2000-2017 [employer]. [address] [place] [address]. All rights  reserved. This information is not intended as a substitute for professional medical care. Always follow your healthcare professional's instructions.          Your next 10 appointments already scheduled     Aug 13, 2018 10:40 AM CDT   Return Visit with Samreen Barahona MD   [facility] ([facility])    [facility] [place]  [address]  [place] [address] [phone]              24 Hour Appointment Hotline       To make an appointment at any [facility], call [phone] ([phone]). If you don't have a family doctor or clinic, we will help you find one. [facility] clinics are conveniently located to serve the needs of you and your family.             Review of your medicines      START taking        Dose / Directions Last dose taken    HYDROcodone-acetaminophen 5-325 MG per tablet   Commonly known as:  NORCO   Dose:  1 tablet   Quantity:  10 tablet        Take 1 tablet by mouth every 6 hours as needed for severe pain   Refills:  0          Our records show that you are taking the medicines listed below. If these are incorrect, please call your family doctor or clinic.        Dose / Directions Last dose taken    methylPREDNISolone 4 MG tablet   Commonly known as:  MEDROL DOSEPAK        Refills:  0        ondansetron 4 MG tablet   Commonly known as:  ZOFRAN        Refills:  0        tamoxifen 20 MG tablet   Commonly known as:  NOLVADEX   Dose:  20 mg   Quantity:  30 tablet        Take 1 tablet (20 mg) by mouth daily   Refills:  11                Information about OPIOIDS     PRESCRIPTION OPIOIDS: WHAT YOU NEED TO KNOW   We gave you an opioid (narcotic) pain medicine. It is important to manage your pain, but opioids are not always the best choice. You should first try all the other options your care team gave you. Take this medicine for as short a time (and as few doses) as possible.     These medicines have risks:    DO NOT drive when on new or higher  doses of pain medicine. These medicines can affect your alertness and reaction times, and you could be arrested for driving under the influence (DUI). If you need to use opioids long-term, talk to your care team about driving.    DO NOT operate heave machinery    DO NOT do any other dangerous activities while taking these medicines.     DO NOT drink any alcohol while taking these medicines.      If the opioid prescribed includes acetaminophen, DO NOT take with any other medicines that contain acetaminophen. Read all labels carefully. Look for the word  acetaminophen  or  Tylenol.  Ask your pharmacist if you have questions or are unsure.    You can get addicted to pain medicines, especially if you have a history of addiction (chemical, alcohol or substance dependence). Talk to your care team about ways to reduce this risk.    Store your pills in a secure place, locked if possible. We will not replace any lost or stolen medicine. If you don t finish your medicine, please throw away (dispose) as directed by your pharmacist. The [place] [employer] has more information about safe disposal: https://www.pca.[facility].[place].us/living-green/managing-unwanted-medications.     All opioids tend to cause constipation. Drink plenty of water and eat foods that have a lot of fiber, such as fruits, vegetables, prune juice, apple juice and high-fiber cereal. Take a laxative (Miralax, milk of magnesia, Colace, Senna) if you don t move your bowels at least every other day.         Prescriptions were sent or printed at these locations (1 Prescription)                   Other Prescriptions                Printed at Department/Unit printer (1 of 1)         HYDROcodone-acetaminophen (NORCO) 5-325 MG per tablet                Procedures and tests performed during your visit     CBC with platelets + differential    Comprehensive metabolic panel    HCG qualitative urine    INR    Lumbar spine XR, 2-3 views    UA with Microscopic  reflex to Culture      Orders Needing Specimen Collection     None      Pending Results     No orders found from 6/28/2018 to 7/1/2018.            Pending Culture Results     No orders found from 6/28/2018 to 7/1/2018.            Pending Results Instructions     If you had any lab results that were not finalized at the time of your Discharge, you can call the ED Lab Result RN at [phone]. You will be contacted by this team for any positive Lab results or changes in treatment. The nurses are available 7 days a week from 10A to 6:30P.  You can leave a message 24 hours per day and they will return your call.        Test Results From Your Hospital Stay        6/30/2018 10:52 AM      Component Results     Component Value Ref Range & Units Status    WBC 4.0 4.0 - 11.0 10e9/L Final    RBC Count 4.39 3.8 - 5.2 10e12/L Final    Hemoglobin 13.2 11.7 - 15.7 g/dL Final    Hematocrit 39.2 35.0 - 47.0 % Final    MCV 89 78 - 100 fl Final    MCH 30.1 26.5 - 33.0 pg Final    MCHC 33.7 31.5 - 36.5 g/dL Final    RDW 12.9 10.0 - 15.0 % Final    Platelet Count 185 150 - 450 10e9/L Final    Diff Method Automated Method  Final    % Neutrophils 63.9 % Final    % Lymphocytes 23.1 % Final    % Monocytes 7.0 % Final    % Eosinophils 5.5 % Final    % Basophils 0.5 % Final    % Immature Granulocytes 0.0 % Final    Nucleated RBCs 0 0 /100 Final    Absolute Neutrophil 2.6 1.6 - 8.3 10e9/L Final    Absolute Lymphocytes 0.9 0.8 - 5.3 10e9/L Final    Absolute Monocytes 0.3 0.0 - 1.3 10e9/L Final    Absolute Eosinophils 0.2 0.0 - 0.7 10e9/L Final    Absolute Basophils 0.0 0.0 - 0.2 10e9/L Final    Abs Immature Granulocytes 0.0 0 - 0.4 10e9/L Final    Absolute Nucleated RBC 0.0  Final         6/30/2018 11:13 AM      Component Results     Component Value Ref Range & Units Status    Sodium 143 133 - 144 mmol/L Final    Potassium 3.5 3.4 - 5.3 mmol/L Final    Chloride 107 94 - 109 mmol/L Final    Carbon Dioxide 28 20 - 32 mmol/L Final    Anion Gap 8  3 - 14 mmol/L Final    Glucose 146 (H) 70 - 99 mg/dL Final    Urea Nitrogen 13 7 - 30 mg/dL Final    Creatinine 0.64 0.52 - 1.04 mg/dL Final    GFR Estimate >90 >60 mL/min/1.7m2 Final    Non  GFR Calc    GFR Estimate If Black >90 >60 mL/min/1.7m2 Final    African American GFR Calc    Calcium 8.9 8.5 - 10.1 mg/dL Final    Bilirubin Total 0.4 0.2 - 1.3 mg/dL Final    Albumin 3.5 3.4 - 5.0 g/dL Final    Protein Total 6.8 6.8 - 8.8 g/dL Final    Alkaline Phosphatase 60 40 - 150 U/L Final    ALT 22 0 - 50 U/L Final    AST 14 0 - 45 U/L Final         6/30/2018 11:08 AM      Component Results     Component Value Ref Range & Units Status    INR 1.01 0.86 - 1.14 Final         6/30/2018 10:55 AM      Component Results     Component Value Ref Range & Units Status    Color Urine Straw  Final    Appearance Urine Clear  Final    Glucose Urine Negative NEG^Negative mg/dL Final    Bilirubin Urine Negative NEG^Negative Final    Ketones Urine Negative NEG^Negative mg/dL Final    Specific Gravity Urine 1.003 1.003 - 1.035 Final    Blood Urine Small (A) NEG^Negative Final    pH Urine 5.0 5.0 - 7.0 pH Final    Protein Albumin Urine Negative NEG^Negative mg/dL Final    Urobilinogen mg/dL Normal 0.0 - 2.0 mg/dL Final    Nitrite Urine Negative NEG^Negative Final    Leukocyte Esterase Urine Negative NEG^Negative Final    Source Midstream Urine  Final    WBC Urine 0 0 - 5 /HPF Final    RBC Urine 2 0 - 2 /HPF Final    Bacteria Urine Few (A) NEG^Negative /HPF Final    Squamous Epithelial /HPF Urine <1 0 - 1 /HPF Final         6/30/2018 10:58 AM      Component Results     Component Value Ref Range & Units Status    HCG Qual Urine Negative NEG^Negative Final    This test is for screening purposes.  Results should be interpreted along with   the clinical picture.  Confirmation testing is available if warranted by   ordering [id number], HCG Quantitative Pregnancy.           6/30/2018 11:26 AM      Narrative     LUMBAR SPINE TWO OR  THREE VIEWS   6/30/2018 11:15 AM     HISTORY: Pain.     COMPARISON: None.        Impression     IMPRESSION: There is some mild leftward lateral curvature the lumbar  spine which could just be positional. The exam is otherwise negative.    CUONG BALTAZAR MD                Clinical Quality Measure: Blood Pressure Screening     Your blood pressure was checked while you were in the emergency department today. The last reading we obtained was  BP: 109/77 . Please read the guidelines below about what these numbers mean and what you should do about them.  If your systolic blood pressure (the top number) is less than 120 and your diastolic blood pressure (the bottom number) is less than 80, then your blood pressure is normal. There is nothing more that you need to do about it.  If your systolic blood pressure (the top number) is 120-139 or your diastolic blood pressure (the bottom number) is 80-89, your blood pressure may be higher than it should be. You should have your blood pressure rechecked within a year by a primary care provider.  If your systolic blood pressure (the top number) is 140 or greater or your diastolic blood pressure (the bottom number) is 90 or greater, you may have high blood pressure. High blood pressure is treatable, but if left untreated over time it can put you at risk for heart attack, stroke, or kidney failure. You should have your blood pressure rechecked by a primary care provider within the next 4 weeks.  If your provider in the emergency department today gave you specific instructions to follow-up with your doctor or provider even sooner than that, you should follow that instruction and not wait for up to 4 weeks for your follow-up visit.        Thank you for choosing [facility]       Thank you for choosing [facility] for your care. Our goal is always to provide you with excellent care. Hearing back from our patients is one way we can continue to improve our services. Please take a few minutes to  "complete the written survey that you may receive in the mail after you visit with us. Thank you!        [facility] [employer] Information     [employer] lets you send messages to your doctor, view your test results, renew your prescriptions, schedule appointments and more. To sign up, go to [email or web address] . Click on \"Log in\" on the left side of the screen, which will take you to the Welcome page. Then click on \"Sign up Now\" on the right side of the page.     You will be asked to enter the access code listed below, as well as some personal information. Please follow the directions to create your username and password.     Your access code is: [email or web address]  Expires: 2018  4:29 PM     Your access code will  in 90 days. If you need help or a new code, please call your [facility] clinic or [phone].        Care EveryWhere ID     This is your Care EveryWhere ID. This could be used by other organizations to access your [facility] medical records  [id number]        Equal Access to Services     OMARI DUFFY : Hadii kaley reyeso Socindy, waaxda luqadaha, qaybta kaalmalisseth ziegler, becky multani . So [facility] [phone].    ATENCIÓN: Si habla español, tiene a banuelos disposición servicios gratuitos de asistencia lingüística. Llame al [phone].    We comply with applicable federal civil rights laws and [place] laws. We do not discriminate on the basis of race, color, national origin, age, disability, sex, sexual orientation, or gender identity.            After Visit Summary       This is your record. Keep this with you and show to your community pharmacist(s) and doctor(s) at your next visit.                  "

## 2018-06-30 NOTE — DISCHARGE INSTRUCTIONS
Understanding Lumbosacral Strain    Lumbosacral strain is a medical term for an injury that causes low back pain. The lumbosacral area (low back) is between the bottom of the ribcage and the top of the buttocks. A strain is tearing of muscles and tendons. These tears can be very small but still cause pain.  How a lumbosacral strain happens  Muscles and tendons connected to the spine can be strained in a number of ways:    Sitting or standing in the same position for long periods of time. This can harm the low back over time. Poor posture can make low back pain more likely.    Moving the muscles and tendons past their usual range of motion. This can cause a sudden injury. This can happen when you twist, bend over, or lift something heavy. Not using correct technique for sports or tasks like lifting can make back injury more likely.    Accidents or falls  Lumbosacral strain can be caused by other problems, but these are less common.  Symptoms of lumbosacral strain  Symptoms may include:    Pain in the back, often on one side    Pain that gets worse with movement and gets better with rest    Inability to move as freely as usual    Swelling, slight redness, and skin warmth in the painful area  Treatment for lumbosacral strain  Low back pain often goes away by itself within several weeks. But it often comes back. Treatment focuses on reducing pain and avoiding further injury. Bed rest is usually not recommended for low back pain. Treatments may include:    Avoiding or changing the action that caused the problem. This helps prevent injuring the tissues again.    Prescription or over-the-counter pain medicines. These help reduce inflammation, swelling, and pain.    Cold or heat packs. These help reduce pain and swelling.    Stretching and other exercises. These improve flexibility and strength.    Physical therapy. This usually includes exercises and other treatments.    Injections of medicine. This may relieve  symptoms.  If these treatments do not relieve symptoms, your healthcare provider may order imaging tests to learn more about the problem. Sometimes you may need surgery.  Possible complications of lumbosacral strain  If the cause of the pain is not addressed, symptoms may return or get worse. Follow your healthcare provider s instructions on lifestyle changes and treating your back.     When to call your healthcare provider  Call your healthcare provider right away if you have any of these:    Fever of 100.4 F (38 C) or higher, or as directed    Numbness, tingling, or weakness    Problems with bowel or bladder control, or problems having sex    Pain that does not go away, or gets worse    New symptoms   Date Last Reviewed: 3/10/2016    4983-4884 [employer]. [address] [place] [address], [place] [address]. All rights reserved. This information is not intended as a substitute for professional medical care. Always follow your healthcare professional's instructions.          Back Sprain or Strain    Injury to the muscles (strain) or ligaments (sprain) around the spine can be troubling. Injury may occur after a sudden forceful twisting or bending force such as in a car accident, after a simple awkward movement, or after lifting something heavy with poor body positioning. In any case, muscle spasm is often present and adds to the pain.  Thankfully, most people feel better in 1 to 2 weeks, and most of the rest in 1 to 2 months. Most people can remain active. Unless you had a forceful or traumatic physical injury such as a car accident or fall, X-rays may not be ordered for the first evaluation of a back sprain or strain. If pain continues and does not respond to medical treatment, your healthcare provider may then order X-rays and other tests.  Home care  The following guidelines will help you care for your injury at home:    When in bed, try to find a comfortable position. A firm mattress is best. Try lying flat  on your back with pillows under your knees. You can also try lying on your side with your knees bent up toward your chest and a pillow between your knees.    Don't sit for long periods. Try not to take long car rides or take other trips that have you sitting for a long time. This puts more stress on the lower back than standing or walking.    During the first 24 to 72 hours after an injury or flare-up, apply an ice pack to the painful area for 20 minutes. Then remove it for 20 minutes. Do this for 60 to 90 minutes, or several times a day. This will reduce swelling and pain. Be sure to wrap the ice pack in a thin towel or plastic to protect your skin.    You can start with ice, then switch to heat. Heat from a hot shower, hot bath, or heating pad reduces pain and works well for muscle spasms. Put heat on the painful area for 20 minutes, then remove for 20 minutes. Do this for 60 to 90 minutes, or several times a day. Do not use a heating pad while sleeping. It can burn the skin.    You can alternate the ice and heat. Talk with your healthcare provider to find out the best treatment or therapy for your back pain.    Therapeutic massage will help relax the back muscles without stretching them.    Be aware of safe lifting methods. Do not lift anything over 15 pounds until all of the pain is gone.  Medicines  Talk to your healthcare provider before using medicines, especially if you have other health problems or are taking other medicines.    You may use acetaminophen or ibuprofen to control pain, unless another pain medicine was prescribed. If you have chronic conditions like diabetes, liver or kidney disease, stomach ulcers, or gastrointestinal bleeding, or are taking blood-thinner medicines, talk with your doctor before taking any medicines.    Be careful if you are given prescription medicines, narcotics, or medicine for muscle spasm. They can cause drowsiness, and affect your coordination, reflexes, and judgment. Do  not drive or operate heavy machinery when taking these types of medicines. Only take pain medicine as prescribed by your healthcare provider.  Follow-up care  Follow up with your healthcare provider, or as advised. You may need physical therapy or more tests if your symptoms get worse.  If you had X-rays your healthcare provider may be checking for any broken bones, breaks, or fractures. Bruises and sprains can sometimes hurt as much as a fracture. These injuries can take time to heal completely. If your symptoms don t improve or they get worse, talk with your healthcare provider. You may need a repeat X-ray or other tests.  Call 911  Call 911 if any of the following occur:    Trouble breathing    Confused    Very drowsy or trouble awakening    Fainting or loss of consciousness    Rapid or very slow heart rate    Loss of bowel or bladder control  When to seek medical advice  Call your healthcare provider right away if any of the following occur:    Pain gets worse or spreads to your arms or legs    Weakness or numbness in one or both arms or legs    Numbness in the groin or genital area  Date Last Reviewed: 6/1/2016 2000-2017 [employer]. [address] [place] [address]. All rights reserved. This information is not intended as a substitute for professional medical care. Always follow your healthcare professional's instructions.

## 2018-07-10 ENCOUNTER — OFFICE VISIT (OUTPATIENT)
Dept: INTERNAL MEDICINE | Facility: CLINIC | Age: 53
End: 2018-07-10
Payer: COMMERCIAL

## 2018-07-10 VITALS
WEIGHT: 164.2 LBS | HEART RATE: 82 BPM | TEMPERATURE: 98.1 F | RESPIRATION RATE: 18 BRPM | SYSTOLIC BLOOD PRESSURE: 100 MMHG | OXYGEN SATURATION: 98 % | DIASTOLIC BLOOD PRESSURE: 70 MMHG | BODY MASS INDEX: 29.09 KG/M2

## 2018-07-10 DIAGNOSIS — M54.50 ACUTE BILATERAL LOW BACK PAIN WITHOUT SCIATICA: ICD-10-CM

## 2018-07-10 DIAGNOSIS — N89.8 VAGINAL DISCHARGE: ICD-10-CM

## 2018-07-10 DIAGNOSIS — R41.3 MEMORY CHANGES: Primary | ICD-10-CM

## 2018-07-10 DIAGNOSIS — Z11.4 SCREENING FOR HUMAN IMMUNODEFICIENCY VIRUS WITHOUT PRESENCE OF RISK FACTORS: ICD-10-CM

## 2018-07-10 DIAGNOSIS — L58.9 RADIATION DERMATITIS: ICD-10-CM

## 2018-07-10 LAB
ALBUMIN SERPL-MCNC: 4 G/DL (ref 3.4–5)
ALP SERPL-CCNC: 63 U/L (ref 40–150)
ALT SERPL W P-5'-P-CCNC: 24 U/L (ref 0–50)
ANION GAP SERPL CALCULATED.3IONS-SCNC: 6 MMOL/L (ref 3–14)
AST SERPL W P-5'-P-CCNC: 17 U/L (ref 0–45)
BILIRUB SERPL-MCNC: 0.2 MG/DL (ref 0.2–1.3)
BUN SERPL-MCNC: 17 MG/DL (ref 7–30)
CALCIUM SERPL-MCNC: 9.3 MG/DL (ref 8.5–10.1)
CHLORIDE SERPL-SCNC: 109 MMOL/L (ref 94–109)
CO2 SERPL-SCNC: 27 MMOL/L (ref 20–32)
CREAT SERPL-MCNC: 0.64 MG/DL (ref 0.52–1.04)
ERYTHROCYTE [DISTWIDTH] IN BLOOD BY AUTOMATED COUNT: 13.2 % (ref 10–15)
GFR SERPL CREATININE-BSD FRML MDRD: >90 ML/MIN/1.7M2
GLUCOSE SERPL-MCNC: 86 MG/DL (ref 70–99)
HCT VFR BLD AUTO: 42.4 % (ref 35–47)
HGB BLD-MCNC: 14.1 G/DL (ref 11.7–15.7)
MCH RBC QN AUTO: 30.6 PG (ref 26.5–33)
MCHC RBC AUTO-ENTMCNC: 33.3 G/DL (ref 31.5–36.5)
MCV RBC AUTO: 92 FL (ref 78–100)
PLATELET # BLD AUTO: 206 10E9/L (ref 150–450)
POTASSIUM SERPL-SCNC: 4.1 MMOL/L (ref 3.4–5.3)
PROT SERPL-MCNC: 7.8 G/DL (ref 6.8–8.8)
RBC # BLD AUTO: 4.61 10E12/L (ref 3.8–5.2)
SODIUM SERPL-SCNC: 142 MMOL/L (ref 133–144)
SPECIMEN SOURCE: NORMAL
T4 FREE SERPL-MCNC: 1.19 NG/DL (ref 0.76–1.46)
TSH SERPL DL<=0.005 MIU/L-ACNC: 0.34 MU/L (ref 0.4–4)
WBC # BLD AUTO: 7.3 10E9/L (ref 4–11)
WET PREP SPEC: NORMAL

## 2018-07-10 PROCEDURE — 82306 VITAMIN D 25 HYDROXY: CPT | Performed by: INTERNAL MEDICINE

## 2018-07-10 PROCEDURE — 86780 TREPONEMA PALLIDUM: CPT | Performed by: INTERNAL MEDICINE

## 2018-07-10 PROCEDURE — 84443 ASSAY THYROID STIM HORMONE: CPT | Performed by: INTERNAL MEDICINE

## 2018-07-10 PROCEDURE — 82607 VITAMIN B-12: CPT | Performed by: INTERNAL MEDICINE

## 2018-07-10 PROCEDURE — 80053 COMPREHEN METABOLIC PANEL: CPT | Performed by: INTERNAL MEDICINE

## 2018-07-10 PROCEDURE — 87210 SMEAR WET MOUNT SALINE/INK: CPT | Performed by: INTERNAL MEDICINE

## 2018-07-10 PROCEDURE — 99214 OFFICE O/P EST MOD 30 MIN: CPT | Performed by: INTERNAL MEDICINE

## 2018-07-10 PROCEDURE — 87389 HIV-1 AG W/HIV-1&-2 AB AG IA: CPT | Performed by: INTERNAL MEDICINE

## 2018-07-10 PROCEDURE — 84439 ASSAY OF FREE THYROXINE: CPT | Performed by: INTERNAL MEDICINE

## 2018-07-10 PROCEDURE — 36415 COLL VENOUS BLD VENIPUNCTURE: CPT | Performed by: INTERNAL MEDICINE

## 2018-07-10 PROCEDURE — 85027 COMPLETE CBC AUTOMATED: CPT | Performed by: INTERNAL MEDICINE

## 2018-07-10 RX ORDER — TRIAMCINOLONE ACETONIDE 1 MG/G
CREAM TOPICAL
Qty: 80 G | Refills: 0 | Status: SHIPPED | OUTPATIENT
Start: 2018-07-10 | End: 2019-06-13

## 2018-07-10 NOTE — MR AVS SNAPSHOT
After Visit Summary   7/10/2018    Graciela Castorena    MRN: [id number]           Patient Information     Date Of Birth          1965        Visit Information        Provider Department      7/10/2018 3:00 PM Genna Magallanes MD; [employer] [facility]        Today's Diagnoses     Vaginal discharge    -  1    Radiation dermatitis        Acute bilateral low back pain without sciatica        Memory loss          Care Instructions    Wet prep takes ~1 hour to come back.    ---    Triamcinolone cream to left breast area daily after radiation therapy.     ---    Naproxen twice daily with meals as needed for back pain.    Heat will help as well.    Physical therapy is also an option if no improvement with above.    ---    Labs today to evaluate memory changes.           Follow-ups after your visit        Your next 10 appointments already scheduled     Jul 11, 2018  2:40 PM CDT   Return Visit with Samreen Barahona MD   [facility] ([facility])    [facility]  [address] [facility] [address]  [facility] [id number] [phone]              Who to contact     If you have questions or need follow up information about today's clinic visit or your schedule please contact [facility] directly at [phone].  Normal or non-critical lab and imaging results will be communicated to you by MyChart, letter or phone within 4 business days after the clinic has received the results. If you do not hear from us within 7 days, please contact the clinic through MyChart or phone. If you have a critical or abnormal lab result, we will notify you by phone as soon as possible.  Submit refill requests through [employer] or call your pharmacy and they will forward the refill request to us. Please allow 3 business days for your refill to be completed.          Additional Information About Your Visit        Care EveryWhere ID      This is your Care EveryWhere ID. This could be used by other organizations to access your [place] medical records  [id number]        Your Vitals Were     Pulse Temperature Respirations Pulse Oximetry BMI (Body Mass Index)       82 98.1  F (36.7  C) (Oral) 18 98% 29.09 kg/m2        Blood Pressure from Last 3 Encounters:   07/10/18 100/70   06/30/18 109/77   05/25/18 122/88    Weight from Last 3 Encounters:   07/10/18 164 lb 3.2 oz (74.5 kg)   06/30/18 167 lb (75.8 kg)   05/25/18 166 lb (75.3 kg)              We Performed the Following     CBC with platelets     Comprehensive metabolic panel     Treponema Abs w Reflex to RPR and Titer     TSH with free T4 reflex     Vitamin B12     Vitamin D Deficiency     Wet prep          Today's Medication Changes          These changes are accurate as of 7/10/18  3:44 PM.  If you have any questions, ask your nurse or doctor.               Start taking these medicines.        Dose/Directions    naproxen 375 MG tablet   Commonly known as:  NAPROSYN   Used for:  Acute bilateral low back pain without sciatica   Started by:  Genna Magallanes MD        Dose:  375 mg   Take 1 tablet (375 mg) by mouth 2 times daily (with meals)   Quantity:  30 tablet   Refills:  0       triamcinolone 0.1 % cream   Commonly known as:  KENALOG   Used for:  Radiation dermatitis   Started by:  Genna Magallanes MD        Apply sparingly to affected area three times daily for 14 days.   Quantity:  80 g   Refills:  0            Where to get your medicines      These medications were sent to [facility]/pharmacy #[id number] [place] [id number] [address]     Phone:  [phone]     naproxen 375 MG tablet    triamcinolone 0.1 % cream                Primary Care Provider Office Phone # Fax #    Genna Magallanes [phone]       [address] [facility] [address]        Equal Access to Services     JACOBO DUFFY AH: Ruperto Flores,  wadionda arnav, qaybta kachano ziegler, becky liuaaem ah. So [facility] [phone].    ATENCIÓN: Si lavelle white, tiene a banuelos disposición servicios gratuitos de asistencia lingüística. Pretty al [phone].    We comply with applicable federal civil rights laws and [place] laws. We do not discriminate on the basis of race, color, national origin, age, disability, sex, sexual orientation, or gender identity.            Thank you!     Thank you for choosing [facility]  for your care. Our goal is always to provide you with excellent care. Hearing back from our patients is one way we can continue to improve our services. Please take a few minutes to complete the written survey that you may receive in the mail after your visit with us. Thank you!             Your Updated Medication List - Protect others around you: Learn how to safely use, store and throw away your medicines at www.disposemymeds.org.          This list is accurate as of 7/10/18  3:44 PM.  Always use your most recent med list.                   Brand Name Dispense Instructions for use Diagnosis    naproxen 375 MG tablet    NAPROSYN    30 tablet    Take 1 tablet (375 mg) by mouth 2 times daily (with meals)    Acute bilateral low back pain without sciatica       tamoxifen 20 MG tablet    NOLVADEX    30 tablet    Take 1 tablet (20 mg) by mouth daily    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 breast       triamcinolone 0.1 % cream    KENALOG    80 g    Apply sparingly to affected area three times daily for 14 days.    Radiation dermatitis

## 2018-07-10 NOTE — PROGRESS NOTES
"  SUBJECTIVE:                                                      HPI: Graciela Castorena is a pleasant 53 year old female who presents with multiple concerns.    [place] citizen  utilized, but patient does speak English reasonably well.    History is challenging, however - patient frequently jumps from one topic to another.    PMH significant for recently diagnosed breast CA s/p lumpectomy and radiation therapy. On Tamoxifen currently.     Reports memory changes since surgery for above several months ago. Has difficulty identifying specific examples, but says she is not as sharp or quick as she was before. She sometimes forgets what she was about to do. Feels \"cloudy\" at times. Not forgetting important people, places, or dates. Not getting lost or wandering.     Patient also complains of vaginal discharge. Clear-white. More than usual. Ongoing for last couple of months. No associated vaginal irritation or itching. No abdominal or pelvic pain. No fevers or chills. No soaps, lotions, or douches to vulvar area.     Patient also complains of rash along left lateral breast. Started during radiation therapy to that area. Has continued and worsened since completing radiation therapy. Rash is itchy and uncomfortable. Has not tried any products on area.     Patient also complains of bilateral lower back pain. Chronic in nature - ongoing months to years. No radiation down legs. Dull in quality. Mild to moderate in severity. Worse with position changes. Better with heat application. No significant change with Tylenol use. Patient is overweight and does not exercise.     The medication, allergy, and problem lists have been reviewed and updated as appropriate.       OBJECTIVE:                                                      /70  Pulse 82  Temp 98.1  F (36.7  C) (Oral)  Resp 18  Wt 164 lb 3.2 oz (74.5 kg)  SpO2 98%  BMI 29.09 kg/m2  Constitutional: well-appearing  Lumbar spine: no deformity, crepitus, or " step-off; no spinal tenderness to palpation; + bilateral perispinal tenderness to palpation; negative straight leg raise bilaterally  Integumentary: well-circumscribed patch, ~8x6cm, of hyperpigmented, thickened skin left lateral breast  Genitourinary: external genitalia, urethral meatus, and vagina normal; no abnormal discharge noted  Musculoskeletal: normal gait and station, but transfers with difficulty  Psych: normal judgment and insight; normal mood and affect; recent and remote memory intact      ASSESSMENT/PLAN:                                                      (R41.3) Memory changes  (primary encounter diagnosis)  Comment:    - memory generally within normal limits today.   - discussed with patient that patients often report memory changes...    - after general anesthesia    - during cancer treatment    - changes may improve over time, but not always  Plan:    - CBC, CMP, TSH reflex, vitamin D and B12 levels, and screen for syphilis.   - if labs unrevealing, would continue to monitor for now.    - if symptoms worsen or change, patient to contact MD for referral to neuropsychiatry.     (N89.8) Vaginal discharge  Comment: suspect side effect of Tamoxifen.   Plan:    - wet prep today.   - if negative, patient would still like to see ob/gyn to confirm above suspicions.    - referral placed in case of negative wet prep.     (L58.9) Radiation dermatitis  Comment: left lateral breast.   Plan:    - TRIAL triamcinolone 0.1 % cream to area twice daily.         - if symptoms worsen, change, or do not improve, patient to contact MD.    - contacted radiation oncology or dermatology would be next steps.     (M54.5) Acute bilateral low back pain without sciatica  Comment: suspect lumbar strain.  Plan:    - naproxen sparingly as needed.   - heat application as needed.   - patient encouraged to exercise regularly and to lose weight.   - discussed PT - patient will hold off for now, but will consider if pain is not  improving.     (Z11.4) Screening for human immunodeficiency virus without presence of risk factors  Plan: HIV screen today.     The instructions on the AVS were discussed and explained to the patient. Patient expressed understanding of instructions.    A total of 28 minutes were spent face-to-face with this patient during this encounter and over half of that time was spent on counseling and coordination of care re: above diagnoses and plans of care.     (Chart documentation was completed, in part, with [employer] voice-recognition software. Even though reviewed, some grammatical, spelling, and word errors may remain.)    Genna Magallanes MD   [address] [address]  T: [phone], F: [phone]

## 2018-07-10 NOTE — PATIENT INSTRUCTIONS
Wet prep takes ~1 hour to come back.    ---    Triamcinolone cream to left breast area daily after radiation therapy.     ---    Naproxen twice daily with meals as needed for back pain.    Heat will help as well.    Physical therapy is also an option if no improvement with above.    ---    Labs today to evaluate memory changes.

## 2018-07-11 LAB
DEPRECATED CALCIDIOL+CALCIFEROL SERPL-MC: 35 UG/L (ref 20–75)
HIV 1+2 AB+HIV1 P24 AG SERPL QL IA: NONREACTIVE
T PALLIDUM AB SER QL: NONREACTIVE
VIT B12 SERPL-MCNC: 452 PG/ML (ref 193–986)

## 2018-07-16 ENCOUNTER — ONCOLOGY VISIT (OUTPATIENT)
Dept: ONCOLOGY | Facility: CLINIC | Age: 53
End: 2018-07-16
Attending: INTERNAL MEDICINE
Payer: COMMERCIAL

## 2018-07-16 VITALS
TEMPERATURE: 98.2 F | OXYGEN SATURATION: 100 % | SYSTOLIC BLOOD PRESSURE: 112 MMHG | DIASTOLIC BLOOD PRESSURE: 74 MMHG | WEIGHT: 165.8 LBS | RESPIRATION RATE: 16 BRPM | HEART RATE: 94 BPM | BODY MASS INDEX: 29.37 KG/M2

## 2018-07-16 DIAGNOSIS — D05.12 DUCTAL CARCINOMA IN SITU (DCIS) OF LEFT BREAST: Primary | ICD-10-CM

## 2018-07-16 PROCEDURE — 99214 OFFICE O/P EST MOD 30 MIN: CPT | Performed by: INTERNAL MEDICINE

## 2018-07-16 PROCEDURE — G0463 HOSPITAL OUTPT CLINIC VISIT: HCPCS

## 2018-07-16 ASSESSMENT — PAIN SCALES - GENERAL: PAINLEVEL: WORST PAIN (10)

## 2018-07-16 NOTE — Clinical Note
"    7/16/2018         RE: Graciela Castorena  [address]  [facility] [address]        Dear Colleague,    Thank you for referring your patient, Graciela Castorena, to the [facility]. Please see a copy of my visit note below.    Oncology Rooming Note    July 16, 2018 1:44 PM   Graciela Castorena is a 53 year old female who presents for:    Chief Complaint   Patient presents with     Oncology Clinic Visit     Initial Vitals: /74 (BP Location: Left arm, Patient Position: Chair, Cuff Size: Adult Regular)  Pulse 94  Temp 98.2  F (36.8  C) (Oral)  Resp 16  Wt 75.2 kg (165 lb 12.8 oz)  SpO2 100%  BMI 29.37 kg/m2 Estimated body mass index is 29.37 kg/(m^2) as calculated from the following:    Height as of 5/25/18: 1.6 m (5' 3\").    Weight as of this encounter: 75.2 kg (165 lb 12.8 oz). Body surface area is 1.83 meters squared.  Worst Pain (10) Comment: back pain   No LMP recorded. Patient is postmenopausal.  Allergies reviewed: Yes  Medications reviewed: Yes    Medications: Medication refills not needed today.  Pharmacy name entered into [employer]: [id number] - [place], [place] - [address] [facility]    Clinical concerns: None     5 minutes for nursing intake (face to face time)     Ember Joe CMA                Visit Date:   07/16/2018      SUBJECTIVE:  Ms. Castorena is a 53-year-old female with a left breast DCIS.  She had a lumpectomy done in 04/2018.  She completed radiation to the left breast.  She had 5056 cGy between 05/31/2018 and 06/27/2018.      The patient is currently on tamoxifen since 05/2018.  The patient has noticed some side effects.  She has noticed a little bit of forgetfulness.  The patient says that sometimes she does not remember things well.  She also sometimes gets aches and pain, especially in the back.      No headache.  Occasional dizziness.  No neck pain.  No chest pain.  She has some skin toxicity from radiation.  She is going to meet with a radiation " oncologist today regarding it.  No abdominal pain, nausea or vomiting.  Appetite fairly good.  No urinary or bowel complaints.  No bleeding.      PHYSICAL EXAMINATION:   GENERAL:  She is alert, oriented x 3.   VITAL SIGNS:  Reviewed.   The rest of the system not examined.      LABORATORY DATA:  Reviewed.      ASSESSMENT:   1.  A 53-year-old female with left breast DCIS, status post lumpectomy and radiation, ER positive.   2.  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   3.  Forgetfulness, most likely from tamoxifen.     4.  Aches and pain which would be from tamoxifen.      PLAN:   1.  I discussed regarding DCIS.  The patient is doing well.  She had lumpectomy.  Now she has finished radiation.  She is currently on tamoxifen.  Overall, she is tolerating it well.  She will continue tamoxifen.   2.  The patient has some forgetfulness.  She was recently evaluated by Dr. Magallanes.  I told the patient that her forgetfulness could be simply from tamoxifen.  It is one of the side effects.  Her forgetfulness is not bad.  Hopefully, her memory will improve somewhat in the next few months.  For now, she will continue on tamoxifen.   3.  Discussed regarding aches and pain.  I told the patient that this is secondary to tamoxifen.  She will take over-the-counter pain medicine as needed.   4.  The patient has some skin toxicity from radiation.  She is going to see the radiation oncologist today.   5.  She had multiple other questions, which were all answered.  I will see her in December with left breast mammogram.  I advised her to see a physician sooner if she has worsening forgetfulness, chest pain, shortness of breath, bleeding, pain/swelling/redness in the extremities or any other concerns.         JESSIE BENSON MD             D: 2018   T: 2018   MT: SONIA      Name:     MACKENIZE REAGAN   MRN:      [id number]        Account:      [id number]   :      1965           Visit Date:   2018      Document:  [id number]       Again, thank you for allowing me to participate in the care of your patient.        Sincerely,        Samreen Barahona MD

## 2018-07-16 NOTE — PATIENT INSTRUCTIONS
Left breast mammogram in December 2018. Scheduled/evette  Continue tamoxifen.  Take calcium and vitamin D twice a day.  Follow up in january 2018.  Scheduled/evette      AVS printed & given to patient/evette

## 2018-07-16 NOTE — MR AVS SNAPSHOT
After Visit Summary   7/16/2018    Graciela Castorena    MRN: [id number]           Patient Information     Date Of Birth          1965        Visit Information        Provider Department      7/16/2018 1:40 PM Samreen Barahona MD [facility]        Today's Diagnoses     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 breast    -  1      Care Instructions    Left breast mammogram in December 2018. Scheduled/evette  Continue tamoxifen.  Take calcium and vitamin D twice a day.  Follow up in january 2018.  Scheduled/evette      AVS printed & given to patient/evette          Follow-ups after your visit        Your next 10 appointments already scheduled     Dec 27, 2018 10:00 AM CST   MA DIAGNOSTIC DIGITAL LEFT with SHBCMA3   [facility] ([facility])    [address] [place] [address], [address] [place] [address] [phone]           How do I prepare for my exam? (Food and drink instructions) No Food and Drink Restrictions.  How do I prepare for my exam? (Other instructions) Do not use any powder, lotion or deodorant under your arms or on your breast. If you do, we will ask you to remove it before your exam.  What should I wear: Wear comfortable, two-piece clothing.  How long does the exam take: Most scans will take 15 minutes.  What should I bring: Bring any previous mammograms from other facilities or have them mailed to the breast center.  Do I need a :  No  is needed.  What do I need to tell my doctor: If you have any allergies, tell your care team.  What should I do after the exam: No restrictions, You may resume normal activities.  What is this test: This test is an x-ray of the breast to look for breast disease. The breast is pressed between two plates to flatten and spread the tissue. An X-ray is taken of the breast from different angles.  Who should I call with questions: If you have any questions, please call the Imaging Department where you  "will have your exam. Directions, parking instructions, and other information is available on our website, [email or web address].  Other information about my exam Three-dimensional (3D) mammograms are available at [facility] locations in [facility], [place], and [place].  Health locations include [place] and the [facility] in [place].  Benefits of 3D mammograms include: * Improved rate of cancer detection * Decreases your chance of having to go back for more tests, which means fewer: * \"False-positive\" results (This means that there is an abnormal area but it isn't cancer.) * Invasive testing procedures, such as a biopsy or surgery * Can provide clearer images of the breast if you have dense breast tissue.  *3D mammography is an optional exam that anyone can have with a 2D mammogram. It doesn't replace or take the place of a 2D mammogram. 2D mammograms remain an effective screening test for all women.  Not all insurance companies cover the cost of a 3D mammogram. Check with your insurance.            Dec 27, 2018 10:30 AM CST   US BREAST LEFT LIMITED 1-3 QUAD with SHBCUS1   [facility] ([facility])    [address], [address]  [facility] [address] [phone]           How do I prepare for my exam? (Food and drink instructions) No Food and Drink Restrictions.  How do I prepare for my exam? (Other instructions) You do not need to do anything special to prepare for your exam.  What should I wear: Wear comfortable clothes.  How long does the exam take: Most ultrasounds take 30 to 60 minutes.  What should I bring: Bring a list of your medicines, including vitamins, minerals and over-the-counter drugs. It is safest to leave personal items at home.  Do I need a :  No  is needed.  What do I need to tell my doctor: Tell your doctor about any allergies you may have.  What should I do after the " "exam: No restrictions, You may resume normal activities.  What is this test: An ultrasound uses sound waves to make pictures of the body. Sound waves do not cause pain. The only discomfort may be the pressure of the wand against your skin or full bladder.  Who should I call with questions: If you have any questions, please call the Imaging Department where you will have your exam. Directions, parking instructions, and other information is available on our website, [email or web address].            Jan 08, 2019  1:40 PM CST   Return Visit with Samreen Barahona MD   [facility] ([facility])    [facility]  [address] [facility] [address]  [facility] [address] [phone]              Who to contact     If you have questions or need follow up information about today's clinic visit or your schedule please contact [facility] directly at [phone].  Normal or non-critical lab and imaging results will be communicated to you by MyChart, letter or phone within 4 business days after the clinic has received the results. If you do not hear from us within 7 days, please contact the clinic through [employer] or phone. If you have a critical or abnormal lab result, we will notify you by phone as soon as possible.  Submit refill requests through [employer] or call your pharmacy and they will forward the refill request to us. Please allow 3 business days for your refill to be completed.          Additional Information About Your Visit        [employer] Information     [employer] lets you send messages to your doctor, view your test results, renew your prescriptions, schedule appointments and more. To sign up, go to [email or web address] . Click on \"Log in\" on the left side of the screen, which will take you to the Welcome page. Then click on \"Sign up Now\" on the right side of the page.     You will be asked to enter the access code listed below, as well as some personal information. " Please follow the directions to create your username and password.     Your access code is: [email or web address]  Expires: 2019  8:51 PM     Your access code will  in 90 days. If you need help or a new code, please call your [facility] clinic or [phone].        Care EveryWhere ID     This is your Care EveryWhere ID. This could be used by other organizations to access your [facility] medical records  [id number]        Your Vitals Were     Pulse Temperature Respirations Pulse Oximetry BMI (Body Mass Index)       94 98.2  F (36.8  C) (Oral) 16 100% 29.37 kg/m2        Blood Pressure from Last 3 Encounters:   18 134/84   18 114/66   18 110/76    Weight from Last 3 Encounters:   18 76.7 kg (169 lb 2 oz)   18 76.2 kg (168 lb)   18 75.3 kg (166 lb 1.6 oz)                 Today's Medication Changes          These changes are accurate as of 18 11:59 PM.  If you have any questions, ask your nurse or doctor.               Start taking these medicines.        Dose/Directions    calcium carbonate-vitamin D 600-400 MG-UNIT Chew   Used for:  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 breast   Started by:  Samreen Barahona MD        Dose:  1 chew tab   Take 1 chew tab by mouth 2 times daily   Quantity:  180 tablet   Refills:  3            Where to get your medicines      These medications were sent to [facility]/pharmacy #[id number] [address] [address]     Phone:  [phone]     calcium carbonate-vitamin D 600-400 MG-UNIT Chew                Primary Care Provider Office Phone # Fax #    Genna Magallanes [phone]       [address] [facility] [address]        Equal Access to Services     JACOBO DUFFY : Ruperto Flores, wadionda luqadaha, qaybta kaalmalisseth ziegler, becky hendrickson. [facility] [phone].    ATENCIÓN: Si habla español, tiene a banuelos disposición servicios gratuitos de  asistencia lingüística. Pretty al [phone].    We comply with applicable federal civil rights laws and [place] laws. We do not discriminate on the basis of race, color, national origin, age, disability, sex, sexual orientation, or gender identity.            Thank you!     Thank you for choosing [facility]  for your care. Our goal is always to provide you with excellent care. Hearing back from our patients is one way we can continue to improve our services. Please take a few minutes to complete the written survey that you may receive in the mail after your visit with us. Thank you!             Your Updated Medication List - Protect others around you: Learn how to safely use, store and throw away your medicines at www.disposemymeds.org.          This list is accurate as of 7/16/18 11:59 PM.  Always use your most recent med list.                   Brand Name Dispense Instructions for use Diagnosis    calcium carbonate-vitamin D 600-400 MG-UNIT Chew     180 tablet    Take 1 chew tab by mouth 2 times daily    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 breast       naproxen 375 MG tablet    NAPROSYN    30 tablet    Take 1 tablet (375 mg) by mouth 2 times daily (with meals)    Acute bilateral low back pain without sciatica       tamoxifen 20 MG tablet    NOLVADEX    30 tablet    Take 1 tablet (20 mg) by mouth daily    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 breast       triamcinolone 0.1 % cream    KENALOG    80 g    Apply sparingly to affected area three times daily for 14 days.    Radiation dermatitis

## 2018-07-16 NOTE — PROGRESS NOTES
"Oncology Rooming Note    July 16, 2018 1:44 PM   Graciela Castorena is a 53 year old female who presents for:    Chief Complaint   Patient presents with     Oncology Clinic Visit     Initial Vitals: /74 (BP Location: Left arm, Patient Position: Chair, Cuff Size: Adult Regular)  Pulse 94  Temp 98.2  F (36.8  C) (Oral)  Resp 16  Wt 75.2 kg (165 lb 12.8 oz)  SpO2 100%  BMI 29.37 kg/m2 Estimated body mass index is 29.37 kg/(m^2) as calculated from the following:    Height as of 5/25/18: 1.6 m (5' 3\").    Weight as of this encounter: 75.2 kg (165 lb 12.8 oz). Body surface area is 1.83 meters squared.  Worst Pain (10) Comment: back pain   No LMP recorded. Patient is postmenopausal.  Allergies reviewed: Yes  Medications reviewed: Yes    Medications: Medication refills not needed today.  Pharmacy name entered into [facility]: [id number] - [place], [place] - [address]    Clinical concerns: None     5 minutes for nursing intake (face to face time)     Ember Joe CMA              "

## 2018-07-16 NOTE — PROGRESS NOTES
Visit Date:   07/16/2018     ONCOLOGY HISTORY: Mrs. 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      1.  Screening mammogram on 01/16/2018 revealed a new calcification cluster behind the left nipple.   -Left diagnostic mammogram on 03/12/2018 revealed an indeterminate cluster of microcalcification in retroareolar portion of left breast at 11:00.     2. Stereotactic left breast biopsy on 03/13/2018 reveals intermediate grade DCIS with microcalcification and comedonecrosis.  % positive and  [place] 25% positive.     3.  Patient underwent left breast lumpectomy on 04/18/2018.  Pathology reveals grade 2 DCIS measuring 3 mm.  Margins are negative.    4. Radiation to left breast. 5056 cGy between 05/31/2018 and 06/27/2018.     5. Tamoxifen started in May,2018.      SUBJECTIVE:    Ms. Castorena is a 53-year-old female with a left breast DCIS.  She had a lumpectomy done in 04/2018.  She completed radiation to the left breast.  She had 5056 cGy between 05/31/2018 and 06/27/2018.      Patient is currently on tamoxifen since 05/2018.  Patient has noticed some side effects.  She has noticed a little bit of forgetfulness.  Patient says that sometimes she does not remember things well.  She also sometimes gets aches and pain, especially in the back.      No headache.  Occasional dizziness.  No neck pain.  No chest pain.  She has some skin toxicity from radiation.  She is going to meet with a radiation oncologist today regarding it.  No abdominal pain, nausea or vomiting.  Appetite fairly good.  No urinary or bowel complaints.  No bleeding.      PHYSICAL EXAMINATION:   GENERAL:  She is alert, oriented x 3.   VITAL SIGNS:  Reviewed.   Rest of the system not examined.      LABORATORY DATA:  Reviewed.      ASSESSMENT:   1.  A 53-year-old female with left breast DCIS status post lumpectomy and radiation.ER positive.   2.  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   3.  Forgetfulness, most likely from tamoxifen.     4.  Aches and pain  which would be from tamoxifen.      PLAN:   1.  I discussed regarding DCIS.  Patient is doing well.  She had lumpectomy followed by radiation.  She is currently on tamoxifen.  Overall, she is tolerating it well.  She will continue tamoxifen.   2.  Patient has some forgetfulness.  She was recently evaluated by Dr. Magallanes.  I told the patient that her forgetfulness could be simply from tamoxifen.  It is one of the side effects.  Her forgetfulness is not bad.  Hopefully, her memory will improve somewhat in the next few months.  For now, she will continue on tamoxifen.   3.  Discussed regarding aches and pain.  I told the patient that this is secondary to tamoxifen.  She will take over-the-counter pain medications as needed.   4.  Patient has some skin toxicity from radiation.  She is going to see the radiation oncologist today.   5.  She had multiple other questions, which were all answered.  I will see her in December with left breast mammogram.  I advised her to see a physician sooner if she has worsening forgetfulness, chest pain, shortness of breath, bleeding, pain/swelling/redness in the extremities or any other concerns.         JESSIE BENSON MD             D: 2018   T: 2018   MT: SONIA      Name:     MACKENZIE REAGAN   MRN:      [id number]        Account:      [id number]   :      1965           Visit Date:   2018      Document: [id number]

## 2018-08-23 ENCOUNTER — OFFICE VISIT (OUTPATIENT)
Dept: URGENT CARE | Facility: URGENT CARE | Age: 53
End: 2018-08-23
Payer: COMMERCIAL

## 2018-08-23 VITALS
BODY MASS INDEX: 29.42 KG/M2 | SYSTOLIC BLOOD PRESSURE: 110 MMHG | OXYGEN SATURATION: 99 % | RESPIRATION RATE: 16 BRPM | HEART RATE: 79 BPM | DIASTOLIC BLOOD PRESSURE: 76 MMHG | WEIGHT: 166.1 LBS

## 2018-08-23 DIAGNOSIS — R10.13 ABDOMINAL PAIN, EPIGASTRIC: Primary | ICD-10-CM

## 2018-08-23 DIAGNOSIS — R21 RASH: ICD-10-CM

## 2018-08-23 DIAGNOSIS — R19.5 LOOSE STOOLS: ICD-10-CM

## 2018-08-23 PROCEDURE — 99214 OFFICE O/P EST MOD 30 MIN: CPT | Performed by: FAMILY MEDICINE

## 2018-08-23 RX ORDER — BENZOCAINE/MENTHOL 6 MG-10 MG
LOZENGE MUCOUS MEMBRANE
Qty: 30 G | Refills: 0 | Status: SHIPPED | OUTPATIENT
Start: 2018-08-23 | End: 2018-09-06

## 2018-08-23 NOTE — MR AVS SNAPSHOT
"              After Visit Summary   8/23/2018    Graciela Castorena    MRN: [id number]           Patient Information     Date Of Birth          1965        Visit Information        Provider Department      8/23/2018 2:30 PM Tulio Mahajan, DO [facility]        Today's Diagnoses     Abdominal pain, epigastric    -  1    Rash        Loose stools           Follow-ups after your visit        Your next 10 appointments already scheduled     Dec 27, 2018 10:00 AM CST   MA DIAGNOSTIC DIGITAL LEFT with SHBCMA3   [facility] ([facility])    [address] [place] [address], [address]  [facility] [address] [phone]           Do not use any powder, lotion or deodorant under your arms or on your breast. If you do, we will ask you to remove it before your exam.  Wear comfortable, two-piece clothing.  If you have any allergies, tell your care team.  Bring any previous mammograms from other facilities or have them mailed to the breast center.  Three-dimensional (3D) mammograms are available at [facility] locations in [facility], [facility], [facility], and [place]. [facility] locations include [facility] in [place]. Benefits of 3D mammograms include: - Improved rate of cancer detection - Decreases your chance of having to go back for more tests, which means fewer: - \"False-positive\" results (This means that there is an abnormal area but it isn't cancer.) - Invasive testing procedures, such as a biopsy or surgery - Can provide clearer images of the breast if you have dense breast tissue. 3D mammography is an optional exam that anyone can have with a 2D mammogram. It doesn't replace or take the place of a 2D mammogram. 2D mammograms remain an effective screening test for all women.  Not all insurance companies cover the cost of a 3D mammogram. Check with your insurance.            Dec 27, 2018 10:30 " AM CST   US BREAST LEFT LIMITED 1-3 QUAD with SHBCUS1   [facility] ([facility])    [address], [address]  [facility] [address] [phone]           Please bring a list of your medicines (including vitamins, minerals and over-the-counter drugs). Also, tell your doctor about any allergies you may have. Wear comfortable clothes and leave your valuables at home.  You do not need to do anything special to prepare for your exam.  Please call the Imaging Department at your exam site with any questions.            Jan 08, 2019  1:40 PM CST   Return Visit with Samreen Barahona MD   [facility] ([facility])    [facility]  [address]  [facility] [address] [phone]              Future tests that were ordered for you today     Open Future Orders        Priority Expected Expires Ordered    Enteric Bacteria and Virus Panel by JOEY Stool Routine  8/23/2019 8/23/2018    Ova and Parasite Exam Routine Routine  8/23/2019 8/23/2018    H Pylori antigen, stool Routine 8/23/2018 9/22/2018 8/23/2018            Who to contact     If you have questions or need follow up information about today's clinic visit or your schedule please contact [facility] directly at [phone].  Normal or non-critical lab and imaging results will be communicated to you by MyChart, letter or phone within 4 business days after the clinic has received the results. If you do not hear from us within 7 days, please contact the clinic through MyChart or phone. If you have a critical or abnormal lab result, we will notify you by phone as soon as possible.  Submit refill requests through [employer] or call your pharmacy and they will forward the refill request to us. Please allow 3 business days for your refill to be completed.          Additional Information About Your Visit        Care EveryWhere ID     This is your Care  EveryWhere ID. This could be used by other organizations to access your [facility] medical records  [id number]        Your Vitals Were     Pulse Respirations Pulse Oximetry BMI (Body Mass Index)          79 16 99% 29.42 kg/m2         Blood Pressure from Last 3 Encounters:   08/23/18 110/76   07/16/18 112/74   07/10/18 100/70    Weight from Last 3 Encounters:   08/23/18 166 lb 1.6 oz (75.3 kg)   07/16/18 165 lb 12.8 oz (75.2 kg)   07/10/18 164 lb 3.2 oz (74.5 kg)                 Today's Medication Changes          These changes are accurate as of 8/23/18  3:06 PM.  If you have any questions, ask your nurse or doctor.               Start taking these medicines.        Dose/Directions    hydrocortisone 1 % cream   Commonly known as:  CORTAID   Used for:  Rash   Started by:  Tulio Mahajan DO        Apply sparingly to affected area three times daily for 14 days prn.   Quantity:  30 g   Refills:  0       omeprazole 20 MG CR capsule   Commonly known as:  priLOSEC   Used for:  Abdominal pain, epigastric   Started by:  Tulio Mahajan DO        Dose:  20 mg   Take 1 capsule (20 mg) by mouth daily   Quantity:  30 capsule   Refills:  1            Where to get your medicines      These medications were sent to [facility]/pharmacy #[id number] [address] [address]     Phone:  [phone]     hydrocortisone 1 % cream    omeprazole 20 MG CR capsule                Primary Care Provider Office Phone # Fax #    Genna Magallanes [phone]       [address] [address]        Equal Access to Services     JACOBO DUFFY AH: Hadii kaley campos hadashbrea Socindy, waaxda luqadaha, qaybta kaalmada becky ziegler. [facility] [phone].    ATENCIÓN: Si habla español, tiene a banuelos disposición servicios gratuitos de asistencia lingüística. Pretty al [phone].    We comply with applicable federal civil rights laws and [place]  laws. We do not discriminate on the basis of race, color, national origin, age, disability, sex, sexual orientation, or gender identity.            Thank you!     Thank you for choosing [facility]  for your care. Our goal is always to provide you with excellent care. Hearing back from our patients is one way we can continue to improve our services. Please take a few minutes to complete the written survey that you may receive in the mail after your visit with us. Thank you!             Your Updated Medication List - Protect others around you: Learn how to safely use, store and throw away your medicines at www.disposemymeds.org.          This list is accurate as of 8/23/18  3:06 PM.  Always use your most recent med list.                   Brand Name Dispense Instructions for use Diagnosis    calcium carbonate-vitamin D 600-400 MG-UNIT Chew     180 tablet    Take 1 chew tab by mouth 2 times daily    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 breast       hydrocortisone 1 % cream    CORTAID    30 g    Apply sparingly to affected area three times daily for 14 days prn.    Rash       naproxen 375 MG tablet    NAPROSYN    30 tablet    Take 1 tablet (375 mg) by mouth 2 times daily (with meals)    Acute bilateral low back pain without sciatica       omeprazole 20 MG CR capsule    priLOSEC    30 capsule    Take 1 capsule (20 mg) by mouth daily    Abdominal pain, epigastric       tamoxifen 20 MG tablet    NOLVADEX    30 tablet    Take 1 tablet (20 mg) by mouth daily    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 breast       triamcinolone 0.1 % cream    KENALOG    80 g    Apply sparingly to affected area three times daily for 14 days.    Radiation dermatitis

## 2018-08-23 NOTE — PROGRESS NOTES
SUBJECTIVE  HPI: Graciela Castorena is a 53 year old female  who presents with the CC of abdominal/pelvic pain.   Pain is located in the epigastric area, with radiation to None    The pain is characterized as burning.    Pain has been present for day(s) and is stable.     EXACERBATING FACTORS: NEGATIVE.   RELIEVING FACTORS: NEGATIVE.    ASSOCIATED SX: loose stools, pt c/o possible parasite.   Also wants to be tested for H pylori    He has also noticed a itchy rash rt lateral neck    Past Medical History:   Diagnosis Date     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 breast 2018     Allergies   Allergen Reactions     Ciprofloxacin Itching     Social History   Substance Use Topics     Smoking status: Never Smoker     Smokeless tobacco: Never Used     Alcohol use No       ROS:CONSTITUTIONAL:NEGATIVE for fever, chills, change in weight  GI: NEGATIVE for constipation and vomiting  : negative for, dysuria and hematuria    EXAMINATION:  /76  Pulse 79  Resp 16  Wt 166 lb 1.6 oz (75.3 kg)  SpO2 99%  BMI 29.42 kg/m2   GENERAL APPEARANCE: healthy, alert and no distress  ABDOMEN:  soft, nontender, no HSM or masses and bowel sounds normal  Mild rash, skin color rt lateral neck      ICD-10-CM    1. Abdominal pain, epigastric R10.13 H Pylori antigen, stool     omeprazole (PRILOSEC) 20 MG CR capsule     Enteric Bacteria and Virus Panel by JOEY Stool     Ova and Parasite Exam Routine   2. Rash R21 hydrocortisone (CORTAID) 1 % cream   3. Loose stools R19.5 Enteric Bacteria and Virus Panel by JOEY Stool     Ova and Parasite Exam Routine     F/U PCP/IM/FP, ED if worse

## 2018-08-27 DIAGNOSIS — A04.8 H. PYLORI INFECTION: Primary | ICD-10-CM

## 2018-08-27 DIAGNOSIS — R19.5 LOOSE STOOLS: ICD-10-CM

## 2018-08-27 DIAGNOSIS — R10.13 ABDOMINAL PAIN, EPIGASTRIC: ICD-10-CM

## 2018-08-27 PROCEDURE — 87177 OVA AND PARASITES SMEARS: CPT | Performed by: FAMILY MEDICINE

## 2018-08-27 PROCEDURE — 87338 HPYLORI STOOL AG IA: CPT | Performed by: FAMILY MEDICINE

## 2018-08-27 PROCEDURE — 87506 IADNA-DNA/RNA PROBE TQ 6-11: CPT | Performed by: FAMILY MEDICINE

## 2018-08-27 PROCEDURE — 87209 SMEAR COMPLEX STAIN: CPT | Performed by: FAMILY MEDICINE

## 2018-08-28 LAB
H PYLORI AG STL QL IA: ABNORMAL
O+P STL MICRO: ABNORMAL
SPECIMEN SOURCE: ABNORMAL
SPECIMEN SOURCE: ABNORMAL

## 2018-08-28 RX ORDER — CLARITHROMYCIN 500 MG
500 TABLET ORAL 2 TIMES DAILY
Qty: 28 TABLET | Refills: 0 | Status: SHIPPED | OUTPATIENT
Start: 2018-08-28 | End: 2018-09-11

## 2018-08-28 RX ORDER — AMOXICILLIN 500 MG/1
500 CAPSULE ORAL 3 TIMES DAILY
Qty: 42 CAPSULE | Refills: 0 | Status: SHIPPED | OUTPATIENT
Start: 2018-08-28 | End: 2018-09-11

## 2018-08-29 ENCOUNTER — RESULT FOLLOW UP (OUTPATIENT)
Dept: OBGYN | Facility: CLINIC | Age: 53
End: 2018-08-29

## 2018-08-29 ENCOUNTER — OFFICE VISIT (OUTPATIENT)
Dept: OBGYN | Facility: CLINIC | Age: 53
End: 2018-08-29
Payer: COMMERCIAL

## 2018-08-29 VITALS
BODY MASS INDEX: 29.77 KG/M2 | DIASTOLIC BLOOD PRESSURE: 66 MMHG | HEIGHT: 63 IN | SYSTOLIC BLOOD PRESSURE: 114 MMHG | HEART RATE: 80 BPM | WEIGHT: 168 LBS

## 2018-08-29 DIAGNOSIS — R30.0 DYSURIA: ICD-10-CM

## 2018-08-29 DIAGNOSIS — N89.8 VAGINAL ITCHING: ICD-10-CM

## 2018-08-29 DIAGNOSIS — Z12.4 SCREENING FOR CERVICAL CANCER: ICD-10-CM

## 2018-08-29 DIAGNOSIS — N89.8 VAGINAL DISCHARGE: Primary | ICD-10-CM

## 2018-08-29 DIAGNOSIS — R10.9 FLANK PAIN: ICD-10-CM

## 2018-08-29 DIAGNOSIS — N89.8 VAGINAL SORE: ICD-10-CM

## 2018-08-29 DIAGNOSIS — R87.610 ASCUS OF CERVIX WITH NEGATIVE HIGH RISK HPV: ICD-10-CM

## 2018-08-29 DIAGNOSIS — Z11.3 ENCOUNTER FOR SCREENING EXAMINATION FOR SEXUALLY TRANSMITTED DISEASE: ICD-10-CM

## 2018-08-29 DIAGNOSIS — N90.810 HISTORY OF FEMALE GENITAL MUTILATION: ICD-10-CM

## 2018-08-29 LAB
ALBUMIN UR-MCNC: NEGATIVE MG/DL
APPEARANCE UR: CLEAR
BACTERIA #/AREA URNS HPF: ABNORMAL /HPF
BILIRUB UR QL STRIP: NEGATIVE
COLOR UR AUTO: YELLOW
GLUCOSE UR STRIP-MCNC: NEGATIVE MG/DL
HGB UR QL STRIP: ABNORMAL
KETONES UR STRIP-MCNC: NEGATIVE MG/DL
LEUKOCYTE ESTERASE UR QL STRIP: NEGATIVE
NITRATE UR QL: NEGATIVE
PH UR STRIP: 5.5 PH (ref 5–7)
RBC #/AREA URNS AUTO: ABNORMAL /HPF
SOURCE: ABNORMAL
SP GR UR STRIP: >1.03 (ref 1–1.03)
SPECIMEN SOURCE: NORMAL
UROBILINOGEN UR STRIP-ACNC: 0.2 EU/DL (ref 0.2–1)
WBC #/AREA URNS AUTO: ABNORMAL /HPF
WET PREP SPEC: NORMAL

## 2018-08-29 PROCEDURE — 87491 CHLMYD TRACH DNA AMP PROBE: CPT | Performed by: ADVANCED PRACTICE MIDWIFE

## 2018-08-29 PROCEDURE — 87529 HSV DNA AMP PROBE: CPT | Performed by: ADVANCED PRACTICE MIDWIFE

## 2018-08-29 PROCEDURE — 87529 HSV DNA AMP PROBE: CPT | Mod: 59 | Performed by: ADVANCED PRACTICE MIDWIFE

## 2018-08-29 PROCEDURE — 87086 URINE CULTURE/COLONY COUNT: CPT | Performed by: ADVANCED PRACTICE MIDWIFE

## 2018-08-29 PROCEDURE — 99214 OFFICE O/P EST MOD 30 MIN: CPT | Performed by: ADVANCED PRACTICE MIDWIFE

## 2018-08-29 PROCEDURE — G0476 HPV COMBO ASSAY CA SCREEN: HCPCS | Performed by: ADVANCED PRACTICE MIDWIFE

## 2018-08-29 PROCEDURE — G0145 SCR C/V CYTO,THINLAYER,RESCR: HCPCS | Performed by: ADVANCED PRACTICE MIDWIFE

## 2018-08-29 PROCEDURE — G0124 SCREEN C/V THIN LAYER BY MD: HCPCS | Performed by: ADVANCED PRACTICE MIDWIFE

## 2018-08-29 PROCEDURE — 87591 N.GONORRHOEAE DNA AMP PROB: CPT | Performed by: ADVANCED PRACTICE MIDWIFE

## 2018-08-29 PROCEDURE — 81001 URINALYSIS AUTO W/SCOPE: CPT | Performed by: ADVANCED PRACTICE MIDWIFE

## 2018-08-29 PROCEDURE — 87210 SMEAR WET MOUNT SALINE/INK: CPT | Performed by: ADVANCED PRACTICE MIDWIFE

## 2018-08-29 NOTE — LETTER
August 16, 2019      Graciela A Castorena  [address]    [facility] [address]    Dear MsDelroyRaffaele,      At [facility], your health and wellness is our primary concern. That is why we are following up on an abnormal pap from 08/29/18, which was reported as ASCUS. Your provider had recommended that you have a Pap smear and HPV test completed by 08/29/19. Our records do not show that this has been scheduled.    It is important to complete the follow up that your provider has suggested for you to ensure that there are no worsening changes which may, over time, develop into cancer.      Please contact our office at  [phone] to schedule an appointment for a Pap smear and HPV test at your earliest convenience. If you have questions or concerns, please call the clinic and we will be happy to assist you.    If you have completed the tests outside of [facility], please have the results forwarded to our office. We will update the chart for your primary Physician to review before your next annual physical.     Thank you for choosing [facility]!    Sincerely,      Your [facility] Care Team/[facility]

## 2018-08-29 NOTE — LETTER
September 7, 2018    Graciela Castorena  [address]   [facility] [address]      Dear ,      This letter is in regards to your recent cervical cancer screening (Pap smear and HPV test).    Your Pap smear result was reported as ASCUS or Atypical Squamous Cells of Undetermined Significance. This means that there were mildly abnormal cells found in the sample that we collected from your cervix, but no cancer cells were found. The vast majority of patients with this result do not have significant cervical abnormalities.     Your cervical sample was also tested for the presence of Human Papillomavirus (HPV). Your HPV test is NEGATIVE for high risk HPV, meaning that no HPV was found at this time.     Over time, your body can get rid of these abnormal cells, so it is recommended that you repeat your pap and HPV in 1 year.    If you have questions about these results contact [phone]    Please continue to be seen every year for an annual physical exam and other preventative tests.         Sincerely,    CHRIS Man CNM./  Britta Asencio RN-Pap Tracking

## 2018-08-29 NOTE — MR AVS SNAPSHOT
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      Your next 10 appointments already scheduled     Dec 27, 2018 10:00 AM CST   MA DIAGNOSTIC DIGITAL LEFT with SHBCMA3   [facility] [place] ([facility])    [address] [place] [address], [address]  [facility] [address] [phone]           Do not use any powder, lotion or deodorant under your arms or on your breast. If you do, we will ask you to remove it before your exam.  Wear comfortable, two-piece clothing.  If you have any allergies, tell your care team.  Bring any previous mammograms from other facilities or have them mailed to the breast center.  Three-dimensional (3D) mammograms are available at [facility] locations in [facility], [facility], [facility], and [place]. [facility] locations include [facility] in [place]. Benefits of 3D mammograms include: - Improved rate of cancer detection - Decreases your chance of having to go back for more tests, which means fewer: - \"False-positive\" results (This means that there is an abnormal area but it isn't cancer.) - Invasive testing procedures, such as a biopsy or surgery - Can provide clearer images of the breast if you have dense breast tissue. 3D mammography is an " optional exam that anyone can have with a 2D mammogram. It doesn't replace or take the place of a 2D mammogram. 2D mammograms remain an effective screening test for all women.  Not all insurance companies cover the cost of a 3D mammogram. Check with your insurance.            Dec 27, 2018 10:30 AM CST   US BREAST LEFT LIMITED 1-3 QUAD with SHBCUS1   [facility] ([facility])    [address] [facility], [address]  [facility] [id number] [phone]           Please bring a list of your medicines (including vitamins, minerals and over-the-counter drugs). Also, tell your doctor about any allergies you may have. Wear comfortable clothes and leave your valuables at home.  You do not need to do anything special to prepare for your exam.  Please call the Imaging Department at your exam site with any questions.            Jan 08, 2019  1:40 PM CST   Return Visit with Samreen Barahona MD   [facility] ([facility])    [facility]  [address]  [facility] [id number] [phone]              Who to contact     If you have questions or need follow up information about today's clinic visit or your schedule please contact [facility] directly at [phone].  Normal or non-critical lab and imaging results will be communicated to you by MyChart, letter or phone within 4 business days after the clinic has received the results. If you do not hear from us within 7 days, please contact the clinic through MyChart or phone. If you have a critical or abnormal lab result, we will notify you by phone as soon as possible.  Submit refill requests through [employer] or call your pharmacy and they will forward the refill request to us. Please allow 3 business days for your refill to be completed.          Additional Information About Your Visit        Care EveryWhere ID     This is your Care EveryWhere ID. This could be used  "by other organizations to access your [facility] medical records  [id number]        Your Vitals Were     Pulse Height Breastfeeding? BMI (Body Mass Index)          80 5' 3\" (1.6 m) No 29.76 kg/m2         Blood Pressure from Last 3 Encounters:   08/29/18 114/66   08/23/18 110/76   07/16/18 112/74    Weight from Last 3 Encounters:   08/29/18 168 lb (76.2 kg)   08/23/18 166 lb 1.6 oz (75.3 kg)   07/16/18 165 lb 12.8 oz (75.2 kg)              We Performed the Following     Chlamydia trachomatis PCR     HPV High Risk Types DNA Cervical     HSV 1 and 2 DNA by PCR     Neisseria gonorrhoeae PCR     Pap imaged thin layer screen with HPV - recommended age 30 - 65 years (select HPV order below)     UA with Microscopic     Urine Culture Aerobic Bacterial     Wet prep        Primary Care Provider Office Phone # Fax #    Genna Magallanes [phone]       [address] [place] [address]        Equal Access to Services     JACOBO DUFFY : Hadii aad ku hadasho Soomaali, waaxda luqadaha, qaybta kaalmada adeegyada, waxay idiin haychristiann yamila khcuca multani . So [facility] [phone].    ATENCIÓN: Si habla español, tiene a banuelos disposición servicios gratuitos de asistencia lingüística. Llame al [phone].    We comply with applicable federal civil rights laws and [place] laws. We do not discriminate on the basis of race, color, national origin, age, disability, sex, sexual orientation, or gender identity.            Thank you!     Thank you for choosing [facility]  for your care. Our goal is always to provide you with excellent care. Hearing back from our patients is one way we can continue to improve our services. Please take a few minutes to complete the written survey that you may receive in the mail after your visit with us. Thank you!             Your Updated Medication List - Protect others around you: Learn how to safely use, store and throw away your medicines at " www.disposemymeds.org.          This list is accurate as of 8/29/18  2:47 PM.  Always use your most recent med list.                   Brand Name Dispense Instructions for use Diagnosis    amoxicillin 500 MG capsule    AMOXIL    42 capsule    Take 1 capsule (500 mg) by mouth 3 times daily for 14 days    H. pylori infection       calcium carbonate-vitamin D 600-400 MG-UNIT Chew     180 tablet    Take 1 chew tab by mouth 2 times daily    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 breast       clarithromycin 500 MG tablet    BIAXIN    28 tablet    Take 1 tablet (500 mg) by mouth 2 times daily for 14 days    H. pylori infection       hydrocortisone 1 % cream    CORTAID    30 g    Apply sparingly to affected area three times daily for 14 days prn.    Rash       naproxen 375 MG tablet    NAPROSYN    30 tablet    Take 1 tablet (375 mg) by mouth 2 times daily (with meals)    Acute bilateral low back pain without sciatica       * omeprazole 20 MG CR capsule    priLOSEC    30 capsule    Take 1 capsule (20 mg) by mouth daily    Abdominal pain, epigastric       * omeprazole 20 MG CR capsule    priLOSEC    28 capsule    Take 1 capsule (20 mg) by mouth 2 times daily for 14 days    H. pylori infection       tamoxifen 20 MG tablet    NOLVADEX    30 tablet    Take 1 tablet (20 mg) by mouth daily    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 breast       triamcinolone 0.1 % cream    KENALOG    80 g    Apply sparingly to affected area three times daily for 14 days.    Radiation dermatitis       * Notice:  This list has 2 medication(s) that are the same as other medications prescribed for you. Read the directions carefully, and ask your doctor or other care provider to review them with you.

## 2018-08-29 NOTE — PROGRESS NOTES
SUBJECTIVE:   Graciela is a 53 year old here for vaginal symptoms:  Itching and discharge. Has been going on after intercourse on and off for a year. Her  now complains of it being uncomfortable as well.  States she is postmenopausal x 9 years. She would like STD testing for gonorrhea and chlamydia as well.                  Vaginal Symptoms     Onset: 1 year, almost everyday, usually occurs after intercourse    Description:  Vaginal Discharge: white  Itching (Pruritis): Yes  Burning sensation:  Yes  Odor:  No  Irritation:  Yes    Accompanying Signs & Symptoms:  Pain with Urination: Yes  Abdominal Pain:  No  Fever: No   History:   Sexually active:  Yes  New Partner:  No  Possibility of Pregnancy:  Postmenopausal  Contraceptive type: none    Precipitating factors:   Recent Antibiotic Use: No    Alleviating factors:   Therapies Tried and outcome:   Previous Episodes of Vaginitis:  No      Other associated symptoms: pelvic pain and dysuria.  History of STI's:  No  STI Testing offered: YES    LMP: No LMP recorded. Patient is postmenopausal.      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     Past Medical History:   Diagnosis Date     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 breast 2018     Past Surgical History:   Procedure Laterality Date     COLONOSCOPY N/A 2/12/2018    Procedure: COLONOSCOPY;  colonoscopy;  Surgeon: Sabas Villegas MD;  Location:  GI     LUMPECTOMY BREAST WITH SEED LOCALIZATION Left 4/18/2018    Procedure: LUMPECTOMY BREAST WITH SEED LOCALIZATION;  SEED LOCALIZED LEFT BREAST LUMPECTOMY ;  Surgeon: Meli Rodriguez MD;  Location: [facility]     NO HISTORY OF SURGERY       Current Outpatient Prescriptions   Medication Sig Dispense Refill     amoxicillin (AMOXIL) 500 MG capsule Take 1 capsule (500 mg) by mouth 3 times daily for 14 days 42 capsule 0     calcium carbonate-vitamin D 600-400 MG-UNIT CHEW Take 1  "chew tab by mouth 2 times daily 180 tablet 3     clarithromycin (BIAXIN) 500 MG tablet Take 1 tablet (500 mg) by mouth 2 times daily for 14 days 28 tablet 0     hydrocortisone (CORTAID) 1 % cream Apply sparingly to affected area three times daily for 14 days prn. 30 g 0     naproxen (NAPROSYN) 375 MG tablet Take 1 tablet (375 mg) by mouth 2 times daily (with meals) 30 tablet 0     omeprazole (PRILOSEC) 20 MG CR capsule Take 1 capsule (20 mg) by mouth 2 times daily for 14 days 28 capsule 0     omeprazole (PRILOSEC) 20 MG CR capsule Take 1 capsule (20 mg) by mouth daily 30 capsule 1     tamoxifen (NOLVADEX) 20 MG tablet Take 1 tablet (20 mg) by mouth daily 30 tablet 11     triamcinolone (KENALOG) 0.1 % cream Apply sparingly to affected area three times daily for 14 days. 80 g 0     Allergies   Allergen Reactions     Ciprofloxacin Itching       Health maintenance updated:  No, Pap 2015 ASCUS HPV-    ROS:   12 point review of systems negative other than symptoms noted below.  Genitourinary: Vaginal Discharge and Vaginal Itching    PHYSICAL EXAM:    /66  Pulse 80  Ht 5' 3\" (1.6 m)  Wt 168 lb (76.2 kg)  Breastfeeding? No  BMI 29.76 kg/m2, Body mass index is 29.76 kg/(m^2).  General appearance:  healthy, alert and no distress  +CVA tenderness on right side  Pelvic Exam:  Vulva: No lesions, no adenopathy, BUS:  wnl. Small sore on right labia which may indicate HSV, swab done, unable to visualize urethra and clitoris due to history of genital circumcision   Vagina: Moist, pink, discharge normal  well rugated, no lesions  Cervix:smooth, pink/white/bluish in color, small lesion at  7 o'clock, pap collected, GC done.  Rectal exam: deferred    ASSESSMENT/PLAN:     ICD-10-CM    1. Vaginal discharge N89.8 Wet prep   2. Vaginal itching L29.8 Wet prep   3. Dysuria R30.0 UA with Microscopic     Urine Culture Aerobic Bacterial   4. Flank pain R10.9    5. Screening for cervical cancer Z12.4 Pap imaged thin layer screen with " HPV - recommended age 30 - 65 years (select HPV order below)     HPV High Risk Types DNA Cervical   6. Encounter for screening examination for sexually transmitted disease Z11.3 Neisseria gonorrhoeae PCR     Chlamydia trachomatis PCR   7. Vaginal sore N89.8 HSV 1 and 2 DNA by PCR   8. 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 N90.810        Results for orders placed or performed in visit on 08/29/18   UA with Microscopic   Result Value Ref Range    Color Urine Yellow     Appearance Urine Clear     Glucose Urine Negative NEG^Negative mg/dL    Bilirubin Urine Negative NEG^Negative    Ketones Urine Negative NEG^Negative mg/dL    Specific Gravity Urine >1.030 1.003 - 1.035    pH Urine 5.5 5.0 - 7.0 pH    Protein Albumin Urine Negative NEG^Negative mg/dL    Urobilinogen Urine 0.2 0.2 - 1.0 EU/dL    Nitrite Urine Negative NEG^Negative    Blood Urine Large (A) NEG^Negative    Leukocyte Esterase Urine Negative NEG^Negative    Source Midstream Urine     WBC Urine 0 - 5 OTO5^0 - 5 /HPF    RBC Urine 2-5 (A) OTO2^O - 2 /HPF    Bacteria Urine Few (A) NEG^Negative /HPF   Wet prep   Result Value Ref Range    Specimen Description Vagina     Wet Prep No Trichomonas seen     Wet Prep No clue cells seen     Wet Prep No yeast seen        COUNSELING:  Discussed vaginal discomfort may be r/t 9 years post menopause and vaginal dryness although appears to be moist without atrophic vaginitis  Discussed HSV swab for sore on right labia  UA/UC done due to dysuria and flank pain  GC swab done  Discussed pap guidelines.  If unable to determine cause of vaginal discomfort recommend follow up with OB GYN and consider pelvic US  Return to clinic if symptoms persist or worsen    25 minutes was spent face to face with the patient today discussing her history, diagnosis, and follow-up plan as noted above. Over 50% of the visit was spent in counseling and coordination of care.    Total Visit Time: 25 minutes.       CHRIS Tavera, LYNDON

## 2018-08-30 LAB
BACTERIA SPEC CULT: NORMAL
C TRACH DNA SPEC QL NAA+PROBE: NEGATIVE
HSV1 DNA SPEC QL NAA+PROBE: NEGATIVE
HSV2 DNA SPEC QL NAA+PROBE: NEGATIVE
N GONORRHOEA DNA SPEC QL NAA+PROBE: NEGATIVE
SPECIMEN SOURCE: NORMAL

## 2018-08-31 NOTE — PROGRESS NOTES
Please call the patient with the results. All of her results thus far have been negative. She does not have a UTI, as her urine culture was negative.  She will be contacted when her pap smear/HPV cotest is back.    Thanks--   Agustina Lowe, DNP, APRN, CNM

## 2018-09-04 LAB
COPATH REPORT: ABNORMAL
PAP: ABNORMAL

## 2018-09-06 LAB
FINAL DIAGNOSIS: NORMAL
HPV HR 12 DNA CVX QL NAA+PROBE: NEGATIVE
HPV16 DNA SPEC QL NAA+PROBE: NEGATIVE
HPV18 DNA SPEC QL NAA+PROBE: NEGATIVE
SPECIMEN DESCRIPTION: NORMAL
SPECIMEN SOURCE CVX/VAG CYTO: NORMAL

## 2018-09-07 NOTE — PROGRESS NOTES
08/29/18: Ascus pap with Neg HR HPV result. Pt was dx with Breast ca 03/13/18. Plan cotest in 1 year as patient previously had an ascus she didn't follow up on per provider. Pt was advised.  09/07/18 Result letter sent at request of RN. ([facility])  08/16/19 Cotest reminder letter sent. ([facility])  09/12/19 Spoke with pt, transferred to scheduling. ([facility])    1/31/20 ASCUS pap, neg HR HPV. Plan colp per provider ([facility])  02/14/20 Pt notified ([facility])  4/14/20 Per provider, COVID 19 interim guideline, plan updated to: colp within 6-12 mo, due bef 1/31/21 ([facility])

## 2018-11-06 ENCOUNTER — OFFICE VISIT (OUTPATIENT)
Dept: URGENT CARE | Facility: URGENT CARE | Age: 53
End: 2018-11-06
Payer: COMMERCIAL

## 2018-11-06 VITALS
BODY MASS INDEX: 29.96 KG/M2 | SYSTOLIC BLOOD PRESSURE: 134 MMHG | HEART RATE: 86 BPM | WEIGHT: 169.13 LBS | OXYGEN SATURATION: 98 % | TEMPERATURE: 97.5 F | DIASTOLIC BLOOD PRESSURE: 84 MMHG

## 2018-11-06 DIAGNOSIS — M54.2 CERVICALGIA: Primary | ICD-10-CM

## 2018-11-06 PROCEDURE — 99213 OFFICE O/P EST LOW 20 MIN: CPT | Performed by: PHYSICIAN ASSISTANT

## 2018-11-06 RX ORDER — IBUPROFEN 800 MG/1
800 TABLET, FILM COATED ORAL EVERY 8 HOURS PRN
Qty: 30 TABLET | Refills: 1 | Status: SHIPPED | OUTPATIENT
Start: 2018-11-06 | End: 2019-06-13

## 2018-11-06 RX ORDER — METHOCARBAMOL 750 MG/1
750 TABLET, FILM COATED ORAL 4 TIMES DAILY PRN
Qty: 60 TABLET | Refills: 0 | Status: SHIPPED | OUTPATIENT
Start: 2018-11-06 | End: 2019-03-29

## 2018-11-06 NOTE — PROGRESS NOTES
SUBJECTIVE:  Chief Complaint   Patient presents with     Headache     headache radiating to neck and eye discomfort for one week.      Graciela Castorena is a 53 year old female presents with a chief complaint of left sided neck pain and headache for the past week.  NO trauma.  No fevers.  NO URI symptoms.  Area on her neck is tender to touch.      She has not taken any medication for it.      She has not used heat or ice to it.      She has not history of similar symptoms.      Past Medical History:   Diagnosis Date     Abnormal Pap smear of cervix 08/29/2018 08/29/18: see problem list.      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     ASCUS of cervix with negative high risk HPV     Social History   Substance Use Topics     Smoking status: Never Smoker     Smokeless tobacco: Never Used     Alcohol use No       ROS:  CONSTITUTIONAL:NEGATIVE for fever, chills, change in weight  INTEGUMENTARY/SKIN: NEGATIVE for worrisome rashes, moles or lesions  EYES: NEGATIVE for vision changes or irritation  ENT/MOUTH: NEGATIVE for ear, mouth and throat problems  RESP:NEGATIVE for significant cough or SOB  CV: NEGATIVE for chest pain, palpitations or peripheral edema  GI: NEGATIVE for nausea, abdominal pain, heartburn, or change in bowel habits  MUSCULOSKELETAL: as per HPI  NEURO: NEGATIVE for weakness, dizziness or paresthesias  Review of systems negative except as stated above.    EXAM:   /84  Pulse 86  Temp 97.5  F (36.4  C) (Oral)  Wt 169 lb 2 oz (76.7 kg)  SpO2 98%  BMI 29.96 kg/m2  Gen: healthy,alert,no distress  GENERAL APPEARANCE: healthy, alert and no distress  NECK: tenderness over left paracervical muscle, palpable spasm.    CHEST: clear to auscultation  CV: regular rate and rhythm  EXTREMITIES: peripheral pulses normal  MS: no gross deformities noted, no evidence  of inflammation in joints, FROM in all extremities.  SKIN: no suspicious lesions or rashes  NEURO: Normal strength and tone, sensory exam grossly normal, mentation intact and speech normal    (M54.2) Cervicalgia  (primary encounter diagnosis)  Comment: secondary to muscle spasm  Plan: methocarbamol (ROBAXIN) 750 MG tablet,         ibuprofen (ADVIL/MOTRIN) 800 MG tablet        Ice to area over thin cloth.     Start gentle stretching exercises in a couple of days.    Heat to area over cloth for a few minutes prior to stretching, then follow with ice to area for 20 minutes.      Patient expresses understanding and agreement with the assessment and plan as above.

## 2018-11-06 NOTE — MR AVS SNAPSHOT
After Visit Summary   11/6/2018    Graciela Castorena    MRN: [id number]           Patient Information     Date Of Birth          1965        Visit Information        Provider Department      11/6/2018 3:50 PM Ronda Schilling PA-C [facility]        Today's Diagnoses     Cervicalgia    -  1       Follow-ups after your visit        Your next 10 appointments already scheduled     Dec 27, 2018 10:00 AM CST   MA DIAGNOSTIC DIGITAL LEFT with SHBCMA3   [facility] [place] ([facility])    [address] [place] [address], [address]  [facility] [id number] [phone]           How do I prepare for my exam? (Food and drink instructions) No Food and Drink Restrictions.  How do I prepare for my exam? (Other instructions) Do not use any powder, lotion or deodorant under your arms or on your breast. If you do, we will ask you to remove it before your exam.  What should I wear: Wear comfortable, two-piece clothing.  How long does the exam take: Most scans will take 15 minutes.  What should I bring: Bring any previous mammograms from other facilities or have them mailed to the breast center.  Do I need a :  No  is needed.  What do I need to tell my doctor: If you have any allergies, tell your care team.  What should I do after the exam: No restrictions, You may resume normal activities.  What is this test: This test is an x-ray of the breast to look for breast disease. The breast is pressed between two plates to flatten and spread the tissue. An X-ray is taken of the breast from different angles.  Who should I call with questions: If you have any questions, please call the Imaging Department where you will have your exam. Directions, parking instructions, and other information is available on our website, [email or web address].  Other information about my exam Three-dimensional (3D) mammograms are available at [facility] locations in [facility]  "[place], [place], [place], [facility], [place], and [place]. [facility] locations include [place] and the [facility] in [place].  Benefits of 3D mammograms include: * Improved rate of cancer detection * Decreases your chance of having to go back for more tests, which means fewer: * \"False-positive\" results (This means that there is an abnormal area but it isn't cancer.) * Invasive testing procedures, such as a biopsy or surgery * Can provide clearer images of the breast if you have dense breast tissue.  *3D mammography is an optional exam that anyone can have with a 2D mammogram. It doesn't replace or take the place of a 2D mammogram. 2D mammograms remain an effective screening test for all women.  Not all insurance companies cover the cost of a 3D mammogram. Check with your insurance.            Dec 27, 2018 10:30 AM CST   US BREAST LEFT LIMITED 1-3 QUAD with SHBCUS1   [facility] ([facility])    [address] [place] [address], [address]  [facility] [address] [phone]           How do I prepare for my exam? (Food and drink instructions) No Food and Drink Restrictions.  How do I prepare for my exam? (Other instructions) You do not need to do anything special to prepare for your exam.  What should I wear: Wear comfortable clothes.  How long does the exam take: Most ultrasounds take 30 to 60 minutes.  What should I bring: Bring a list of your medicines, including vitamins, minerals and over-the-counter drugs. It is safest to leave personal items at home.  Do I need a :  No  is needed.  What do I need to tell my doctor: Tell your doctor about any allergies you may have.  What should I do after the exam: No restrictions, You may resume normal activities.  What is this test: An ultrasound uses sound waves to make pictures of the body. Sound waves do not cause pain. The only discomfort may be the pressure of the wand against your skin or full " "bladder.  Who should I call with questions: If you have any questions, please call the Imaging Department where you will have your exam. Directions, parking instructions, and other information is available on our website, [email or web address].            2019  1:40 PM CST   Return Visit with Samreen Barahona MD   [facility] ([facility])    [facility]  [address]  [place] [address] [phone]              Who to contact     If you have questions or need follow up information about today's clinic visit or your schedule please contact [facility] directly at [phone].  Normal or non-critical lab and imaging results will be communicated to you by MyChart, letter or phone within 4 business days after the clinic has received the results. If you do not hear from us within 7 days, please contact the clinic through [employer] or phone. If you have a critical or abnormal lab result, we will notify you by phone as soon as possible.  Submit refill requests through [employer] or call your pharmacy and they will forward the refill request to us. Please allow 3 business days for your refill to be completed.          Additional Information About Your Visit        [employer] Information     [employer] lets you send messages to your doctor, view your test results, renew your prescriptions, schedule appointments and more. To sign up, go to [email or web address] . Click on \"Log in\" on the left side of the screen, which will take you to the Welcome page. Then click on \"Sign up Now\" on the right side of the page.     You will be asked to enter the access code listed below, as well as some personal information. Please follow the directions to create your username and password.     Your access code is: [email or web address]  Expires: 2019  8:51 PM     Your access code will  in 90 days. If you need help or a new code, please call your " [facility] or [phone].        Care EveryWhere ID     This is your Care EveryWhere ID. This could be used by other organizations to access your [facility] medical records  [id number]        Your Vitals Were     Pulse Temperature Pulse Oximetry BMI (Body Mass Index)          86 97.5  F (36.4  C) (Oral) 98% 29.96 kg/m2         Blood Pressure from Last 3 Encounters:   11/06/18 134/84   08/29/18 114/66   08/23/18 110/76    Weight from Last 3 Encounters:   11/06/18 169 lb 2 oz (76.7 kg)   08/29/18 168 lb (76.2 kg)   08/23/18 166 lb 1.6 oz (75.3 kg)              Today, you had the following     No orders found for display         Today's Medication Changes          These changes are accurate as of 11/6/18  8:51 PM.  If you have any questions, ask your nurse or doctor.               Start taking these medicines.        Dose/Directions    ibuprofen 800 MG tablet   Commonly known as:  ADVIL/MOTRIN   Used for:  Cervicalgia   Started by:  Ronda Schilling PA-C        Dose:  800 mg   Take 1 tablet (800 mg) by mouth every 8 hours as needed for moderate pain   Quantity:  30 tablet   Refills:  1       methocarbamol 750 MG tablet   Commonly known as:  ROBAXIN   Used for:  Cervicalgia   Started by:  Ronda Schilling PA-C        Dose:  750 mg   Take 1 tablet (750 mg) by mouth 4 times daily as needed for muscle spasms   Quantity:  60 tablet   Refills:  0            Where to get your medicines      These medications were sent to [facility]/pharmacy #[id number] [address] [address]     Phone:  [phone]     ibuprofen 800 MG tablet    methocarbamol 750 MG tablet                Primary Care Provider Office Phone # Fax #    Genna Magallanes [phone]       [address] [facility] [address]        Equal Access to Services     JACOBO DUFFY AH: Ruperto Flores, waaxda luqadaha, qaybta yolis ziegler, becky driscoll  jesús multani ah. So [facility] [phone].    ATENCIÓN: Si lavelle white, tiene a banuelos disposición servicios gratuitos de asistencia lingüística. Pretty pederson [phone].    We comply with applicable federal civil rights laws and [place] laws. We do not discriminate on the basis of race, color, national origin, age, disability, sex, sexual orientation, or gender identity.            Thank you!     Thank you for choosing [facility]  for your care. Our goal is always to provide you with excellent care. Hearing back from our patients is one way we can continue to improve our services. Please take a few minutes to complete the written survey that you may receive in the mail after your visit with us. Thank you!             Your Updated Medication List - Protect others around you: Learn how to safely use, store and throw away your medicines at www.disposemymeds.org.          This list is accurate as of 11/6/18  8:51 PM.  Always use your most recent med list.                   Brand Name Dispense Instructions for use Diagnosis    calcium carbonate-vitamin D 600-400 MG-UNIT Chew     180 tablet    Take 1 chew tab by mouth 2 times daily    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 breast       ibuprofen 800 MG tablet    ADVIL/MOTRIN    30 tablet    Take 1 tablet (800 mg) by mouth every 8 hours as needed for moderate pain    Cervicalgia       methocarbamol 750 MG tablet    ROBAXIN    60 tablet    Take 1 tablet (750 mg) by mouth 4 times daily as needed for muscle spasms    Cervicalgia       naproxen 375 MG tablet    NAPROSYN    30 tablet    Take 1 tablet (375 mg) by mouth 2 times daily (with meals)    Acute bilateral low back pain without sciatica       tamoxifen 20 MG tablet    NOLVADEX    30 tablet    Take 1 tablet (20 mg) by mouth daily    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 breast       triamcinolone 0.1 % cream    KENALOG    80 g    Apply sparingly to affected area three times daily for 14 days.     Radiation dermatitis

## 2018-12-08 DIAGNOSIS — R10.13 ABDOMINAL PAIN, EPIGASTRIC: ICD-10-CM

## 2018-12-27 ENCOUNTER — HOSPITAL ENCOUNTER (OUTPATIENT)
Dept: MAMMOGRAPHY | Facility: CLINIC | Age: 53
Discharge: HOME | End: 2018-12-27
Attending: INTERNAL MEDICINE | Admitting: INTERNAL MEDICINE
Payer: COMMERCIAL

## 2018-12-27 DIAGNOSIS — D05.12 DUCTAL CARCINOMA IN SITU (DCIS) OF LEFT BREAST: ICD-10-CM

## 2018-12-27 PROCEDURE — G0279 TOMOSYNTHESIS, MAMMO: HCPCS

## 2019-01-08 ENCOUNTER — ONCOLOGY VISIT (OUTPATIENT)
Dept: ONCOLOGY | Facility: CLINIC | Age: 54
End: 2019-01-08
Attending: INTERNAL MEDICINE
Payer: COMMERCIAL

## 2019-01-08 VITALS
OXYGEN SATURATION: 100 % | HEIGHT: 63 IN | HEART RATE: 87 BPM | WEIGHT: 165 LBS | DIASTOLIC BLOOD PRESSURE: 89 MMHG | BODY MASS INDEX: 29.23 KG/M2 | SYSTOLIC BLOOD PRESSURE: 130 MMHG | TEMPERATURE: 98.4 F

## 2019-01-08 DIAGNOSIS — D05.12 DUCTAL CARCINOMA IN SITU (DCIS) OF LEFT BREAST: Primary | ICD-10-CM

## 2019-01-08 PROCEDURE — 99214 OFFICE O/P EST MOD 30 MIN: CPT | Performed by: INTERNAL MEDICINE

## 2019-01-08 ASSESSMENT — PAIN SCALES - GENERAL
PAINLEVEL: NO PAIN (0)
PAINLEVEL: NO PAIN (0)

## 2019-01-08 ASSESSMENT — MIFFLIN-ST. JEOR: SCORE: 1327.57

## 2019-01-08 NOTE — PATIENT INSTRUCTIONS
Continue tamoxifen.  Follow up in 6 months.  Scheduled Mammogram and Dr. Barahona/ Zara TERAN printed and given to patient/ Zara

## 2019-01-08 NOTE — Clinical Note
1/8/2019         RE: Graciela Castorena  [address]  [address]  [facility] [address]        Dear Colleague,    Thank you for referring your patient, Graciela Castorena, to the [facility] [place]. Please see a copy of my visit note below.    Visit Date:   01/09/2019      SUBJECTIVE:  Ms. Castorena is a 54-year-old female with left breast DCIS, status post lumpectomy in 04/2018 followed by radiation.  She is on tamoxifen.  The patient says that many times she forgets to take tamoxifen.      Overall, she is doing good.  No headache or dizziness.  No chest pain or difficulty breathing.  No abdominal pain, nausea or vomiting.  Appetite good.  No urinary or bowel complaints.  No bleeding.  No fever, chills or night sweats.  At the lumpectomy site, sometimes she gets some itching.      PHYSICAL EXAMINATION:     BREASTS:  Examined in the presence of the nurse.   -- Right breast exam is normal.  No mass.  No lymphadenopathy.  No skin changes.  No axillary lymphadenopathy.   -- Left breast exam reveals post-surgical changes.  No mass.  No suspicious skin lesion.  No axillary lymphadenopathy.      ASSESSMENT:  A 54-year-old female with left breast ductal carcinoma in situ, status post lumpectomy and radiation.  No evidence of recurrence.      PLAN:   1.  The patient is doing well from DCIS.  No evidence of recurrence of malignancy.  She is on tamoxifen.  The patient many times forgets to take tamoxifen.  I told her to be compliant with it.  Overall, she is tolerating treatment well.  Sometimes, she is more forgetful.  She does not get confused.  It is just that sometimes she cannot remember things.  It could be due to tamoxifen.  It is not getting worse.  If it gets worse, we will send her for a Neurology evaluation.   2.  Discussed regarding followup.  I will see her in 6 months' time.  The patient advised to see a physician sooner if she has any lump, unexplained pain, weight loss, worsening weakness, shortness of  breath, recurrent vomiting, bleeding or any other concerns.      ADDENDUM:  The patient gets some itching in the left breast.  I told her that this is secondary to her surgery and radiation.  There is no suspicious skin lesion.  Also, her recent mammogram on 2018 was benign.         JESSIE BENSON MD             D: 2019   T: 01/10/2019   MT: MANUEL      Name:     MACKENZIE REAGAN   MRN:      [id number]        Account:      [id number]   :      1965           Visit Date:   2019      Document: [id number]       Again, thank you for allowing me to participate in the care of your patient.        Sincerely,        Jessie Benson MD

## 2019-01-09 PROBLEM — D05.12 DUCTAL CARCINOMA IN SITU (DCIS) OF LEFT BREAST: Status: ACTIVE | Noted: 2018-03-13

## 2019-01-10 NOTE — PROGRESS NOTES
Visit Date:   01/09/2019     ONCOLOGY HISTORY: Mrs. 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      1.  Screening mammogram on 01/16/2018 revealed a new calcification cluster behind the left nipple.   -Left diagnostic mammogram on 03/12/2018 revealed an indeterminate cluster of microcalcification in retroareolar portion of left breast at 11:00.      2. Stereotactic left breast biopsy on 03/13/2018 reveals intermediate grade DCIS with microcalcification and comedonecrosis.  % positive and  WI 25% positive.      3.  Patient underwent left breast lumpectomy on 04/18/2018.  Pathology reveals grade 2 DCIS measuring 3 mm.  Margins are negative.     4. Radiation to left breast. 5056 cGy between 05/31/2018 and 06/27/2018.      5. Tamoxifen started in May,2018.      SUBJECTIVE:  Ms. Castorena is a 54-year-old female with left breast DCIS, status post lumpectomy in 04/2018 followed by radiation.  She is on tamoxifen. Patient says that many times she forgets to take tamoxifen.      Overall, she is doing good.  No headache or dizziness.  No chest pain or difficulty breathing.  No abdominal pain, nausea or vomiting.  Appetite is good.  No urinary or bowel complaints.  No bleeding.  No fever, chills or night sweats.  At the lumpectomy site, sometimes she gets some itching.      PHYSICAL EXAMINATION:   GENERAL:  Alert and oriented x 3.   VITAL SIGNS:  Reviewed.  ECOG PS of 1.     EYES:  No icterus.   THROAT:  No ulcer. No thrush.   NECK:  Supple. No lymphadenopathy. No thyromegaly.   AXILLAE:  No lymphadenopathy.   LUNGS:  Good air entry bilaterally.  No crackles or wheezing.   HEART:  Regular.  No murmur.   ABDOMEN:  Soft.  Nontender. No mass.   EXTREMITIES:  No pedal edema.  No calf swelling or tenderness.   SKIN:  No rash.   BREASTS:  Examined in the presence of the nurse.   -Right breast exam is normal.  No mass.  No lymphadenopathy.  No skin changes.  No axillary lymphadenopathy.   -Left breast exam reveals post-surgical  changes.  No mass.  No suspicious skin lesion.  No axillary lymphadenopathy.      ASSESSMENT:  A 54-year-old female with left breast ductal carcinoma in situ, status post lumpectomy and radiation.  No evidence of recurrence.      PLAN:   1.  Patient is doing well from DCIS.  No evidence of recurrence of malignancy.  She is on tamoxifen. I told her to be compliant with it.  Overall, she is tolerating treatment well.  Sometimes, she is more forgetful.  She does not get confused. It could be due to tamoxifen.  It is not getting worse.  If it gets worse, we will send her for a Neurology evaluation.     Patient gets some itching in the left breast.  I told her that this is secondary to surgery and radiation.  There is no suspicious skin lesion.  Also, her recent mammogram on 2018 was benign.     2.  Discussed regarding followup.  I will see her in 6 months' time.  Patient advised to see a physician sooner if she has any lump, unexplained pain, weight loss, worsening weakness, shortness of breath, recurrent vomiting, bleeding or any other concerns.      JESSIE BENSON MD             D: 2019   T: 01/10/2019   MT: MANUEL      Name:     MACKENZIE REAGAN   MRN:      [id number]        Account:      [id number]   :      1965           Visit Date:   2019      Document: [id number]

## 2019-03-29 ENCOUNTER — OFFICE VISIT (OUTPATIENT)
Dept: INTERNAL MEDICINE | Facility: CLINIC | Age: 54
End: 2019-03-29
Payer: COMMERCIAL

## 2019-03-29 VITALS
DIASTOLIC BLOOD PRESSURE: 76 MMHG | RESPIRATION RATE: 16 BRPM | OXYGEN SATURATION: 99 % | WEIGHT: 168.7 LBS | HEIGHT: 63 IN | SYSTOLIC BLOOD PRESSURE: 118 MMHG | BODY MASS INDEX: 29.89 KG/M2 | HEART RATE: 69 BPM

## 2019-03-29 DIAGNOSIS — Z13.21 ENCOUNTER FOR VITAMIN DEFICIENCY SCREENING: ICD-10-CM

## 2019-03-29 DIAGNOSIS — Z13.1 SCREENING FOR DIABETES MELLITUS: ICD-10-CM

## 2019-03-29 DIAGNOSIS — Z00.00 ROUTINE HISTORY AND PHYSICAL EXAMINATION OF ADULT: Primary | ICD-10-CM

## 2019-03-29 DIAGNOSIS — Z13.220 SCREENING FOR CHOLESTEROL LEVEL: ICD-10-CM

## 2019-03-29 DIAGNOSIS — Z13.0 SCREENING FOR BLOOD DISEASE: ICD-10-CM

## 2019-03-29 PROBLEM — N90.810: Status: RESOLVED | Noted: 2018-08-29 | Resolved: 2019-03-29

## 2019-03-29 LAB
ALBUMIN SERPL-MCNC: 3.7 G/DL (ref 3.4–5)
ALP SERPL-CCNC: 55 U/L (ref 40–150)
ALT SERPL W P-5'-P-CCNC: 19 U/L (ref 0–50)
ANION GAP SERPL CALCULATED.3IONS-SCNC: 4 MMOL/L (ref 3–14)
AST SERPL W P-5'-P-CCNC: 16 U/L (ref 0–45)
BILIRUB SERPL-MCNC: 0.2 MG/DL (ref 0.2–1.3)
BUN SERPL-MCNC: 12 MG/DL (ref 7–30)
CALCIUM SERPL-MCNC: 9.4 MG/DL (ref 8.5–10.1)
CHLORIDE SERPL-SCNC: 110 MMOL/L (ref 94–109)
CHOLEST SERPL-MCNC: 171 MG/DL
CO2 SERPL-SCNC: 30 MMOL/L (ref 20–32)
CREAT SERPL-MCNC: 0.68 MG/DL (ref 0.52–1.04)
ERYTHROCYTE [DISTWIDTH] IN BLOOD BY AUTOMATED COUNT: 12.8 % (ref 10–15)
GFR SERPL CREATININE-BSD FRML MDRD: >90 ML/MIN/{1.73_M2}
GLUCOSE SERPL-MCNC: 94 MG/DL (ref 70–99)
HCT VFR BLD AUTO: 39.6 % (ref 35–47)
HDLC SERPL-MCNC: 48 MG/DL
HGB BLD-MCNC: 13.4 G/DL (ref 11.7–15.7)
LDLC SERPL CALC-MCNC: 98 MG/DL
MCH RBC QN AUTO: 30.8 PG (ref 26.5–33)
MCHC RBC AUTO-ENTMCNC: 33.8 G/DL (ref 31.5–36.5)
MCV RBC AUTO: 91 FL (ref 78–100)
NONHDLC SERPL-MCNC: 123 MG/DL
PLATELET # BLD AUTO: 199 10E9/L (ref 150–450)
POTASSIUM SERPL-SCNC: 4.4 MMOL/L (ref 3.4–5.3)
PROT SERPL-MCNC: 7.5 G/DL (ref 6.8–8.8)
RBC # BLD AUTO: 4.35 10E12/L (ref 3.8–5.2)
SODIUM SERPL-SCNC: 144 MMOL/L (ref 133–144)
TRIGL SERPL-MCNC: 125 MG/DL
WBC # BLD AUTO: 5.8 10E9/L (ref 4–11)

## 2019-03-29 PROCEDURE — 80061 LIPID PANEL: CPT | Performed by: INTERNAL MEDICINE

## 2019-03-29 PROCEDURE — 99396 PREV VISIT EST AGE 40-64: CPT | Performed by: INTERNAL MEDICINE

## 2019-03-29 PROCEDURE — 85027 COMPLETE CBC AUTOMATED: CPT | Performed by: INTERNAL MEDICINE

## 2019-03-29 PROCEDURE — 36415 COLL VENOUS BLD VENIPUNCTURE: CPT | Performed by: INTERNAL MEDICINE

## 2019-03-29 PROCEDURE — 82306 VITAMIN D 25 HYDROXY: CPT | Performed by: INTERNAL MEDICINE

## 2019-03-29 PROCEDURE — 80053 COMPREHEN METABOLIC PANEL: CPT | Performed by: INTERNAL MEDICINE

## 2019-03-29 ASSESSMENT — MIFFLIN-ST. JEOR: SCORE: 1344.35

## 2019-03-29 NOTE — PROGRESS NOTES
SUBJECTIVE:                                                      HPI: Graciela Castorena is a pleasant 54 year old female who presents for a physical.    No specific complaints, concerns, or questions.    ROS:  Constitutional: denies unintentional weight loss or gain; denies fevers, chills, or sweats     Cardiovascular: denies chest pain, palpitations, or edema  Respiratory: denies cough, wheezing, shortness of breath, or dyspnea on exertion  Gastrointestinal: denies nausea, vomiting, constipation, diarrhea, or abdominal pain  Genitourinary: denies urinary frequency, urgency, dysuria, or hematuria  Integumentary: denies rash or pruritus  Musculoskeletal: denies back pain, muscle pain, joint pain, or joint swelling  Neurologic: denies focal weakness, numbness, or tingling  Hematologic/Immunologic: denies history of anemia or blood transfusions  Endocrine: denies heat or cold intolerance; denies polyuria, polydipsia  Psychiatric: denies anxiety; see preventative health below    Past Medical History:   Diagnosis Date     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 breast 2018    s/p lumpectomy and radiation; on Tamoxifen     Past Surgical History:   Procedure Laterality Date     COLONOSCOPY N/A 2/12/2018    Procedure: COLONOSCOPY;  colonoscopy;  Surgeon: Sabas Villegas MD;  Location:  GI     LUMPECTOMY BREAST WITH SEED LOCALIZATION Left 4/18/2018    Procedure: LUMPECTOMY BREAST WITH SEED LOCALIZATION;  SEED LOCALIZED LEFT BREAST LUMPECTOMY ;  Surgeon: Meli Rodriguez MD;  [facility]     Family History   Problem Relation Age of Onset     Diabetes Type 2  Brother      Diabetes Type 2  Sister      Myocardial Infarction No family hx of      Cerebrovascular Disease No family hx of      Coronary Artery Disease Early Onset No family hx of      Colon Cancer No family hx of      Breast Cancer No family hx of      Ovarian Cancer No family hx of      Social History     Occupational History     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    8 adult kids - 7 are here. 1 still in [place].     One grandchild (as of 2019).    Walks a lot.      Allergies   Allergen Reactions     Ciprofloxacin Itching     Current Outpatient Medications   Medication Sig     ibuprofen (ADVIL/MOTRIN) 800 MG tablet Take 1 tablet (800 mg) by mouth every 8 hours as needed for moderate pain     tamoxifen (NOLVADEX) 20 MG tablet Take 1 tablet (20 mg) by mouth daily     triamcinolone (KENALOG) 0.1 % cream Apply sparingly to affected area three times daily for 14 days.     Immunization History   Administered Date(s) Administered     Influenza (IIV3) PF 10/08/2013, 10/01/2017     Influenza Vaccine IM 3yrs+ 4 Valent IIV4 09/29/2015     TDAP Vaccine (Adacel) 06/09/2014     OBJECTIVE:                                                      /76   Pulse 69   Resp 16   Ht 1.6 m (5' 3\")   Wt 76.5 kg (168 lb 11.2 oz)   SpO2 99%   BMI 29.88 kg/m    Constitutional: well-appearing  Eyes: normal conjunctivae and lids; pupils equal, round, and reactive to light  Ears, Nose, Mouth, and Throat: normal ears and nose; tympanic membranes visualized and normal; normal lips, teeth, and gums; no oropharyngeal lesions or ulcers  Neck: supple and symmetric; no lymphadenopathy; no thyromegaly or masses  Respiratory: normal respiratory effort; clear to auscultation bilaterally  Cardiovascular: regular rate and rhythm; pedal pulses palpable; no edema  Gastrointestinal: soft, non-tender, non-distended, and bowel sounds present; no organomegaly or masses  Musculoskeletal: normal gait and station  Psych: normal judgment and insight; normal mood and affect; recent and remote memory intact; oriented to time, place, and " person    PREVENTATIVE HEALTH                                                      BMI: overweight  Blood pressure: within normal limits   Breast CA screening: up to date   Cervical CA screening: last pap performed 8/28/19; report reviewed and was normal; repeat due 8/28/19  Colon CA screening: last performed 2018; report reviewed; repeat due in 2028  Lung CA screening: n/a   Dexa: not medically indicated at this time   Screening HCV: n/a   Screening HIV: completed  Screening cholesterol: DUE  Screening diabetes: DUE  STD testing: no risk factors present  Depression screening: PHQ-2 assessment completed and reviewed - no intervention indicated at this time  Alcohol misuse screening: alcohol use reviewed - no intervention indicated at this time  Immunizations: reviewed; Shingrix DUE - we are out of stock    ASSESSMENT/PLAN:                                                       (Z00.00) Routine history and physical examination of adult  (primary encounter diagnosis)  Comment: PMH, PSH, FH, SH, medications, allergies, immunizations, and preventative health measures reviewed.   Plan: pap smear in September, 2019; see below for remainder plans.    (Z13.0) Screening for blood disease  (Z13.220) Screening for cholesterol level  (Z13.1) Screening for diabetes mellitus  (Z13.21) Encounter for vitamin deficiency screening  Plan: fasting labs today.    The instructions on the AVS were discussed and explained to the patient. Patient expressed understanding of instructions.    (Chart documentation was completed, in part, with [employer] voice-recognition software. Even though reviewed, some grammatical, spelling, and word errors may remain.)    Genna Magallanes MD   [address] [address]  T: [phone], F: [phone]

## 2019-03-29 NOTE — LETTER
03/30/19      Gracieladarren Castorena  [address]    [facility] [address]        Dear ,    It was a pleasure seeing you again the other day! I hope this finds you well.    I wanted to let you know that your labs came back as normal.    Based on your results, I have no additional recommendations at this time.     Please feel free to contact me with any questions or concerns.      Take care,    Genna Magallanes MD   [place] [address] [place] [address]  T: [phone], F: [phone]                             Patient/Caregiver provided printed discharge information.

## 2019-03-30 LAB — DEPRECATED CALCIDIOL+CALCIFEROL SERPL-MC: 33 UG/L (ref 20–75)

## 2019-05-16 ENCOUNTER — OFFICE VISIT (OUTPATIENT)
Dept: URGENT CARE | Facility: URGENT CARE | Age: 54
End: 2019-05-16
Payer: COMMERCIAL

## 2019-05-16 VITALS
RESPIRATION RATE: 20 BRPM | WEIGHT: 165 LBS | SYSTOLIC BLOOD PRESSURE: 120 MMHG | BODY MASS INDEX: 29.23 KG/M2 | TEMPERATURE: 98.2 F | HEART RATE: 76 BPM | DIASTOLIC BLOOD PRESSURE: 80 MMHG

## 2019-05-16 DIAGNOSIS — M25.551 LATERAL PAIN OF RIGHT HIP: Primary | ICD-10-CM

## 2019-05-16 DIAGNOSIS — D05.12 DUCTAL CARCINOMA IN SITU (DCIS) OF LEFT BREAST: ICD-10-CM

## 2019-05-16 PROCEDURE — 99213 OFFICE O/P EST LOW 20 MIN: CPT | Performed by: PHYSICIAN ASSISTANT

## 2019-05-16 RX ORDER — TAMOXIFEN CITRATE 20 MG/1
20 TABLET ORAL DAILY
Qty: 30 TABLET | Refills: 11 | Status: SHIPPED | OUTPATIENT
Start: 2019-05-16 | End: 2020-01-07

## 2019-05-16 RX ORDER — METHOCARBAMOL 750 MG/1
750 TABLET, FILM COATED ORAL 4 TIMES DAILY PRN
Qty: 40 TABLET | Refills: 0 | Status: SHIPPED | OUTPATIENT
Start: 2019-05-16 | End: 2019-06-13

## 2019-05-17 NOTE — PROGRESS NOTES
Patient presents with:  Back Pain: rt sided back pain for past 4 days, worsen with  movements    SUBJECTIVE:  Chief Complaint   Patient presents with     Back Pain     rt sided back pain for past 4 days, worsen with  movements     Graciela Castorena is a 54 year old female presents with a chief complaint of right lateral hip pain onset 4 days ago.  Denies any trauma.  Pain does not radiate.  Denies any injury.  Denies any urinary symptoms or abdominal pain.  She has not taken any medication for her symptoms.      SH: she is here with her  this evening.        Past Medical History:   Diagnosis Date     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 breast 2018    s/p lumpectomy and radiation; on Tamoxifen     Patient Active Problem List   Diagnosis     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 breast     Social History     Tobacco Use     Smoking status: Never Smoker     Smokeless tobacco: Never Used   Substance Use Topics     Alcohol use: No     Alcohol/week: 0.0 oz       ROS:  CONSTITUTIONAL:NEGATIVE for fever, chills, change in weight  INTEGUMENTARY/SKIN: NEGATIVE for worrisome rashes, moles or lesions  RESP:NEGATIVE for significant cough or SOB  CV: NEGATIVE for chest pain, palpitations or peripheral edema  GI: NEGATIVE for nausea, abdominal pain, heartburn, or change in bowel habits  : normal menstrual cycles  MUSCULOSKELETAL: as per HPI  NEURO: NEGATIVE for weakness, dizziness or paresthesias  Review of systems negative except as stated above.    EXAM:   /80 (Cuff Size: Adult Regular)   Pulse 76   Temp 98.2  F (36.8  C) (Oral)   Resp 20   Wt 74.8 kg (165 lb)   BMI 29.23 kg/m    Gen: healthy,alert,mild distress secondary to discomfortr  Extremity: right hip: tender over well defined are of soft tissue on lateral aspect.  NO pain with ROM.  No crepitus.   BACK: no vertebral tenderness.   NO soft tissue tenderness.    There is not compromise to the distal circulation.  Pulses are +2 and CRT is  brisk    SKIN: no suspicious lesions or rashes  NEURO: Normal strength and tone, sensory exam grossly normal, mentation intact and speech normal    (M25.551) Lateral pain of right hip  (primary encounter diagnosis)  Comment:   Plan: methocarbamol (ROBAXIN) 750 MG tablet        Naproxen 375mg 1 pill twice a day with food. (patient has at home)      You may also apply ice to the area over a thin cloth a few times a day    Follow up with primary clinic should symptoms persist or worsen.    Patient expresses understanding and agreement with the assessment and plan as above.

## 2019-05-17 NOTE — PATIENT INSTRUCTIONS
(M25.760) Lateral pain of right hip  (primary encounter diagnosis)  Comment:   Plan: methocarbamol (ROBAXIN) 750 MG tablet        Naproxen 375mg 1 pill twice a day with food.      You may also apply ice to the area over a thin cloth a few times a day    Follow up with primary clinic should symptoms persist or worsen.

## 2019-06-13 ENCOUNTER — OFFICE VISIT (OUTPATIENT)
Dept: INTERNAL MEDICINE | Facility: CLINIC | Age: 54
End: 2019-06-13
Payer: COMMERCIAL

## 2019-06-13 VITALS
DIASTOLIC BLOOD PRESSURE: 78 MMHG | BODY MASS INDEX: 29.33 KG/M2 | SYSTOLIC BLOOD PRESSURE: 108 MMHG | OXYGEN SATURATION: 99 % | HEART RATE: 90 BPM | WEIGHT: 165.6 LBS | RESPIRATION RATE: 16 BRPM

## 2019-06-13 DIAGNOSIS — M76.61 TENDONITIS, ACHILLES, RIGHT: ICD-10-CM

## 2019-06-13 DIAGNOSIS — E55.9 VITAMIN D DEFICIENCY: ICD-10-CM

## 2019-06-13 DIAGNOSIS — M79.18 GLUTEAL PAIN: Primary | ICD-10-CM

## 2019-06-13 PROCEDURE — 99213 OFFICE O/P EST LOW 20 MIN: CPT | Performed by: INTERNAL MEDICINE

## 2019-06-13 RX ORDER — CHOLECALCIFEROL (VITAMIN D3) 50 MCG
1 TABLET ORAL DAILY
Qty: 90 TABLET | Refills: 3 | Status: SHIPPED | OUTPATIENT
Start: 2019-06-13 | End: 2020-03-27

## 2019-06-13 RX ORDER — OMEGA-3 FATTY ACIDS/FISH OIL 300-1000MG
200-400 CAPSULE ORAL EVERY 6 HOURS PRN
Qty: 30 CAPSULE | Refills: 0 | Status: SHIPPED | OUTPATIENT
Start: 2019-06-13 | End: 2019-12-20

## 2019-06-13 NOTE — PATIENT INSTRUCTIONS
Ibuprofen (200mg) 1-2 tabs every 6 hours as needed for right buttock and right achilles pain.    Cool compresses may help as well.    Recommend sports medicine evaluation - may benefit from physical therapy and/or an injection.

## 2019-06-13 NOTE — PROGRESS NOTES
SUBJECTIVE:                                                      HPI: Graciela Castorena is a pleasant 54 year old female who presents with right buttock pain:    Ongoing for the last month or so. No precipitating trauma, injury, or unusual exertion. No back pain or radiation down leg. Pain occurs with walking. Minimal to absent when at rest, standing, sitting, or lying down. Has tried ibuprofen and Robaxin with temporary improvement in symptoms.    Patient also complains of right Achilles pain. Also ongoing for the last month. As with above, occurs with walking. Not bothersome otherwise. No fevers or chills. No recent oral steroid or fluoroquinolone use.  Has not tried anything for pain (does not recall effective ibuprofen and Robaxin on pain).    Finally, patient needs refills of her daily vitamin D supplement.    The medication, allergy, and problem lists have been reviewed and updated as appropriate.     OBJECTIVE:                                                      /78   Pulse 90   Resp 16   Wt 75.1 kg (165 lb 9.6 oz)   SpO2 99%   BMI 29.33 kg/m    Constitutional: well-appearing  Musculoskeletal: tenderness along right lateral buttock; soft tissue swelling and tenderness along right Achilles    ASSESSMENT/PLAN:                                                      (M79.18) Gluteal pain  (primary encounter diagnosis)  Comment: suspect right gluteal tendinopathy/tendinitis.  Plan:    - recommend NSAIDs and cool compresses.   - referred to sports medicine to determine if PT or injection is next best step -patient to schedule.    (M76.61) Tendonitis, Achilles, right  Comment: etiology unclear.  Plan:    - recommend NSAIDs and cool compresses.   - referred to sports medicine for further evaluation - patient to schedule.    (E55.9) Vitamin D deficiency  Plan: vitamin D3 2000 units daily prescribed.    The instructions on the AVS were discussed and explained to the patient. Patient expressed understanding of  instructions.    (Chart documentation was completed, in part, with [employer] voice-recognition software. Even though reviewed, some grammatical, spelling, and word errors may remain.)    Genna Magallanes MD   [address] [address]  T: [phone], F: [phone]

## 2019-07-01 ENCOUNTER — HOSPITAL ENCOUNTER (OUTPATIENT)
Dept: MAMMOGRAPHY | Facility: CLINIC | Age: 54
Discharge: HOME | End: 2019-07-01
Attending: INTERNAL MEDICINE | Admitting: INTERNAL MEDICINE
Payer: COMMERCIAL

## 2019-07-01 DIAGNOSIS — Z12.31 VISIT FOR SCREENING MAMMOGRAM: ICD-10-CM

## 2019-07-01 PROCEDURE — 77067 SCR MAMMO BI INCL CAD: CPT

## 2019-07-08 ENCOUNTER — ONCOLOGY VISIT (OUTPATIENT)
Dept: ONCOLOGY | Facility: CLINIC | Age: 54
End: 2019-07-08
Attending: INTERNAL MEDICINE
Payer: COMMERCIAL

## 2019-07-08 VITALS
OXYGEN SATURATION: 96 % | DIASTOLIC BLOOD PRESSURE: 95 MMHG | HEIGHT: 63 IN | BODY MASS INDEX: 29.66 KG/M2 | HEART RATE: 96 BPM | SYSTOLIC BLOOD PRESSURE: 128 MMHG | WEIGHT: 167.4 LBS | TEMPERATURE: 98.6 F

## 2019-07-08 DIAGNOSIS — D05.12 DUCTAL CARCINOMA IN SITU (DCIS) OF LEFT BREAST: Primary | ICD-10-CM

## 2019-07-08 DIAGNOSIS — R68.89 FORGETFULNESS: ICD-10-CM

## 2019-07-08 PROCEDURE — 99214 OFFICE O/P EST MOD 30 MIN: CPT | Performed by: INTERNAL MEDICINE

## 2019-07-08 PROCEDURE — G0463 HOSPITAL OUTPT CLINIC VISIT: HCPCS

## 2019-07-08 ASSESSMENT — PAIN SCALES - GENERAL: PAINLEVEL: SEVERE PAIN (7)

## 2019-07-08 ASSESSMENT — MIFFLIN-ST. JEOR: SCORE: 1338.45

## 2019-07-08 NOTE — Clinical Note
"    7/8/2019         RE: Graciela Castorena  [address]  [address]  [facility] [address]        Dear Colleague,    Thank you for referring your patient, Graciela Castorena, to the [facility]. Please see a copy of my visit note below.    Oncology Rooming Note    July 8, 2019 10:54 AM   Graciela Castorena is a 54 year old female who presents for:    Chief Complaint   Patient presents with     Oncology Clinic Visit     Initial Vitals: BP (!) 128/98   Pulse 110   Temp 98.6  F (37  C) (Oral)   Ht 1.6 m (5' 3\")   Wt 75.9 kg (167 lb 6.4 oz)   SpO2 96%   BMI 29.65 kg/m    Estimated body mass index is 29.65 kg/m  as calculated from the following:    Height as of this encounter: 1.6 m (5' 3\").    Weight as of this encounter: 75.9 kg (167 lb 6.4 oz). Body surface area is 1.84 meters squared.  Severe Pain (7) Comment: Data Unavailable   No LMP recorded. Patient is postmenopausal.  Allergies reviewed: Yes  Medications reviewed: Yes    Medications: MEDICATION REFILLS NEEDED TODAY. Provider was notified.  Pharmacy name entered into [employer]: [id number] - [facility] [address]    Clinical concerns:  dodctor was notified.  REFILL ON TAMOXIFEN    [facility]              Visit Date:   07/08/2019     ONCOLOGY HISTORY: Mrs. 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      1.  Screening mammogram on 01/16/2018 revealed a new calcification cluster behind the left nipple.   -Left diagnostic mammogram on 03/12/2018 revealed an indeterminate cluster of microcalcification in retroareolar portion of left breast at 11:00.      2. Stereotactic left breast biopsy on 03/13/2018 reveals intermediate grade DCIS with microcalcification and comedonecrosis.  % positive and  LA 25% positive.      3.  Patient underwent left breast lumpectomy on 04/18/2018.  Pathology reveals grade 2 DCIS measuring 3 mm.  Margins are negative.     4. Radiation to left breast. 5056 cGy between 05/31/2018 and " 06/27/2018.      5. Tamoxifen started in May,2018.      SUBJECTIVE:  Ms. Castorena is a 54-year-old female with left breast DCIS.  She had lumpectomy in 04/2018 followed by radiation.  She is on tamoxifen since 05/2018.      Overall, she is doing good.  No headache.  No dizziness.  No chest pain.  No shortness of breath.  No abdominal pain, nausea or vomiting.  Appetite overall has been good.  No urinary or bowel complaints.  No bleeding.  No fever, chills or night sweats.      The patient has been more forgetful.  The patient says that she is not confused, but she forgets things. Her forgetfulness has been slowly getting worse.      PHYSICAL EXAMINATION:   GENERAL:  Alert and oriented x 3.   VITAL SIGNS:  Reviewed.  ECOG PS of 1.     EYES:  No icterus.   THROAT:  No ulcer. No thrush.   NECK:  Supple. No lymphadenopathy. No thyromegaly.   AXILLAE:  No lymphadenopathy.   LUNGS:  Good air entry bilaterally.  No crackles or wheezing.   HEART:  Regular.  No murmur.   ABDOMEN:  Soft.  Nontender. No mass.   EXTREMITIES:  No pedal edema.  No calf swelling or tenderness.   SKIN:  No rash.   BREASTS:  Examined in the presence of the nurse.   -Right breast exam is normal.  No mass.  No lymphadenopathy.  No skin changes.  No axillary lymphadenopathy.   -Left breast exam reveals post-surgical changes.  No mass.  No suspicious skin lesion.  No axillary lymphadenopathy.      LABORATORY DATA:  Reviewed.  These were done on 03/29/2019.  Normal CBC.  CMP essentially normal.      ASSESSMENT:   1.  A 54-year-old female with left breast DCIS on tamoxifen.  No evidence of recurrence.   2.  Forgetfulness.      PLAN:   1.  I discussed regarding breast cancer.  The patient is doing very well.  No evidence of recurrence of breast cancer.  She had mammogram done last week, which is benign.      For DCIS, she will continue on tamoxifen.  She is tolerating it well.  Side effects of tamoxifen discussed.  Plan is to give it for 5 years.  In  future, we can consider changing it to aromatase inhibitor.      2.  Discussed regarding forgetfulness.  It could be related to tamoxifen.  It could be unrelated.  I told the patient that she needs further evaluation for this forgetfulness as she is very young.      Discussed regarding Neurology appointment.  She is agreeable for it.  That will be scheduled.      If neurologist finds tamoxifen to be the cause of forgetfulness, we will discontinue the tamoxifen and start her on aromatase inhibitor.  We will wait for neurology evaluation.     3. Blood pressure is elevated.  Her previous blood pressure has been better.  The patient mentioned that she was a little anxious.  I told the patient to have her blood pressure again rechecked with her PCP.     4.  The patient had a few questions, which were all answered.  I will see her in 6 months for followup.  Advised her to see a physician sooner if she has any lump, unexplained pain, weight loss, worsening weakness, shortness of breath, recurrent vomiting, bleeding or any other concerns.         JESSIE BENSON MD             D: 2019   T: 2019   MT: WT      Name:     MACKENZIE REAGAN   MRN:      [id number]        Account:      [id number]   :      1965           Visit Date:   2019      Document: [id number]        Again, thank you for allowing me to participate in the care of your patient.        Sincerely,        Jessie Benson MD

## 2019-07-08 NOTE — PATIENT INSTRUCTIONS
Recheck BP. BP re-checked. PJM.  Neurology appointment for forgetfulness. \[facility]\"" [facility] will call pt/Amee   Continue tamoxifen.  Follow up in 6 months.

## 2019-07-09 NOTE — PROGRESS NOTES
Visit Date:   07/08/2019     ONCOLOGY HISTORY: Mrs. 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      1.  Screening mammogram on 01/16/2018 revealed a new calcification cluster behind the left nipple.   -Left diagnostic mammogram on 03/12/2018 revealed an indeterminate cluster of microcalcification in retroareolar portion of left breast at 11:00.      2. Stereotactic left breast biopsy on 03/13/2018 reveals intermediate grade DCIS with microcalcification and comedonecrosis.  % positive and  [place] 25% positive.      3.  Patient underwent left breast lumpectomy on 04/18/2018.  Pathology reveals grade 2 DCIS measuring 3 mm.  Margins are negative.     4. Radiation to left breast. 5056 cGy between 05/31/2018 and 06/27/2018.      5. Tamoxifen started in May,2018.      SUBJECTIVE:  Ms. Castorena is a 54-year-old female with left breast DCIS.  She had lumpectomy in 04/2018 followed by radiation.  She is on tamoxifen since 05/2018.      Overall, she is doing good.  No headache.  No dizziness.  No chest pain.  No shortness of breath.  No abdominal pain, nausea or vomiting.  Appetite overall has been good.  No urinary or bowel complaints.  No bleeding.  No fever, chills or night sweats.      The patient has been more forgetful.  The patient says that she is not confused, but she forgets things. Her forgetfulness has been slowly getting worse.      PHYSICAL EXAMINATION:   GENERAL:  Alert and oriented x 3.   VITAL SIGNS:  Reviewed.  ECOG PS of 1.     EYES:  No icterus.   THROAT:  No ulcer. No thrush.   NECK:  Supple. No lymphadenopathy. No thyromegaly.   AXILLAE:  No lymphadenopathy.   LUNGS:  Good air entry bilaterally.  No crackles or wheezing.   HEART:  Regular.  No murmur.   ABDOMEN:  Soft.  Nontender. No mass.   EXTREMITIES:  No pedal edema.  No calf swelling or tenderness.   SKIN:  No rash.   BREASTS:  Examined in the presence of the nurse.   -Right breast exam is normal.  No mass.  No lymphadenopathy.  No skin changes.  No  axillary lymphadenopathy.   -Left breast exam reveals post-surgical changes.  No mass.  No suspicious skin lesion.  No axillary lymphadenopathy.      LABORATORY DATA:  Reviewed.  These were done on 2019.  Normal CBC.  CMP essentially normal.      ASSESSMENT:   1.  A 54-year-old female with left breast DCIS on tamoxifen.  No evidence of recurrence.   2.  Forgetfulness.      PLAN:   1.  I discussed regarding breast cancer.  The patient is doing very well.  No evidence of recurrence of breast cancer.  She had mammogram done last week, which is benign.      For DCIS, she will continue on tamoxifen.  She is tolerating it well.  Side effects of tamoxifen discussed.  Plan is to give it for 5 years.  In future, we can consider changing it to aromatase inhibitor.      2.  Discussed regarding forgetfulness.  It could be related to tamoxifen.  It could be unrelated.  I told the patient that she needs further evaluation for this forgetfulness as she is very young.      Discussed regarding Neurology appointment.  She is agreeable for it.  That will be scheduled.      If neurologist finds tamoxifen to be the cause of forgetfulness, we will discontinue the tamoxifen and start her on aromatase inhibitor.  We will wait for neurology evaluation.     3. Blood pressure is elevated.  Her previous blood pressure has been better.  The patient mentioned that she was a little anxious.  I told the patient to have her blood pressure again rechecked with her PCP.     4.  The patient had a few questions, which were all answered.  I will see her in 6 months for followup.  Advised her to see a physician sooner if she has any lump, unexplained pain, weight loss, worsening weakness, shortness of breath, recurrent vomiting, bleeding or any other concerns.         JESSIE BENSON MD             D: 2019   T: 2019   MT: MURPHY      Name:     MACKENZIE REAGAN   MRN:      [id number]        Account:      [id number]   :      1965            Visit Date:   07/08/2019      Document: [id number]

## 2019-07-30 DIAGNOSIS — M76.61 TENDONITIS, ACHILLES, RIGHT: ICD-10-CM

## 2019-07-30 DIAGNOSIS — K59.00 CONSTIPATION: ICD-10-CM

## 2019-07-30 DIAGNOSIS — M79.18 GLUTEAL PAIN: ICD-10-CM

## 2019-07-30 RX ORDER — POLYETHYLENE GLYCOL 3350 17 G/17G
1 POWDER, FOR SOLUTION ORAL DAILY
Qty: 1 BOTTLE | Status: SHIPPED | OUTPATIENT
Start: 2019-07-30 | End: 2020-03-27

## 2019-08-15 ENCOUNTER — OFFICE VISIT (OUTPATIENT)
Dept: URGENT CARE | Facility: URGENT CARE | Age: 54
End: 2019-08-15
Payer: COMMERCIAL

## 2019-08-15 VITALS
OXYGEN SATURATION: 98 % | SYSTOLIC BLOOD PRESSURE: 124 MMHG | HEART RATE: 91 BPM | DIASTOLIC BLOOD PRESSURE: 86 MMHG | WEIGHT: 166 LBS | RESPIRATION RATE: 12 BRPM | BODY MASS INDEX: 29.41 KG/M2 | TEMPERATURE: 98.8 F

## 2019-08-15 DIAGNOSIS — R07.0 THROAT PAIN: ICD-10-CM

## 2019-08-15 DIAGNOSIS — R30.0 DYSURIA: Primary | ICD-10-CM

## 2019-08-15 DIAGNOSIS — R31.9 HEMATURIA, UNSPECIFIED TYPE: ICD-10-CM

## 2019-08-15 LAB
ALBUMIN UR-MCNC: NEGATIVE MG/DL
AMORPH CRY #/AREA URNS HPF: ABNORMAL /HPF
APPEARANCE UR: CLEAR
BACTERIA #/AREA URNS HPF: ABNORMAL /HPF
BILIRUB UR QL STRIP: NEGATIVE
COLOR UR AUTO: YELLOW
GLUCOSE UR STRIP-MCNC: NEGATIVE MG/DL
HGB UR QL STRIP: ABNORMAL
KETONES UR STRIP-MCNC: NEGATIVE MG/DL
LEUKOCYTE ESTERASE UR QL STRIP: NEGATIVE
NITRATE UR QL: NEGATIVE
NON-SQ EPI CELLS #/AREA URNS LPF: ABNORMAL /LPF
PH UR STRIP: 7.5 PH (ref 5–7)
RBC #/AREA URNS AUTO: ABNORMAL /HPF
SOURCE: ABNORMAL
SP GR UR STRIP: 1.01 (ref 1–1.03)
UROBILINOGEN UR STRIP-ACNC: 0.2 EU/DL (ref 0.2–1)
WBC #/AREA URNS AUTO: ABNORMAL /HPF

## 2019-08-15 PROCEDURE — 87086 URINE CULTURE/COLONY COUNT: CPT | Performed by: PHYSICIAN ASSISTANT

## 2019-08-15 PROCEDURE — 81001 URINALYSIS AUTO W/SCOPE: CPT | Performed by: PHYSICIAN ASSISTANT

## 2019-08-15 PROCEDURE — 99214 OFFICE O/P EST MOD 30 MIN: CPT | Performed by: PHYSICIAN ASSISTANT

## 2019-08-15 RX ORDER — CEFDINIR 300 MG/1
300 CAPSULE ORAL 2 TIMES DAILY
Qty: 14 CAPSULE | Refills: 0 | Status: SHIPPED | OUTPATIENT
Start: 2019-08-15 | End: 2019-12-20

## 2019-08-15 RX ORDER — NEOMYCIN SULFATE, POLYMYXIN B SULFATE, HYDROCORTISONE 3.5; 10000; 1 MG/ML; [USP'U]/ML; MG/ML
3 SOLUTION/ DROPS AURICULAR (OTIC) 4 TIMES DAILY
Qty: 5 ML | Refills: 0 | Status: SHIPPED | OUTPATIENT
Start: 2019-08-15 | End: 2019-12-20

## 2019-08-16 LAB
BACTERIA SPEC CULT: NORMAL
BACTERIA SPEC CULT: NORMAL
SPECIMEN SOURCE: NORMAL

## 2019-08-16 NOTE — PROGRESS NOTES
SUBJECTIVE:   Graciela Castorena is a 54 year old female presenting with a chief complaint of throat pain, dysuria and noticed blood in urine.  Onset of symptoms was 3 day(s) ago.  Course of illness is worsening.    Severity moderate  Current and Associated symptoms: throat irriattion  Treatment measures tried include otc medications.  Predisposing factors include hx of UTI.    Past Medical History:   Diagnosis Date     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 breast 2018    s/p lumpectomy and radiation; on Tamoxifen        Allergies   Allergen Reactions     Ciprofloxacin Itching     Family History   Problem Relation Age of Onset     Diabetes Type 2  Brother      Diabetes Type 2  Sister      Myocardial Infarction No family hx of      Cerebrovascular Disease No family hx of      Coronary Artery Disease Early Onset No family hx of      Colon Cancer No family hx of      Breast Cancer No family hx of      Ovarian Cancer No family hx of        Social History     Tobacco Use     Smoking status: Never Smoker     Smokeless tobacco: Never Used   Substance Use Topics     Alcohol use: No     Alcohol/week: 0.0 oz       ROS:  CONSTITUTIONAL:NEGATIVE for fever, chills, change in weight  INTEGUMENTARY/SKIN: NEGATIVE for worrisome rashes, moles or lesions  EYES: NEGATIVE for vision changes or irritation  ENT/MOUTH: POSITIVE for left ear drainage, sore throat  RESP:NEGATIVE for significant cough or SOB  CV: NEGATIVE for chest pain, palpitations or peripheral edema  GI: NEGATIVE for nausea, abdominal pain, heartburn, or change in bowel habits  : dysuria and frequency   MUSCULOSKELETAL: NEGATIVE for significant arthralgias or myalgia  NEURO: NEGATIVE for weakness, dizziness or paresthesias    OBJECTIVE  :/86   Pulse 91   Temp 98.8  F (37.1  C) (Oral)   Resp 12   Wt 75.3 kg (166 lb)   SpO2 98%   BMI 29.41 kg/m    GENERAL APPEARANCE: healthy, alert and no distress  EYES: EOMI,  PERRL, conjunctiva clear  HENT: external ear  canal inflamed both  NECK: supple, nontender, no lymphadenopathy  RESP: lungs clear to auscultation - no rales, rhonchi or wheezes  CV: regular rates and rhythm, normal S1 S2, no murmur noted  ABDOMEN:  soft, nontender, no HSM or masses and bowel sounds normal  Extremities: no peripheral edema or tenderness, peripheral pulses normal  MS: extremities normal- no gross deformities noted, no erythema, FROM noted in all extremities  NEURO: Normal strength and tone, sensory exam grossly normal,  normal speech and mentation  SKIN: no suspicious lesions or rashes    Results for orders placed or performed in visit on 08/15/19   UA with Microscopic reflex to Culture   Result Value Ref Range    Color Urine Yellow     Appearance Urine Clear     Glucose Urine Negative NEG^Negative mg/dL    Bilirubin Urine Negative NEG^Negative    Ketones Urine Negative NEG^Negative mg/dL    Specific Gravity Urine 1.015 1.003 - 1.035    pH Urine 7.5 (H) 5.0 - 7.0 pH    Protein Albumin Urine Negative NEG^Negative mg/dL    Urobilinogen Urine 0.2 0.2 - 1.0 EU/dL    Nitrite Urine Negative NEG^Negative    Blood Urine Moderate (A) NEG^Negative    Leukocyte Esterase Urine Negative NEG^Negative    Source Midstream Urine     WBC Urine 0 - 5 OTO5^0 - 5 /HPF    RBC Urine 2-5 (A) OTO2^O - 2 /HPF    Squamous Epithelial /LPF Urine Few FEW^Few /LPF    Bacteria Urine Few (A) NEG^Negative /HPF    Amorphous Crystals Few (A) NEG^Negative /HPF       ASSESSMENT/PLAN      ICD-10-CM    1. Dysuria R30.0 UA with Microscopic reflex to Culture     neomycin-polymyxin-hydrocortisone (CORTISPORIN) 3.5-93139-5 otic solution     cefdinir (OMNICEF) 300 MG capsule     Urine Culture Aerobic Bacterial   2. Throat pain R07.0 CANCELED: Strep, Rapid Screen   3. Hematuria, unspecified type R31.9 cefdinir (OMNICEF) 300 MG capsule     Urine Culture Aerobic Bacterial         Orders Placed This Encounter     UA with Microscopic reflex to Culture     neomycin-polymyxin-hydrocortisone  (CORTISPORIN) 3.5-64711-0 otic solution     cefdinir (OMNICEF) 300 MG capsule       Urine culture pending  Follow up with PCP as needed  See orders in Epic

## 2019-12-20 ENCOUNTER — OFFICE VISIT (OUTPATIENT)
Dept: INTERNAL MEDICINE | Facility: CLINIC | Age: 54
End: 2019-12-20
Payer: COMMERCIAL

## 2019-12-20 VITALS
TEMPERATURE: 98.2 F | RESPIRATION RATE: 16 BRPM | BODY MASS INDEX: 29.58 KG/M2 | DIASTOLIC BLOOD PRESSURE: 82 MMHG | SYSTOLIC BLOOD PRESSURE: 136 MMHG | HEART RATE: 91 BPM | OXYGEN SATURATION: 98 % | WEIGHT: 167 LBS

## 2019-12-20 DIAGNOSIS — R00.2 PALPITATIONS: Primary | ICD-10-CM

## 2019-12-20 DIAGNOSIS — R45.86 MOOD CHANGE: ICD-10-CM

## 2019-12-20 PROCEDURE — 99214 OFFICE O/P EST MOD 30 MIN: CPT | Performed by: PHYSICIAN ASSISTANT

## 2019-12-20 PROCEDURE — 93000 ELECTROCARDIOGRAM COMPLETE: CPT | Performed by: PHYSICIAN ASSISTANT

## 2019-12-20 NOTE — PROGRESS NOTES
Subjective     Graciela Castorena is a 54 year old female who presents to clinic today for the following health issues:    HPI   CHEST-Palpations      Onset: 3-4 days    Description:   Feels heart fluttering  Duration: 3 minutes     Intensity: mild    Progression of Symptoms:  same    Accompanying Signs & Symptoms:  Shortness of breath: no  Sweating: no  Nausea/vomiting: no  Lightheadedness: YES  Palpitations: YES  Fever/Chills: no  Cough: YES  Heartburn: YES    History:   Family history of heart disease no  Tobacco use: no    Precipitating factors:   Worse with exertion: YES  Worse with deep breaths :  YES- sometimes  Related to food: no    Alleviating factors:  None    Checked her BP somewhere and got a reading of 145/88       Therapies Tried and outcome: None    -------------------------------------    BP Readings from Last 3 Encounters:   12/20/19 136/82   08/15/19 124/86   07/08/19 (!) 128/95    Wt Readings from Last 3 Encounters:   12/20/19 75.8 kg (167 lb)   08/15/19 75.3 kg (166 lb)   07/08/19 75.9 kg (167 lb 6.4 oz)                    -------------------------------------  Reviewed and updated as needed this visit by Provider  Allergies  Meds         Review of Systems   ROS COMP: Constitutional, HEENT, cardiovascular, pulmonary, gi and gu systems are negative, except as otherwise noted.      Objective    /82 (BP Location: Left arm, Patient Position: Chair, Cuff Size: Adult Regular)   Pulse 91   Temp 98.2  F (36.8  C) (Oral)   Resp 16   Wt 75.8 kg (167 lb)   SpO2 98%   BMI 29.58 kg/m    Body mass index is 29.58 kg/m .  Physical Exam   GENERAL: healthy, alert and no distress  NECK: no adenopathy, no asymmetry, masses, or scars and thyroid normal to palpation  RESP: lungs clear to auscultation - no rales, rhonchi or wheezes  CV: regular rates and rhythm and normal S1 S2, no S3 or S4  SKIN: no suspicious lesions or rashes  PSYCH: affect normal/bright and anxious    Diagnostic Test  "Results:  ekg  Normal sinus        Assessment & Plan     1. Palpitations    - EKG 12-lead complete w/read - Clinics    2. Mood change  Patient will mood issues since starting tamoxifen she states.  She feels more irritable mood.         BMI:   Estimated body mass index is 29.58 kg/m  as calculated from the following:    Height as of 7/8/19: 1.6 m (5' 3\").    Weight as of this encounter: 75.8 kg (167 lb).       25 minutes spent with patient > 50% of time on counseling and plan of care    Patient Instructions   Consider talking with Dr Magallanes next available.    In 2 weeks-           Return in about 2 weeks (around 1/3/2020) for mood/ anxious/ palpitations .    Ember Kim PA-C  [facility]      "

## 2020-01-07 ENCOUNTER — TELEPHONE (OUTPATIENT)
Dept: ONCOLOGY | Facility: CLINIC | Age: 55
End: 2020-01-07

## 2020-01-07 ENCOUNTER — ONCOLOGY VISIT (OUTPATIENT)
Dept: ONCOLOGY | Facility: CLINIC | Age: 55
End: 2020-01-07
Attending: INTERNAL MEDICINE
Payer: MEDICAID

## 2020-01-07 VITALS
DIASTOLIC BLOOD PRESSURE: 79 MMHG | HEIGHT: 63 IN | RESPIRATION RATE: 16 BRPM | HEART RATE: 78 BPM | OXYGEN SATURATION: 98 % | SYSTOLIC BLOOD PRESSURE: 135 MMHG | BODY MASS INDEX: 29.52 KG/M2 | TEMPERATURE: 97.9 F | WEIGHT: 166.6 LBS

## 2020-01-07 DIAGNOSIS — D05.12 DUCTAL CARCINOMA IN SITU (DCIS) OF LEFT BREAST: Primary | ICD-10-CM

## 2020-01-07 DIAGNOSIS — Z12.39 SCREENING FOR MALIGNANT NEOPLASM OF BREAST: ICD-10-CM

## 2020-01-07 PROCEDURE — 99214 OFFICE O/P EST MOD 30 MIN: CPT | Performed by: INTERNAL MEDICINE

## 2020-01-07 PROCEDURE — G0463 HOSPITAL OUTPT CLINIC VISIT: HCPCS

## 2020-01-07 RX ORDER — TAMOXIFEN CITRATE 20 MG/1
20 TABLET ORAL DAILY
Qty: 90 TABLET | Refills: 3 | Status: SHIPPED | OUTPATIENT
Start: 2020-01-07 | End: 2020-07-14

## 2020-01-07 ASSESSMENT — MIFFLIN-ST. JEOR: SCORE: 1329.82

## 2020-01-07 ASSESSMENT — PAIN SCALES - GENERAL: PAINLEVEL: NO PAIN (0)

## 2020-01-07 NOTE — TELEPHONE ENCOUNTER
Oncology Distress Screening Follow-up  Clinical Social Work  [facility]    Identified Concern and Score From Distress Screenin. How concerned are you about your ability to eat?   4           2. How concerned are you about unintended weight loss or your current weight?   0           3. How concerned are you about feeling depressed or very sad?   6Abnormal            4. How concerned are you about feeling anxious or very scared?   0           5. Do you struggle with the loss of meaning and jacqueline in your life?       Not at all           6. How concerned are you about work and home life issues that may be affected by your cancer?   5           7. How concerned are you about knowing what resources are available to help you?   7Abnormal            8. Do you currently have what you would describe as Christian or spiritual struggles?              Not at all           You can also ask to be contacted by one of our Oncology Supportive Care professionals.    9. If you want to be contacted by one of our professionals, I can send a message to them right now.   Oncology Social  Worker             Date of Distress Screenin20    Intervention:   Graciela is a 55-year-old woman with a diagnosis of left breast DCIS, s/p lumpectomy and radiation, and started on tamoxifen 2018. Graciela reports that she has had increased forgetfulness which has been emotionally distressing. Graciela reports that she worries about forgetting to take medication, and has concerns that there might be times that she might take a double dose. Graciela reports that family is supportive for her, but that this has been worrying. Encouraged Graciela to purchase a pill box to set medication aside, which she reported she would consider.     This clinician also provided education about benefit of engaging with Cancer Rehab services for memory changes, specifically OT services. Graciela reported an interest in engaging with services, and was open to this clinician  collaborating with MD and RNCC about referral for programming.     No other psychosocial concerns reported at today's encounter. Graciela knows to reach out to SW in case of distress or need.     Follow-up Required:   1) This clinician to collaborate with MD/RNCC regarding OT referral through Cancer Rehab for cognitive support.   2) SW to explore if there are additional research opportunities available for cognitive support in setting of hormonal treatment.   3) Ongoing availability of SW support. No SW outreach needed at present time.     NELLIE Daugherty, [facility] CenterSW  Phone: [phone]  Pager: [phone]    [facility]: M, Thu  *every other Tue, 8am-4:30pm  [facility]: W, F, *every other Tue, 8am-4:30pm

## 2020-01-07 NOTE — PROGRESS NOTES
"Oncology Rooming Note    January 7, 2020 10:35 AM   Graciela Castorena is a 55 year old female who presents for:    Chief Complaint   Patient presents with     Oncology Clinic Visit     Initial Vitals: /79   Pulse 78   Temp 97.9  F (36.6  C) (Oral)   Resp 16   Ht 1.6 m (5' 3\")   Wt 75.6 kg (166 lb 9.6 oz)   SpO2 98%   BMI 29.51 kg/m   Estimated body mass index is 29.51 kg/m  as calculated from the following:    Height as of this encounter: 1.6 m (5' 3\").    Weight as of this encounter: 75.6 kg (166 lb 9.6 oz). Body surface area is 1.83 meters squared.  No Pain (0) Comment: Data Unavailable   No LMP recorded. Patient is postmenopausal.  Allergies reviewed: Yes  Medications reviewed: Yes    Medications: MEDICATION REFILLS NEEDED TODAY. Provider was notified.  Tamoxofen  Pharmacy name entered into [employer]: [id number] - [place], [place] - [address]    Clinical concerns: Pt having memory issues - forgot to go to the MRI Bar[place]ona was notified.      Shari J. Schoenberger, ALANA            "

## 2020-01-07 NOTE — LETTER
"    1/7/2020         RE: Graciela Castorena  [address]  [address]  [place] [address]        Dear Colleague,    Thank you for referring your patient, Graciela Castorena, to the [facility]. Please see a copy of my visit note below.    Oncology Rooming Note    January 7, 2020 10:35 AM   Graciela Castorena is a 55 year old female who presents for:    Chief Complaint   Patient presents with     Oncology Clinic Visit     Initial Vitals: /79   Pulse 78   Temp 97.9  F (36.6  C) (Oral)   Resp 16   Ht 1.6 m (5' 3\")   Wt 75.6 kg (166 lb 9.6 oz)   SpO2 98%   BMI 29.51 kg/m    Estimated body mass index is 29.51 kg/m  as calculated from the following:    Height as of this encounter: 1.6 m (5' 3\").    Weight as of this encounter: 75.6 kg (166 lb 9.6 oz). Body surface area is 1.83 meters squared.  No Pain (0) Comment: Data Unavailable   No LMP recorded. Patient is postmenopausal.  Allergies reviewed: Yes  Medications reviewed: Yes    Medications: MEDICATION REFILLS NEEDED TODAY. Provider was notified.  Tamoxofen  Pharmacy name entered into [employer]: [id number] - [place], [place] - [address]    Clinical concerns: Pt having memory issues - forgot to go to the MRI {PROVIDER NOTIFIED?:062901}      Shari J. Schoenberger, [facility]              Visit Date:   01/07/2020     ONCOLOGY HISTORY: Mrs. 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      1.  Screening mammogram on 01/16/2018 revealed a new calcification cluster behind the left nipple.   -Left diagnostic mammogram on 03/12/2018 revealed an indeterminate cluster of microcalcification in retroareolar portion of left breast at 11:00.      2. Stereotactic left breast biopsy on 03/13/2018 reveals intermediate grade DCIS with microcalcification and comedonecrosis.  % positive and  [place] 25% positive.      3.  Patient underwent left breast lumpectomy on 04/18/2018.  Pathology reveals grade 2 DCIS measuring 3 mm.  Margins are negative.     4. Radiation " to left breast. 5056 cGy between 05/31/2018 and 06/27/2018.      5. Tamoxifen started in May, 2018.      SUBJECTIVE:  Ms. Castorena is a 55-year-old female with left breast DCIS status post lumpectomy followed by radiation in 2018.  She is on tamoxifen.  Overall, she is doing well.      The patient has some forgetfulness.  She was advised to see a neurologist.  She has not done that.      Intermittent headache.  Intermittent dizziness.  Sometimes she will get aches and pain in different parts of the body.  No shortness of breath.  No abdominal pain, nausea or vomiting.  Appetite is good.  No urinary or bowel complaints.  No bleeding.  No fever, chills or night sweats.      PHYSICAL EXAMINATION:   GENERAL:  Alert and oriented x 3.   VITAL SIGNS:  Reviewed.  ECOG PS of 1.     EYES:  No icterus.   THROAT:  No ulcer. No thrush.   NECK:  Supple. No lymphadenopathy. No thyromegaly.   AXILLAE:  No lymphadenopathy.   LUNGS:  Good air entry bilaterally.  No crackles or wheezing.   HEART:  Regular.  No murmur.   GI:  Soft.  Nontender. No mass.   EXTREMITIES:  No pedal edema.  No calf swelling or tenderness.   SKIN:  No rash.   BREASTS:  Examined in the presence of the nurse.   -Right breast exam is normal.  No mass.  No lymphadenopathy.  No skin changes.  No axillary lymphadenopathy.   -Left breast exam reveals post-surgical changes.  No mass.  No suspicious skin lesion.  No axillary lymphadenopathy.      ASSESSMENT:   1.  A 55-year-old female with left breast DCIS.  No evidence of recurrence.   2.  Forgetfulness   3.  Fatigue.      PLAN:   1.  I discussed regarding breast cancer.  She is doing well.  No evidence of recurrence of breast cancer.  She is on tamoxifen.  She is tolerating it well.  She will continue on it.   2.  The patient has some forgetfulness.  It may or may not be related to tamoxifen.  I told the patient to reschedule her appointment with a neurologist.   3.  She will have mammogram in 6 months.   4.  I will  see her in 6 months' time. Advised her to see a physician sooner if she has any lump, unexplained pain, weight loss, worsening weakness, shortness of breath, bleeding or any other concerns. The patient is postmenopausal.  In future, we may consider changing her to an aromatase inhibitor.         JESSIE BENSON MD             D: 2020   T: 2020   MT: SHIKAH      Name:     MACKENZIE REAGAN   MRN:      [id number]        Account:      [id number]   :      1965           Visit Date:   2020      Document: [id number]        Again, thank you for allowing me to participate in the care of your patient.        Sincerely,        Jessie Benson MD

## 2020-01-08 NOTE — PROGRESS NOTES
Visit Date:   01/07/2020     ONCOLOGY HISTORY: Mrs. 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      1.  Screening mammogram on 01/16/2018 revealed a new calcification cluster behind the left nipple.   -Left diagnostic mammogram on 03/12/2018 revealed an indeterminate cluster of microcalcification in retroareolar portion of left breast at 11:00.      2. Stereotactic left breast biopsy on 03/13/2018 reveals intermediate grade DCIS with microcalcification and comedonecrosis.  % positive and  [place] 25% positive.      3.  Patient underwent left breast lumpectomy on 04/18/2018.  Pathology reveals grade 2 DCIS measuring 3 mm.  Margins are negative.     4. Radiation to left breast. 5056 cGy between 05/31/2018 and 06/27/2018.      5. Tamoxifen started in May, 2018.      SUBJECTIVE:  Ms. Castorena is a 55-year-old female with left breast DCIS status post lumpectomy followed by radiation in 2018.  She is on tamoxifen.  Overall, she is doing well.      The patient has some forgetfulness.  She was advised to see a neurologist.  She has not done that.      Intermittent headache.  Intermittent dizziness.  Sometimes she will get aches and pain in different parts of the body.  No shortness of breath.  No abdominal pain, nausea or vomiting.  Appetite is good.  No urinary or bowel complaints.  No bleeding.  No fever, chills or night sweats.      PHYSICAL EXAMINATION:   GENERAL:  Alert and oriented x 3.   VITAL SIGNS:  Reviewed.  ECOG PS of 1.     EYES:  No icterus.   THROAT:  No ulcer. No thrush.   NECK:  Supple. No lymphadenopathy. No thyromegaly.   AXILLAE:  No lymphadenopathy.   LUNGS:  Good air entry bilaterally.  No crackles or wheezing.   HEART:  Regular.  No murmur.   GI:  Soft.  Nontender. No mass.   EXTREMITIES:  No pedal edema.  No calf swelling or tenderness.   SKIN:  No rash.   BREASTS:  Examined in the presence of the nurse.   -Right breast exam is normal.  No mass.  No lymphadenopathy.  No skin changes.  No axillary  lymphadenopathy.   -Left breast exam reveals post-surgical changes.  No mass.  No suspicious skin lesion.  No axillary lymphadenopathy.      ASSESSMENT:   1.  A 55-year-old female with left breast DCIS.  No evidence of recurrence.   2.  Forgetfulness   3.  Fatigue.      PLAN:   1.  I discussed regarding breast cancer.  She is doing well.  No evidence of recurrence of breast cancer.  She is on tamoxifen.  She is tolerating it well.  She will continue on it.   2.  The patient has some forgetfulness.  It may or may not be related to tamoxifen.  I told the patient to reschedule her appointment with a neurologist.   3.  She will have mammogram in 6 months.   4.  I will see her in 6 months' time. Advised her to see a physician sooner if she has any lump, unexplained pain, weight loss, worsening weakness, shortness of breath, bleeding or any other concerns. The patient is postmenopausal.  In future, we may consider changing her to an aromatase inhibitor.         JESSIE BENSON MD             D: 2020   T: 2020   MT: SHIKHA      Name:     MACKENZIE REAGAN   MRN:      [id number]        Account:      [id number]   :      1965           Visit Date:   2020      Document: [id number]

## 2020-01-09 ENCOUNTER — PATIENT OUTREACH (OUTPATIENT)
Dept: ONCOLOGY | Facility: CLINIC | Age: 55
End: 2020-01-09

## 2020-01-09 NOTE — TELEPHONE ENCOUNTER
I called patient and informed patient that I am placing in the mail a treatment summary that list the care team, pathology, and treatment received related to her breast cancer.     Patient instructed to review it and call with questions and that it is for her records.    Layne Kramer RN

## 2020-01-31 ENCOUNTER — OFFICE VISIT (OUTPATIENT)
Dept: OBGYN | Facility: CLINIC | Age: 55
End: 2020-01-31
Payer: MEDICAID

## 2020-01-31 ENCOUNTER — TELEPHONE (OUTPATIENT)
Dept: OBGYN | Facility: CLINIC | Age: 55
End: 2020-01-31

## 2020-01-31 VITALS
DIASTOLIC BLOOD PRESSURE: 80 MMHG | HEIGHT: 63 IN | SYSTOLIC BLOOD PRESSURE: 118 MMHG | WEIGHT: 165 LBS | BODY MASS INDEX: 29.23 KG/M2

## 2020-01-31 DIAGNOSIS — Z87.42 HISTORY OF ABNORMAL CERVICAL PAP SMEAR: Primary | ICD-10-CM

## 2020-01-31 DIAGNOSIS — N89.8 VAGINAL ITCHING: ICD-10-CM

## 2020-01-31 LAB
SPECIMEN SOURCE: NORMAL
WET PREP SPEC: NORMAL

## 2020-01-31 PROCEDURE — 87210 SMEAR WET MOUNT SALINE/INK: CPT | Performed by: OBSTETRICS & GYNECOLOGY

## 2020-01-31 PROCEDURE — 88141 CYTOPATH C/V INTERPRET: CPT | Performed by: OBSTETRICS & GYNECOLOGY

## 2020-01-31 PROCEDURE — 88175 CYTOPATH C/V AUTO FLUID REDO: CPT | Performed by: OBSTETRICS & GYNECOLOGY

## 2020-01-31 PROCEDURE — G0476 HPV COMBO ASSAY CA SCREEN: HCPCS | Performed by: OBSTETRICS & GYNECOLOGY

## 2020-01-31 PROCEDURE — 99213 OFFICE O/P EST LOW 20 MIN: CPT | Performed by: OBSTETRICS & GYNECOLOGY

## 2020-01-31 PROCEDURE — 87624 HPV HI-RISK TYP POOLED RSLT: CPT | Performed by: OBSTETRICS & GYNECOLOGY

## 2020-01-31 ASSESSMENT — MIFFLIN-ST. JEOR: SCORE: 1322.57

## 2020-01-31 NOTE — PATIENT INSTRUCTIONS
Itching near the urethra:  - try over the counter monistat cream applied to the areas of itching. You can find this at any drug store.     - you can also try over the counter 1% hydrocortisone cream or ointment, apply to any areas that itch      Some Suggested Vulvar Pain & Itching Measures    The vulva is the external genitalia in the female.  The skin of the vulva can be quite sensitive.  Because it is moist and frequently subjected to friction while sitting and moving, this area can be easily injured.  There are various strategies that can be used to prevent irritation and allow the vulva to heal.  Keeping this area dry can accelerate healing.  Chemicals found in toilet tissues, laundry soaps and detergents that come in contact with the vulva can cause irritation.  Avoiding contact with potential irritants that contain chemicals is important.  Fabric softeners in undergarments, chemicals in deodorant soaps, bubble baths, feminine hygiene spray and panty liners, etc., can all cause irritation to the vulva.  The following recommendations are specific measures that can help minimize vulvar irritation.    Wear white 100% cotton underwear and do not wear underwear at night.  Do not wear pantyhose, tights, or other close-fitting clothes.  Enclosing this area with synthetic fibers holds both heat and moisture in the skin, conditions which potentiate the development of infections.  Tight-fitting clothes may also increase your symptoms of discomfort.    After washing underwear, put it through at least one whole cycle with water only.  Some women have suffered needlessly from irritants in detergents whose residue was left in clothes by incomplete rinsing.  Rinsing clothes thoroughly is more important than which detergent is used although to be on the safe side, the milder the soap, the better.  Wash new underwear before wearing.  Fabric softeners and dryer sheets should not be used.    Soaking in the tub or a sitz bath  twice daily for 10-15 minutes can help. After soaking, pat dry the vulva and apply vaseline to the entire outer area.     Rinse skin off with plain water frequently.  Use tap water, distilled water, sitz baths, squirt bottles, or bidets.  Additionally, hand held showers can be helpful for rinsing the vulva.  A wet warm washcloth can be held against the vulva to soak if a bath is unavailable.     Use very mild soap for bathing.  It is best not to use any soaps on the vulva.  The vulva should be rinsed with warm water.  Bars of soap such as Neutrogena unscented face soap, Dove, Basis, Pears (made in [place]), and castile soap with olive oil (Eklin) are gentle to the other skin areas.  They are found at pharmacies or health food stores.  Remember that frequent baths with soaps may increase the irritation.  You cannot wash away your symptoms.    Some patients find the use of cool gel packs on the vulva to be helpful.    Do not use products with benzocaine.    Consider using 100% cotton menstrual pads and tampons.  Many women with vulvar pain experience a significant increase in irritation and pain every month when they use commercial paper pads or tampons.  This monthly increase in pain can often be reduced by using 100% washable and reusable cotton menstrual pads.  Pure cotton tampons are available. Or try a menstrual cup.    Don't sit or remain in a wet bathing suit for prolonged periods. Change underwear shortly after exercising as well.

## 2020-01-31 NOTE — PROGRESS NOTES
"Gyn Clinic Visit    Graciela Castorena is a very pleasant 55 year old female here for diagnostic pap. Requests evaluation for vulvar pruritis x3 weeks. No discharge. Does not wear pads. Denies any urinary incontinence.      Lab Results   Component Value Date    PAP ASC-US, HPV neg 08/29/2018    PAP ASC-US 03/30/2015      /80 (BP Location: Left arm, Patient Position: Chair, Cuff Size: Adult Large)   Ht 1.6 m (5' 3\")   Wt 74.8 kg (165 lb)   Breastfeeding No   BMI 29.23 kg/m     Gen: sitting in chair in no acute distress, comfortable  Eyes: no scleral icterus, EOMI  CV: regular rate, well perfused  Pulm: no increased work of breathing, no cough  Abd: soft, non-tender to palpation, no organomegaly, no palpable masses  Skin: warm and dry, no rashes/lesions  Psych: mood stable, appropriate affect  Neuro: A+Ox3   : External genitalia normal well-estrogenized, healthy tissue with infibulation, clitoral higuera, labia minora, and clitoris surgically absent. No other architectural changes, no discoloration or thinning of tissue.  No obvious excoriations, lesions, or rashes. Bartholins, urethra, normal.  Normal moist pink vaginal mucosa.  SSE: Normal cervix, normal physiologic discharge.      A/P: Graciela Castorena is a 55 year old female here for f/u cervical cancer screening and vulvar pruritis.     1. History of abnormal cervical Pap smear  - Pap imaged thin layer diagnostic with HPV (select HPV order below)  - HPV High Risk Types DNA Cervical    2. Vaginal itching  - no evidence of lichen sclerosus on exam or significant vaginal discharge.   - Wet prep collected  - if wet prep + patient to try over the counter monistat. Otherwise, plan over the counter hydrocortisone applied to area of pruritis twice a day for 5 days.     - return to clinic if no improvement.     Jesica Aceves MD   "

## 2020-01-31 NOTE — NURSING NOTE
"Chief Complaint   Patient presents with     Repeat Pap Smear     hx ASCUS       Initial /80 (BP Location: Left arm, Patient Position: Chair, Cuff Size: Adult Large)   Ht 1.6 m (5' 3\")   Wt 74.8 kg (165 lb)   Breastfeeding No   BMI 29.23 kg/m   Estimated body mass index is 29.23 kg/m  as calculated from the following:    Height as of this encounter: 1.6 m (5' 3\").    Weight as of this encounter: 74.8 kg (165 lb).  BP completed using cuff size: large    Questioned patient about current smoking habits.  Pt. has never smoked.          The following HM Due: NONE    Krystyna Walker CMA    "

## 2020-02-04 LAB
COPATH REPORT: ABNORMAL
PAP: ABNORMAL

## 2020-02-05 PROBLEM — R87.610 ASCUS OF CERVIX WITH NEGATIVE HIGH RISK HPV: Status: RESOLVED | Noted: 2018-09-06 | Resolved: 2019-03-29

## 2020-02-20 ENCOUNTER — HOSPITAL ENCOUNTER (OUTPATIENT)
Dept: OCCUPATIONAL THERAPY | Facility: CLINIC | Age: 55
Setting detail: THERAPIES SERIES
End: 2020-02-20
Attending: INTERNAL MEDICINE
Payer: COMMERCIAL

## 2020-02-20 PROCEDURE — 97535 SELF CARE MNGMENT TRAINING: CPT | Mod: GO | Performed by: REHABILITATION PRACTITIONER

## 2020-02-20 PROCEDURE — 97166 OT EVAL MOD COMPLEX 45 MIN: CPT | Mod: GO | Performed by: REHABILITATION PRACTITIONER

## 2020-02-20 NOTE — PROGRESS NOTES
[facility]          OUTPATIENT OCCUPATIONAL THERAPY  EVALUATION  PLAN OF TREATMENT FOR OUTPATIENT REHABILITATION  (COMPLETE FOR INITIAL CLAIMS ONLY)  Patient's Last Name, First Name, M.I.  YOB: 1965  Graciela Castorena                        Provider's Name  [facility] Medical Record No.  [id number]                               Onset Date:     04/18/18   Start of Care Date:     02/20/20   Type:     ___PT   _X_OT   ___SLP Medical Diagnosis:     Ductal carcinoma in situ of left breast                          OT Diagnosis:     Impaired ADL/IADL I Visits from SOC:  1   _________________________________________________________________________________  Plan of Treatment/Functional Goals:  ADL training, IADL training, Cognitive skills, Self care/Home management                    Goals  Goal Identifier: 1 Memory compensation  Goal Description: Pt will demonstrate Mod I with managing daily schedule and recall for appointments using 1-2 memory strategies consistently 7/7 days per pt report and demonstrated by independent use of memory binder or electronic external aid.   Target Date: 04/16/20     Goal Identifier: 2 HEP  Goal Description: Pt will complete coordination HEP 5/7 days for 10-15 min with mod I to increase strength for ADL tasks   Target Date: 04/16/20     Goal Identifier: 3 Fatigue management  Goal Description: Pt will identify 2-3 strategies to improve endurance and conserve energy and use 5/7 days consistently, per pt report and improved FACIT score.   Target Date: 04/16/20     Goal Identifier: 4 Med management  Goal Description: Pt will complete medication management task with 4+ novel medications and 100% accuracy and use of 2 strategies for organization.  Target Date: 04/16/20              Therapy Frequency: 6 visits     Predicted Duration of Therapy Intervention  (days/wks): 8 weeks  Arlyn Celestin, OT          I CERTIFY THE NEED FOR THESE SERVICES FURNISHED UNDER        THIS PLAN OF TREATMENT AND WHILE UNDER MY CARE     (Physician co-signature of this document indicates review and certification of the therapy plan).                         Referring Physician: Dr. Samreen Barahona     Initial Assessment        See Epic Evaluation      Start Of Care Date: 02/20/20

## 2020-02-20 NOTE — PROGRESS NOTES
02/20/20 0900   Quick Adds   Type of Visit Initial Outpatient Occupational Therapy Evaluation       Present No    Comment Pt refuses    General Information   Start Of Care Date 02/20/20   Referring Physician Dr. Samreen Barahona   Orders Evaluate and treat as indicated   Orders Date 01/10/20   Medical Diagnosis Ductal carcinoma in situ of left breast   Onset of Illness/Injury or Date of Surgery 04/18/18   Surgical/Medical History Reviewed Yes   Additional Occupational Profile Info/Pertinent History of Current Problem Pt had lumpectomy and radiation following in 2018.  She is reporting some fdifficult with recall and forgot to go to scheduled MRI.     Role/Living Environment   Current Community Support Family/friend caregiver   Community/Avocational Activities Pt has 8 children and has 3 daughters, 1 son and    Current Living Environment Apartment/condo   Prior Level - Transfers Independent   Prior Level - Ambulation Independent   Prior Level - ADLS Independent   Prior Responsibilities - IADL Medication management;;Work;Meal Preparation;Housekeeping;Laundry;Shopping;Finances   Prior Level Comments Pt works 2 days a week 2-4 hours.    Patient/family Goals Statement Improve memory, be able to manage finances, manage medications   Pain   Patient currently in pain No   Pain comments Pt reports she is very tired.    Fall Risk Screen   Fall screen completed by OT   Fall screen comments Pt reports some dizziness at times.     Abuse Screen (yes response referral indicated)   Feels Unsafe at Home or Work/School no   Feels Threatened by Someone no   Does Anyone Try to Keep You From Having Contact with Others or Doing Things Outside Your Home? no   Physical Signs of Abuse Present no   Cognitive Status Examination   Level of Consciousness Alert   Follows Commands and Answers Questions 50% of the time   Personal Safety and Judgment Intact   Memory Impaired   Executive  Function Working memory impaired, decreased storage of information for performing tasks;Cognitive flexibility impaired;Planning ability impaired;Self awareness/monitoring impaired   Cognitive Comment Pt reports misplacing things, missing appointments, forgetting medications, forgetting groceries   Strength   Strength Comments pt reports decrease in strength and would like a home program instruction.     Activity Tolerance   Activity Tolerance Pt is independent with basic ADL tasks.  Pt reports fatigue when standing and sitting for long periods of time   Instrumental Activities of Daily Living Assessment   IADL Assessment/Observations Pt reports she forgets to shower at times.     Planned Therapy Interventions   Planned Therapy Interventions ADL training;IADL training;Cognitive skills;Self care/Home management   Adult OT Eval Goals   OT Eval Goals (Adult) 1;2;3;4    OT Goal 1   Goal Identifier 1 Memory compensation   Goal Description Pt will demonstrate Mod I with managing daily schedule and recall for appointments using 1-2 memory strategies consistently 7/7 days per pt report and demonstrated by independent use of memory binder or electronic external aid.    Target Date 04/16/20    OT Goal 2   Goal Identifier 2 HEP   Goal Description Pt will complete coordination HEP 5/7 days for 10-15 min with mod I to increase strength for ADL tasks    Target Date 04/16/20    OT Goal 3   Goal Identifier 3 Fatigue management   Goal Description Pt will identify 2-3 strategies to improve endurance and conserve energy and use 5/7 days consistently, per pt report and improved FACIT score.    Target Date 04/16/20   OT Goal 4   Goal Identifier 4 Med management   Goal Description Pt will complete medication management task with 4+ novel medications and 100% accuracy and use of 2 strategies for organization.   Target Date 04/16/20   Clinical Impression   Criteria for Skilled Therapeutic Interventions Met Yes, treatment indicated   OT  Diagnosis Impaired ADL/IADL I   Influenced by the following impairments Impaired endurance, impaired cognition, reported decrease in strength   Assessment of Occupational Performance 3-5 Performance Deficits   Identified Performance Deficits Endurance for ADLs, recall for ADL tasks   Clinical Decision Making (Complexity) Low complexity   Therapy Frequency 6 visits   Predicted Duration of Therapy Intervention (days/wks) 8 weeks   Risks and Benefits of Treatment have been explained. Yes   Patient, Family & other staff in agreement with plan of care Yes   Clinical Impression Comments Patient would benefit from trial of occupational therapy and for visits have been scheduled.  Patient demonstrates motivation to improve.  Patient is limited by reading and writing and language barrier however, she denies need for .   Education Assessment   Barriers To Learning Language;Cognitive   Preferred Learning Style Listening   Total Evaluation Time   OT Amelie, Moderate Complexity Minutes (52627) 14

## 2020-02-22 ENCOUNTER — ANCILLARY PROCEDURE (OUTPATIENT)
Dept: GENERAL RADIOLOGY | Facility: CLINIC | Age: 55
End: 2020-02-22
Attending: FAMILY MEDICINE
Payer: COMMERCIAL

## 2020-02-22 ENCOUNTER — OFFICE VISIT (OUTPATIENT)
Dept: URGENT CARE | Facility: URGENT CARE | Age: 55
End: 2020-02-22
Payer: COMMERCIAL

## 2020-02-22 VITALS
WEIGHT: 168.2 LBS | BODY MASS INDEX: 29.8 KG/M2 | SYSTOLIC BLOOD PRESSURE: 114 MMHG | RESPIRATION RATE: 18 BRPM | TEMPERATURE: 99.4 F | OXYGEN SATURATION: 97 % | HEART RATE: 94 BPM | DIASTOLIC BLOOD PRESSURE: 72 MMHG

## 2020-02-22 DIAGNOSIS — R31.29 MICROSCOPIC HEMATURIA: ICD-10-CM

## 2020-02-22 DIAGNOSIS — M54.50 ACUTE BILATERAL LOW BACK PAIN WITHOUT SCIATICA: ICD-10-CM

## 2020-02-22 DIAGNOSIS — J01.90 ACUTE SINUSITIS WITH COEXISTING CONDITION REQUIRING PROPHYLACTIC TREATMENT: ICD-10-CM

## 2020-02-22 DIAGNOSIS — M54.50 ACUTE BILATERAL LOW BACK PAIN WITHOUT SCIATICA: Primary | ICD-10-CM

## 2020-02-22 DIAGNOSIS — H10.32 ACUTE CONJUNCTIVITIS OF LEFT EYE, UNSPECIFIED ACUTE CONJUNCTIVITIS TYPE: ICD-10-CM

## 2020-02-22 LAB
ALBUMIN UR-MCNC: NEGATIVE MG/DL
ANION GAP SERPL CALCULATED.3IONS-SCNC: 1 MMOL/L (ref 3–14)
APPEARANCE UR: CLEAR
BACTERIA #/AREA URNS HPF: ABNORMAL /HPF
BILIRUB UR QL STRIP: NEGATIVE
BUN SERPL-MCNC: 12 MG/DL (ref 7–30)
CALCIUM SERPL-MCNC: 9.3 MG/DL (ref 8.5–10.1)
CHLORIDE SERPL-SCNC: 109 MMOL/L (ref 94–109)
CO2 SERPL-SCNC: 29 MMOL/L (ref 20–32)
COLOR UR AUTO: YELLOW
CREAT SERPL-MCNC: 0.6 MG/DL (ref 0.52–1.04)
GFR SERPL CREATININE-BSD FRML MDRD: >90 ML/MIN/{1.73_M2}
GLUCOSE SERPL-MCNC: 99 MG/DL (ref 70–99)
GLUCOSE UR STRIP-MCNC: NEGATIVE MG/DL
HGB UR QL STRIP: ABNORMAL
KETONES UR STRIP-MCNC: NEGATIVE MG/DL
LEUKOCYTE ESTERASE UR QL STRIP: NEGATIVE
NITRATE UR QL: NEGATIVE
NON-SQ EPI CELLS #/AREA URNS LPF: ABNORMAL /LPF
PH UR STRIP: 6 PH (ref 5–7)
POTASSIUM SERPL-SCNC: 3.8 MMOL/L (ref 3.4–5.3)
RBC #/AREA URNS AUTO: ABNORMAL /HPF
SODIUM SERPL-SCNC: 139 MMOL/L (ref 133–144)
SOURCE: ABNORMAL
SP GR UR STRIP: 1.02 (ref 1–1.03)
UROBILINOGEN UR STRIP-ACNC: 0.2 EU/DL (ref 0.2–1)
WBC #/AREA URNS AUTO: ABNORMAL /HPF

## 2020-02-22 PROCEDURE — 36415 COLL VENOUS BLD VENIPUNCTURE: CPT | Performed by: FAMILY MEDICINE

## 2020-02-22 PROCEDURE — 99214 OFFICE O/P EST MOD 30 MIN: CPT | Performed by: FAMILY MEDICINE

## 2020-02-22 PROCEDURE — 80048 BASIC METABOLIC PNL TOTAL CA: CPT | Performed by: FAMILY MEDICINE

## 2020-02-22 PROCEDURE — 87086 URINE CULTURE/COLONY COUNT: CPT | Performed by: FAMILY MEDICINE

## 2020-02-22 PROCEDURE — 74019 RADEX ABDOMEN 2 VIEWS: CPT

## 2020-02-22 PROCEDURE — 81001 URINALYSIS AUTO W/SCOPE: CPT | Performed by: FAMILY MEDICINE

## 2020-02-22 RX ORDER — METHOCARBAMOL 750 MG/1
750 TABLET, FILM COATED ORAL 4 TIMES DAILY
Qty: 28 TABLET | Refills: 0 | Status: SHIPPED | OUTPATIENT
Start: 2020-02-22 | End: 2020-04-16

## 2020-02-22 RX ORDER — CEFDINIR 300 MG/1
300 CAPSULE ORAL 2 TIMES DAILY
Qty: 20 CAPSULE | Refills: 0 | Status: SHIPPED | OUTPATIENT
Start: 2020-02-22 | End: 2020-04-16

## 2020-02-23 LAB
BACTERIA SPEC CULT: NORMAL
SPECIMEN SOURCE: NORMAL

## 2020-03-02 NOTE — PROGRESS NOTES
SUBJECTIVE: Graciela Castorena is a 55 year old female here with concerns about sinus infection.  She states onset  of symptoms were greater than 10 days with sinus pressure and drainage.  Course of illness is worsening.    Severity: moderate  Current and Associated symptoms: cold symptoms  Predisposing factors include none.   Recent treatment has included: OTC's  Allergic symptoms include: none    Past Medical History:   Diagnosis Date     Abnormal Pap smear of cervix 03/30/2015    see problem list     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 breast 2018    s/p lumpectomy and radiation; on Tamoxifen     Allergies   Allergen Reactions     Ciprofloxacin Itching     Social History     Tobacco Use     Smoking status: Never Smoker     Smokeless tobacco: Never Used   Substance Use Topics     Alcohol use: No     Alcohol/week: 0.0 standard drinks       ROS:   no rash  no vomiting    OBJECTIVE:  /72   Pulse 94   Temp 99.4  F (37.4  C) (Tympanic)   Resp 18   Wt 76.3 kg (168 lb 3.2 oz)   SpO2 97%   BMI 29.80 kg/m    NAD  EYES: clear, no mattering  EARS: TM's clear, no redness/buldging, canals clear, no swelling/redness  NOSE: discolored discharge dov. Nares  THROAT: clear, no erythema, no exudate  SINUS: maxillary tenderness with palpation  LUNGS: CTAB, no rhonchi/wheeze/rales      ICD-10-CM    1. Acute bilateral low back pain without sciatica M54.5 UA with Microscopic reflex to Culture     XR KUB     Basic metabolic panel     Urine Culture Aerobic Bacterial     methocarbamol (ROBAXIN) 750 MG tablet   2. Microscopic hematuria R31.29 XR KUB     Basic metabolic panel     cefdinir (OMNICEF) 300 MG capsule     Urine Culture Aerobic Bacterial   3. Acute conjunctivitis of left eye, unspecified acute conjunctivitis type H10.32    4. Acute sinusitis with coexisting condition requiring prophylactic treatment J01.90 cefdinir (OMNICEF) 300 MG capsule       Follow up with primary clinic if not improving

## 2020-03-08 ENCOUNTER — HOSPITAL ENCOUNTER (EMERGENCY)
Facility: CLINIC | Age: 55
Discharge: HOME | End: 2020-03-09
Attending: EMERGENCY MEDICINE | Admitting: EMERGENCY MEDICINE
Payer: COMMERCIAL

## 2020-03-08 VITALS
OXYGEN SATURATION: 99 % | WEIGHT: 169 LBS | HEART RATE: 109 BPM | BODY MASS INDEX: 28.16 KG/M2 | RESPIRATION RATE: 16 BRPM | SYSTOLIC BLOOD PRESSURE: 156 MMHG | HEIGHT: 65 IN | TEMPERATURE: 98.6 F | DIASTOLIC BLOOD PRESSURE: 85 MMHG

## 2020-03-08 DIAGNOSIS — R07.9 LEFT-SIDED CHEST PAIN: ICD-10-CM

## 2020-03-08 DIAGNOSIS — R07.81 PLEURITIC CHEST PAIN: ICD-10-CM

## 2020-03-08 DIAGNOSIS — M79.671 BILATERAL FOOT PAIN: ICD-10-CM

## 2020-03-08 DIAGNOSIS — M79.672 BILATERAL FOOT PAIN: ICD-10-CM

## 2020-03-08 PROCEDURE — 93005 ELECTROCARDIOGRAM TRACING: CPT

## 2020-03-08 PROCEDURE — 99285 EMERGENCY DEPT VISIT HI MDM: CPT | Mod: 25

## 2020-03-08 RX ORDER — ASPIRIN 81 MG/1
324 TABLET, CHEWABLE ORAL ONCE
Status: COMPLETED | OUTPATIENT
Start: 2020-03-08 | End: 2020-03-09

## 2020-03-08 ASSESSMENT — ENCOUNTER SYMPTOMS
COUGH: 0
NUMBNESS: 1
FEVER: 0
CONSTIPATION: 1
CHILLS: 0
DIARRHEA: 0

## 2020-03-08 ASSESSMENT — MIFFLIN-ST. JEOR: SCORE: 1372.46

## 2020-03-08 NOTE — ED AVS SNAPSHOT
Emergency Department  [address] [place] [address]  Phone:  [phone]  Fax:  [phone]                                    Graciela Castorena   MRN: [id number]    Department:   Emergency Department   Date of Visit:  3/8/2020           After Visit Summary Signature Page    I have received my discharge instructions, and my questions have been answered. I have discussed any challenges I see with this plan with the nurse or doctor.    ..........................................................................................................................................  Patient/Patient Representative Signature      ..........................................................................................................................................  Patient Representative Print Name and Relationship to Patient    ..................................................               ................................................  Date                                   Time    ..........................................................................................................................................  Reviewed by Signature/Title    ...................................................              ..............................................  Date                                               Time          22EPIC Rev 08/18

## 2020-03-09 ENCOUNTER — APPOINTMENT (OUTPATIENT)
Dept: GENERAL RADIOLOGY | Facility: CLINIC | Age: 55
End: 2020-03-09
Attending: EMERGENCY MEDICINE
Payer: COMMERCIAL

## 2020-03-09 LAB
ALBUMIN SERPL-MCNC: 3.3 G/DL (ref 3.4–5)
ALP SERPL-CCNC: 61 U/L (ref 40–150)
ALT SERPL W P-5'-P-CCNC: 21 U/L (ref 0–50)
ANION GAP SERPL CALCULATED.3IONS-SCNC: 4 MMOL/L (ref 3–14)
AST SERPL W P-5'-P-CCNC: 17 U/L (ref 0–45)
BASOPHILS # BLD AUTO: 0 10E9/L (ref 0–0.2)
BASOPHILS NFR BLD AUTO: 0.3 %
BILIRUB SERPL-MCNC: 0.2 MG/DL (ref 0.2–1.3)
BUN SERPL-MCNC: 12 MG/DL (ref 7–30)
CALCIUM SERPL-MCNC: 8.6 MG/DL (ref 8.5–10.1)
CHLORIDE SERPL-SCNC: 109 MMOL/L (ref 94–109)
CO2 SERPL-SCNC: 28 MMOL/L (ref 20–32)
CREAT SERPL-MCNC: 0.93 MG/DL (ref 0.52–1.04)
D DIMER PPP FEU-MCNC: 0.3 UG/ML FEU (ref 0–0.5)
DIFFERENTIAL METHOD BLD: NORMAL
EOSINOPHIL # BLD AUTO: 0.2 10E9/L (ref 0–0.7)
EOSINOPHIL NFR BLD AUTO: 2.9 %
ERYTHROCYTE [DISTWIDTH] IN BLOOD BY AUTOMATED COUNT: 13 % (ref 10–15)
GFR SERPL CREATININE-BSD FRML MDRD: 70 ML/MIN/{1.73_M2}
GLUCOSE SERPL-MCNC: 113 MG/DL (ref 70–99)
HCT VFR BLD AUTO: 39 % (ref 35–47)
HGB BLD-MCNC: 12.9 G/DL (ref 11.7–15.7)
IMM GRANULOCYTES # BLD: 0 10E9/L (ref 0–0.4)
IMM GRANULOCYTES NFR BLD: 0.1 %
LIPASE SERPL-CCNC: 99 U/L (ref 73–393)
LYMPHOCYTES # BLD AUTO: 2.1 10E9/L (ref 0.8–5.3)
LYMPHOCYTES NFR BLD AUTO: 28.6 %
MCH RBC QN AUTO: 30.1 PG (ref 26.5–33)
MCHC RBC AUTO-ENTMCNC: 33.1 G/DL (ref 31.5–36.5)
MCV RBC AUTO: 91 FL (ref 78–100)
MONOCYTES # BLD AUTO: 0.7 10E9/L (ref 0–1.3)
MONOCYTES NFR BLD AUTO: 9 %
NEUTROPHILS # BLD AUTO: 4.4 10E9/L (ref 1.6–8.3)
NEUTROPHILS NFR BLD AUTO: 59.1 %
NRBC # BLD AUTO: 0 10*3/UL
NRBC BLD AUTO-RTO: 0 /100
PLATELET # BLD AUTO: 207 10E9/L (ref 150–450)
POTASSIUM SERPL-SCNC: 3.9 MMOL/L (ref 3.4–5.3)
PROT SERPL-MCNC: 6.8 G/DL (ref 6.8–8.8)
RBC # BLD AUTO: 4.29 10E12/L (ref 3.8–5.2)
SODIUM SERPL-SCNC: 141 MMOL/L (ref 133–144)
TROPONIN I SERPL-MCNC: 0.03 UG/L (ref 0–0.04)
WBC # BLD AUTO: 7.5 10E9/L (ref 4–11)

## 2020-03-09 PROCEDURE — 71046 X-RAY EXAM CHEST 2 VIEWS: CPT

## 2020-03-09 PROCEDURE — 25000125 ZZHC RX 250: Performed by: EMERGENCY MEDICINE

## 2020-03-09 PROCEDURE — 85025 COMPLETE CBC W/AUTO DIFF WBC: CPT | Performed by: EMERGENCY MEDICINE

## 2020-03-09 PROCEDURE — 85379 FIBRIN DEGRADATION QUANT: CPT | Performed by: EMERGENCY MEDICINE

## 2020-03-09 PROCEDURE — 80053 COMPREHEN METABOLIC PANEL: CPT | Performed by: EMERGENCY MEDICINE

## 2020-03-09 PROCEDURE — 83690 ASSAY OF LIPASE: CPT | Performed by: EMERGENCY MEDICINE

## 2020-03-09 PROCEDURE — 84484 ASSAY OF TROPONIN QUANT: CPT | Performed by: EMERGENCY MEDICINE

## 2020-03-09 PROCEDURE — 25000132 ZZH RX MED GY IP 250 OP 250 PS 637: Performed by: EMERGENCY MEDICINE

## 2020-03-09 RX ORDER — FAMOTIDINE 20 MG/1
20 TABLET, FILM COATED ORAL 2 TIMES DAILY
Qty: 14 TABLET | Refills: 0 | Status: SHIPPED | OUTPATIENT
Start: 2020-03-09 | End: 2020-03-27

## 2020-03-09 RX ADMIN — LIDOCAINE HYDROCHLORIDE 30 ML: 20 SOLUTION ORAL; TOPICAL at 00:22

## 2020-03-09 RX ADMIN — ASPIRIN 81 MG 324 MG: 81 TABLET ORAL at 00:22

## 2020-03-09 NOTE — ED PROVIDER NOTES
"  History     Chief Complaint:  Chest Pain    The history is provided by the patient.      Graciela Castorena is a 55 year old female with a history of breast cancer, who presents to the emergency department for evaluation of chest pain. The patient reports experiencing \"sharp\" left sided chest pain which began a week ago and has continued since. She reports that touching her chest and breathing aggravates her chest pain. The patient also reports swelling and numbness to her legs, indigestion, and chronic constipation accompanying her chest pain. She denies taking any medications to treat her chest pain. The patient denies coughing, fevers, vomiting, or diarrhea.    Allergies:  Ciprofloxacin     Medications:    Nolvadex     Past Medical History:    Abnormal pap smear of cervix  Ductal carcinoma of left breast    Past Surgical History:    Lumpectomy with seed localization of breast    Family History:    DM2      Social History:  Presents with family  Never smoker  No alcohol use  No drug use  Marital Status:   [2]     Review of Systems   Constitutional: Negative for chills and fever.   Respiratory: Negative for cough.    Cardiovascular: Positive for chest pain and leg swelling.   Gastrointestinal: Positive for constipation. Negative for diarrhea.   Neurological: Positive for numbness.   All other systems reviewed and are negative.    Physical Exam     Patient Vitals for the past 24 hrs:   BP Temp Temp src Pulse Heart Rate Resp SpO2 Height Weight   03/08/20 2257 (!) 156/85 98.6  F (37  C) Oral 109 109 16 99 % 1.651 m (5' 5\") 76.7 kg (169 lb)       Physical Exam  Eyes:  Sclera white; Pupils are equal and round  ENT:    External ears and nares normal, no goiter  CV:  Rate as above with regular rhythm     L parasternal CW tenderness    Symmetric radial pulses    No BLE edema  Resp:  Breath sounds clear and equal bilaterally  GI:  Abdomen is soft, non-tender  MS:  Moves all extremities    Prominent posterior portion " of bilateral feet  Skin:  Warm and dry  Neuro:  Speech is normal and fluent. Alert.  Points to lateral thigh as area of numbness.          Emergency Department Course     ECG:  Time: 2301  Vent. Rate 104 bpm. MA interval 150. QRS duration 92. QT/QTc 354/465. P-R-T axis 61 -21 46.  Sinus tachycardia  Septal infarct  Abnormal ECG  No significant change compared to previous EKG dated 12/20/19  Read time: 2330      Imaging:  Radiology findings were communicated with the patient and family who voiced understanding of the findings.    XR CHEST 2 VW  IMPRESSION: Heart is normal in size. No pneumothorax. No infiltrates. No pleural fluid. Mild thoracic osteophytes. Multiple calcific densities right upper quadrant of abdomen may represent multiple gallstones.  As per radiology.     Laboratory:  Laboratory findings were communicated with the patient and family who voiced understanding of the findings.    CBC: WBC: 7.5, HGB: 12.9, PLT: 207   CMP: Glucose 113 (H), Albumin 3.3 (L), o/w WNL (Creatinine: 0.93)     Lipase: 99    0047 Troponin I: <0.015     D dimer quantitative: 0.3    Interventions:  0022  mg PO  0022 Xylocaine 30 mL PO    Emergency Department Course:  Past medical records, nursing notes, and vitals reviewed.    2320 I performed an exam of the patient as documented above.     EKG obtained in the ED, see results above.     IV was inserted and blood was drawn for laboratory testing, results above.    The patient was sent for a XR chest while in the emergency department, results above.     0105 I rechecked the patient and discussed the results of her workup thus far.     Findings and plan explained to the Patient. Patient discharged home with instructions regarding supportive care, medications, and reasons to return. The importance of close follow-up was reviewed. The patient was prescribed Pepcid    I personally reviewed the laboratory and imaging results with the Patient and daughter and answered all related  questions prior to discharge.     Impression & Plan     Medical Decision Making:  Graciela Castorena is a 55 year old female here for evaluation of pleuritic chest pain with associated tachycardia. EKG was performed and no evidence of dysrhythmia, pericarditis, or ischemia. She is perc positive and D dimer was sent to work up a potential PE.  This was negative.  Blood work shows no evidence of leukocytosis, anemia, electrolyte abnormalities or renal insufficiency.  Troponin detectable but w/duration of symptoms single troponin effectively rules out ACS.  Imaging returned without intrathoracic abnormality.  She has been previously told she has gallstones.  Reassessed abdomen.  No RUQ or epigastric tenderness.  No suspicion for cholecystitis; emergent US not indicated.  Felt better after aspirin and GI cocktail.  Will start famotidine in the event of acid related component.  Due to risk factors was referred for stress test.  Requested clinic information at [facility] for follow-up.  F/u podiatry regarding foot pain.  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    Diagnosis:    ICD-10-CM    1. Left-sided chest pain  R07.9 Echo Stress Echocardiogram   2. Pleuritic chest pain  R07.81    3. Bilateral foot pain  M79.671     M79.672        Disposition:  Discharged to home.    Discharge Medications:  New Prescriptions    FAMOTIDINE (PEPCID) 20 MG TABLET    Take 1 tablet (20 mg) by mouth 2 times daily       Scribe Disclosure:  I, Yann Wilde, am serving as a scribe at 11:21 PM on 3/8/2020 to document services personally performed by Carine Giles MD based on my observations and the provider's statements to me.         Carine Giles MD  03/09/20 0151

## 2020-03-09 NOTE — DISCHARGE INSTRUCTIONS
Prescription strength dosing instructions:  - 650mg acetaminophen (tylenol) every 4 hours or 1000mg every 6 hours (maximum of 4000mg in 24 hours).  Acetaminophen is often in combination over the counter and prescription pain medications.  Be sure to include this in daily total.          Discharge Instructions  Chest Pain    You have been seen today for chest pain or discomfort.  At this time, your provider has found no signs that your chest pain is due to a serious or life-threatening condition, (or you have declined more testing and/or admission to the hospital). However, sometimes there is a serious problem that does not show up right away. Your evaluation today may not be complete and you may need further testing and evaluation.     Generally, every Emergency Department visit should have a follow-up clinic visit with either a primary or a specialty clinic/provider. Please follow-up as instructed by your emergency provider today.  Return to the Emergency Department if:  Your chest pain changes, gets worse, starts to happen more often, or comes with less activity.  You are newly short of breath.  You get very weak or tired.  You pass out or faint.  You have any new symptoms, like fever, cough, numb legs, or you cough up blood.  You have anything else that worries you.    Until you follow-up with your regular provider, please do the following:  Take one aspirin daily unless you have an allergy or are told not to by your provider.  If a stress test appointment has been made, go to the appointment.  If you have questions, contact your regular provider.  Follow-up with your regular provider/clinic as directed; this is very important.    If you were given a prescription for medicine here today, be sure to read all of the information (including the package insert) that comes with your prescription.  This will include important information about the medicine, its side effects, and any warnings that you need to know about.   The pharmacist who fills the prescription can provide more information and answer questions you may have about the medicine.  If you have questions or concerns that the pharmacist cannot address, please call or return to the Emergency Department.       Remember that you can always come back to the Emergency Department if you are not able to see your regular provider in the amount of time listed above, if you get any new symptoms, or if there is anything that worries you.

## 2020-03-10 LAB — INTERPRETATION ECG - MUSE: NORMAL

## 2020-03-18 ENCOUNTER — HOSPITAL ENCOUNTER (OUTPATIENT)
Dept: CARDIOLOGY | Facility: CLINIC | Age: 55
Discharge: HOME | End: 2020-03-18
Attending: EMERGENCY MEDICINE | Admitting: EMERGENCY MEDICINE
Payer: COMMERCIAL

## 2020-03-18 DIAGNOSIS — R07.9 LEFT-SIDED CHEST PAIN: Primary | ICD-10-CM

## 2020-03-18 PROCEDURE — 93018 CV STRESS TEST I&R ONLY: CPT | Performed by: INTERNAL MEDICINE

## 2020-03-18 PROCEDURE — 25500064 ZZH RX 255 OP 636: Performed by: EMERGENCY MEDICINE

## 2020-03-18 PROCEDURE — 93016 CV STRESS TEST SUPVJ ONLY: CPT | Performed by: INTERNAL MEDICINE

## 2020-03-18 PROCEDURE — 93321 DOPPLER ECHO F-UP/LMTD STD: CPT | Mod: 26 | Performed by: INTERNAL MEDICINE

## 2020-03-18 PROCEDURE — 93325 DOPPLER ECHO COLOR FLOW MAPG: CPT | Mod: 26 | Performed by: INTERNAL MEDICINE

## 2020-03-18 PROCEDURE — 93325 DOPPLER ECHO COLOR FLOW MAPG: CPT | Mod: TC

## 2020-03-18 PROCEDURE — 93350 STRESS TTE ONLY: CPT | Mod: 26 | Performed by: INTERNAL MEDICINE

## 2020-03-18 RX ADMIN — HUMAN ALBUMIN MICROSPHERES AND PERFLUTREN 9 ML: 10; .22 INJECTION, SOLUTION INTRAVENOUS at 15:52

## 2020-03-19 ENCOUNTER — OFFICE VISIT (OUTPATIENT)
Dept: PODIATRY | Facility: CLINIC | Age: 55
End: 2020-03-19
Payer: COMMERCIAL

## 2020-03-19 DIAGNOSIS — M76.61 TENDONITIS, ACHILLES, RIGHT: Primary | ICD-10-CM

## 2020-03-19 PROCEDURE — 99203 OFFICE O/P NEW LOW 30 MIN: CPT | Performed by: PODIATRIST

## 2020-03-19 NOTE — PATIENT INSTRUCTIONS
Thank you for choosing [facility] Podiatry / Foot & Ankle Surgery!    DR. HOWE'S CLINIC LOCATIONS:   MONDAY - [place] TUESDAY AM - [place]   [address] [facility]  [address]   [place] [address] [place] [address]   [phone]       THURSDAY AM - [place] THURSDAY PM - [place]   [address]   [place] [address] [place], [place] [address] [phone]       FRIDAY AM - [place] SET UP SURGERY: [phone] [address] APPOINTMENTS: [phone]   [place] [address] BILLING QUESTIONS: [phone] FAX NUMBER: [phone]     Follow up: 4    Next steps: Achilles home exercises, inserts    Please read through the following handouts and if you have any questions, please feel free to call us or send a [employer] message!              Graciela to follow up with Primary Care provider regarding elevated blood pressure. (if equal or greater than 140/90)    Body Mass Index (BMI)  Many things can cause foot and ankle problems. Foot structure, activity level, foot mechanics and injuries are common causes of pain.    One very important issue that often goes unmentioned, is body weight.  Extra weight can cause increased stress on muscles, ligaments, bones and tendons. Sometimes just a few extra pounds is all it takes to put one over her/his threshold. Without reducing that stress, it can be difficult to alleviate pain.      Some people are uncomfortable addressing this issue, but we feel it is important for you to think about it. As Foot & Ankle specialists, our job is addressing the lower extremity problem and possible causes.     Regarding extra body weight, we encourage patients to discuss diet and weight management plans with their primary care doctors. It is this team approach that gives you the best opportunity for pain relief and getting you back on your feet.      ACHILLES TENDONITIS  Therapies discussed with   "Karla today:  1.  Supportive shoes, minimizing barefoot ambulation - helps to provide cushion, padding and support to the tendon that is inflamed.  Socks, flip flops, flats and some slippers are not typically sufficient to provide support, although choose shoe gear based on comfort.  Shoes should be worn even in-doors  2.  Insert/orthotics - inserts/orthotics that have an arch support and heel lift built in to them provide further stress relief for the tendon.  3. Icing - applying ice to the back of the heel can help to alleviate discomfort.  Apply ice to the area every few hours x 10-15 minutes as needed based on pain levels.  4.  Anti-inflammatory(NSAIDS)/Tylenol - anti-inflammatories, such as ibuprofen or Aleve, as well as Tylenol can be used to help decrease symptoms and improve pain levels.  If you have high blood pressure, heart disease, stomach or kidney problems, use anti-inflammatories sparingly.  Tylenol should not be used if you have liver problems.  If you can safely taken them, you can use NSAIDS and tylenol in combination for pain relief  5. Activity modifications - if there are certain things that you do, whether it's going barefoot or certain shoes/activities, you should try to minimize those activities as much as possible until your symptoms are sufficiently resolved.  Certainly, some activities, such as running on the treadmill, are easier to take a break from versus others, such as work or chores at home.  \"If there are certain activities that hurt your heel, and you keep doing those activities that hurt your heel, your heel will keep hurting\".    6.  Stretching - Stretching your Achilles and hamstring can help to decrease stress on the Achilles tendon.               Hold each stretch for 10 seconds.  Stretch 10 times per set, three sets per day.  Morning, afternoon and evening.  If your heel pain is very severe in the morning, consider doing the first set of stretches before you get out of " bed.      *If these initial therapies are insufficient, we have our tier 2 therapies that can more aggressively work to improve your symptoms and get you back to the activities that you enjoy.        OVER THE COUNTER INSERTS    Most of these can be found at your local [employer], [employer], [employer], sporting [employer], or online:    SuperFeet   Sofsole Fit Spenco   Power Step   Walk-Fit ([employer])  *For heel pain* Arch Cradles  *For heel pain*       **A good high quality over the counter insert should cost around $40-$50

## 2020-03-19 NOTE — PROGRESS NOTES
"Foot & Ankle Surgery  March 19, 2020    CC: right heel pain    I was asked to see Graciela Castorena regarding the chief complaint by:  ER    HPI:  Pt is a 55 year old female who presents with above complaint.  Right heel pain \"a long time\", no injury noted.  Pain 4/10 at worse.  No treatment thus far.  Was seen in the ER recently for left-sided chest pain, mentioned heel pain.  \"prominent posterior\" foot noted in ER note.  No imaging.      ROS:   Pos for CC.  The patient denies current nausea, vomiting, chills, fevers, belly pain, calf pain, chest pain or SOB.  Complete remainder of ROS is otherwise neg.    VITALS:  There were no vitals filed for this visit.    PMH:    Past Medical History:   Diagnosis Date     Abnormal Pap smear of cervix 03/30/2015    see problem list     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 breast 2018    s/p lumpectomy and radiation; on Tamoxifen       SXHX:    Past Surgical History:   Procedure Laterality Date     COLONOSCOPY N/A 2/12/2018    Procedure: COLONOSCOPY;  colonoscopy;  Surgeon: Sabas Villegas MD;  Location:  GI     LUMPECTOMY BREAST WITH SEED LOCALIZATION Left 4/18/2018    Procedure: LUMPECTOMY BREAST WITH SEED LOCALIZATION;  SEED LOCALIZED LEFT BREAST LUMPECTOMY ;  Surgeon: Meli Rodriguez MD;  Location:  [facility]        MEDS:    Current Outpatient Medications   Medication     diclofenac (VOLTAREN) 1 % topical gel     famotidine (PEPCID) 20 MG tablet     polyethylene glycol (MIRALAX) powder     tamoxifen (NOLVADEX) 20 MG tablet     vitamin D3 (CHOLECALCIFEROL) 2000 units (50 mcg) tablet     No current facility-administered medications for this visit.        ALL:     Allergies   Allergen Reactions     Ciprofloxacin Itching       FMH:    Family History   Problem Relation Age of Onset     Diabetes Type 2  Brother      Diabetes Type 2  Sister      Myocardial Infarction No family hx of      Cerebrovascular Disease No family hx of      Coronary Artery Disease Early Onset No " family hx of      Colon Cancer No family hx of      Breast Cancer No family hx of      Ovarian Cancer No family hx of        SocHx:    Social History     Socioeconomic History     Marital status:      Spouse name: Not on file     Number of children: 9     Years of education: Not on file     Highest education level: Not on file   Occupational History     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   Social Needs     Financial resource strain: Not on file     Food insecurity     Worry: Not on file     Inability: Not on file     Transportation needs     Medical: Not on file     Non-medical: Not on file   Tobacco Use     Smoking status: Never Smoker     Smokeless tobacco: Never Used   Substance and Sexual Activity     Alcohol use: No     Alcohol/week: 0.0 standard drinks     Drug use: No     Sexual activity: Yes     Partners: Male   Lifestyle     Physical activity     Days per week: Not on file     Minutes per session: Not on file     Stress: Not on file   Relationships     Social connections     Talks on phone: Not on file     Gets together: Not on file     Attends Hinduism service: Not on file     Active member of club or organization: Not on file     Attends meetings of clubs or organizations: Not on file     Relationship status: Not on file     Intimate partner violence     Fear of current or ex partner: Not on file     Emotionally abused: Not on file     Physically abused: Not on file     Forced sexual activity: Not on file   Other Topics Concern     Parent/sibling w/ CABG, MI or angioplasty before 65F 55M? Not Asked   Social History Narrative    .    8 adult kids - 7 are here. 1 still in [place].     One grandchild (as of 2019).    Walks a lot.            EXAMINATION:  Gen:   No apparent distress  Neuro:   A&Ox3, no deficits  Psych:    Answering questions appropriately for age and situation with normal affect  Head:    NCAT  Eye:    Visual scanning without deficit  Ear:    Response to auditory stimuli  wnl  Lung:    Non-labored breathing on RA noted  Abd:    NTND per patient report  Lymph:    Neg for pitting/non-pitting edema BLE  Vasc:    Pulses palpable, CFT minimally delayed  Neuro:    Light touch sensation intact to all sensory nerve distributions without paresthesias  Derm:    Neg for nodules, lesions or ulcerations  MSK:    right lower extremity - posterior heel pain at Achilles insertion and at level of retrocalcaneal bursa.  No tendon pain, 5/5 plantarflexion with minimal pain.  Inflammation noted at posterior heel.  Calf:    Neg for redness, swelling or tenderness    Assessment:  55 year old female with right lower extremity heel pain 2/2 to insertion Achilles tendonitis and retrocalcaneal bursitis      Plan:  Discussed etiologies, anatomy and options  1.  Right lower extremity heel pain 2/2 to insertion Achilles tendonitis and retrocalcaneal bursitis   -Regarding the Achilles tendonitis, the Achilles handout was dispensed and discussed.  We talked about stretching, resting/activity modification, icing, NSAID/tylenol use as tolerated, inserts, supportive shoes and minimizing shoeless walking.    -discussed Achilles and hamstring stretches  -OTC insert information dispensed and discussed  -Rx for Voltaren gel  -discussed walking boot.  Declined today, consider at next visit if above plan fails to provide sufficient relief    Follow up:  4 weeks or sooner with acute issues      Patient's medical history was reviewed today    There is no height or weight on file to calculate BMI.  Weight management plan: Patient was referred to their PCP to discuss a diet and exercise plan.        Manan Acosta, YEN FACFAS FACFAOM  Podiatric Foot & Ankle Surgeon  [facility]  [phone]

## 2020-03-20 ENCOUNTER — TELEPHONE (OUTPATIENT)
Dept: INTERNAL MEDICINE | Facility: CLINIC | Age: 55
End: 2020-03-20

## 2020-03-20 NOTE — TELEPHONE ENCOUNTER
Reason for Call:  Request for results:    Name of test or procedure: echo stress test    Date of test of procedure: 03/18/20    Location of the test or procedure: [facility]    OK to leave the result message on voice mail or with a family member? YES    Phone number Patient can be reached at:  Home number on file [phone] (home)    Additional comments: pt wants to know the results of her stress echo, please call pt back with results. Thanks.    Call taken on 3/20/2020 at 2:28 PM by ASPEN WEIR

## 2020-03-20 NOTE — TELEPHONE ENCOUNTER
PCP please advise,     Ok to get results? Looks like this was done at the hospital.    Rae SANCHEZN, RN, PHN

## 2020-03-27 ENCOUNTER — VIRTUAL VISIT (OUTPATIENT)
Dept: INTERNAL MEDICINE | Facility: CLINIC | Age: 55
End: 2020-03-27
Payer: COMMERCIAL

## 2020-03-27 ENCOUNTER — APPOINTMENT (OUTPATIENT)
Dept: INTERPRETER SERVICES | Facility: CLINIC | Age: 55
End: 2020-03-27
Payer: COMMERCIAL

## 2020-03-27 ENCOUNTER — NURSE TRIAGE (OUTPATIENT)
Dept: INTERNAL MEDICINE | Facility: CLINIC | Age: 55
End: 2020-03-27

## 2020-03-27 DIAGNOSIS — R07.9 CHEST PAIN, UNSPECIFIED TYPE: Primary | ICD-10-CM

## 2020-03-27 PROCEDURE — 99441 ZZC PHYSICIAN TELEPHONE EVALUATION 5-10 MIN: CPT | Performed by: INTERNAL MEDICINE

## 2020-03-27 NOTE — TELEPHONE ENCOUNTER
Additional Information    Commented on: Dizziness or lightheadedness     Will Defer in clinic visit to virtual visit    Protocols used: CHEST PAIN-A-OH

## 2020-03-27 NOTE — TELEPHONE ENCOUNTER
"Patient calling.     Feels that the left side of chest feels fast again like it was when she went into the ER. Patient stated she is unable to check her HR.        Reason for Disposition    Heart beating irregularly or very rapidly    Additional Information    Negative: Severe difficulty breathing (e.g., struggling for each breath, speaks in single words)    Negative: Passed out (i.e., fainted, collapsed and was not responding)    Negative: Chest pain lasting longer than 5 minutes and ANY of the following:* Over 50 years old* Over 30 years old and at least one cardiac risk factor (i.e., high blood pressure, diabetes, high cholesterol, obesity, smoker or strong family history of heart disease)* Pain is crushing, pressure-like, or heavy * Took nitroglycerin and chest pain was not relieved* History of heart disease (i.e., angina, heart attack, bypass surgery, angioplasty, CHF)    Negative: Visible sweat on face or sweat dripping down face    Negative: Sounds like a life-threatening emergency to the triager    Negative: Followed an injury to chest    Negative: SEVERE chest pain    Negative: Pain also present in shoulder(s) or arm(s) or jaw    Negative: Difficulty breathing    Negative: Cocaine use within last 3 days    Negative: History of prior 'blood clot' in leg or lungs (i.e., deep vein thrombosis, pulmonary embolism)    Negative: Recent illness requiring prolonged bed rest (i.e., immobilization)    Negative: Hip or leg fracture in past 2 months (e.g, or had cast on leg or ankle)    Negative: Major surgery in the past month    Negative: Recent long-distance travel with prolonged time in car, bus, plane, or train (i.e., within past 2 weeks; 6 or more hours duration)    Dizziness or lightheadedness    Answer Assessment - Initial Assessment Questions  1. LOCATION: \"Where does it hurt?\"        Chest hurts  2. RADIATION: \"Does the pain go anywhere else?\" (e.g., into neck, jaw, arms, back)      No  3. ONSET: \"When did the " "chest pain begin?\" (Minutes, hours or days)       This morning  4. PATTERN \"Does the pain come and go, or has it been constant since it started?\"  \"Does it get worse with exertion?\"       Comes and go  5. DURATION: \"How long does it last\" (e.g., seconds, minutes, hours)      minutes  6. SEVERITY: \"How bad is the pain?\"  (e.g., Scale 1-10; mild, moderate, or severe)     - MILD (1-3): doesn't interfere with normal activities      - MODERATE (4-7): interferes with normal activities or awakens from sleep     - SEVERE (8-10): excruciating pain, unable to do any normal activities        Mild  7. CARDIAC RISK FACTORS: \"Do you have any history of heart problems or risk factors for heart disease?\" (e.g., prior heart attack, angina; high blood pressure, diabetes, being overweight, high cholesterol, smoking, or strong family history of heart disease)      GERD  8. PULMONARY RISK FACTORS: \"Do you have any history of lung disease?\"  (e.g., blood clots in lung, asthma, emphysema, birth control pills)      No  9. CAUSE: \"What do you think is causing the chest pain?\"      Unsure. Was seen ED had chest stress echo  10. OTHER SYMPTOMS: \"Do you have any other symptoms?\" (e.g., dizziness, nausea, vomiting, sweating, fever, difficulty breathing, cough)        Dizziness, No sweating. No fever. No cough. No SOB  11. PREGNANCY: \"Is there any chance you are pregnant?\" \"When was your last menstrual period?\"        NO    Protocols used: CHEST PAIN-A-OH      "

## 2020-03-27 NOTE — PROGRESS NOTES
"Graciela Castorena is a 55 year old female who is being evaluated via a billable telephone visit.      The patient has been notified of following:     \"This telephone visit will be conducted via a call between you and your physician/provider. We have found that certain health care needs can be provided without the need for a physical exam.  This service lets us provide the care you need with a short phone conversation.  If a prescription is necessary we can send it directly to your pharmacy.  If lab work is needed we can place an order for that and you can then stop by our lab to have the test done at a later time.    If during the course of the call the physician/provider feels a telephone visit is not appropriate, you will not be charged for this service.\"      I have reviewed and updated the patient's Past Medical History, Social History, Family History and Medication List.    TELEPHONE VISIT                                                      SUBJECTIVE:                                                      HPI: Graciela Castorena is a pleasant 55 year old female who requested a telephone visit to discuss chest pain:    Shirley  utilized via conference call.    - occurred this morning  - occurred while she was performing house chores  - located upper left chest, without radiation  - sharp in quality and severe in severity  - lasted 1 minute and resolved after sitting down and taking a drink of water  - associated racing heart during episode, but not before or after  - associated lightheadedness during episode, but, again, not before after    Patient had a similar episode of chest pain several weeks ago. She was seen in the ER for this. Evaluation including exam, EKG, CXR, and labs unremarkable other than mild sinus tachycardia (104). An outpatient stress echo was ordered and was also normal.    ASSESSMENT/PLAN:                                                      (R07.9) Chest pain, unspecified type  " (primary encounter diagnosis)  Comment: recent thorough and negative cardiac work-up; suspect pain is MSK in origin (intercostal strain or ischemia).  Plan: if pain recurs, patient should sit down immediately and take some deep breaths to open up thorax, and, hopefully, improve oxygenation and blood flow throughout.     Total time of call between patient and provider was 8 minutes     (Chart documentation was completed, in part, with e[employer] voice-recognition software. Even though reviewed, some grammatical, spelling, and word errors may remain.)    Genna Magallanes MD   [address] [address]  T: [phone], F: [phone]

## 2020-04-10 ENCOUNTER — TELEPHONE (OUTPATIENT)
Dept: INTERNAL MEDICINE | Facility: CLINIC | Age: 55
End: 2020-04-10

## 2020-04-10 DIAGNOSIS — R10.13 DYSPEPSIA: Primary | ICD-10-CM

## 2020-04-10 RX ORDER — FAMOTIDINE 20 MG/1
20 TABLET, FILM COATED ORAL 2 TIMES DAILY PRN
Qty: 60 TABLET | Refills: 1 | Status: SHIPPED | OUTPATIENT
Start: 2020-04-10 | End: 2020-06-27

## 2020-04-10 RX ORDER — FAMOTIDINE 20 MG/1
TABLET, FILM COATED ORAL
COMMUNITY
Start: 2020-03-12 | End: 2020-04-10

## 2020-04-10 NOTE — TELEPHONE ENCOUNTER
Reason for Call:  Other Patient request for note/letter    Detailed comments: The patient called and stated that she needs Dr. Magallanes to write her a note/letter for her work excusing her from work during the covid 19 pandemic as she has breast cancer and other medical condition that make her more susceptible to getting it. She said she needs this done as soon as possible as she needs to give it to her employer. She would like a call back once this has been completed so she knows it has been taken care of and will let us know where she would like the letter sent.     Phone Number Patient can be reached at: Cell number on file:    Telephone Information:   Mobile [phone]       Best Time: Any    Can we leave a detailed message on this number? YES    Call taken on 4/10/2020 at 9:15 AM by Lea Pope

## 2020-04-10 NOTE — TELEPHONE ENCOUNTER
Requested Prescriptions   Pending Prescriptions Disp Refills     famotidine (PEPCID) 20 MG tablet          There is no refill protocol information for this order      Signed Prescriptions Disp Refills    famotidine (PEPCID) 20 MG tablet         There is no refill protocol information for this order        Last Written Prescription Date:  3/12/20  Last Fill Quantity: NA,  # refills: NA   Last office visit: 3/27/2020 with prescribing provider:  3/27/20   Future Office Visit:

## 2020-04-10 NOTE — LETTER
April 10, 2020      Graciela Castorena  [address]    [place] [address]        To Whom It May Concern:    Graciela Castorena is at increased risk for coronavirus complications based on a chronic medical condition.         Sincerely,        Shirley oTrrez PA-C

## 2020-04-10 NOTE — TELEPHONE ENCOUNTER
I am covering for Dr. Magallanes as she is out until next week, if patient wishes to wait for her response that is okay as well.    Our clinic policy to date has been that we are not witting work excuses to be out of work to prevent COVID-19.    I can write a letter documenting that patient is at increased risk for coronavirus complications if she becomes infected with virus.     Please let me know if patient would like me to write this letter.

## 2020-04-10 NOTE — TELEPHONE ENCOUNTER
Spoke with patient using  services. Pt states that ok to print letter of increased risk. Pt asked that letter be mailed to her home address. Renu Navarrete, CMA

## 2020-04-10 NOTE — TELEPHONE ENCOUNTER
Reason for Call:  Other Medication Request    Detailed comments: The patient called and stated that she would like Dr. Magallanes to write her a new prescription for famotidine (PEPCID) 40 MGs. She would like the prescription sent to [facility] at [address] [place] [address] in [place].     Phone Number Patient can be reached at: Cell number on file:    Telephone Information:   Mobile [phone]       Best Time: Any    Can we leave a detailed message on this number? YES    Call taken on 4/10/2020 at 9:13 AM by Lea Pope

## 2020-04-13 ENCOUNTER — PATIENT OUTREACH (OUTPATIENT)
Dept: PEDIATRICS | Facility: CLINIC | Age: 55
End: 2020-04-13

## 2020-04-13 NOTE — TELEPHONE ENCOUNTER
"Hi Dr Aceves,    This patient is due for a colposcopy after recurrent ASCUS, neg HPV result. Most recent pap done 1/31/20. She cancelled her colposcopy appt for 4/9/20. Pt is on tamoxifen for breast cancer diagnosis. Please review and recommend appropriate follow up time frame, due to recent concerns with exposure to COVID 19.     Interim guidelines, [email or web address]://[email or web address].org/covid-[email or web address].    \"-Individuals with low grade cervical cancer screening tests may have postponement of diagnostic evaluations up to 6-12 months.    -Individuals with high grade cervical cancer screening tests should have diagnostic evaluation scheduled within 3 months.\"     Would you recommend colp be completed within 3 months or 6-12 months from date of last pap, which was done on 1/31/20? Please see below for pap history.    10/22/13 NIL pap, neg HR HPV (in Care Everywhere)   3/30/15 ASCUS pap, neg HR HPV  08/29/18 ASCUS pap, neg HR HPV result. Pt was dx with Breast ca 03/13/18. Plan cotest in 1 year as patient previously had an ascus she didn't follow up on per provider.    1/31/20 ASCUS pap, neg HR HPV. Plan colposcopy  4/13/20 Per provider, COVID 19 interim guideline, plan updated to: awaiting provider       Thanks!    Agustina Sanon RN BSN, Pap Tracking    "

## 2020-04-15 ENCOUNTER — TELEPHONE (OUTPATIENT)
Dept: ONCOLOGY | Facility: CLINIC | Age: 55
End: 2020-04-15

## 2020-04-15 NOTE — TELEPHONE ENCOUNTER
"Graciela called to report that she has chosen to self-isolate due to fears of COVID-19.  She states that her workplace wrote her a letter to terminate her employment.  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    Informed her that we are recommending that our patients follow the CDC's recommendations regarding COVID-19.  Graciela has a history of DCIS with radiation treatment 2 years ago.  Educated Graciela that she is not currently considered immunosuppressed, so she would not be considered a higher risk.  (Her last CBC was normal.)  She stated \"but I am taking a medication.\"  Educated Graciela that Tamoxifen does not cause immunosuppression.    Graciela requested to have a telephone visit with Dr. Barahona.  Transferred to scheduling to set up appointment.    Seb Echeverria, LAURAN, RN, OCN  Oncology Care Coordinator  [facility]  "

## 2020-04-15 NOTE — TELEPHONE ENCOUNTER
"Called patient back to inquire about \"symptoms\" she reported to the , that she did not mention during our previous phone call.    Patient reports that her  developed \"all the symptoms\" of COVID-19 on 4/6.  She and her family have  themselves to a separate section of the house.  She has questions related to this.  Encouraged patient that she could discuss this with her PCP, as this is not an oncology problem, but she was insistent that she would like to have telephone visit with Dr. Barahona to discuss her concerns.    Patient scheduled for telephone visit with Dr. Barahona tomorrow.    Seb Echeverria, LAURAN, RN, OCN  Oncology Care Coordinator  [facility]  "

## 2020-04-15 NOTE — TELEPHONE ENCOUNTER
"Patient calling requesting telephone visit with Dr Barahona due to \"new symptoms\" she would like an urgent appointment either today or tomorrow per patient.  Please advise.  "

## 2020-04-16 ENCOUNTER — VIRTUAL VISIT (OUTPATIENT)
Dept: ONCOLOGY | Facility: CLINIC | Age: 55
End: 2020-04-16
Attending: INTERNAL MEDICINE
Payer: COMMERCIAL

## 2020-04-16 DIAGNOSIS — D05.12 DUCTAL CARCINOMA IN SITU (DCIS) OF LEFT BREAST: Primary | ICD-10-CM

## 2020-04-16 PROCEDURE — 99213 OFFICE O/P EST LOW 20 MIN: CPT | Mod: 95 | Performed by: INTERNAL MEDICINE

## 2020-04-16 RX ORDER — SWAB
1 SWAB, NON-MEDICATED MISCELLANEOUS DAILY
Qty: 100 CAPSULE | Refills: 1 | Status: SHIPPED | OUTPATIENT
Start: 2020-04-16 | End: 2020-07-25

## 2020-04-16 ASSESSMENT — PAIN SCALES - GENERAL: PAINLEVEL: MILD PAIN (3)

## 2020-04-16 NOTE — PROGRESS NOTES
"Graciela Castorena is a 55 year old female who is being evaluated via a billable telephone visit.      The patient has been notified of following:     \"This telephone visit will be conducted via a call between you and your physician/provider. We have found that certain health care needs can be provided without the need for a physical exam.  This service lets us provide the care you need with a short phone conversation.  If a prescription is necessary we can send it directly to your pharmacy.  If lab work is needed we can place an order for that and you can then stop by our lab to have the test done at a later time.    Telephone visits are billed at different rates depending on your insurance coverage. During this emergency period, for some insurers they may be billed the same as an in-person visit.  Please reach out to your insurance provider with any questions.    If during the course of the call the physician/provider feels a telephone visit is not appropriate, you will not be charged for this service.\"    Patient has given verbal consent for Telephone visit?  Yes    How would you like to obtain your AVS? Mail a copy    Additional provider notes: insert own note template here pt's  has Covid19 tests but he cannot get tested.  They have been isolating 11 days.  8 people in the house.  No one else has symptoms as of yet.  Pt and  work at [employer].        Shari J. Schoenberger, [facility]      "

## 2020-04-16 NOTE — PROGRESS NOTES
Visit Date:   04/16/2020     ONCOLOGY HISTORY: Mrs. 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      1.  Screening mammogram on 01/16/2018 revealed a new calcification cluster behind the left nipple.   -Left diagnostic mammogram on 03/12/2018 revealed an indeterminate cluster of microcalcification in retroareolar portion of left breast at 11:00.      2. Stereotactic left breast biopsy on 03/13/2018 reveals intermediate grade DCIS with microcalcification and comedonecrosis.  % positive and  PA 25% positive.      3.  Patient underwent left breast lumpectomy on 04/18/2018.  Pathology reveals grade 2 DCIS measuring 3 mm.  Margins are negative.     4. Radiation to left breast. 5056 cGy between 05/31/2018 and 06/27/2018.      5. Tamoxifen started in May, 2018.      SUBJECTIVE:  Ms. Castorena is a 55-year-old female with left breast DCIS status post lumpectomy and radiation.  She is on tamoxifen.        Overall, she is doing well.  She has some allergies.  Because of that, she has some sneezing, watering of the eyes and some itching of the eyes.      No headache.  No dizziness.  No chest pain.  No shortness of breath.  No abdominal pain.  No nausea.  No vomiting.  Appetite is good.  No urinary complaints.  No bowel problem.  She has not felt any lump.      The patient's  is currently on quarantine for possible COVID-19 exposure.  Because of that, the patient is concerned.  The patient personally does not have any symptoms.  She has been checking her temperature twice.      PHYSICAL EXAMINATION:   GENERAL:  The patient was alert and oriented x 3.  Rest of the systems not examined as this is a virtual visit.      ASSESSMENT:   1.  A 55-year-old female with left breast DCIS.  No clinical evidence of recurrence.   2.  Seasonal allergies.      PLAN:   1.  The patient is doing well from breast DCIS.  No suspicion of recurrence.  She will continue on tamoxifen.  She is tolerating it well.   2.  The patient used to have  forgetfulness.  That has improved.   3.  I will see her in 3 months' time for an exam.  Advised her to call with any questions or concerns.   4.  She will continue on calcium and vitamin D. Prescription refilled.         JESSIE BENSON MD             D: 2020   T: 2020   MT: SHIKHA      Name:     MACKENZIE REAGAN   MRN:      [id number]        Account:      [id number]   :      1965           Visit Date:   2020      Document: [id number]        TELEPHONE VISIT 2:56 PM AND 3:09 PM.

## 2020-04-17 NOTE — PROGRESS NOTES
Visit Date:   04/16/2020     ONCOLOGY HISTORY: Mrs. 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      1.  Screening mammogram on 01/16/2018 revealed a new calcification cluster behind the left nipple.   -Left diagnostic mammogram on 03/12/2018 revealed an indeterminate cluster of microcalcification in retroareolar portion of left breast at 11:00.      2. Stereotactic left breast biopsy on 03/13/2018 reveals intermediate grade DCIS with microcalcification and comedonecrosis.  % positive and  AR 25% positive.      3.  Patient underwent left breast lumpectomy on 04/18/2018.  Pathology reveals grade 2 DCIS measuring 3 mm.  Margins are negative.     4. Radiation to left breast. 5056 cGy between 05/31/2018 and 06/27/2018.      5. Tamoxifen started in May, 2018.      SUBJECTIVE:  Ms. Castorena is a 55-year-old female with left breast DCIS status post lumpectomy and radiation.  She is on tamoxifen.        Overall, she is doing well.  She has some allergies.  Because of that, she has some sneezing, watering of the eyes and some itching of the eyes.      No headache.  No dizziness.  No chest pain.  No shortness of breath.  No abdominal pain.  No nausea.  No vomiting.  Appetite is good.  No urinary complaints.  No bowel problem.  She has not felt any lump.      The patient's  is currently on quarantine for possible COVID-19 exposure.  Because of that, the patient is concerned.  The patient personally does not have any symptoms.  She has been checking her temperature twice.      PHYSICAL EXAMINATION:   GENERAL:  The patient was alert and oriented x 3.  Rest of the systems not examined as this is a virtual visit.      ASSESSMENT:   1.  A 55-year-old female with left breast DCIS.  No clinical evidence of recurrence.   2.  Seasonal allergies.      PLAN:   1.  The patient is doing well from breast DCIS.  No suspicion of recurrence.  She will continue on tamoxifen.  She is tolerating it well.   2.  The patient used to have  forgetfulness.  That has improved.   3.  I will see her in 3 months' time for an exam.  Advised her to call with any questions or concerns.   4.  She will continue on calcium and vitamin D. Prescription refilled.         JESSIE BENSON MD             D: 2020   T: 2020   MT: SHIKHA      Name:     MACKENZIE REAGAN   MRN:      [id number]        Account:      [id number]   :      1965           Visit Date:   2020      Document: [id number]

## 2020-06-11 ENCOUNTER — TELEPHONE (OUTPATIENT)
Dept: INTERNAL MEDICINE | Facility: CLINIC | Age: 55
End: 2020-06-11

## 2020-06-27 ENCOUNTER — OFFICE VISIT (OUTPATIENT)
Dept: URGENT CARE | Facility: URGENT CARE | Age: 55
End: 2020-06-27
Payer: COMMERCIAL

## 2020-06-27 VITALS
TEMPERATURE: 98.2 F | BODY MASS INDEX: 28.12 KG/M2 | OXYGEN SATURATION: 97 % | DIASTOLIC BLOOD PRESSURE: 80 MMHG | HEART RATE: 108 BPM | WEIGHT: 169 LBS | SYSTOLIC BLOOD PRESSURE: 119 MMHG

## 2020-06-27 DIAGNOSIS — E55.9 VITAMIN D DEFICIENCY: ICD-10-CM

## 2020-06-27 DIAGNOSIS — G44.209 TENSION HEADACHE: Primary | ICD-10-CM

## 2020-06-27 DIAGNOSIS — K29.70 GASTRITIS WITHOUT BLEEDING, UNSPECIFIED CHRONICITY, UNSPECIFIED GASTRITIS TYPE: ICD-10-CM

## 2020-06-27 DIAGNOSIS — K29.00 ACUTE GASTRITIS WITHOUT HEMORRHAGE, UNSPECIFIED GASTRITIS TYPE: ICD-10-CM

## 2020-06-27 LAB
ALBUMIN SERPL-MCNC: 3.3 G/DL (ref 3.4–5)
ALBUMIN UR-MCNC: NEGATIVE MG/DL
ALP SERPL-CCNC: 52 U/L (ref 40–150)
ALT SERPL W P-5'-P-CCNC: 25 U/L (ref 0–50)
AMYLASE SERPL-CCNC: 96 U/L (ref 30–110)
ANION GAP SERPL CALCULATED.3IONS-SCNC: 4 MMOL/L (ref 3–14)
APPEARANCE UR: CLEAR
AST SERPL W P-5'-P-CCNC: 18 U/L (ref 0–45)
BACTERIA #/AREA URNS HPF: ABNORMAL /HPF
BASOPHILS # BLD AUTO: 0 10E9/L (ref 0–0.2)
BASOPHILS NFR BLD AUTO: 0.4 %
BILIRUB SERPL-MCNC: 0.2 MG/DL (ref 0.2–1.3)
BILIRUB UR QL STRIP: NEGATIVE
BUN SERPL-MCNC: 11 MG/DL (ref 7–30)
CALCIUM SERPL-MCNC: 8.6 MG/DL (ref 8.5–10.1)
CHLORIDE SERPL-SCNC: 110 MMOL/L (ref 94–109)
CO2 SERPL-SCNC: 25 MMOL/L (ref 20–32)
COLOR UR AUTO: YELLOW
CREAT SERPL-MCNC: 0.61 MG/DL (ref 0.52–1.04)
DIFFERENTIAL METHOD BLD: NORMAL
EOSINOPHIL # BLD AUTO: 0.3 10E9/L (ref 0–0.7)
EOSINOPHIL NFR BLD AUTO: 3.7 %
ERYTHROCYTE [DISTWIDTH] IN BLOOD BY AUTOMATED COUNT: 13.2 % (ref 10–15)
GFR SERPL CREATININE-BSD FRML MDRD: >90 ML/MIN/{1.73_M2}
GLUCOSE SERPL-MCNC: 155 MG/DL (ref 70–99)
GLUCOSE UR STRIP-MCNC: NEGATIVE MG/DL
HCT VFR BLD AUTO: 38.4 % (ref 35–47)
HGB BLD-MCNC: 12.8 G/DL (ref 11.7–15.7)
HGB UR QL STRIP: ABNORMAL
KETONES UR STRIP-MCNC: NEGATIVE MG/DL
LEUKOCYTE ESTERASE UR QL STRIP: NEGATIVE
LIPASE SERPL-CCNC: 97 U/L (ref 73–393)
LYMPHOCYTES # BLD AUTO: 2 10E9/L (ref 0.8–5.3)
LYMPHOCYTES NFR BLD AUTO: 28.2 %
MCH RBC QN AUTO: 30.5 PG (ref 26.5–33)
MCHC RBC AUTO-ENTMCNC: 33.3 G/DL (ref 31.5–36.5)
MCV RBC AUTO: 92 FL (ref 78–100)
MONOCYTES # BLD AUTO: 0.6 10E9/L (ref 0–1.3)
MONOCYTES NFR BLD AUTO: 8.5 %
MUCOUS THREADS #/AREA URNS LPF: PRESENT /LPF
NEUTROPHILS # BLD AUTO: 4.1 10E9/L (ref 1.6–8.3)
NEUTROPHILS NFR BLD AUTO: 59.2 %
NITRATE UR QL: NEGATIVE
NON-SQ EPI CELLS #/AREA URNS LPF: ABNORMAL /LPF
PH UR STRIP: 5 PH (ref 5–7)
PLATELET # BLD AUTO: 217 10E9/L (ref 150–450)
POTASSIUM SERPL-SCNC: 3.6 MMOL/L (ref 3.4–5.3)
PROT SERPL-MCNC: 7.1 G/DL (ref 6.8–8.8)
RBC # BLD AUTO: 4.19 10E12/L (ref 3.8–5.2)
RBC #/AREA URNS AUTO: ABNORMAL /HPF
SODIUM SERPL-SCNC: 139 MMOL/L (ref 133–144)
SOURCE: ABNORMAL
SP GR UR STRIP: >1.03 (ref 1–1.03)
UROBILINOGEN UR STRIP-ACNC: 0.2 EU/DL (ref 0.2–1)
WBC # BLD AUTO: 6.9 10E9/L (ref 4–11)
WBC #/AREA URNS AUTO: ABNORMAL /HPF

## 2020-06-27 PROCEDURE — 80053 COMPREHEN METABOLIC PANEL: CPT | Performed by: PHYSICIAN ASSISTANT

## 2020-06-27 PROCEDURE — 81001 URINALYSIS AUTO W/SCOPE: CPT | Performed by: PHYSICIAN ASSISTANT

## 2020-06-27 PROCEDURE — 99214 OFFICE O/P EST MOD 30 MIN: CPT | Performed by: PHYSICIAN ASSISTANT

## 2020-06-27 PROCEDURE — 85025 COMPLETE CBC W/AUTO DIFF WBC: CPT | Performed by: PHYSICIAN ASSISTANT

## 2020-06-27 PROCEDURE — 82150 ASSAY OF AMYLASE: CPT | Performed by: PHYSICIAN ASSISTANT

## 2020-06-27 PROCEDURE — 82306 VITAMIN D 25 HYDROXY: CPT | Performed by: PHYSICIAN ASSISTANT

## 2020-06-27 PROCEDURE — 36415 COLL VENOUS BLD VENIPUNCTURE: CPT | Performed by: PHYSICIAN ASSISTANT

## 2020-06-27 PROCEDURE — 83690 ASSAY OF LIPASE: CPT | Performed by: PHYSICIAN ASSISTANT

## 2020-06-27 PROCEDURE — 87086 URINE CULTURE/COLONY COUNT: CPT | Performed by: PHYSICIAN ASSISTANT

## 2020-06-27 RX ORDER — OMEPRAZOLE 40 MG/1
40 CAPSULE, DELAYED RELEASE ORAL DAILY
Qty: 30 CAPSULE | Refills: 3 | Status: SHIPPED | OUTPATIENT
Start: 2020-06-27 | End: 2020-07-13 | Stop reason: ALTCHOICE

## 2020-06-27 RX ORDER — BUTALBITAL, ACETAMINOPHEN AND CAFFEINE 50; 325; 40 MG/1; MG/1; MG/1
1 TABLET ORAL EVERY 6 HOURS PRN
Qty: 15 TABLET | Refills: 0 | Status: SHIPPED | OUTPATIENT
Start: 2020-06-27 | End: 2020-08-14

## 2020-06-29 LAB
BACTERIA SPEC CULT: NORMAL
BACTERIA SPEC CULT: NORMAL
DEPRECATED CALCIDIOL+CALCIFEROL SERPL-MC: 31 UG/L (ref 20–75)
SPECIMEN SOURCE: NORMAL

## 2020-07-08 ENCOUNTER — HOSPITAL ENCOUNTER (OUTPATIENT)
Dept: MAMMOGRAPHY | Facility: CLINIC | Age: 55
End: 2020-07-08
Attending: INTERNAL MEDICINE
Payer: COMMERCIAL

## 2020-07-08 DIAGNOSIS — D05.12 DUCTAL CARCINOMA IN SITU (DCIS) OF LEFT BREAST: ICD-10-CM

## 2020-07-08 DIAGNOSIS — Z12.39 SCREENING FOR MALIGNANT NEOPLASM OF BREAST: ICD-10-CM

## 2020-07-08 PROCEDURE — G0279 TOMOSYNTHESIS, MAMMO: HCPCS

## 2020-07-13 ENCOUNTER — OFFICE VISIT (OUTPATIENT)
Dept: INTERNAL MEDICINE | Facility: CLINIC | Age: 55
End: 2020-07-13
Payer: COMMERCIAL

## 2020-07-13 VITALS
SYSTOLIC BLOOD PRESSURE: 130 MMHG | HEART RATE: 85 BPM | WEIGHT: 166.9 LBS | OXYGEN SATURATION: 100 % | TEMPERATURE: 97.3 F | DIASTOLIC BLOOD PRESSURE: 84 MMHG | BODY MASS INDEX: 27.81 KG/M2 | HEIGHT: 65 IN | RESPIRATION RATE: 15 BRPM

## 2020-07-13 DIAGNOSIS — R10.13 DYSPEPSIA: Primary | ICD-10-CM

## 2020-07-13 DIAGNOSIS — R73.9 HYPERGLYCEMIA: ICD-10-CM

## 2020-07-13 DIAGNOSIS — D05.12 DUCTAL CARCINOMA IN SITU (DCIS) OF LEFT BREAST: ICD-10-CM

## 2020-07-13 PROCEDURE — 99214 OFFICE O/P EST MOD 30 MIN: CPT | Performed by: INTERNAL MEDICINE

## 2020-07-13 RX ORDER — FAMOTIDINE 20 MG/1
20 TABLET, FILM COATED ORAL DAILY
Qty: 90 TABLET | Refills: 1 | Status: SHIPPED | OUTPATIENT
Start: 2020-07-13 | End: 2021-07-14

## 2020-07-13 ASSESSMENT — MIFFLIN-ST. JEOR: SCORE: 1362.93

## 2020-07-13 NOTE — PROGRESS NOTES
Subjective     Graciela Castorena is a 55 year old female who presents to clinic today for the following health issues:    HPI     Establish care- patient states she is interested in seeing new provider.    ED/UC Followup:    Facility:  [facility]  Date of visit: 6/27/20   Reason for visit: Tension headache, acute gastritis   Current Status: Improved. Patient still having stomach pain, requesting that she is switched from omeprazole to famotide as it worked better in the past.      Other concerns:  1. Would like to discuss glucose. Requesting to update any needed labs (not fasting today)   2. Itchy eyes- intermittent discussed and using OTC products    Patient Active Problem List   Diagnosis     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 breast     Past Surgical History:   Procedure Laterality Date     COLONOSCOPY N/A 2/12/2018    Procedure: COLONOSCOPY;  colonoscopy;  Surgeon: Sabas Villegas MD;  Location:  GI     LUMPECTOMY BREAST WITH SEED LOCALIZATION Left 4/18/2018    Procedure: LUMPECTOMY BREAST WITH SEED LOCALIZATION;  SEED LOCALIZED LEFT BREAST LUMPECTOMY ;  Surgeon: Meli Rodriguez MD;  Location:  [facility]       Social History     Tobacco Use     Smoking status: Never Smoker     Smokeless tobacco: Never Used   Substance Use Topics     Alcohol use: No     Alcohol/week: 0.0 standard drinks     Family History   Problem Relation Age of Onset     Diabetes Type 2  Brother      Diabetes Type 2  Sister      Myocardial Infarction No family hx of      Cerebrovascular Disease No family hx of      Coronary Artery Disease Early Onset No family hx of      Colon Cancer No family hx of      Breast Cancer No family hx of      Ovarian Cancer No family hx of          Current Outpatient Medications   Medication Sig Dispense Refill     diclofenac (VOLTAREN) 1 % topical gel Place 4 g onto the skin 4 times daily 100 g 2     omeprazole (PRILOSEC) 40 MG DR capsule Take 1 capsule (40 mg) by mouth daily 30 capsule 3     tamoxifen  "(NOLVADEX) 20 MG tablet Take 1 tablet (20 mg) by mouth daily 90 tablet 3     vitamin D3 (CHOLECALCIFEROL) 10 MCG (400 UNIT) capsule Take 1 capsule (400 Units) by mouth daily 100 capsule 1     butalbital-acetaminophen-caffeine (ESGIC) -40 MG tablet Take 1 tablet by mouth every 6 hours as needed for headaches 15 tablet 0     Allergies   Allergen Reactions     Ciprofloxacin Itching     BP Readings from Last 3 Encounters:   06/27/20 119/80   03/08/20 (!) 156/85   02/22/20 114/72    Wt Readings from Last 3 Encounters:   06/27/20 76.7 kg (169 lb)   03/08/20 76.7 kg (169 lb)   02/22/20 76.3 kg (168 lb 3.2 oz)            Reviewed and updated as needed this visit by Provider         Review of Systems   CONSTITUTIONAL: NEGATIVE for fever, chills, change in weight  ENT/MOUTH: NEGATIVE for ear, mouth and throat problems  RESP: NEGATIVE for significant cough or SOB  CV: NEGATIVE for chest pain, palpitations or peripheral edema  : NEGATIVE for frequency, dysuria, or hematuria  MUSCULOSKELETAL: NEGATIVE for significant arthralgias or myalgia  NEURO: NEGATIVE for weakness, dizziness or paresthesias  HEME: NEGATIVE for bleeding problems  PSYCHIATRIC: NEGATIVE for changes in mood or affect      Objective    /84   Pulse 85   Temp 97.3  F (36.3  C) (Temporal)   Resp 15   Ht 1.651 m (5' 5\")   Wt 75.7 kg (166 lb 14.4 oz)   SpO2 100%   BMI 27.77 kg/m    Body mass index is 27.77 kg/m .  Physical Exam   GENERAL: healthy, alert and no distress  EYES: Eyes grossly normal to inspection, PERRL and conjunctivae and sclerae normal  HENT: ear canals and TM's normal, nose and mouth without ulcers or lesions  NECK: no adenopathy, no asymmetry, masses, or scars and thyroid normal to palpation  RESP: lungs clear to auscultation - no rales, rhonchi or wheezes  CV: regular rate and rhythm, normal S1 S2, no S3 or S4, no click or rub, no peripheral edema and peripheral pulses strong  MS: no gross musculoskeletal defects noted, no " edema  NEURO: Normal strength and tone, mentation intact and speech normal  PSYCH: mentation appears normal, affect normal/bright       Assessment & Plan     1. Dyspepsia  Stop omeprazole and start trial of  - famotidine (PEPCID) 20 MG tablet; Take 1 tablet (20 mg) by mouth daily  Dispense: 90 tablet; Refill: 1    2. Hyperglycemia  Recheck fasting labs accordingly  - Basic metabolic panel; Future  - Hemoglobin A1c; Future    3. 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 breast  -Following up with oncology as ordered      See Patient Instructions    Return in about 1 month (around 8/13/2020) for Lab Work appointment.    Tulio Dominguez MD  [facility]

## 2020-07-14 ENCOUNTER — TELEPHONE (OUTPATIENT)
Dept: ONCOLOGY | Facility: CLINIC | Age: 55
End: 2020-07-14

## 2020-07-14 ENCOUNTER — ONCOLOGY VISIT (OUTPATIENT)
Dept: ONCOLOGY | Facility: CLINIC | Age: 55
End: 2020-07-14
Attending: INTERNAL MEDICINE
Payer: COMMERCIAL

## 2020-07-14 VITALS
WEIGHT: 167.6 LBS | BODY MASS INDEX: 27.89 KG/M2 | RESPIRATION RATE: 14 BRPM | TEMPERATURE: 98.8 F | SYSTOLIC BLOOD PRESSURE: 132 MMHG | DIASTOLIC BLOOD PRESSURE: 79 MMHG | HEART RATE: 82 BPM | OXYGEN SATURATION: 99 %

## 2020-07-14 DIAGNOSIS — D05.12 DUCTAL CARCINOMA IN SITU (DCIS) OF LEFT BREAST: ICD-10-CM

## 2020-07-14 PROCEDURE — 99213 OFFICE O/P EST LOW 20 MIN: CPT | Performed by: INTERNAL MEDICINE

## 2020-07-14 PROCEDURE — G0463 HOSPITAL OUTPT CLINIC VISIT: HCPCS

## 2020-07-14 RX ORDER — TAMOXIFEN CITRATE 20 MG/1
20 TABLET ORAL DAILY
Qty: 90 TABLET | Refills: 3 | Status: SHIPPED | OUTPATIENT
Start: 2020-07-14 | End: 2021-01-12

## 2020-07-14 ASSESSMENT — PAIN SCALES - GENERAL: PAINLEVEL: NO PAIN (0)

## 2020-07-14 NOTE — PROGRESS NOTES
Visit Date:   07/14/2020     ONCOLOGY HISTORY: Mrs. 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      1.  Screening mammogram on 01/16/2018 revealed a new calcification cluster behind the left nipple.   -Left diagnostic mammogram on 03/12/2018 revealed an indeterminate cluster of microcalcification in retroareolar portion of left breast at 11:00.      2. Stereotactic left breast biopsy on 03/13/2018 reveals intermediate grade DCIS with microcalcification and comedonecrosis.  % positive and  ME 25% positive.      3.  Patient underwent left breast lumpectomy on 04/18/2018.  Pathology reveals grade 2 DCIS measuring 3 mm.  Margins are negative.     4. Radiation to left breast. 5056 cGy between 05/31/2018 and 06/27/2018.      5. Tamoxifen started in May, 2018.      SUBJECTIVE:  Ms. Castorena is a 55-year-old female with left breast DCIS.  She is on tamoxifen.  Overall, she is tolerating tamoxifen well.      Overall, she is doing well.  She has some fatigue.  Intermittent headache.  No dizziness. No visual problem.  No neck pain.  No chest pain.  No shortness of breath at rest.  No abdominal pain, nausea or vomiting.  Appetite has been fairly good.  No urinary or bowel complaints.  No bleeding from any site.  She is postmenopausal.      The patient has mild forgetfulness.  She is not confused.  Her forgetfulness has not gotten worse.      PHYSICAL EXAMINATION:   GENERAL:  Alert and oriented x 3.   VITAL SIGNS:  Reviewed.  ECOG PS of 1.     EYES:  No icterus.   THROAT:  No ulcer. No thrush.   NECK:  Supple. No lymphadenopathy. No thyromegaly.   AXILLAE:  No lymphadenopathy.   LUNGS:  Good air entry bilaterally.  No crackles or wheezing.   HEART:  Regular.  No murmur.   GI:  Soft.  Nontender. No mass.   EXTREMITIES:  No pedal edema.  No calf swelling or tenderness.   SKIN:  No rash.   BREASTS:  Examined in the presence of the nurse.   -Right breast exam is normal.  No mass.  No lymphadenopathy.  No skin changes.  No  axillary lymphadenopathy.   -Left breast exam reveals post-surgical changes.  No mass.  No suspicious skin lesion.  No axillary lymphadenopathy.      LABORATORY DATA:  Reviewed.      ASSESSMENT:   1.  A 55-year-old female with left breast DCIS (ductal carcinoma in situ).  No evidence of recurrence.   2.  Fatigue.      PLAN:   1.  The patient is doing well from breast cancer.  Her exam is normal.  Recent mammogram is normal.  She does not have any breast-related symptoms.  No evidence of recurrence.      She is on tamoxifen.  She will continue on it.  We discussed regarding switching to aromatase inhibitor.  The patient is tolerating tamoxifen and wants to continue on it and not change it.  She will take tamoxifen for a total of 5 years.      2.  I will see her in 1 year's time.  The patient mentioned that she will see her primary care physician in 6 months for history and physical.  The patient advised to see a physician sooner if she has lump, unexplained pain, weight loss, worsening weakness, shortness of breath, bleeding or any other concerns.         JESSIE BENSON MD             D: 2020   T: 2020   MT: TAMIKO      Name:     MACKENZIE REAGAN   MRN:      [id number]        Account:      [id number]   :      1965           Visit Date:   2020      Document: [id number]

## 2020-07-14 NOTE — LETTER
"    7/14/2020         RE: Graciela Castorena  [address]  [address]  [place] [address]        Dear Colleague,    Thank you for referring your patient, Graciela Castorena, to the [facility]. Please see a copy of my visit note below.    Oncology Rooming Note    July 14, 2020 10:13 AM   Graciela Castorena is a 55 year old female who presents for:    Chief Complaint   Patient presents with     Oncology Clinic Visit     Initial Vitals: /79   Pulse 82   Temp 98.8  F (37.1  C) (Oral)   Resp 14   Wt 76 kg (167 lb 9.6 oz)   SpO2 99%   BMI 27.89 kg/m   Estimated body mass index is 27.89 kg/m  as calculated from the following:    Height as of 7/13/20: 1.651 m (5' 5\").    Weight as of this encounter: 76 kg (167 lb 9.6 oz). Body surface area is 1.87 meters squared.  No Pain (0) Comment: Data Unavailable   No LMP recorded. Patient is postmenopausal.  Allergies reviewed: Yes  Medications reviewed: Yes    Medications: MEDICATION REFILLS NEEDED TODAY. Provider was notified.  Tamoxifen  Pharmacy name entered into [employer]: [id number] - [facility] [address] [address]    Clinical concerns: Left breast itching Dr. Barahona was notified.      Shari J. Schoenberger, [facility]              Visit Date:   07/14/2020     ONCOLOGY HISTORY: Mrs. 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      1.  Screening mammogram on 01/16/2018 revealed a new calcification cluster behind the left nipple.   -Left diagnostic mammogram on 03/12/2018 revealed an indeterminate cluster of microcalcification in retroareolar portion of left breast at 11:00.      2. Stereotactic left breast biopsy on 03/13/2018 reveals intermediate grade DCIS with microcalcification and comedonecrosis.  % positive and  DE 25% positive.      3.  Patient underwent left breast lumpectomy on 04/18/2018.  Pathology reveals grade 2 DCIS measuring 3 mm.  Margins are negative.     4. Radiation to left breast. 5056 cGy between 05/31/2018 and " 06/27/2018.      5. Tamoxifen started in May, 2018.      SUBJECTIVE:  Ms. Castorena is a 55-year-old female with left breast DCIS.  She is on tamoxifen.  Overall, she is tolerating tamoxifen well.      Overall, she is doing well.  She has some fatigue.  Intermittent headache.  No dizziness. No visual problem.  No neck pain.  No chest pain.  No shortness of breath at rest.  No abdominal pain, nausea or vomiting.  Appetite has been fairly good.  No urinary or bowel complaints.  No bleeding from any site.  She is postmenopausal.      The patient has mild forgetfulness.  She is not confused.  Her forgetfulness has not gotten worse.      PHYSICAL EXAMINATION:   GENERAL:  Alert and oriented x 3.   VITAL SIGNS:  Reviewed.  ECOG PS of 1.     EYES:  No icterus.   THROAT:  No ulcer. No thrush.   NECK:  Supple. No lymphadenopathy. No thyromegaly.   AXILLAE:  No lymphadenopathy.   LUNGS:  Good air entry bilaterally.  No crackles or wheezing.   HEART:  Regular.  No murmur.   GI:  Soft.  Nontender. No mass.   EXTREMITIES:  No pedal edema.  No calf swelling or tenderness.   SKIN:  No rash.   BREASTS:  Examined in the presence of the nurse.   -Right breast exam is normal.  No mass.  No lymphadenopathy.  No skin changes.  No axillary lymphadenopathy.   -Left breast exam reveals post-surgical changes.  No mass.  No suspicious skin lesion.  No axillary lymphadenopathy.      LABORATORY DATA:  Reviewed.      ASSESSMENT:   1.  A 55-year-old female with left breast DCIS (ductal carcinoma in situ).  No evidence of recurrence.   2.  Fatigue.      PLAN:   1.  The patient is doing well from breast cancer.  Her exam is normal.  Recent mammogram is normal.  She does not have any breast-related symptoms.  No evidence of recurrence.      She is on tamoxifen.  She will continue on it.  We discussed regarding switching to aromatase inhibitor.  The patient is tolerating tamoxifen and wants to continue on it and not change it.  She will take tamoxifen  for a total of 5 years.      2.  I will see her in 1 year's time.  The patient mentioned that she will see her primary care physician in 6 months for history and physical.  The patient advised to see a physician sooner if she has lump, unexplained pain, weight loss, worsening weakness, shortness of breath, bleeding or any other concerns.         JESSIE BENSON MD             D: 2020   T: 2020   MT: TAMIKO      Name:     MACKENZIE CASTORENA   MRN:      [id number]        Account:      [id number]   :      1965           Visit Date:   2020      Document: [id number]      Visit Date:   2020     ONCOLOGY HISTORY: Mrs. 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      1.  Screening mammogram on 2018 revealed a new calcification cluster behind the left nipple.   -Left diagnostic mammogram on 2018 revealed an indeterminate cluster of microcalcification in retroareolar portion of left breast at 11:00.      2. Stereotactic left breast biopsy on 2018 reveals intermediate grade DCIS with microcalcification and comedonecrosis.  % positive and  VA 25% positive.      3.  Patient underwent left breast lumpectomy on 2018.  Pathology reveals grade 2 DCIS measuring 3 mm.  Margins are negative.     4. Radiation to left breast. 5056 cGy between 2018 and 2018.      5. Tamoxifen started in May, 2018.      SUBJECTIVE:  Ms. Castorena is a 55-year-old female with left breast DCIS.  She is on tamoxifen.  Overall, she is tolerating tamoxifen well.      Overall, she is doing well.  She has some fatigue.  Intermittent headache.  No dizziness. No visual problem.  No neck pain.  No chest pain.  No shortness of breath at rest.  No abdominal pain, nausea or vomiting.  Appetite has been fairly good.  No urinary or bowel complaints.  No bleeding from any site.  She is postmenopausal.      The patient has mild forgetfulness.  She is not confused.  Her forgetfulness has not gotten worse.       PHYSICAL EXAMINATION:   GENERAL:  Alert and oriented x 3.   VITAL SIGNS:  Reviewed.  ECOG PS of 1.     EYES:  No icterus.   THROAT:  No ulcer. No thrush.   NECK:  Supple. No lymphadenopathy. No thyromegaly.   AXILLAE:  No lymphadenopathy.   LUNGS:  Good air entry bilaterally.  No crackles or wheezing.   HEART:  Regular.  No murmur.   GI:  Soft.  Nontender. No mass.   EXTREMITIES:  No pedal edema.  No calf swelling or tenderness.   SKIN:  No rash.   BREASTS:  Examined in the presence of the nurse.   -Right breast exam is normal.  No mass.  No lymphadenopathy.  No skin changes.  No axillary lymphadenopathy.   -Left breast exam reveals post-surgical changes.  No mass.  No suspicious skin lesion.  No axillary lymphadenopathy.      LABORATORY DATA:  Reviewed.      ASSESSMENT:   1.  A 55-year-old female with left breast DCIS (ductal carcinoma in situ).  No evidence of recurrence.   2.  Fatigue.      PLAN:   1.  The patient is doing well from breast cancer.  Her exam is normal.  Recent mammogram is normal.  She does not have any breast-related symptoms.  No evidence of recurrence.      She is on tamoxifen.  She will continue on it.  We discussed regarding switching to aromatase inhibitor.  The patient is tolerating tamoxifen and wants to continue on it and not change it.  She will take tamoxifen for a total of 5 years.      2.  I will see her in 1 year's time.  The patient mentioned that she will see her primary care physician in 6 months for history and physical.  The patient advised to see a physician sooner if she has lump, unexplained pain, weight loss, worsening weakness, shortness of breath, bleeding or any other concerns.         JESSIE BENSON MD             D: 2020   T: 2020   MT: TAMIKO      Name:     MACKENZIE REAGAN   MRN:      [id number]        Account:      [id number]   :      1965           Visit Date:   2020      Document: [id number]          Again, thank you for allowing me to  participate in the care of your patient.        Sincerely,        Samreen Barahona MD

## 2020-07-14 NOTE — PROGRESS NOTES
"Oncology Rooming Note    July 14, 2020 10:13 AM   Graciela Castorena is a 55 year old female who presents for:    Chief Complaint   Patient presents with     Oncology Clinic Visit     Initial Vitals: /79   Pulse 82   Temp 98.8  F (37.1  C) (Oral)   Resp 14   Wt 76 kg (167 lb 9.6 oz)   SpO2 99%   BMI 27.89 kg/m   Estimated body mass index is 27.89 kg/m  as calculated from the following:    Height as of 7/13/20: 1.651 m (5' 5\").    Weight as of this encounter: 76 kg (167 lb 9.6 oz). Body surface area is 1.87 meters squared.  No Pain (0) Comment: Data Unavailable   No LMP recorded. Patient is postmenopausal.  Allergies reviewed: Yes  Medications reviewed: Yes    Medications: MEDICATION REFILLS NEEDED TODAY. Provider was notified.  Tamoxifen  Pharmacy name entered into test company: [id number] [place], [place] - [address] [facility]    Clinical concerns: Left breast itching Dr. Barahona was notified.      Shari J. Schoenberger, [facility]            "

## 2020-07-14 NOTE — TELEPHONE ENCOUNTER
Oncology Distress Screening Follow-up  Clinical Social Work  [facility]    Identified Concern and Score From Distress Screenin. How concerned are you about your ability to eat?   --   5          2. How concerned are you about unintended weight loss or your current weight?   --   0          3. How concerned are you about feeling depressed or very sad?   --   9Abnormal            4. How concerned are you about feeling anxious or very scared?   --   0          5. Do you struggle with the loss of meaning and jacqueline in your life?       --   Not at all          6. How concerned are you about work and home life issues that may be affected by your cancer?   --  fatigue, hard to get house work done   9Abnormal    pt fatigued, hard to get things done around the house          7. How concerned are you about knowing what resources are available to help you?   --   0          8. Do you currently have what you would describe as Anabaptism or spiritual struggles?               --   Not at all            Date of Distress Screenin20    Intervention:   Graciela is a 55-year-old Guamanian speaking woman who has a diagnosis of left breast DCIS, currently on tamoxifen. Graciela had appointment scheduled with Dr. Barahona today, at which time she scored positive on distress screen. This clinician called Graciela with assistance of Dutch speaking . Graciela reported increased distress as a result of memory changes and fatigue that has gotten worse since starting tamoxifen 2 years ago. Graciela reports that she discussed these concerns with Dr. Barahona, who offered changing to a different treatment, she denied interest in this at present time. Graciela reports that she gets fatigued easily doing cooking and cleaning, and that she has noticed that she forgets to brush her teeth at times. Graciela reported that she attempted OT support in the past for this, but is not interested in this intervention. Graciela reports that her daughter provides  assistance in home. This clinician provided education about possible consideration of PCA support in the home through [place]choices assessment, and provided information about [employer], programming Graciela reported she would be interested in. No other concerns reported at present time. Graciela knows to reach out in case of distress or need.     Education Provided (mailed to home):  M[place]hoices Assessment Process  Onc SW contact information  [employer]    Follow-up Required:   1) SW will continue to be available as needed for ongoing psychosocial support.   2) Ongoing collaboration with multidisciplinary care team.    Please page in the case of psychosocial distress or need.     NELLIE Daugherty, LICSW  Phone: [phone]  Pager: [phone]    [facility]: M, Thu  *every other Tue, 8am-4:30pm  [facility]: W, F, *every other Tue, 8am-4:30pm

## 2020-07-19 NOTE — PROGRESS NOTES
Visit Date:   07/14/2020     ONCOLOGY HISTORY: Mrs. 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      1.  Screening mammogram on 01/16/2018 revealed a new calcification cluster behind the left nipple.   -Left diagnostic mammogram on 03/12/2018 revealed an indeterminate cluster of microcalcification in retroareolar portion of left breast at 11:00.      2. Stereotactic left breast biopsy on 03/13/2018 reveals intermediate grade DCIS with microcalcification and comedonecrosis.  % positive and  [place] 25% positive.      3.  Patient underwent left breast lumpectomy on 04/18/2018.  Pathology reveals grade 2 DCIS measuring 3 mm.  Margins are negative.     4. Radiation to left breast. 5056 cGy between 05/31/2018 and 06/27/2018.      5. Tamoxifen started in May, 2018.      SUBJECTIVE:  Ms. Castorena is a 55-year-old female with left breast DCIS.  She is on tamoxifen.  Overall, she is tolerating tamoxifen well.      Overall, she is doing well.  She has some fatigue.  Intermittent headache.  No dizziness. No visual problem.  No neck pain.  No chest pain.  No shortness of breath at rest.  No abdominal pain, nausea or vomiting.  Appetite has been fairly good.  No urinary or bowel complaints.  No bleeding from any site.  She is postmenopausal.      The patient has mild forgetfulness.  She is not confused.  Her forgetfulness has not gotten worse.      PHYSICAL EXAMINATION:   GENERAL:  Alert and oriented x 3.   VITAL SIGNS:  Reviewed.  ECOG PS of 1.     EYES:  No icterus.   THROAT:  No ulcer. No thrush.   NECK:  Supple. No lymphadenopathy. No thyromegaly.   AXILLAE:  No lymphadenopathy.   LUNGS:  Good air entry bilaterally.  No crackles or wheezing.   HEART:  Regular.  No murmur.   GI:  Soft.  Nontender. No mass.   EXTREMITIES:  No pedal edema.  No calf swelling or tenderness.   SKIN:  No rash.   BREASTS:  Examined in the presence of the nurse.   -Right breast exam is normal.  No mass.  No lymphadenopathy.  No skin changes.  No  axillary lymphadenopathy.   -Left breast exam reveals post-surgical changes.  No mass.  No suspicious skin lesion.  No axillary lymphadenopathy.      LABORATORY DATA:  Reviewed.      ASSESSMENT:   1.  A 55-year-old female with left breast DCIS (ductal carcinoma in situ).  No evidence of recurrence.   2.  Fatigue.      PLAN:   1.  The patient is doing well from breast cancer.  Her exam is normal.  Recent mammogram is normal.  She does not have any breast-related symptoms.  No evidence of recurrence.      She is on tamoxifen.  She will continue on it.  We discussed regarding switching to aromatase inhibitor.  The patient is tolerating tamoxifen and wants to continue on it and not change it.  She will take tamoxifen for a total of 5 years.      2.  I will see her in 1 year's time.  The patient mentioned that she will see her primary care physician in 6 months for history and physical.  The patient advised to see a physician sooner if she has lump, unexplained pain, weight loss, worsening weakness, shortness of breath, bleeding or any other concerns.         JESSIE BENSON MD             D: 2020   T: 2020   MT: [id number]      Name:     MACKENZIE REAGAN   MRN:      [id number]        Account:      [id number]   :      1965           Visit Date:   2020      Document: [id number]

## 2020-07-21 DIAGNOSIS — R73.9 HYPERGLYCEMIA: ICD-10-CM

## 2020-07-21 LAB
ANION GAP SERPL CALCULATED.3IONS-SCNC: 3 MMOL/L (ref 3–14)
BUN SERPL-MCNC: 10 MG/DL (ref 7–30)
CALCIUM SERPL-MCNC: 9.1 MG/DL (ref 8.5–10.1)
CHLORIDE SERPL-SCNC: 109 MMOL/L (ref 94–109)
CO2 SERPL-SCNC: 28 MMOL/L (ref 20–32)
CREAT SERPL-MCNC: 0.64 MG/DL (ref 0.52–1.04)
GFR SERPL CREATININE-BSD FRML MDRD: >90 ML/MIN/{1.73_M2}
GLUCOSE SERPL-MCNC: 95 MG/DL (ref 70–99)
HBA1C MFR BLD: 5.7 % (ref 0–5.6)
POTASSIUM SERPL-SCNC: 4.4 MMOL/L (ref 3.4–5.3)
SODIUM SERPL-SCNC: 140 MMOL/L (ref 133–144)

## 2020-07-21 PROCEDURE — 83036 HEMOGLOBIN GLYCOSYLATED A1C: CPT | Performed by: INTERNAL MEDICINE

## 2020-07-21 PROCEDURE — 36415 COLL VENOUS BLD VENIPUNCTURE: CPT | Performed by: INTERNAL MEDICINE

## 2020-07-21 PROCEDURE — 80048 BASIC METABOLIC PNL TOTAL CA: CPT | Performed by: INTERNAL MEDICINE

## 2020-08-14 ENCOUNTER — OFFICE VISIT (OUTPATIENT)
Dept: INTERNAL MEDICINE | Facility: CLINIC | Age: 55
End: 2020-08-14
Payer: COMMERCIAL

## 2020-08-14 VITALS
SYSTOLIC BLOOD PRESSURE: 120 MMHG | RESPIRATION RATE: 16 BRPM | HEIGHT: 67 IN | TEMPERATURE: 98.3 F | HEART RATE: 87 BPM | DIASTOLIC BLOOD PRESSURE: 86 MMHG | WEIGHT: 168.8 LBS | OXYGEN SATURATION: 98 % | BODY MASS INDEX: 26.49 KG/M2

## 2020-08-14 DIAGNOSIS — B37.31 YEAST INFECTION OF THE VAGINA: ICD-10-CM

## 2020-08-14 DIAGNOSIS — Z13.220 LIPID SCREENING: ICD-10-CM

## 2020-08-14 DIAGNOSIS — Z00.00 ROUTINE GENERAL MEDICAL EXAMINATION AT A HEALTH CARE FACILITY: Primary | ICD-10-CM

## 2020-08-14 DIAGNOSIS — R87.610 ATYPICAL SQUAMOUS CELLS OF UNDETERMINED SIGNIFICANCE ON CYTOLOGIC SMEAR OF CERVIX (ASC-US): ICD-10-CM

## 2020-08-14 DIAGNOSIS — R30.0 DYSURIA: ICD-10-CM

## 2020-08-14 DIAGNOSIS — R31.9 HEMATURIA, UNSPECIFIED TYPE: ICD-10-CM

## 2020-08-14 DIAGNOSIS — N89.8 VAGINAL ITCHING: ICD-10-CM

## 2020-08-14 LAB
ALBUMIN UR-MCNC: NEGATIVE MG/DL
APPEARANCE UR: CLEAR
BACTERIA #/AREA URNS HPF: ABNORMAL /HPF
BILIRUB UR QL STRIP: NEGATIVE
COLOR UR AUTO: YELLOW
GLUCOSE UR STRIP-MCNC: NEGATIVE MG/DL
HGB UR QL STRIP: ABNORMAL
KETONES UR STRIP-MCNC: NEGATIVE MG/DL
LEUKOCYTE ESTERASE UR QL STRIP: NEGATIVE
NITRATE UR QL: NEGATIVE
NON-SQ EPI CELLS #/AREA URNS LPF: ABNORMAL /LPF
PH UR STRIP: 6 PH (ref 5–7)
RBC #/AREA URNS AUTO: ABNORMAL /HPF
SOURCE: ABNORMAL
SP GR UR STRIP: >1.03 (ref 1–1.03)
SPECIMEN SOURCE: ABNORMAL
UROBILINOGEN UR STRIP-ACNC: 0.2 EU/DL (ref 0.2–1)
WBC #/AREA URNS AUTO: ABNORMAL /HPF
WET PREP SPEC: ABNORMAL

## 2020-08-14 PROCEDURE — 99396 PREV VISIT EST AGE 40-64: CPT | Performed by: PHYSICIAN ASSISTANT

## 2020-08-14 PROCEDURE — 87210 SMEAR WET MOUNT SALINE/INK: CPT | Performed by: PHYSICIAN ASSISTANT

## 2020-08-14 PROCEDURE — 36415 COLL VENOUS BLD VENIPUNCTURE: CPT | Performed by: PHYSICIAN ASSISTANT

## 2020-08-14 PROCEDURE — 80061 LIPID PANEL: CPT | Performed by: PHYSICIAN ASSISTANT

## 2020-08-14 PROCEDURE — 99213 OFFICE O/P EST LOW 20 MIN: CPT | Mod: 25 | Performed by: PHYSICIAN ASSISTANT

## 2020-08-14 PROCEDURE — 81001 URINALYSIS AUTO W/SCOPE: CPT | Performed by: PHYSICIAN ASSISTANT

## 2020-08-14 RX ORDER — FLUCONAZOLE 150 MG/1
150 TABLET ORAL ONCE
Qty: 1 TABLET | Refills: 0 | Status: SHIPPED | OUTPATIENT
Start: 2020-08-14 | End: 2020-08-14

## 2020-08-14 ASSESSMENT — MIFFLIN-ST. JEOR: SCORE: 1395.36

## 2020-08-14 NOTE — PROGRESS NOTES
SUBJECTIVE:   CC: Graciela Castorena is an 55 year old woman who presents for preventive health visit.     Healthy Habits:    Getting at least 3 servings of Calcium per day:  Yes    Bi-annual eye exam:  Yes    Dental care twice a year:  Yes    Sleep apnea or symptoms of sleep apnea:  None    Diet:  Regular (no restrictions)    Frequency of exercise:  None    Duration of exercise:  N/A    Taking medications regularly:  Yes    Barriers to taking medications:  None    Medication side effects:  None    PHQ-2 Total Score:    Additional concerns today:  No    Notices some vaginal itching  - notes discharge   - dysuria   - no increased frequency        Today's PHQ-2 Score:   PHQ-2 ( 1999 Pfizer) 6/27/2020   Q1: Little interest or pleasure in doing things 0   Q2: Feeling down, depressed or hopeless 0   PHQ-2 Score 0       Abuse: Current or Past(Physical, Sexual or Emotional)- No  Do you feel safe in your environment? Yes      Social History     Tobacco Use     Smoking status: Never Smoker     Smokeless tobacco: Never Used   Substance Use Topics     Alcohol use: No     Alcohol/week: 0.0 standard drinks     If you drink alcohol do you typically have >3 drinks per day or >7 drinks per week? No    Alcohol Use 8/14/2020   Prescreen: >3 drinks/day or >7 drinks/week? No   No flowsheet data found.    Reviewed orders with patient.  Reviewed health maintenance and updated orders accordingly - Yes    Mammogram Screening: Patient over age 50, mutual decision to screen reflected in health maintenance.    Pertinent mammograms are reviewed under the imaging tab.  History of abnormal Pap smear: YES - updated in Problem List and Health Maintenance accordingly  PAP / HPV Latest Ref Rng & Units 1/31/2020 8/29/2018 3/30/2015   PAP - ASC-US(A) ASC-US(A) ASC-US(A)   HPV 16 DNA NEG:Negative Negative Negative Negative   HPV 18 DNA NEG:Negative Negative Negative Negative   OTHER HR HPV NEG:Negative Negative Negative Negative     Reviewed and  "updated as needed this visit by clinical staff  Tobacco  Allergies  Meds  Problems  Surg Hx  Fam Hx  Soc Hx        Reviewed and updated as needed this visit by Provider  Tobacco  Meds  Problems  Surg Hx  Fam Hx  Soc Hx           Review of Systems  CONSTITUTIONAL: NEGATIVE for fever, chills, change in weight  INTEGUMENTARY/SKIN: NEGATIVE for worrisome rashes, moles or lesions  EYES: NEGATIVE for vision changes or irritation  ENT: NEGATIVE for ear, mouth and throat problems  RESP: NEGATIVE for significant cough or SOB  BREAST: NEGATIVE for masses, tenderness or discharge  CV: NEGATIVE for chest pain, palpitations or peripheral edema  GI: NEGATIVE for nausea, abdominal pain, heartburn, or change in bowel habits  : NEGATIVE for unusual urinary or vaginal symptoms. No vaginal bleeding.  MUSCULOSKELETAL: NEGATIVE for significant arthralgias or myalgia  NEURO: NEGATIVE for weakness, dizziness or paresthesias  PSYCHIATRIC: NEGATIVE for changes in mood or affect       OBJECTIVE:   /86   Pulse 87   Temp 98.3  F (36.8  C) (Tympanic)   Resp 16   Ht 1.689 m (5' 6.5\")   Wt 76.6 kg (168 lb 12.8 oz)   SpO2 98%   BMI 26.84 kg/m    Physical Exam  GENERAL: healthy, alert and no distress  EYES: Eyes grossly normal to inspection, PERRL and conjunctivae and sclerae normal  HENT: ear canals and TM's normal, nose and mouth without ulcers or lesions  NECK: no adenopathy, no asymmetry, masses, or scars and thyroid normal to palpation  RESP: lungs clear to auscultation - no rales, rhonchi or wheezes  CV: regular rate and rhythm, normal S1 S2, no S3 or S4, no murmur, click or rub, no peripheral edema and peripheral pulses strong  ABDOMEN: soft, nontender, no hepatosplenomegaly, no masses and bowel sounds normal  MS: no gross musculoskeletal defects noted, no edema  SKIN: no suspicious lesions or rashes  NEURO: Normal strength and tone, mentation intact and speech normal  PSYCH: mentation appears normal, affect " normal/bright    Diagnostic Test Results:  Labs reviewed in Epic    ASSESSMENT/PLAN:     1. Routine general medical examination at a health care facility  - reviewed PMH and health maintenance     2. Atypical squamous cells of undetermined significance on cytologic smear of cervix (ASC-US)  - overdue fr colposcopy, pt agrees to follow up with OB   - OB/GYN REFERRAL    3. Vaginal itching  - Wet prep    4. Dysuria  - UA with Microscopic reflex to Culture    5. Lipid screening  - Lipid panel reflex to direct LDL Fasting    6. Yeast infection of the vagina  - wet prep returned showing yeast, will treat as below  - fluconazole (DIFLUCAN) 150 MG tablet; Take 1 tablet (150 mg) by mouth once for 1 dose  Dispense: 1 tablet; Refill: 0    7. Hematuria, unspecified type  - UA shows hematuria, pt notes this is a reoccurring finding, will have her see urology   - UROLOGY ADULT REFERRAL  - *UA reflex to Microscopic and Culture ([facility] (except [place] and [place]); Future        Shirley Torrez PA-C  [facility]

## 2020-08-15 LAB
CHOLEST SERPL-MCNC: 160 MG/DL
HDLC SERPL-MCNC: 44 MG/DL
LDLC SERPL CALC-MCNC: 75 MG/DL
NONHDLC SERPL-MCNC: 116 MG/DL
TRIGL SERPL-MCNC: 207 MG/DL

## 2020-08-17 ENCOUNTER — TELEPHONE (OUTPATIENT)
Dept: UROLOGY | Facility: CLINIC | Age: 55
End: 2020-08-17

## 2020-08-18 NOTE — TELEPHONE ENCOUNTER
MEDICAL RECORDS REQUEST   [facility]  Urology Clinic  [address] [place] [address]  PHONE: [phone]  Fax: [phone]        FUTURE VISIT INFORMATION                                                   Graciela Castorena, : 1965 scheduled for future visit at [facility]    APPOINTMENT INFORMATION:    Date: 20 7:30aM    Provider:  Caro Cox MD    Reason for Visit/Diagnosis: Possible UTI    REFERRAL INFORMATION:    Referring provider:  TOD HASSAN    Specialty: PA-C    Referring providers clinic:       Clinic contact number:  [phone]    RECORDS REQUESTED FOR VISIT                                                     NOTES  STATUS/DETAILS   OFFICE NOTE from referring provider  yes   OFFICE NOTE from other specialist  yes   DISCHARGE SUMMARY from hospital  no   DISCHARGE REPORT from the ER  no   OPERATIVE REPORT  no   MEDICATION LIST  no   LABS     URINALYSIS (UA)  yes     PRE-VISIT CHECKLIST      Record collection complete Yes- Internal recs in epic  CT ABD 19   Appointment appropriately scheduled           (right time/right provider) Yes   MyChart activation Yes   Questionnaire complete If no, please explain: In process      Completed by: Karly Espinosa

## 2020-08-27 ENCOUNTER — TELEPHONE (OUTPATIENT)
Dept: UROLOGY | Facility: CLINIC | Age: 55
End: 2020-08-27

## 2020-08-27 NOTE — TELEPHONE ENCOUNTER
----- Message from Leola Flores CMA sent at 8/27/2020  9:53 AM CDT -----  Regarding: URGENT - reschedule  Please call pt and move her to a video visit the week before. This new consult will not involve an exam, and she does not need an in-person visit for this.    Thank you,  Leola Flores CMA

## 2020-08-27 NOTE — TELEPHONE ENCOUNTER
Called pt to reschedule to a virtual visit on 9/10 per deysi. Pt declined to reschedule to video visit, she insisted on being seen in person. Sent message back to clinic.

## 2020-08-28 NOTE — PROGRESS NOTES
SUBJECTIVE:                                                   Graciela Castorena is a 55 year old female who presents to clinic today for the following health issue(s):  Patient presents with:  Colposcopy  Referral          HPI:  Patient here due to recurrent ascus paps with consistently negative hpv tests    Had breast cancer with lumpectomy and radiation and is now on tamoxifen    Also referred to see urology due to recurrent hematuria    Daughter is 2nd years medical school        No LMP recorded. Patient is postmenopausal..     Patient is not sexually active, .  Using menopause for contraception.    reports that she has never smoked. She has never used smokeless tobacco.    STD testing offered?  Declined    Health maintenance updated:  no    Today's PHQ-2 Score:   PHQ-2 (  [employer]) 2020   Q1: Little interest or pleasure in doing things 0   Q2: Feeling down, depressed or hopeless 0   PHQ-2 Score 0     Today's PHQ-9 Score: No flowsheet data found.  Today's TOMMY-7 Score: No flowsheet data found.    Problem list and histories reviewed & adjusted, as indicated.  Additional history: as documented.    Patient Active Problem List   Diagnosis     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 breast     Atypical squamous cells of undetermined significance on cytologic smear of cervix (ASC-US)     RUQ pain     Latent tuberculosis     Hemoptysis     Environmental allergies     Past Surgical History:   Procedure Laterality Date     COLONOSCOPY N/A 2018    Procedure: COLONOSCOPY;  colonoscopy;  Surgeon: Sabas Villegas MD;  Location:  GI     LUMPECTOMY BREAST WITH SEED LOCALIZATION Left 2018    Procedure: LUMPECTOMY BREAST WITH SEED LOCALIZATION;  SEED LOCALIZED LEFT BREAST LUMPECTOMY ;  Surgeon: Meli Rodriguez MD;  Location: [facility]      Social History     Tobacco Use     Smoking status: Never Smoker     Smokeless tobacco: Never Used   Substance Use Topics     Alcohol use: No     Alcohol/week: 0.0  "standard drinks      Problem (# of Occurrences) Relation (Name,Age of Onset)    Diabetes Type 2  (2) Brother, Sister       Negative family history of: Myocardial Infarction, Cerebrovascular Disease, Coronary Artery Disease Early Onset, Colon Cancer, Breast Cancer, Ovarian Cancer            Current Outpatient Medications   Medication Sig     diclofenac (VOLTAREN) 1 % topical gel Place 4 g onto the skin 4 times daily     famotidine (PEPCID) 20 MG tablet Take 1 tablet (20 mg) by mouth daily     tamoxifen (NOLVADEX) 20 MG tablet Take 1 tablet (20 mg) by mouth daily     No current facility-administered medications for this visit.      Allergies   Allergen Reactions     Ciprofloxacin Itching       ROS:  12 point review of systems negative other than symptoms noted below or in the HPI.  No urinary frequency or dysuria, bladder or kidney problems      OBJECTIVE:     /82   Pulse 68   Ht 1.676 m (5' 6\")   Wt 74.8 kg (165 lb)   BMI 26.63 kg/m    Body mass index is 26.63 kg/m .    Exam:  Constitutional:  Appearance: Well nourished, well developed alert, in no acute distress  Neurologic:  Mental Status:  Oriented X3.  Normal strength and tone, sensory exam grossly normal, mentation intact and speech normal.    Psychiatric:  Mentation appears normal and affect normal/bright.  Pelvic Exam:  External Genitalia:     Normal appearance for age, no discharge present, no tenderness present, no inflammatory lesions present, color normal  Vagina:     Normal vaginal vault without central or paravaginal defects, ATROPHIC  Bladder:     Nontender to palpation  Urethra:   Urethral Body:  Urethra palpation normal, urethra structural support normal   Urethral Meatus:  No erythema or lesions present  Cervix:     Appearance healthy, no lesions present, nontender to palpation, no bleeding present  Uterus:     Nontender to palpation, no masses present, position anteflexed, mobility: normal  Adnexa:     No adnexal tenderness present, no " adnexal masses present  Perineum:     Perineum within normal limits, no evidence of trauma, no rashes or skin lesions present  Inguinal Lymph Nodes:     No lymphadenopathy present       In-Clinic Test Results:  No results found for this or any previous visit (from the past 24 hour(s)).    ASSESSMENT/PLAN:                                                        ICD-10-CM    1. Atypical squamous cells of undetermined significance on cytologic smear of cervix (ASC-US)  R87.610        There are no Patient Instructions on file for this visit.    Discussed ascus paps   Her hpv tests have been negative so that is good    Herminia Hernandez MD  [facility] [place]    INDICATIONS:                                                    Is a pregnancy test required: No.  Was a consent obtained?  Yes    Today's PHQ-2 Score:   PHQ-2 ( 1999 Pfizer) 6/27/2020   Q1: Little interest or pleasure in doing things 0   Q2: Feeling down, depressed or hopeless 0   PHQ-2 Score 0     Today's PHQ-9 Score:  No flowsheet data found.  Today's GAD7 Score: 0    Graciela Castorena, is a 55 year old female, who had a recent ASCUS pap.  HPV Negative Yes prior history of abnormal pap. Here today for colposcopy. Discussed indication, risks of infection and bleeding.      Patient had several ascus paps with hpv negative  Not having vaginal bleeding  Is on tamoxifen  Sent here for colposcopy    Had 8 children    Her last pap was   Lab Results   Component Value Date    PAP ASC-US 01/31/2020    .    PROCEDURE:                                                      Cervix is stained with acetic acid and viewed colposcopically. Squamocolumnar junction is visualized in it's entirety. no visible lesions . Biopsy done No. Endocervical curretage Done    No lesions seen  Has nabothian cyst on cervix  Atrophic vagina noted  She is menopausal and on tamoxifen     POST PROCEDURE:                                                      IMPRESSION: Patient tolerated  procedure well    PLAN : Await the results of the biopsies.  She tolerated the procedure well with minimal discomfort. There were no complications. Patient was discharged in stable condition.    Patient advised to call the clinic if excessive bleeding, pelvic pain, or fever.     Follow-up based on pathology results.    Herminia Hernandez MD

## 2020-09-01 ENCOUNTER — OFFICE VISIT (OUTPATIENT)
Dept: OBGYN | Facility: CLINIC | Age: 55
End: 2020-09-01
Payer: COMMERCIAL

## 2020-09-01 VITALS
HEART RATE: 68 BPM | WEIGHT: 165 LBS | DIASTOLIC BLOOD PRESSURE: 82 MMHG | BODY MASS INDEX: 26.52 KG/M2 | SYSTOLIC BLOOD PRESSURE: 124 MMHG | HEIGHT: 66 IN

## 2020-09-01 DIAGNOSIS — R31.9 HEMATURIA, UNSPECIFIED TYPE: ICD-10-CM

## 2020-09-01 DIAGNOSIS — R87.610 ATYPICAL SQUAMOUS CELLS OF UNDETERMINED SIGNIFICANCE ON CYTOLOGIC SMEAR OF CERVIX (ASC-US): Primary | ICD-10-CM

## 2020-09-01 LAB
ALBUMIN UR-MCNC: NEGATIVE MG/DL
APPEARANCE UR: CLEAR
BACTERIA #/AREA URNS HPF: ABNORMAL /HPF
BILIRUB UR QL STRIP: NEGATIVE
COLOR UR AUTO: YELLOW
GLUCOSE UR STRIP-MCNC: NEGATIVE MG/DL
HGB UR QL STRIP: ABNORMAL
KETONES UR STRIP-MCNC: NEGATIVE MG/DL
LEUKOCYTE ESTERASE UR QL STRIP: NEGATIVE
MUCOUS THREADS #/AREA URNS LPF: PRESENT /LPF
NITRATE UR QL: NEGATIVE
NON-SQ EPI CELLS #/AREA URNS LPF: ABNORMAL /LPF
PH UR STRIP: 5.5 PH (ref 5–7)
RBC #/AREA URNS AUTO: ABNORMAL /HPF
SOURCE: ABNORMAL
SP GR UR STRIP: >1.03 (ref 1–1.03)
UROBILINOGEN UR STRIP-ACNC: 0.2 EU/DL (ref 0.2–1)
WBC #/AREA URNS AUTO: ABNORMAL /HPF

## 2020-09-01 PROCEDURE — 57456 ENDOCERV CURETTAGE W/SCOPE: CPT | Performed by: OBSTETRICS & GYNECOLOGY

## 2020-09-01 PROCEDURE — 81001 URINALYSIS AUTO W/SCOPE: CPT | Performed by: OBSTETRICS & GYNECOLOGY

## 2020-09-01 PROCEDURE — 88305 TISSUE EXAM BY PATHOLOGIST: CPT | Performed by: OBSTETRICS & GYNECOLOGY

## 2020-09-01 ASSESSMENT — MIFFLIN-ST. JEOR: SCORE: 1370.19

## 2020-09-03 LAB — COPATH REPORT: NORMAL

## 2020-09-08 ENCOUNTER — PRE VISIT (OUTPATIENT)
Dept: UROLOGY | Facility: CLINIC | Age: 55
End: 2020-09-08

## 2020-09-08 NOTE — TELEPHONE ENCOUNTER
Reason for visit: hematuria consult     Relevant information: pt referred for possible UTI    Records/imaging/labs/orders: all records available    Pt called: no need for a call    At Rooming: dip/pvr

## 2020-09-17 ENCOUNTER — PRE VISIT (OUTPATIENT)
Dept: UROLOGY | Facility: CLINIC | Age: 55
End: 2020-09-17

## 2020-09-21 ENCOUNTER — ANCILLARY PROCEDURE (OUTPATIENT)
Dept: GENERAL RADIOLOGY | Facility: CLINIC | Age: 55
End: 2020-09-21
Attending: PHYSICIAN ASSISTANT
Payer: COMMERCIAL

## 2020-09-21 ENCOUNTER — OFFICE VISIT (OUTPATIENT)
Dept: URGENT CARE | Facility: URGENT CARE | Age: 55
End: 2020-09-21
Payer: COMMERCIAL

## 2020-09-21 VITALS
SYSTOLIC BLOOD PRESSURE: 128 MMHG | RESPIRATION RATE: 12 BRPM | BODY MASS INDEX: 26.95 KG/M2 | TEMPERATURE: 97.8 F | WEIGHT: 167 LBS | OXYGEN SATURATION: 99 % | DIASTOLIC BLOOD PRESSURE: 80 MMHG | HEART RATE: 89 BPM

## 2020-09-21 DIAGNOSIS — R07.9 RIGHT-SIDED CHEST PAIN: ICD-10-CM

## 2020-09-21 DIAGNOSIS — R07.1 CHEST PAIN VARYING WITH BREATHING: ICD-10-CM

## 2020-09-21 DIAGNOSIS — R07.9 RIGHT-SIDED CHEST PAIN: Primary | ICD-10-CM

## 2020-09-21 DIAGNOSIS — R07.89 CHEST WALL PAIN: ICD-10-CM

## 2020-09-21 LAB
ALBUMIN SERPL-MCNC: 3.4 G/DL (ref 3.4–5)
ALP SERPL-CCNC: 60 U/L (ref 40–150)
ALT SERPL W P-5'-P-CCNC: 24 U/L (ref 0–50)
ANION GAP SERPL CALCULATED.3IONS-SCNC: 4 MMOL/L (ref 3–14)
AST SERPL W P-5'-P-CCNC: 18 U/L (ref 0–45)
BASOPHILS # BLD AUTO: 0 10E9/L (ref 0–0.2)
BASOPHILS NFR BLD AUTO: 0.5 %
BILIRUB SERPL-MCNC: 0.2 MG/DL (ref 0.2–1.3)
BUN SERPL-MCNC: 12 MG/DL (ref 7–30)
CALCIUM SERPL-MCNC: 8.8 MG/DL (ref 8.5–10.1)
CHLORIDE SERPL-SCNC: 109 MMOL/L (ref 94–109)
CO2 SERPL-SCNC: 27 MMOL/L (ref 20–32)
CREAT SERPL-MCNC: 0.67 MG/DL (ref 0.52–1.04)
D DIMER PPP FEU-MCNC: <0.3 UG/ML FEU (ref 0–0.5)
DIFFERENTIAL METHOD BLD: NORMAL
EOSINOPHIL # BLD AUTO: 0.2 10E9/L (ref 0–0.7)
EOSINOPHIL NFR BLD AUTO: 3.7 %
ERYTHROCYTE [DISTWIDTH] IN BLOOD BY AUTOMATED COUNT: 12.9 % (ref 10–15)
GFR SERPL CREATININE-BSD FRML MDRD: >90 ML/MIN/{1.73_M2}
GLUCOSE SERPL-MCNC: 122 MG/DL (ref 70–99)
HCT VFR BLD AUTO: 39.6 % (ref 35–47)
HGB BLD-MCNC: 13.1 G/DL (ref 11.7–15.7)
LYMPHOCYTES # BLD AUTO: 1.8 10E9/L (ref 0.8–5.3)
LYMPHOCYTES NFR BLD AUTO: 29.1 %
MCH RBC QN AUTO: 30 PG (ref 26.5–33)
MCHC RBC AUTO-ENTMCNC: 33.1 G/DL (ref 31.5–36.5)
MCV RBC AUTO: 91 FL (ref 78–100)
MONOCYTES # BLD AUTO: 0.5 10E9/L (ref 0–1.3)
MONOCYTES NFR BLD AUTO: 7.8 %
NEUTROPHILS # BLD AUTO: 3.6 10E9/L (ref 1.6–8.3)
NEUTROPHILS NFR BLD AUTO: 58.9 %
PLATELET # BLD AUTO: 204 10E9/L (ref 150–450)
POTASSIUM SERPL-SCNC: 3.7 MMOL/L (ref 3.4–5.3)
PROT SERPL-MCNC: 7.2 G/DL (ref 6.8–8.8)
RBC # BLD AUTO: 4.36 10E12/L (ref 3.8–5.2)
SODIUM SERPL-SCNC: 140 MMOL/L (ref 133–144)
WBC # BLD AUTO: 6.2 10E9/L (ref 4–11)

## 2020-09-21 PROCEDURE — 71046 X-RAY EXAM CHEST 2 VIEWS: CPT

## 2020-09-21 PROCEDURE — 99215 OFFICE O/P EST HI 40 MIN: CPT | Performed by: PHYSICIAN ASSISTANT

## 2020-09-21 PROCEDURE — 80053 COMPREHEN METABOLIC PANEL: CPT | Performed by: PHYSICIAN ASSISTANT

## 2020-09-21 PROCEDURE — 85379 FIBRIN DEGRADATION QUANT: CPT | Performed by: PHYSICIAN ASSISTANT

## 2020-09-21 PROCEDURE — 85025 COMPLETE CBC W/AUTO DIFF WBC: CPT | Performed by: PHYSICIAN ASSISTANT

## 2020-09-21 PROCEDURE — 36415 COLL VENOUS BLD VENIPUNCTURE: CPT | Performed by: PHYSICIAN ASSISTANT

## 2020-09-21 RX ORDER — METHOCARBAMOL 750 MG/1
750 TABLET, FILM COATED ORAL 4 TIMES DAILY PRN
Qty: 30 TABLET | Refills: 0 | Status: SHIPPED | OUTPATIENT
Start: 2020-09-21 | End: 2021-08-17

## 2020-09-21 RX ORDER — IBUPROFEN 600 MG/1
600 TABLET, FILM COATED ORAL EVERY 6 HOURS PRN
Qty: 30 TABLET | Refills: 0 | Status: SHIPPED | OUTPATIENT
Start: 2020-09-21 | End: 2021-01-12

## 2020-09-21 NOTE — PROGRESS NOTES
SUBJECTIVE:  Graciela Castorena is a 55 year old female who presents to the office with the CC of chest pain,pain with movement  Patient complains of pain with movement.  The pain is characterized as mild and moderate sharp located right chest.. Symptoms began 4 day(s) ago, still present  Pain is associated with right side chest wall pain.  Pain is exacerbated by movement.  Pain is relieved by rest.  Cardiac risk factors: negative for, hypertension    Past Medical History:   Diagnosis Date     Abnormal Pap smear of cervix 03/30/2015    see problem list     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 breast 2018    s/p lumpectomy and radiation; on Tamoxifen     Allergies   Allergen Reactions     Ciprofloxacin Itching     Social History     Tobacco Use     Smoking status: Never Smoker     Smokeless tobacco: Never Used   Substance Use Topics     Alcohol use: No     Alcohol/week: 0.0 standard drinks     Family History   Problem Relation Age of Onset     Diabetes Type 2  Brother      Diabetes Type 2  Sister      Myocardial Infarction No family hx of      Cerebrovascular Disease No family hx of      Coronary Artery Disease Early Onset No family hx of      Colon Cancer No family hx of      Breast Cancer No family hx of      Ovarian Cancer No family hx of          ROS:CONSTITUTIONAL:NEGATIVE for fever, chills, change in weight  INTEGUMENTARY/SKIN: NEGATIVE for worrisome rashes, moles or lesions  EYES: NEGATIVE for vision changes or irritation  ENT/MOUTH: NEGATIVE for ear, mouth and throat problems  RESP:POSITIVE for pain with breathing  BREAST: POSITIVE for chest wall tenderness  GI: NEGATIVE for nausea, abdominal pain, heartburn, or change in bowel habits  : negative for and dysuria  MUSCULOSKELETAL: POSITIVE  for pain with movement  NEURO: NEGATIVE for weakness, dizziness or paresthesias    EXAM:  /80   Pulse 89   Temp 97.8  F (36.6  C) (Tympanic)   Resp 12   Wt 75.8 kg (167 lb)   SpO2 99%   Breastfeeding No   BMI  26.95 kg/m    GENERAL APPEARANCE: healthy, alert and no distress  HENT: ear canals and TM's normal.  Nose and mouth without ulcers, erythema or lesions  NECK: supple, nontender, no lymphadenopathy  RESP: lungs clear to auscultation - no rales, rhonchi or wheezes  CV: regular rates and rhythm, normal S1 S2, no murmur noted  ABDOMEN:  soft, nontender, no HSM or masses and bowel sounds normal  Extremities: no peripheral edema or tenderness, peripheral pulses normal  MS: decreased range of motion due to pain  NEURO: Normal strength and tone, sensory exam grossly normal,  normal speech and mentation  SKIN: no suspicious lesions or rashes     Results for orders placed or performed in visit on 09/21/20   XR Chest 2 Views     Status: None (Preliminary result)    Narrative    CHEST TWO VIEWS  9/21/2020 11:29 AM     HISTORY: Right-sided chest pain. Chest pain varying with breathing.    COMPARISON: 3/9/2020.      Impression    IMPRESSION: No acute cardiopulmonary disease.   Results for orders placed or performed in visit on 09/21/20   CBC with platelets differential     Status: None   Result Value Ref Range    WBC 6.2 4.0 - 11.0 10e9/L    RBC Count 4.36 3.8 - 5.2 10e12/L    Hemoglobin 13.1 11.7 - 15.7 g/dL    Hematocrit 39.6 35.0 - 47.0 %    MCV 91 78 - 100 fl    MCH 30.0 26.5 - 33.0 pg    MCHC 33.1 31.5 - 36.5 g/dL    RDW 12.9 10.0 - 15.0 %    Platelet Count 204 150 - 450 10e9/L    % Neutrophils 58.9 %    % Lymphocytes 29.1 %    % Monocytes 7.8 %    % Eosinophils 3.7 %    % Basophils 0.5 %    Absolute Neutrophil 3.6 1.6 - 8.3 10e9/L    Absolute Lymphocytes 1.8 0.8 - 5.3 10e9/L    Absolute Monocytes 0.5 0.0 - 1.3 10e9/L    Absolute Eosinophils 0.2 0.0 - 0.7 10e9/L    Absolute Basophils 0.0 0.0 - 0.2 10e9/L    Diff Method Automated Method    Comprehensive metabolic panel     Status: Abnormal   Result Value Ref Range    Sodium 140 133 - 144 mmol/L    Potassium 3.7 3.4 - 5.3 mmol/L    Chloride 109 94 - 109 mmol/L    Carbon  Dioxide 27 20 - 32 mmol/L    Anion Gap 4 3 - 14 mmol/L    Glucose 122 (H) 70 - 99 mg/dL    Urea Nitrogen 12 7 - 30 mg/dL    Creatinine 0.67 0.52 - 1.04 mg/dL    GFR Estimate >90 >60 mL/min/[1.73_m2]    GFR Estimate If Black >90 >60 mL/min/[1.73_m2]    Calcium 8.8 8.5 - 10.1 mg/dL    Bilirubin Total 0.2 0.2 - 1.3 mg/dL    Albumin 3.4 3.4 - 5.0 g/dL    Protein Total 7.2 6.8 - 8.8 g/dL    Alkaline Phosphatase 60 40 - 150 U/L    ALT 24 0 - 50 U/L    AST 18 0 - 45 U/L   D dimer, quantitative     Status: None   Result Value Ref Range    D Dimer <0.3 0.0 - 0.50 ug/ml FEU         Assessment  / IMPRESSION/PLAN:    ICD-10-CM    1. Right-sided chest pain  R07.9 CBC with platelets differential     Comprehensive metabolic panel     XR Chest 2 Views     ibuprofen (ADVIL/MOTRIN) 600 MG tablet     methocarbamol (ROBAXIN) 750 MG tablet   2. Chest pain varying with breathing  R07.1 D dimer, quantitative     XR Chest 2 Views     ibuprofen (ADVIL/MOTRIN) 600 MG tablet   3. Chest wall pain  R07.89 ibuprofen (ADVIL/MOTRIN) 600 MG tablet     methocarbamol (ROBAXIN) 750 MG tablet     Orders Placed This Encounter     XR Chest 2 Views     CBC with platelets differential     Comprehensive metabolic panel     D dimer, quantitative     ibuprofen (ADVIL/MOTRIN) 600 MG tablet     methocarbamol (ROBAXIN) 750 MG tablet       Chest xray neg for pneumothorax and fluid  D-dimer negative for PE  CBC negative for anemia  CMP negative for blood sugars  Motrin and Robaxin for muscle pain and spasms  Follow up with PCP as needed

## 2020-09-21 NOTE — PATIENT INSTRUCTIONS
Patient Education     Chest Wall Pain: Costochondritis    The chest pain that you have had today is caused by costochondritis. This condition is caused by an inflammation of the cartilage joining your ribs to your breastbone. It is not caused by heart or lung problems. Your healthcare team has made sure that the chest pain you feel is not from a life threatening cause of chest pain such as heart attack, collapsed lung, blood clot in the lung, tear in the aorta, or esophageal rupture. The inflammation may have been brought on by a blow to the chest, lifting heavy objects, intense exercise, or an illness that made you cough and sneeze a lot. It often occurs during times of emotional stress. It can be painful, but it is not dangerous. It usually goes away in 1 to 2 weeks. But it may happen again. Rarely, a more serious condition may cause symptoms similar to costochondritis. That s why it s important to watch for the warning signs listed below.  Home care  Follow these guidelines when caring for yourself at home:    If you feel that emotional stress is a cause of your condition, try to figure out the sources of that stress. It may not be obvious. Learn ways to deal with the stress in your life. This can include regular exercise, muscle relaxation, meditation, or simply taking time out for yourself.    You may use acetaminophen, ibuprofen, or naproxen to control pain, unless another pain medicine was prescribed. If you have liver or kidney disease or ever had a stomach ulcer, talk with your healthcare provider before using these medicines.    You can also help ease pain by using a hot, wet compress or heating pad. Use this with or without a medicated skin cream that helps relieves pain.    Do stretching exercise as advised by your provider.    Take any prescribed medicines as directed.  Follow-up care  Follow up with your healthcare provider, or as advised, if you do not start to get better in the next 2 days.  When  to seek medical advice  Call your healthcare provider right away if any of these occur:    A change in the type of pain. Call if it feels different, becomes more serious, lasts longer, or spreads into your shoulder, arm, neck, jaw, or back.    Shortness of breath or pain gets worse when you breathe    Weakness, dizziness, or fainting    Cough with dark-colored sputum (phlegm) or blood    Abdominal pain    Dark red or black stools    Fever of 100.4 F (38 C) or higher, or as directed by your healthcare provider  Date Last Reviewed: 12/1/2016 2000-2019 [employer]. [address] [place] [id number]. All rights reserved. This information is not intended as a substitute for professional medical care. Always follow your healthcare professional's instructions.

## 2020-11-08 ENCOUNTER — APPOINTMENT (OUTPATIENT)
Dept: GENERAL RADIOLOGY | Facility: CLINIC | Age: 55
End: 2020-11-08
Attending: EMERGENCY MEDICINE
Payer: COMMERCIAL

## 2020-11-08 ENCOUNTER — HOSPITAL ENCOUNTER (EMERGENCY)
Facility: CLINIC | Age: 55
Discharge: HOME | End: 2020-11-08
Attending: EMERGENCY MEDICINE | Admitting: EMERGENCY MEDICINE
Payer: COMMERCIAL

## 2020-11-08 VITALS
RESPIRATION RATE: 16 BRPM | OXYGEN SATURATION: 100 % | SYSTOLIC BLOOD PRESSURE: 150 MMHG | HEART RATE: 74 BPM | BODY MASS INDEX: 26.95 KG/M2 | TEMPERATURE: 97.5 F | DIASTOLIC BLOOD PRESSURE: 80 MMHG | WEIGHT: 167 LBS

## 2020-11-08 DIAGNOSIS — R07.9 LEFT-SIDED CHEST PAIN: ICD-10-CM

## 2020-11-08 LAB
ALBUMIN SERPL-MCNC: 3.3 G/DL (ref 3.4–5)
ALP SERPL-CCNC: 56 U/L (ref 40–150)
ALT SERPL W P-5'-P-CCNC: 24 U/L (ref 0–50)
ANION GAP SERPL CALCULATED.3IONS-SCNC: 1 MMOL/L (ref 3–14)
AST SERPL W P-5'-P-CCNC: 20 U/L (ref 0–45)
BASOPHILS # BLD AUTO: 0 10E9/L (ref 0–0.2)
BASOPHILS NFR BLD AUTO: 0.5 %
BILIRUB SERPL-MCNC: 0.2 MG/DL (ref 0.2–1.3)
BUN SERPL-MCNC: 10 MG/DL (ref 7–30)
CALCIUM SERPL-MCNC: 8.6 MG/DL (ref 8.5–10.1)
CHLORIDE SERPL-SCNC: 111 MMOL/L (ref 94–109)
CO2 SERPL-SCNC: 29 MMOL/L (ref 20–32)
CREAT SERPL-MCNC: 0.66 MG/DL (ref 0.52–1.04)
D DIMER PPP FEU-MCNC: 0.4 UG/ML FEU (ref 0–0.5)
DIFFERENTIAL METHOD BLD: NORMAL
EOSINOPHIL # BLD AUTO: 0.3 10E9/L (ref 0–0.7)
EOSINOPHIL NFR BLD AUTO: 3.7 %
ERYTHROCYTE [DISTWIDTH] IN BLOOD BY AUTOMATED COUNT: 12.9 % (ref 10–15)
GFR SERPL CREATININE-BSD FRML MDRD: >90 ML/MIN/{1.73_M2}
GLUCOSE SERPL-MCNC: 98 MG/DL (ref 70–99)
HCT VFR BLD AUTO: 39.9 % (ref 35–47)
HGB BLD-MCNC: 13.5 G/DL (ref 11.7–15.7)
IMM GRANULOCYTES # BLD: 0 10E9/L (ref 0–0.4)
IMM GRANULOCYTES NFR BLD: 0.4 %
INTERPRETATION ECG - MUSE: NORMAL
LIPASE SERPL-CCNC: 107 U/L (ref 73–393)
LYMPHOCYTES # BLD AUTO: 2.3 10E9/L (ref 0.8–5.3)
LYMPHOCYTES NFR BLD AUTO: 31.5 %
MCH RBC QN AUTO: 30.5 PG (ref 26.5–33)
MCHC RBC AUTO-ENTMCNC: 33.8 G/DL (ref 31.5–36.5)
MCV RBC AUTO: 90 FL (ref 78–100)
MONOCYTES # BLD AUTO: 0.6 10E9/L (ref 0–1.3)
MONOCYTES NFR BLD AUTO: 8.7 %
NEUTROPHILS # BLD AUTO: 4 10E9/L (ref 1.6–8.3)
NEUTROPHILS NFR BLD AUTO: 55.2 %
NRBC # BLD AUTO: 0 10*3/UL
NRBC BLD AUTO-RTO: 0 /100
PLATELET # BLD AUTO: 223 10E9/L (ref 150–450)
POTASSIUM SERPL-SCNC: 3.5 MMOL/L (ref 3.4–5.3)
PROT SERPL-MCNC: 6.8 G/DL (ref 6.8–8.8)
RBC # BLD AUTO: 4.43 10E12/L (ref 3.8–5.2)
SODIUM SERPL-SCNC: 141 MMOL/L (ref 133–144)
TROPONIN I SERPL-MCNC: <0.015 UG/L (ref 0–0.04)
WBC # BLD AUTO: 7.3 10E9/L (ref 4–11)

## 2020-11-08 PROCEDURE — 93005 ELECTROCARDIOGRAM TRACING: CPT

## 2020-11-08 PROCEDURE — 71046 X-RAY EXAM CHEST 2 VIEWS: CPT

## 2020-11-08 PROCEDURE — 84484 ASSAY OF TROPONIN QUANT: CPT | Performed by: EMERGENCY MEDICINE

## 2020-11-08 PROCEDURE — 85379 FIBRIN DEGRADATION QUANT: CPT | Performed by: EMERGENCY MEDICINE

## 2020-11-08 PROCEDURE — 83690 ASSAY OF LIPASE: CPT | Performed by: EMERGENCY MEDICINE

## 2020-11-08 PROCEDURE — 99285 EMERGENCY DEPT VISIT HI MDM: CPT | Mod: 25

## 2020-11-08 PROCEDURE — 250N000009 HC RX 250: Performed by: EMERGENCY MEDICINE

## 2020-11-08 PROCEDURE — 80053 COMPREHEN METABOLIC PANEL: CPT | Performed by: EMERGENCY MEDICINE

## 2020-11-08 PROCEDURE — 96374 THER/PROPH/DIAG INJ IV PUSH: CPT

## 2020-11-08 PROCEDURE — 96375 TX/PRO/DX INJ NEW DRUG ADDON: CPT

## 2020-11-08 PROCEDURE — 250N000013 HC RX MED GY IP 250 OP 250 PS 637: Performed by: EMERGENCY MEDICINE

## 2020-11-08 PROCEDURE — 85025 COMPLETE CBC W/AUTO DIFF WBC: CPT | Performed by: EMERGENCY MEDICINE

## 2020-11-08 PROCEDURE — 250N000011 HC RX IP 250 OP 636: Performed by: EMERGENCY MEDICINE

## 2020-11-08 RX ORDER — KETOROLAC TROMETHAMINE 15 MG/ML
15 INJECTION, SOLUTION INTRAMUSCULAR; INTRAVENOUS ONCE
Status: COMPLETED | OUTPATIENT
Start: 2020-11-08 | End: 2020-11-08

## 2020-11-08 RX ORDER — ASPIRIN 81 MG/1
324 TABLET, CHEWABLE ORAL ONCE
Status: COMPLETED | OUTPATIENT
Start: 2020-11-08 | End: 2020-11-08

## 2020-11-08 RX ADMIN — ASPIRIN 324 MG: 81 TABLET, CHEWABLE ORAL at 08:58

## 2020-11-08 RX ADMIN — LIDOCAINE HYDROCHLORIDE 30 ML: 20 SOLUTION ORAL; TOPICAL at 08:58

## 2020-11-08 RX ADMIN — FAMOTIDINE 20 MG: 10 INJECTION INTRAVENOUS at 08:58

## 2020-11-08 RX ADMIN — KETOROLAC TROMETHAMINE 15 MG: 15 INJECTION, SOLUTION INTRAMUSCULAR; INTRAVENOUS at 08:58

## 2020-11-08 ASSESSMENT — ENCOUNTER SYMPTOMS
FEVER: 0
CHILLS: 0
DIAPHORESIS: 0
SHORTNESS OF BREATH: 0
COUGH: 0
VOMITING: 0
NAUSEA: 0

## 2020-11-08 NOTE — DISCHARGE INSTRUCTIONS
Try ibuprofen and Maalox/Mylanta when you have pain with slow, deep breaths. If it does not improve and is severe, return to the ER. You should also return with any other new or concerning symptoms.  I have provided you with a new clinic to call and arrange follow-up appointment.

## 2020-11-08 NOTE — ED PROVIDER NOTES
History   Chief Complaint:  Chest Pain    The history is provided by the patient.     Graciela Castorena is a 55 year old female with a history of GERD and breast cancer, s/p lumpectomy in 2018, who presents via EMS for evaluation of chest pain. This morning, approximately one hour prior to arrival, Graciela had sudden onset of sharp left sided chest pain while preparing breakfast. The pain was so overwhelming she could not move or speak. Initially, the pain had a pleuritic component, but this has resolved and it has overall improved to 5/10 without analgesics. Her pain is primarily on the left but radiates across to her right chest. She has had no associated dyspnea, nausea, vomiting, or diaphoresis. Her main concern is for her heart though she does note a history of acid reflux and considers this to be a possible etiology for her pain. She has had similar pain twice before, most recently about 6 months ago when she was seen in this ED and had a reassuring work up. She then had an outpatient stress test (noted below).     Graciela denies recent illness, including cough, congestion, or fever. She also denies recent travel, surgery, or leg pain/swelling.     From 3/18/2020 - Stress Echocardiogram Complete:   Interpretation Summary  1. The patient exercised 4:51 min. Below average functional capacity for age.  2. The patient exhibited no chest pain during exercise.  3. This was a normal stress EKG with no evidence of stress-induced ischemia.  4. Normal left ventricular function and wall motion at rest and post-stress.    Allergies:  Ciprofloxacin    Medications:    Prilosec   Tamoxifen     Past Medical History:    Abnormal pap smear   Ductal carcinoma in situ, left breast   Latent TB     Past Surgical History:    Left breast lumpectomy with seed localization      Family History:    Type II Diabetes     Social History:  Marital Status: .  Presents with EMS.  Negative for tobacco use.  Negative for alcohol  use.  Negative for drug use.     Review of Systems   Constitutional: Negative for chills, diaphoresis and fever.   HENT: Negative for congestion.    Respiratory: Negative for cough and shortness of breath.    Cardiovascular: Positive for chest pain. Negative for leg swelling.   Gastrointestinal: Negative for nausea and vomiting.   All other systems reviewed and are negative.    Physical Exam     Patient Vitals for the past 24 hrs:   BP Temp Temp src Pulse Resp SpO2 Weight   11/08/20 1046 (!) 150/80 -- -- 74 16 100 % --   11/08/20 1030 -- -- -- -- -- 100 % --   11/08/20 1015 -- -- -- 81 -- 100 % --   11/08/20 1000 (!) 157/86 -- -- 85 -- 99 % --   11/08/20 0945 (!) 165/139 -- -- 87 -- 100 % --   11/08/20 0930 (!) 153/101 -- -- 75 -- 100 % --   11/08/20 0915 (!) 140/86 -- -- 85 -- 98 % --   11/08/20 0900 129/82 -- -- 81 -- 100 % --   11/08/20 0845 115/76 -- -- 72 -- 100 % --   11/08/20 0810 (!) 146/91 97.5  F (36.4  C) Oral 81 16 99 % 75.8 kg (167 lb)      Physical Exam  General: Well-developed and well-nourished. Well appearing middle aged woman. Cooperative.  Head:  Atraumatic.  Eyes:  Conjunctivae, lids, and sclerae are normal.  Neck:  Supple. Normal range of motion.  CV:  Regular rate and rhythm. Normal heart sounds with no murmurs, rubs, or gallops detected.  Resp:  No respiratory distress. Clear to auscultation bilaterally without decreased breath sounds, wheezing, rales, or rhonchi.  GI:  Soft. Non-distended. Non-tender.    MS:  Normal ROM. No bilateral lower extremity edema. Mild tenderness over the left chest wall.  Skin:  Warm. Non-diaphoretic. No pallor.  Neuro:  Awake. A&Ox3. Normal strength.  Psych: Normal mood and affect. Normal speech.  Vitals reviewed.    Emergency Department Course     EKG  Indication: Chest Pain  Time: 0827  Rate 76 bpm. NC interval 180. QRS duration 98. QT/QTc 406/456.   Normal sinus rhythm.   No acute ST changes.  No change as compared to prior, dated 3/8/20.    Imaging:  Radiologic  findings were communicated with the patient who voiced understanding of the findings.  XR Chest 2 views:   PA and lateral views of the chest. Lungs are clear. Heart   is normal in size. No effusions are evident. No pneumothorax. Possible   subacute left rib fracture with callus formation involving the ninth   rib. No other obvious fractures, as per radiology.     Laboratory:  Laboratory findings were communicated with the patient who voiced understanding of the findings.  CBC: WBC: 7.3, HGB: 13.5, PLT: 223  CMP: Cl: 111 (H), Anion gap: 1 (L), Albumin: 3.3 (L), o/w WNL (Creatinine: 0.66)  Lipase: 107  D dimer: 0.4  0808 Troponin: <0.015      Interventions:  0858 Toradol, 15 mg, IV injection    Pepcid, 20 mg, IV injection    aspirin, 324 mg, PO   GI Cocktail (Maalox/Mylanta and viscous Lidocaine), 30 mL suspension, PO     Emergency Department Course:  Nursing notes and vitals reviewed.   0817: I performed an exam of the patient as documented above.      The patient was placed on continuous cardiac and pulse ox monitoring.    IV was inserted and blood was drawn for laboratory testing, results above. Medicine administered as documented above.   EKG obtained in the ED, see results above.    The patient was sent for a chest x-ray while in the emergency department, results above.      1020: I rechecked the patient and discussed the results of her workup. She is feeling much better after the medications given here, especially the GI cocktail. She also notes dissatisfaction with her current PCP and would like a new clinic.     Findings and plan explained to Graciela. She was discharged home with instructions regarding supportive care, medications, and reasons to return. The importance of close follow-up was reviewed.     I personally reviewed the laboratory and imaging results with the patient and answered all related questions prior to discharge.    Impression & Plan      Medical Decision Making:   Graciela is a 55 year old woman  who presents with chest pain that started abruptly about one hour prior to arrival.  She states initially was quite sharp such that she could not move or speak, although it has overall improved to current 5/10 without intervention.  She states it is primarily left-sided but radiates towards the right with no associated symptoms.  She has had similar pain in the past for which she was evaluated in the emergency department and had a subsequent stress test, as above, which was unremarkable.  On exam, she appears quite well.  She does have some mild tenderness over the left chest wall suggesting a possible musculoskeletal etiology.  However, the abrupt nature of her pain is somewhat concerning for pulmonary embolism.  D-dimer is negative essentially ruling this out.  EKG is reassuring without acute ST changes or arrhythmias and troponin is undetectable.  I have very low suspicion for ACS in this woman given very atypical features and similar symptoms in the past which were related to more benign issues. Her laboratory studies are otherwise unremarkable without leukocytosis, anemia, kidney injury, significant electrolyte derangements, biliary obstruction, hepatitis, or pancreatitis.  Chest x-ray is unremarkable aside from a possible subacute left rib fracture with callus formation.  This is unlikely to be the cause of her pain today because there is callus formation. I treated her with aspirin, Toradol, Pepcid, and a GI cocktail.  On repeat evaluation, she felt her pain is further improved which she attributes primarily to the GI cocktail.     At this point, she is appropriate for discharge.  There is no evidence of emergent pathology or critical illness requiring hospitalization.  She already had a stress test this calendar year and this does not need to be repeated now.  I suspect there may be a component of acid reflux for which I recommended she continue her omeprazole and also try over-the-counter Maalox or Mylanta  should she have recurrent pain.  She may also have some musculoskeletal pain for which she will try ibuprofen.  I encouraged her to return should she have a severe, unrelenting pain or new concerns and otherwise follow-up with primary care.  She is displeased with her current primary care provider and I did provide her with a new clinic.  All questions answered.  Amenable to discharge.    Diagnosis:     ICD-10-CM    1. Left-sided chest pain  R07.9        Disposition:   Discharged home.      Scribe Disclosure:  I, Malinda Magdalena, am serving as a scribe on 11/8/2020 at 8:17 AM to personally document services performed by Margaret Helton MD based on my observations and the provider's statements to me.         EMERGENCY DEPARTMENT     Margaret Helton MD  11/09/20 [id number]

## 2020-11-08 NOTE — ED AVS SNAPSHOT
[facility] Emergency Dept  [address] [address]  Phone: [phone]  Fax: [phone]                                    Graciela Castorena   MRN: [id number]    Department: [facility] Emergency Dept   Date of Visit: 11/8/2020           After Visit Summary Signature Page    I have received my discharge instructions, and my questions have been answered. I have discussed any challenges I see with this plan with the nurse or doctor.    ..........................................................................................................................................  Patient/Patient Representative Signature      ..........................................................................................................................................  Patient Representative Print Name and Relationship to Patient    ..................................................               ................................................  Date                                   Time    ..........................................................................................................................................  Reviewed by Signature/Title    ...................................................              ..............................................  Date                                               Time          22EPIC Rev 08/18

## 2020-12-20 ENCOUNTER — HEALTH MAINTENANCE LETTER (OUTPATIENT)
Age: 55
End: 2020-12-20

## 2020-12-29 ENCOUNTER — PATIENT OUTREACH (OUTPATIENT)
Dept: ONCOLOGY | Facility: CLINIC | Age: 55
End: 2020-12-29

## 2020-12-29 ENCOUNTER — HOSPITAL ENCOUNTER (OUTPATIENT)
Facility: CLINIC | Age: 55
Setting detail: SPECIMEN
Discharge: HOME | End: 2020-12-29
Attending: INTERNAL MEDICINE | Admitting: INTERNAL MEDICINE
Payer: COMMERCIAL

## 2020-12-29 ENCOUNTER — ONCOLOGY VISIT (OUTPATIENT)
Dept: ONCOLOGY | Facility: CLINIC | Age: 55
End: 2020-12-29
Attending: INTERNAL MEDICINE
Payer: COMMERCIAL

## 2020-12-29 VITALS
RESPIRATION RATE: 16 BRPM | HEART RATE: 80 BPM | TEMPERATURE: 97.7 F | OXYGEN SATURATION: 100 % | DIASTOLIC BLOOD PRESSURE: 75 MMHG | WEIGHT: 166.8 LBS | BODY MASS INDEX: 26.81 KG/M2 | HEIGHT: 66 IN | SYSTOLIC BLOOD PRESSURE: 124 MMHG

## 2020-12-29 DIAGNOSIS — N93.9 VAGINAL BLEEDING: ICD-10-CM

## 2020-12-29 DIAGNOSIS — D05.12 DUCTAL CARCINOMA IN SITU (DCIS) OF LEFT BREAST: Primary | ICD-10-CM

## 2020-12-29 LAB
ALBUMIN UR-MCNC: NEGATIVE MG/DL
APPEARANCE UR: CLEAR
BILIRUB UR QL STRIP: NEGATIVE
COLOR UR AUTO: YELLOW
GLUCOSE UR STRIP-MCNC: NEGATIVE MG/DL
HGB UR QL STRIP: ABNORMAL
KETONES UR STRIP-MCNC: NEGATIVE MG/DL
LEUKOCYTE ESTERASE UR QL STRIP: NEGATIVE
NITRATE UR QL: NEGATIVE
PH UR STRIP: 6 PH (ref 5–7)
RBC #/AREA URNS AUTO: <1 /HPF (ref 0–2)
SOURCE: ABNORMAL
SP GR UR STRIP: 1.02 (ref 1–1.03)
UROBILINOGEN UR STRIP-MCNC: 0.2 MG/DL (ref 0–2)
WBC #/AREA URNS AUTO: <1 /HPF (ref 0–5)

## 2020-12-29 PROCEDURE — G0463 HOSPITAL OUTPT CLINIC VISIT: HCPCS

## 2020-12-29 PROCEDURE — 99214 OFFICE O/P EST MOD 30 MIN: CPT | Performed by: INTERNAL MEDICINE

## 2020-12-29 PROCEDURE — 81001 URINALYSIS AUTO W/SCOPE: CPT | Performed by: INTERNAL MEDICINE

## 2020-12-29 ASSESSMENT — MIFFLIN-ST. JEOR: SCORE: 1378.35

## 2020-12-29 ASSESSMENT — PAIN SCALES - GENERAL: PAINLEVEL: NO PAIN (0)

## 2020-12-29 NOTE — LETTER
"    12/29/2020         RE: Graciela Castorena  [address]  [place] [place] [facility] [place] [address]        Dear Colleague,    Thank you for referring your patient, Graciela Castorena, to the [facility]. Please see a copy of my visit note below.    Oncology Rooming Note    December 29, 2020 8:14 AM   Graciela Castorena is a 55 year old female who presents for:    Chief Complaint   Patient presents with     Oncology Clinic Visit     Initial Vitals: There were no vitals taken for this visit. Estimated body mass index is 26.95 kg/m  as calculated from the following:    Height as of 9/1/20: 1.676 m (5' 6\").    Weight as of 11/8/20: 75.8 kg (167 lb). There is no height or weight on file to calculate BSA.  Data Unavailable Comment: Data Unavailable   No LMP recorded. Patient is postmenopausal.  Allergies reviewed: Yes  Medications reviewed: Yes    Medications: Medication refills not needed today.  Pharmacy name entered into Outbox: [id number] - [facility] [address]    Clinical concerns:  doctor was notified.      Sary Read MA              Visit Date:   12/29/2020     ONCOLOGY HISTORY: Mrs. 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      1.  Screening mammogram on 01/16/2018 revealed a new calcification cluster behind the left nipple.   -Left diagnostic mammogram on 03/12/2018 revealed an indeterminate cluster of microcalcification in retroareolar portion of left breast at 11:00.      2. Stereotactic left breast biopsy on 03/13/2018 reveals intermediate grade DCIS with microcalcification and comedonecrosis.  % positive and  [place] 25% positive.      3.  Patient underwent left breast lumpectomy on 04/18/2018.  Pathology reveals grade 2 DCIS measuring 3 mm.  Margins are negative.     4. Radiation to left breast. 5056 cGy between 05/31/2018 and 06/27/2018.      5. Tamoxifen started in May, 2018.      SUBJECTIVE:  Ms. Castorena is a 55-year-old female with left breast DCIS on " tamoxifen.  There is no evidence of recurrence of malignancy.  Mammogram in 07/2020 was benign.      The patient says that lately she has been having some vaginal spotting.  The patient says that when she wipes the vaginal area there is some blood in it.  No blood in the urine.  Sometimes she has itching in the vaginal area.      No headache.  No dizziness.  No neck pain.  No chest pain.  No shortness of breath.  At times she has mild lower abdominal discomfort.  No abdominal distention.  No nausea or vomiting.  The patient has constipation.  Because of that, she gets some hemorrhoidal bleed.      All other review of systems negative.      PHYSICAL EXAMINATION:   GENERAL:  Alert and oriented x 3.   VITAL SIGNS:  Reviewed.  ECOG PS of 1.     EYES:  No icterus.   THROAT:  No ulcer. No thrush.   NECK:  Supple. No lymphadenopathy. No thyromegaly.   AXILLAE:  No lymphadenopathy.   LUNGS:  Good air entry bilaterally.  No crackles or wheezing.   HEART:  Regular.  No murmur.   GI:  Soft.  Nontender. No mass.   EXTREMITIES:  No pedal edema.  No calf swelling or tenderness.   SKIN:  No rash.   BREASTS:  Examined in the presence of the nurse.   -Right breast exam is normal.  No mass.  No lymphadenopathy.  No skin changes.  No axillary lymphadenopathy.   -Left breast exam reveals post-surgical changes.  No mass.  No suspicious skin lesion.  No axillary lymphadenopathy.      ASSESSMENT:   1.  A 55-year-old female with left breast ductal carcinoma in situ.  No evidence of recurrence.   2.  Vaginal spotting.   3.  Constipation.   4.  Hemorrhoidal bleed.   5.  Lower abdominal pain from constipation.      PLAN:   1.  I discussed with her regarding vaginal spotting.  I explained to her that this could be due to tamoxifen.  We do need to rule out other local pathology.      We will get a pelvic ultrasound.  She is agreeable for it.      We will check a UA today.      Discussed regarding a Gynecology consult.  I told the patient that  as she is on tamoxifen there is increased risk of endometrial cancer.  Because of that, I would like her to be evaluated by gynecologist.  She is agreeable for it.  We will schedule an appointment with Gynecology Oncology.     2.  The patient is on tamoxifen.  I told her to hold her tamoxifen until reevaluated vaginal spotting.      The patient mentioned that she is going to [place] for about 2 months.  She would like to continue holding the tamoxifen while in [place].  That is okay from my side.     3.  I will see her in 3 months' time.  Advised her to call us with any questions or concerns.         JESSIE BENSON MD             D: 2020   T: 2020   MT: JODY      Name:     MACKENZIE REAGAN   MRN:      [id number]        Account:      [id number]   :      1965           Visit Date:   2020      Document: [id number]      This office note has been dictated.          Again, thank you for allowing me to participate in the care of your patient.        Sincerely,        Jessie Benson MD

## 2020-12-29 NOTE — LETTER
[facility] [place]  [address]  [facility] [place] [place] [facility] [address]  Phone: [phone]    December 29, 2020        Graciela Castorena  [address]   [place] [facility] [place] [facility] [address]          To whom it may concern:    Graciela Castorena is being treated for breast cancer. She will be gone from her home for approximately two months. If you have any questions or concerns feel free to contact us.       Sincerely,        Samreen Barahona MD

## 2020-12-29 NOTE — TELEPHONE ENCOUNTER
RECORDS STATUS - ALL OTHER DIAGNOSIS      RECORDS RECEIVED FROM: [facility]   DATE RECEIVED:1/4/2021   NOTES STATUS DETAILS   OFFICE NOTE from referring provider Complete Samreen Barahona MD   OFFICE NOTE from medical oncologist Complete  Epic- 12/29/2020 Oncology Visit with Dr. Barahona  Vaginal spotting     Breast Cancer Notes are in Epic   DISCHARGE SUMMARY from hospital N/A Breast Cancer Notes are in EPIC   DISCHARGE REPORT from the ER     OPERATIVE REPORT N/A    MEDICATION LIST Complete [facility]   CLINICAL TRIAL TREATMENTS TO DATE     LABS     PATHOLOGY REPORTS Complete 9/1/2020   Endocervix, curettage   - Benign endocervical mucosa, no evidence of dysplasia or malignancy   ANYTHING RELATED TO DIAGNOSIS Complete Labs last updated on 12/29/2020    GENONOMIC TESTING     TYPE:     IMAGING (NEED IMAGES & REPORT)     CT SCANS     Xray Chest Complete 11/8/2020, 9/21/2020    MRI     MAMMO     ULTRASOUND Scheduled  US Pelvic Complete 12/30/2020    PET

## 2020-12-29 NOTE — PROGRESS NOTES
"Oncology Rooming Note    December 29, 2020 8:14 AM   Graciela Castorena is a 55 year old female who presents for:    Chief Complaint   Patient presents with     Oncology Clinic Visit     Initial Vitals: There were no vitals taken for this visit. Estimated body mass index is 26.95 kg/m  as calculated from the following:    Height as of 9/1/20: 1.676 m (5' 6\").    Weight as of 11/8/20: 75.8 kg (167 lb). There is no height or weight on file to calculate BSA.  Data Unavailable Comment: Data Unavailable   No LMP recorded. Patient is postmenopausal.  Allergies reviewed: Yes  Medications reviewed: Yes    Medications: Medication refills not needed today.  Pharmacy name entered into Curves: [id number] - [place], [place] - [address]    Clinical concerns:  doctor was notified.      Sary Read MA            "

## 2020-12-29 NOTE — LETTER
[facility] [place]  [address]  [facility] [place] [facility] [address]  Phone: [phone]    December 29, 2020        Graciela Castorena  [address]   [place] [facility] [place] [facility] [address]          To whom it may concern:    Graciela Smalls is being treated at our clinic for breat cancer. If you have any questions or concerns please feel free to contact us.       Sincerely,        Samreen Barahona MD

## 2020-12-29 NOTE — PROGRESS NOTES
New Patient Oncology Nurse Navigator Note     Referring provider: Dr. Samreen Barahona    Referring Clinic/Organization: [facility]     Referred to: Gyn/Onc     Requested provider (if applicable):     [facility]-didn't specify location.    Referral Received:      Evaluation for : vaginal bleeding. Taking tamoxifen (now on hold).     Clinical History (per Nurse review of records provided):    DCIS of left breast dx. In 2018, s/p lumpectomy and radiation. 12/29/20 MD put tamoxifen on hold until evaluated by gyn/onc. MD also ordered for a UA and Pelvic US.    Clinical Assessment / Barriers to Care (Per Nurse):    Patient aware of referral. No call was made to patient.    Records Location: [facility]     Records Needed:     none    Additional testing needed prior to consult:     Per gyn/onc     Referral updates and Plan:     Referred on for scheduling    Pily Montgomery RN    [facility]  [phone]

## 2020-12-29 NOTE — PATIENT INSTRUCTIONS
1. Hold tamoxifen.  2. UA today.  3. Pelvic US.  4. Gynecology oncology appointment for bleeding.  5. See me in 3 months.

## 2020-12-30 ENCOUNTER — HOSPITAL ENCOUNTER (OUTPATIENT)
Dept: ULTRASOUND IMAGING | Facility: CLINIC | Age: 55
Discharge: HOME | End: 2020-12-30
Attending: INTERNAL MEDICINE | Admitting: INTERNAL MEDICINE
Payer: COMMERCIAL

## 2020-12-30 ENCOUNTER — PATIENT OUTREACH (OUTPATIENT)
Dept: OBGYN | Facility: CLINIC | Age: 55
End: 2020-12-30

## 2020-12-30 DIAGNOSIS — N93.9 VAGINAL BLEEDING: ICD-10-CM

## 2020-12-30 PROCEDURE — 76856 US EXAM PELVIC COMPLETE: CPT

## 2020-12-30 NOTE — TELEPHONE ENCOUNTER
10/22/13 NIL pap, neg HR HPV (in Care Everywhere)   3/30/15 ASCUS pap, neg HR HPV  08/29/18 ASCUS pap, neg HR HPV result. Pt was dx with Breast ca 03/13/18. Plan cotest in 1 year as patient previously had an ascus she didn't follow up on per provider.    1/31/20 ASCUS pap, neg HR HPV. Plan colposcopy  4/14/20 Per provider, COVID 19 interim guideline, plan updated to: colp within 6-12 mo, [place] bef 1[place]31[place]21 [place]     9/1/20 [facility]: neg. Plan 1 year cotest

## 2020-12-31 NOTE — RESULT ENCOUNTER NOTE
Dear Ms. Castorena,    Ultrasound is overall good.    Please, call me with any questions.    Samreen Barahona MD

## 2021-01-01 NOTE — PROGRESS NOTES
GYNECOLOGIC  ONCOLOGY CONSULT    Referring provider:    Samreen Barahona MD  [facility]  [address]  [place] [address]   RE: Graciela Castorena  : 1965  SUZY: 2021    CC: Post-Menopausal Bleeding    HPI: Ms Graciela Castorena is a 56 year old  female who presents for consultation regarding post-menopausal bleeding.    She notes small amount of blood on tissue when she wipes. She feels this is coming from her urine, not the uterus. Patient is [place] and declines  today. Reports urinary frequency and upon further questioning agrees that she has uterine prolapse. Reports menopause over the last 8 years.  Recently she was informed to stop Tamoxifen by Dr. Barahona until the gynecology evaluation but she prefers to keep taking her Tamoxifen.   She declines performing an endometrial biopsy today, she wants to wait until she returns from her international trip in March.     Medical History is significant for left breast DCIS on tamoxifen     20 Pelvic US  FINDINGS: Endovaginal sonography was added to the transabdominal  scans.     No fibroids are evident. The uterus is 6.6 x 5.8 x 3.5 cm. Echotexture  is coarsened of uncertain etiology. Endometrial stripe measures 3 mm  and is normal for patient's age and menstrual status. The right ovary  is normal. The left ovary is normal.  No adnexal masses are present.  No free pelvic fluid is present.    OBGYN history and Health Maintenance:    Last Pap Smear: ASCU-US HPV negative, Colposcopy with ECC- benign  Last Colonoscopy:  report pending    Review of Systems:  Systemic:No weight changes.    Skin : No skin changes or new lesions.   Eye : No changes in vision.   Pulmonary: No cough or SOB.   Cardiovascular: No CP or palpitations  Gastrointestinal : No diarrhea, constipation, abdominal pain. Bowel habits normal.   Genitourinary: No dysuria, urgency or bleeding  Psychiatric: No depression or anxiety  Hematologic : No palpable  lymph nodes.   Endocrine : No hot flashes. No heat/cold intolerance.      Neurological: No headaches, no numbness.     Past Medical History:   Diagnosis Date     Abnormal Pap smear of cervix 03/30/2015    see problem list     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 breast 2018    s/p lumpectomy and radiation; on Tamoxifen       Past Surgical History:   Procedure Laterality Date     COLONOSCOPY N/A 2/12/2018    Procedure: COLONOSCOPY;  colonoscopy;  Surgeon: Sabas Villegas MD;  Location:  GI     LUMPECTOMY BREAST WITH SEED LOCALIZATION Left 4/18/2018    Procedure: LUMPECTOMY BREAST WITH SEED LOCALIZATION;  SEED LOCALIZED LEFT BREAST LUMPECTOMY ;  Surgeon: Meli Rodriguez MD;  Location: [facility]          Current Outpatient Medications   Medication Sig Dispense Refill     diclofenac (VOLTAREN) 1 % topical gel Place 4 g onto the skin 4 times daily (Patient not taking: Reported on 9/21/2020) 100 g 2     famotidine (PEPCID) 20 MG tablet Take 1 tablet (20 mg) by mouth daily 90 tablet 1     ibuprofen (ADVIL/MOTRIN) 600 MG tablet Take 1 tablet (600 mg) by mouth every 6 hours as needed 30 tablet 0     methocarbamol (ROBAXIN) 750 MG tablet Take 1 tablet (750 mg) by mouth 4 times daily as needed 30 tablet 0     tamoxifen (NOLVADEX) 20 MG tablet Take 1 tablet (20 mg) by mouth daily 90 tablet 3         Allergies   Allergen Reactions     Ciprofloxacin Itching       Social History:  Social History     Tobacco Use     Smoking status: Never Smoker     Smokeless tobacco: Never Used   Substance Use Topics     Alcohol use: No     Alcohol/week: 0.0 standard drinks       Family History:   The patient's family history is notable for   Family History   Problem Relation Age of Onset     Diabetes Type 2  Brother      Diabetes Type 2  Sister      Myocardial Infarction No family hx of      Cerebrovascular Disease No family hx of      Coronary Artery Disease Early Onset No family hx of      Colon Cancer No family hx of      Breast  Cancer No family hx of      Ovarian Cancer No family hx of          Physical Exam:     BP (!) 145/80   Pulse 77   Temp 97.8  F (36.6  C) (Oral)   Resp 16   Wt 77.7 kg (171 lb 3.2 oz)   SpO2 99%   BMI 27.63 kg/m    Body mass index is 27.63 kg/m .    General: Alert and oriented, no acute distress  Psych: Mood stable  GI: No distention. No masses. No hernia.   Lymph: No enlarge lymph nodes in groin  : Vulva with old scar along left mediolateral episiotomy, infibulation with resection of bilateral labia minora, Uterine descensus to level of introitus. Urethra without lesion, Cervix parous, no gross lesions, bimanual exam mobile uterus, no adnexal mass.    Assessment/Plam: Graciela Castorena is a 56 year old woman with a new diagnosis of postmenopausal bleeding. History notable for Left breast DCIS on tamoxifen.    Most recent pelvic US is negative for endometrial thickness. Patient declines endometrial biopsy today, she voiced an understanding of the indication and need for the procedure. Prefers to follow up in March for the biopsy, she prefers to call to schedule the exam.    Uterine prolapse- after completing the above work up, patient will be referred to Urogynecology for discussion of treatment options including surgery.    I have recommended scheduling the next visit with [place] , patient declines.       Mary Hatch M.D., MPH,  F.A.C.O.G.  Professor  Department of Ob/Gyn and Women's Health  Division of Gynecologic Oncology  [facility] [place]  [phone]    A total of 60 minutes was spent with the patient, > 50% minutes of which were spent in counseling the patient and/or treatment planning.

## 2021-01-04 ENCOUNTER — TELEPHONE (OUTPATIENT)
Dept: ONCOLOGY | Facility: CLINIC | Age: 56
End: 2021-01-04

## 2021-01-04 ENCOUNTER — ONCOLOGY VISIT (OUTPATIENT)
Dept: ONCOLOGY | Facility: CLINIC | Age: 56
End: 2021-01-04
Attending: INTERNAL MEDICINE
Payer: COMMERCIAL

## 2021-01-04 ENCOUNTER — PRE VISIT (OUTPATIENT)
Dept: ONCOLOGY | Facility: CLINIC | Age: 56
End: 2021-01-04

## 2021-01-04 VITALS
RESPIRATION RATE: 16 BRPM | TEMPERATURE: 97.8 F | WEIGHT: 171.2 LBS | BODY MASS INDEX: 27.63 KG/M2 | DIASTOLIC BLOOD PRESSURE: 80 MMHG | OXYGEN SATURATION: 99 % | HEART RATE: 77 BPM | SYSTOLIC BLOOD PRESSURE: 145 MMHG

## 2021-01-04 DIAGNOSIS — N95.0 POST-MENOPAUSAL BLEEDING: Primary | ICD-10-CM

## 2021-01-04 DIAGNOSIS — N93.9 VAGINAL BLEEDING: ICD-10-CM

## 2021-01-04 DIAGNOSIS — D05.12 DUCTAL CARCINOMA IN SITU (DCIS) OF LEFT BREAST: ICD-10-CM

## 2021-01-04 PROCEDURE — 99205 OFFICE O/P NEW HI 60 MIN: CPT | Performed by: OBSTETRICS & GYNECOLOGY

## 2021-01-04 ASSESSMENT — PAIN SCALES - GENERAL: PAINLEVEL: MILD PAIN (2)

## 2021-01-04 NOTE — PROGRESS NOTES
Visit Date:   12/29/2020     ONCOLOGY HISTORY: Mrs. 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      1.  Screening mammogram on 01/16/2018 revealed a new calcification cluster behind the left nipple.   -Left diagnostic mammogram on 03/12/2018 revealed an indeterminate cluster of microcalcification in retroareolar portion of left breast at 11:00.      2. Stereotactic left breast biopsy on 03/13/2018 reveals intermediate grade DCIS with microcalcification and comedonecrosis.  % positive and  [place] 25% positive.      3.  Patient underwent left breast lumpectomy on 04/18/2018.  Pathology reveals grade 2 DCIS measuring 3 mm.  Margins are negative.     4. Radiation to left breast. 5056 cGy between 05/31/2018 and 06/27/2018.      5. Tamoxifen started in May, 2018.      SUBJECTIVE:  Ms. Castorena is a 55-year-old female with left breast DCIS on tamoxifen.  There is no evidence of recurrence of malignancy.  Mammogram in 07/2020 was benign.      The patient says that lately she has been having some vaginal spotting.  The patient says that when she wipes the vaginal area there is some blood in it.  No blood in the urine.  Sometimes she has itching in the vaginal area.      No headache.  No dizziness.  No neck pain.  No chest pain.  No shortness of breath.  At times she has mild lower abdominal discomfort.  No abdominal distention.  No nausea or vomiting.  The patient has constipation.  Because of that, she gets some hemorrhoidal bleed.      All other review of systems negative.      PHYSICAL EXAMINATION:   GENERAL:  Alert and oriented x 3.   VITAL SIGNS:  Reviewed.  ECOG PS of 1.     EYES:  No icterus.   THROAT:  No ulcer. No thrush.   NECK:  Supple. No lymphadenopathy. No thyromegaly.   AXILLAE:  No lymphadenopathy.   LUNGS:  Good air entry bilaterally.  No crackles or wheezing.   HEART:  Regular.  No murmur.   GI:  Soft.  Nontender. No mass.   EXTREMITIES:  No pedal edema.  No calf swelling or tenderness.   SKIN:  No  rash.   BREASTS:  Examined in the presence of the nurse.   -Right breast exam is normal.  No mass.  No lymphadenopathy.  No skin changes.  No axillary lymphadenopathy.   -Left breast exam reveals post-surgical changes.  No mass.  No suspicious skin lesion.  No axillary lymphadenopathy.      ASSESSMENT:   1.  A 55-year-old female with left breast ductal carcinoma in situ.  No evidence of recurrence.   2.  Vaginal spotting.   3.  Constipation.   4.  Hemorrhoidal bleed.   5.  Lower abdominal pain from constipation.      PLAN:   1.  I discussed with her regarding vaginal spotting.  I explained to her that this could be due to tamoxifen.  We do need to rule out other local pathology.      We will get a pelvic ultrasound.  She is agreeable for it.      We will check a UA today.      Discussed regarding a Gynecology consult.  I told the patient that as she is on tamoxifen there is increased risk of endometrial cancer.  Because of that, I would like her to be evaluated by gynecologist.  She is agreeable for it.  We will schedule an appointment with Gynecology Oncology.     2.  The patient is on tamoxifen.  I told her to hold her tamoxifen until reevaluated vaginal spotting.      The patient mentioned that she is going to [place] for about 2 months.  She would like to continue holding the tamoxifen while in [place].  That is okay from my side.     3.  I will see her in 3 months' time.  Advised her to call us with any questions or concerns.         JESSIE BENSON MD             D: 2020   T: 2020   MT: JODY      Name:     MACKENZIE REAGAN   MRN:      [id number]        Account:      [id number]   :      1965           Visit Date:   2020      Document: [id number]

## 2021-01-04 NOTE — TELEPHONE ENCOUNTER
Called patient to schedule endometrial biopsy, however, patient said she would need to call back to schedule. Patient is due around 3/01/21. (From 1/04/21 discharge)

## 2021-01-04 NOTE — LETTER
2021         RE: Graciela Castorena  [address]  [place] [address]        Dear Colleague,    Thank you for referring your patient, Graciela Castorena, to the [facility]. Please see a copy of my visit note below.    GYNECOLOGIC  ONCOLOGY CONSULT    Referring provider:    Samreen Barahona MD  [facility]  [address]  [place] [address]   RE: Graciela Castorena  : 1965  SUZY: 2021    CC: Post-Menopausal Bleeding    HPI: Ms Graciela Castorena is a 56 year old  female who presents for consultation regarding post-menopausal bleeding.    She notes small amount of blood on tissue when she wipes. She feels this is coming from her urine, not the uterus. Patient is [place] and declines  today. Reports urinary frequency and upon further questioning agrees that she has uterine prolapse. Reports menopause over the last 8 years.  Recently she was informed to stop Tamoxifen by Dr. Barahona until the gynecology evaluation but she prefers to keep taking her Tamoxifen.   She declines performing an endometrial biopsy today, she wants to wait until she returns from her international trip in March.     Medical History is significant for left breast DCIS on tamoxifen     20 Pelvic US  FINDINGS: Endovaginal sonography was added to the transabdominal  scans.     No fibroids are evident. The uterus is 6.6 x 5.8 x 3.5 cm. Echotexture  is coarsened of uncertain etiology. Endometrial stripe measures 3 mm  and is normal for patient's age and menstrual status. The right ovary  is normal. The left ovary is normal.  No adnexal masses are present.  No free pelvic fluid is present.    OBGYN history and Health Maintenance:    Last Pap Smear: ASCU-US HPV negative, Colposcopy with ECC- benign  Last Colonoscopy:  report pending    Review of Systems:  Systemic:No weight changes.    Skin : No skin changes or new lesions.   Eye : No changes in vision.    Pulmonary: No cough or SOB.   Cardiovascular: No CP or palpitations  Gastrointestinal : No diarrhea, constipation, abdominal pain. Bowel habits normal.   Genitourinary: No dysuria, urgency or bleeding  Psychiatric: No depression or anxiety  Hematologic : No palpable lymph nodes.   Endocrine : No hot flashes. No heat/cold intolerance.      Neurological: No headaches, no numbness.     Past Medical History:   Diagnosis Date     Abnormal Pap smear of cervix 03/30/2015    see problem list     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 breast 2018    s/p lumpectomy and radiation; on Tamoxifen       Past Surgical History:   Procedure Laterality Date     COLONOSCOPY N/A 2/12/2018    Procedure: COLONOSCOPY;  colonoscopy;  Surgeon: Sabas Villegas MD;  Location:  GI     LUMPECTOMY BREAST WITH SEED LOCALIZATION Left 4/18/2018    Procedure: LUMPECTOMY BREAST WITH SEED LOCALIZATION;  SEED LOCALIZED LEFT BREAST LUMPECTOMY ;  Surgeon: Meli Rodriguez MD;  Location: [facility]          Current Outpatient Medications   Medication Sig Dispense Refill     diclofenac (VOLTAREN) 1 % topical gel Place 4 g onto the skin 4 times daily (Patient not taking: Reported on 9/21/2020) 100 g 2     famotidine (PEPCID) 20 MG tablet Take 1 tablet (20 mg) by mouth daily 90 tablet 1     ibuprofen (ADVIL/MOTRIN) 600 MG tablet Take 1 tablet (600 mg) by mouth every 6 hours as needed 30 tablet 0     methocarbamol (ROBAXIN) 750 MG tablet Take 1 tablet (750 mg) by mouth 4 times daily as needed 30 tablet 0     tamoxifen (NOLVADEX) 20 MG tablet Take 1 tablet (20 mg) by mouth daily 90 tablet 3         Allergies   Allergen Reactions     Ciprofloxacin Itching       Social History:  Social History     Tobacco Use     Smoking status: Never Smoker     Smokeless tobacco: Never Used   Substance Use Topics     Alcohol use: No     Alcohol/week: 0.0 standard drinks       Family History:   The patient's family history is notable for   Family History   Problem  Relation Age of Onset     Diabetes Type 2  Brother      Diabetes Type 2  Sister      Myocardial Infarction No family hx of      Cerebrovascular Disease No family hx of      Coronary Artery Disease Early Onset No family hx of      Colon Cancer No family hx of      Breast Cancer No family hx of      Ovarian Cancer No family hx of          Physical Exam:     BP (!) 145/80   Pulse 77   Temp 97.8  F (36.6  C) (Oral)   Resp 16   Wt 77.7 kg (171 lb 3.2 oz)   SpO2 99%   BMI 27.63 kg/m    Body mass index is 27.63 kg/m .    General: Alert and oriented, no acute distress  Psych: Mood stable  GI: No distention. No masses. No hernia.   Lymph: No enlarge lymph nodes in groin  : Vulva with old scar along left mediolateral episiotomy, infibulation with resection of bilateral labia minora, Uterine descensus to level of introitus. Urethra without lesion, Cervix parous, no gross lesions, bimanual exam mobile uterus, no adnexal mass.    Assessment/Plam: Graciela Castorena is a 56 year old woman with a new diagnosis of postmenopausal bleeding. History notable for Left breast DCIS on tamoxifen.    Most recent pelvic US is negative for endometrial thickness. Patient declines endometrial biopsy today, she voiced an understanding of the indication and need for the procedure. Prefers to follow up in March for the biopsy, she prefers to call to schedule the exam.    Uterine prolapse- after completing the above work up, patient will be referred to Urogynecology for discussion of treatment options including surgery.    I have recommended scheduling the next visit with [facility] , patient declines.       Mary Hatch M.D., MPH,  F.A.C.O.G.  Professor  Department of Ob/Gyn and Women's Health  Division of Gynecologic Oncology  [facility] [place]  [phone]    A total of 60 minutes was spent with the patient, > 50% minutes of which were spent in counseling the patient and/or treatment  "planning.            Oncology Rooming Note    January 4, 2021 9:34 AM   Graciela Castorena is a 56 year old female who presents for:    Chief Complaint   Patient presents with     Oncology Clinic Visit     Initial Vitals: BP (!) 145/80   Pulse 77   Temp 97.8  F (36.6  C) (Oral)   Resp 16   Wt 77.7 kg (171 lb 3.2 oz)   SpO2 99%   BMI 27.63 kg/m   Estimated body mass index is 27.63 kg/m  as calculated from the following:    Height as of 12/29/20: 1.676 m (5' 6\").    Weight as of this encounter: 77.7 kg (171 lb 3.2 oz). Body surface area is 1.9 meters squared.  Mild Pain (2) Comment: Data Unavailable   No LMP recorded. Patient is postmenopausal.  Allergies reviewed: Yes  Medications reviewed: Yes    Medications: Medication refills not needed today.  Pharmacy name entered into [employer]: [id number] [place] [id number] [facility]    Clinical concerns: bleeding in vagina      [facility]                Again, thank you for allowing me to participate in the care of your patient.        Sincerely,        Mary Hatch MD    "

## 2021-01-04 NOTE — PROGRESS NOTES
"Oncology Rooming Note    January 4, 2021 9:34 AM   Graciela Castorena is a 56 year old female who presents for:    Chief Complaint   Patient presents with     Oncology Clinic Visit     Initial Vitals: BP (!) 145/80   Pulse 77   Temp 97.8  F (36.6  C) (Oral)   Resp 16   Wt 77.7 kg (171 lb 3.2 oz)   SpO2 99%   BMI 27.63 kg/m   Estimated body mass index is 27.63 kg/m  as calculated from the following:    Height as of 12/29/20: 1.676 m (5' 6\").    Weight as of this encounter: 77.7 kg (171 lb 3.2 oz). Body surface area is 1.9 meters squared.  Mild Pain (2) Comment: Data Unavailable   No LMP recorded. Patient is postmenopausal.  Allergies reviewed: Yes  Medications reviewed: Yes    Medications: Medication refills not needed today.  Pharmacy name entered into EPIC: [id number] [place], [place] - [address] [facility]    Clinical concerns: bleeding in vagina      Zandra Jiang CMA            "

## 2021-01-08 ENCOUNTER — TELEPHONE (OUTPATIENT)
Dept: ONCOLOGY | Facility: CLINIC | Age: 56
End: 2021-01-08

## 2021-01-08 NOTE — TELEPHONE ENCOUNTER
Graciela called today wanting an appointment to discuss tamoxifen with her daughter present with Dr. Barahona.   Advised her that her daughter could be on the pone but at this time we are not allowing visitors.  She verbalized understanding.  Appointment on tuesday with Dr. Barahona

## 2021-01-11 ENCOUNTER — ANCILLARY PROCEDURE (OUTPATIENT)
Dept: GENERAL RADIOLOGY | Facility: CLINIC | Age: 56
End: 2021-01-11
Attending: FAMILY MEDICINE
Payer: COMMERCIAL

## 2021-01-11 ENCOUNTER — TELEPHONE (OUTPATIENT)
Dept: ONCOLOGY | Facility: CLINIC | Age: 56
End: 2021-01-11

## 2021-01-11 ENCOUNTER — OFFICE VISIT (OUTPATIENT)
Dept: URGENT CARE | Facility: URGENT CARE | Age: 56
End: 2021-01-11
Payer: COMMERCIAL

## 2021-01-11 VITALS
RESPIRATION RATE: 20 BRPM | TEMPERATURE: 97.8 F | BODY MASS INDEX: 27.12 KG/M2 | SYSTOLIC BLOOD PRESSURE: 130 MMHG | HEART RATE: 80 BPM | OXYGEN SATURATION: 100 % | DIASTOLIC BLOOD PRESSURE: 78 MMHG | WEIGHT: 168 LBS

## 2021-01-11 DIAGNOSIS — N89.8 VAGINAL ITCHING: Primary | ICD-10-CM

## 2021-01-11 DIAGNOSIS — M54.50 ACUTE BILATERAL LOW BACK PAIN, UNSPECIFIED WHETHER SCIATICA PRESENT: ICD-10-CM

## 2021-01-11 DIAGNOSIS — K29.70 GASTRITIS WITHOUT BLEEDING, UNSPECIFIED CHRONICITY, UNSPECIFIED GASTRITIS TYPE: ICD-10-CM

## 2021-01-11 DIAGNOSIS — R31.9 HEMATURIA, UNSPECIFIED TYPE: ICD-10-CM

## 2021-01-11 LAB
ALBUMIN UR-MCNC: NEGATIVE MG/DL
APPEARANCE UR: CLEAR
BILIRUB UR QL STRIP: NEGATIVE
COLOR UR AUTO: YELLOW
GLUCOSE UR STRIP-MCNC: NEGATIVE MG/DL
HGB UR QL STRIP: ABNORMAL
KETONES UR STRIP-MCNC: NEGATIVE MG/DL
LEUKOCYTE ESTERASE UR QL STRIP: NEGATIVE
NITRATE UR QL: NEGATIVE
PH UR STRIP: 6 PH (ref 5–7)
RBC #/AREA URNS AUTO: ABNORMAL /HPF
SOURCE: ABNORMAL
SP GR UR STRIP: 1.02 (ref 1–1.03)
SPECIMEN SOURCE: NORMAL
UROBILINOGEN UR STRIP-ACNC: 0.2 EU/DL (ref 0.2–1)
WBC #/AREA URNS AUTO: ABNORMAL /HPF
WET PREP SPEC: NORMAL

## 2021-01-11 PROCEDURE — 81001 URINALYSIS AUTO W/SCOPE: CPT | Performed by: FAMILY MEDICINE

## 2021-01-11 PROCEDURE — 99214 OFFICE O/P EST MOD 30 MIN: CPT | Performed by: FAMILY MEDICINE

## 2021-01-11 PROCEDURE — 74019 RADEX ABDOMEN 2 VIEWS: CPT | Performed by: RADIOLOGY

## 2021-01-11 PROCEDURE — 87210 SMEAR WET MOUNT SALINE/INK: CPT | Performed by: FAMILY MEDICINE

## 2021-01-11 RX ORDER — METHOCARBAMOL 750 MG/1
750 TABLET, FILM COATED ORAL 4 TIMES DAILY
Qty: 28 TABLET | Refills: 0 | Status: SHIPPED | OUTPATIENT
Start: 2021-01-11 | End: 2021-01-18

## 2021-01-11 RX ORDER — FLUCONAZOLE 150 MG/1
150 TABLET ORAL ONCE
Qty: 1 TABLET | Refills: 0 | Status: SHIPPED | OUTPATIENT
Start: 2021-01-11 | End: 2021-01-11

## 2021-01-11 NOTE — TELEPHONE ENCOUNTER
Made calendar note for 3/22/21 to call patient to schedule endometrial biopsy per Seb AYALA Patient will be traveling and wants to be called end of March to schedule. Note for 3/22/21.

## 2021-01-11 NOTE — PROGRESS NOTES
SUBJECTIVE: Graciela Castorena is a 56 year old female presenting with a chief complaint of low back pain and itchy groin.  Onset of symptoms was day(s) ago.  Course of illness is worsening.    Severity moderate  Current and Associated symptoms: wanting medication for stomach  Treatment measures tried include None tried.  Predisposing factors include None.    Past Medical History:   Diagnosis Date     Abnormal Pap smear of cervix 03/30/2015    see problem list     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 breast 2018    s/p lumpectomy and radiation; on Tamoxifen     Allergies   Allergen Reactions     Ciprofloxacin Itching     Social History     Tobacco Use     Smoking status: Never Smoker     Smokeless tobacco: Never Used   Substance Use Topics     Alcohol use: No     Alcohol/week: 0.0 standard drinks       ROS:  SKIN: no rash  GI: no vomiting    OBJECTIVE:  /78   Pulse 80   Temp 97.8  F (36.6  C)   Resp 20   Wt 76.2 kg (168 lb)   SpO2 100%   BMI 27.12 kg/m  GENERAL APPEARANCE: healthy, alert and no distress  ABDOMEN:  soft, nontender, no HSM or masses and bowel sounds normal  SKIN: no suspicious lesions or rashes  Bilateral low back pain with palpation and rom with limited rom      ICD-10-CM    1. Vaginal itching  N89.8 *UA reflex to Microscopic and Culture ([facility] (except [place] and [place])     Wet prep     Urine Microscopic     fluconazole (DIFLUCAN) 150 MG tablet   2. Acute bilateral low back pain, unspecified whether sciatica present  M54.5 XR KUB     methocarbamol (ROBAXIN) 750 MG tablet   3. Hematuria, unspecified type  R31.9 XR KUB   4. Gastritis without bleeding, unspecified chronicity, unspecified gastritis type  K29.70 omeprazole (PRILOSEC) 20 MG DR capsule       Fluids/Rest, f/u if worse/not any better

## 2021-01-12 ENCOUNTER — ONCOLOGY VISIT (OUTPATIENT)
Dept: ONCOLOGY | Facility: CLINIC | Age: 56
End: 2021-01-12
Attending: INTERNAL MEDICINE
Payer: COMMERCIAL

## 2021-01-12 VITALS
SYSTOLIC BLOOD PRESSURE: 137 MMHG | DIASTOLIC BLOOD PRESSURE: 84 MMHG | BODY MASS INDEX: 27.03 KG/M2 | HEART RATE: 84 BPM | HEIGHT: 66 IN | OXYGEN SATURATION: 100 % | WEIGHT: 168.2 LBS | TEMPERATURE: 98.1 F | RESPIRATION RATE: 16 BRPM

## 2021-01-12 DIAGNOSIS — R07.89 CHEST WALL PAIN: ICD-10-CM

## 2021-01-12 DIAGNOSIS — R07.9 RIGHT-SIDED CHEST PAIN: ICD-10-CM

## 2021-01-12 DIAGNOSIS — R07.1 CHEST PAIN VARYING WITH BREATHING: ICD-10-CM

## 2021-01-12 DIAGNOSIS — D05.12 DUCTAL CARCINOMA IN SITU (DCIS) OF LEFT BREAST: Primary | ICD-10-CM

## 2021-01-12 PROCEDURE — 99213 OFFICE O/P EST LOW 20 MIN: CPT | Performed by: INTERNAL MEDICINE

## 2021-01-12 RX ORDER — ANASTROZOLE 1 MG/1
1 TABLET ORAL DAILY
Qty: 90 TABLET | Refills: 3 | Status: SHIPPED | OUTPATIENT
Start: 2021-01-12 | End: 2021-07-14

## 2021-01-12 RX ORDER — IBUPROFEN 400 MG/1
400 TABLET, FILM COATED ORAL EVERY 8 HOURS PRN
Qty: 60 TABLET | Refills: 1 | Status: SHIPPED | OUTPATIENT
Start: 2021-01-12 | End: 2022-09-09

## 2021-01-12 ASSESSMENT — PAIN SCALES - GENERAL: PAINLEVEL: EXTREME PAIN (8)

## 2021-01-12 ASSESSMENT — MIFFLIN-ST. JEOR: SCORE: 1379.7

## 2021-01-12 NOTE — PROGRESS NOTES
"Oncology Rooming Note    January 12, 2021 9:20 AM   Graciela Castorena is a 56 year old female who presents for:    Chief Complaint   Patient presents with     Oncology Clinic Visit     Initial Vitals: There were no vitals taken for this visit. Estimated body mass index is 27.12 kg/m  as calculated from the following:    Height as of 12/29/20: 1.676 m (5' 6\").    Weight as of 1/11/21: 76.2 kg (168 lb). There is no height or weight on file to calculate BSA.  Data Unavailable Comment: Data Unavailable   No LMP recorded. Patient is postmenopausal.    Allergies reviewed: Yes  Medications reviewed: Yes    Medications: Medication refills not needed today.  Pharmacy name entered into [employer]: [id number] [address] [facility]    Clinical concerns:   Dr. Barahona was notified.      Justine Robles CMA  1/12/2021      9:20 AM    "

## 2021-01-12 NOTE — LETTER
"    1/12/2021         RE: Graciela Castorena  [address]  [facility] [address]        Dear Colleague,    Thank you for referring your patient, Graciela Castorena, to the [facility]. Please see a copy of my visit note below.    Oncology Rooming Note    January 12, 2021 9:20 AM   Graciela Castorena is a 56 year old female who presents for:    Chief Complaint   Patient presents with     Oncology Clinic Visit     Initial Vitals: There were no vitals taken for this visit. Estimated body mass index is 27.12 kg/m  as calculated from the following:    Height as of 12/29/20: 1.676 m (5' 6\").    Weight as of 1/11/21: 76.2 kg (168 lb). There is no height or weight on file to calculate BSA.  Data Unavailable Comment: Data Unavailable   No LMP recorded. Patient is postmenopausal.    Allergies reviewed: Yes  Medications reviewed: Yes    Medications: Medication refills not needed today.  Pharmacy name entered into [email or web address]: [id number] [address]    Clinical concerns:   Dr. Barahona was notified.      Justine Robles, ALANA  1/12/2021      9:20 AM      Visit Date:   01/12/2021     ONCOLOGY HISTORY: Mrs. 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      1.  Screening mammogram on 01/16/2018 revealed a new calcification cluster behind the left nipple.   -Left diagnostic mammogram on 03/12/2018 revealed an indeterminate cluster of microcalcification in retroareolar portion of left breast at 11:00.      2. Stereotactic left breast biopsy on 03/13/2018 reveals intermediate grade DCIS with microcalcification and comedonecrosis.  % positive and  [place] 25% positive.      3.  Patient underwent left breast lumpectomy on 04/18/2018.  Pathology reveals grade 2 DCIS measuring 3 mm.  Margins are negative.     4. Radiation to left breast. 5056 cGy between 05/31/2018 and 06/27/2018.      5. Tamoxifen started in May, 2018.      SUBJECTIVE:  Ms. Castorena is a 56-year-old female with " left breast DCIS on tamoxifen.  The patient was having some vaginal spotting.  Pelvic ultrasound on 2020 did not reveal any fibroid.  Endometrial stripe is 3 mm.  The patient was seen by Gynecology Oncology.  She was recommended endometrial biopsy.  She wants to wait on that.      The patient is here to discuss regarding tamoxifen.  She is worried about endometrial cancer.  I explained to her that there is a small risk of endometrial cancer.  Because of that, she was recommended endometrial biopsy.      I discussed with her regarding other treatment options.  I told her that aromatase inhibitor can be used.  The patient is agreeable to switch.      Side effects of anastrozole discussed.  Discussed regarding hot flashes.  Discussed regarding aches and pain.  It can also cause decrease in bone density.  We will plan on a bone density later.      The patient says that she has some aches and pain in the back.  She takes ibuprofen periodically.  I told her that aches and pain can get worse with anastrozole.  She will continue on ibuprofen and Tylenol as needed.      PHYSICAL EXAMINATION:   GENERAL:  She is alert, oriented x 3.   VITAL SIGNS:  Reviewed.   The rest of systems not examined.      ASSESSMENT:   1.  A 56-year-old female with DCIS.  No evidence of recurrence.   2.  Vaginal spotting.   3.  Back pain.      PLAN:   1.  For DCIS, we will switch her to anastrozole.  Side effects reviewed.  Hopefully, she will tolerate it well.   2.  I advised her to continue to followup with Gynecology Oncology for vaginal spotting/bleeding.   3.  The patient is going to [place].  When she returns, we will plan on getting a bone density done.   4.  I will see her as scheduled.  Advised her to call with any questions or concerns.         JESSIE BENSON MD             D: 2021   T: 2021   MT: CONNER      Name:     MACKENZIE REAGAN   MRN:      [id number]        Account:      [id number]   :      1965            Visit Date:   01/12/2021      Document: [id number]      Visit Date:   01/12/2021      SUBJECTIVE:  Ms. Castorena is a 56-year-old female with left breast DCIS on tamoxifen.  The patient was having some vaginal spotting.  Pelvic ultrasound on 12/30/2020 did not reveal any fibroid.  Endometrial stripe is 3 mm.  The patient was seen by Gynecology Oncology.  She was recommended endometrial biopsy.  She wants to wait on that.      The patient is here to discuss regarding tamoxifen.  She is worried about endometrial cancer.  I explained to her that there is a small risk of endometrial cancer.  Because of that, she was recommended endometrial biopsy.      I discussed with her regarding other treatment options.  I told her to aromatase inhibitor can be used.  The patient is agreeable to switch.      Side effects of anastrozole discussed.  Discussed regarding hot flashes.  Discussed regarding aches and pain.  It can also cause decrease in bone density.  We will plan on a bone density later.      The patient says that she has some aches and pain in the back.  She takes ibuprofen periodically.  I told her that aches and pain can get worse with anastrozole.  She will continue on ibuprofen and Tylenol as needed.      PHYSICAL EXAMINATION:   GENERAL:  She is alert, oriented x 3.   VITAL SIGNS:  Reviewed.   The rest of systems not examined.      ASSESSMENT:   1.  A 56-year-old female with DCIS.  No evidence of recurrence.   2.  Vaginal spotting.   3.  Back pain.      PLAN:   1.  For DCIS, we will switch her to anastrozole.  Side effects reviewed.  Hopefully, she will tolerate it well.   2.  I advised her to continue to followup with Gynecology Oncology for vaginal spotting/bleeding.   3.  The patient is going to [place].  When she returns, we will plan on getting a bone density done.   4.  I will see her as scheduled.  Advised her to call with any questions or concerns.         JESSIE BENSON MD             D: 01/12/2021   T:  2021   MT: CONNER      Name:     MACKENZIE REAGAN   MRN:      [id number]        Account:      [id number]   :      1965           Visit Date:   2021      Document: [id number]        Again, thank you for allowing me to participate in the care of your patient.        Sincerely,        Samreen Barahona MD

## 2021-01-22 NOTE — PROGRESS NOTES
Visit Date:   01/12/2021     ONCOLOGY HISTORY: Mrs. 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      1.  Screening mammogram on 01/16/2018 revealed a new calcification cluster behind the left nipple.   -Left diagnostic mammogram on 03/12/2018 revealed an indeterminate cluster of microcalcification in retroareolar portion of left breast at 11:00.      2. Stereotactic left breast biopsy on 03/13/2018 reveals intermediate grade DCIS with microcalcification and comedonecrosis.  % positive and  [place] 25% positive.      3.  Patient underwent left breast lumpectomy on 04/18/2018.  Pathology reveals grade 2 DCIS measuring 3 mm.  Margins are negative.     4. Radiation to left breast. 5056 cGy between 05/31/2018 and 06/27/2018.      5. Tamoxifen started in May, 2018.      SUBJECTIVE:  Ms. Castorena is a 56-year-old female with left breast DCIS on tamoxifen.  The patient was having some vaginal spotting.  Pelvic ultrasound on 12/30/2020 did not reveal any fibroid.  Endometrial stripe is 3 mm.  The patient was seen by Gynecology Oncology.  She was recommended endometrial biopsy.  She wants to wait on that.      The patient is here to discuss regarding tamoxifen.  She is worried about endometrial cancer.  I explained to her that there is a small risk of endometrial cancer.  Because of that, she was recommended endometrial biopsy.      I discussed with her regarding other treatment options.  I told her that aromatase inhibitor can be used.  The patient is agreeable to switch.      Side effects of anastrozole discussed.  Discussed regarding hot flashes.  Discussed regarding aches and pain.  It can also cause decrease in bone density.  We will plan on a bone density later.      The patient says that she has some aches and pain in the back.  She takes ibuprofen periodically.  I told her that aches and pain can get worse with anastrozole.  She will continue on ibuprofen and Tylenol as needed.      PHYSICAL EXAMINATION:   GENERAL:   She is alert, oriented x 3.   VITAL SIGNS:  Reviewed.   The rest of systems not examined.      ASSESSMENT:   1.  A 56-year-old female with DCIS.  No evidence of recurrence.   2.  Vaginal spotting.   3.  Back pain.      PLAN:   1.  For DCIS, we will switch her to anastrozole.  Side effects reviewed.  Hopefully, she will tolerate it well.   2.  I advised her to continue to followup with Gynecology Oncology for vaginal spotting/bleeding.   3.  The patient is going to [place].  When she returns, we will plan on getting a bone density done.   4.  I will see her as scheduled.  Advised her to call with any questions or concerns.         JESSIE BENSON MD             D: 2021   T: 2021   MT: [place]NER      Name:     MACKENZIE REAGAN   MRN:      [id number]        Account:      [id number]   :      1965           Visit Date:   2021      Document: [id number]

## 2021-03-29 ENCOUNTER — TELEPHONE (OUTPATIENT)
Dept: ONCOLOGY | Facility: CLINIC | Age: 56
End: 2021-03-29

## 2021-03-31 ENCOUNTER — TELEPHONE (OUTPATIENT)
Dept: ONCOLOGY | Facility: CLINIC | Age: 56
End: 2021-03-31

## 2021-03-31 NOTE — TELEPHONE ENCOUNTER
Spoke to patient. She said she was [place] out of town [place] of [place] and she isn't ready to return yet. She was not sure of her timing for coming back. She said she will call back when she knows her schedule.       ----- Message from Mary Echeverria RN sent at 3/31/2021 12:34 PM CDT -----  Regarding: Mamie--needs follow-up scheduled  Patient is overdue for return visit with Dr. Hatch, in-person for endometrial biopsy.    Patient was supposed to call for appointment in March, [place], but she has not called.    Please call her to schedule.    Thanks,  Seb

## 2021-04-01 ENCOUNTER — OFFICE VISIT (OUTPATIENT)
Dept: URGENT CARE | Facility: URGENT CARE | Age: 56
End: 2021-04-01
Payer: COMMERCIAL

## 2021-04-01 ENCOUNTER — ANCILLARY PROCEDURE (OUTPATIENT)
Dept: GENERAL RADIOLOGY | Facility: CLINIC | Age: 56
End: 2021-04-01
Attending: PHYSICIAN ASSISTANT
Payer: COMMERCIAL

## 2021-04-01 VITALS
SYSTOLIC BLOOD PRESSURE: 145 MMHG | DIASTOLIC BLOOD PRESSURE: 88 MMHG | RESPIRATION RATE: 16 BRPM | TEMPERATURE: 98.4 F | OXYGEN SATURATION: 100 % | HEART RATE: 117 BPM

## 2021-04-01 DIAGNOSIS — R10.11 RUQ ABDOMINAL PAIN: Primary | ICD-10-CM

## 2021-04-01 DIAGNOSIS — R73.03 PREDIABETES: ICD-10-CM

## 2021-04-01 DIAGNOSIS — K80.20 GALLSTONES: ICD-10-CM

## 2021-04-01 DIAGNOSIS — R11.0 NAUSEA: ICD-10-CM

## 2021-04-01 LAB
ALBUMIN SERPL-MCNC: 3.5 G/DL (ref 3.4–5)
ALBUMIN UR-MCNC: NEGATIVE MG/DL
ALP SERPL-CCNC: 85 U/L (ref 40–150)
ALT SERPL W P-5'-P-CCNC: 38 U/L (ref 0–50)
AMYLASE SERPL-CCNC: 79 U/L (ref 30–110)
ANION GAP SERPL CALCULATED.3IONS-SCNC: 2 MMOL/L (ref 3–14)
APPEARANCE UR: CLEAR
AST SERPL W P-5'-P-CCNC: 27 U/L (ref 0–45)
BACTERIA #/AREA URNS HPF: ABNORMAL /HPF
BASOPHILS # BLD AUTO: 0 10E9/L (ref 0–0.2)
BASOPHILS NFR BLD AUTO: 0.4 %
BILIRUB SERPL-MCNC: 0.2 MG/DL (ref 0.2–1.3)
BILIRUB UR QL STRIP: NEGATIVE
BUN SERPL-MCNC: 9 MG/DL (ref 7–30)
CALCIUM SERPL-MCNC: 9.6 MG/DL (ref 8.5–10.1)
CHLORIDE SERPL-SCNC: 108 MMOL/L (ref 94–109)
CO2 SERPL-SCNC: 29 MMOL/L (ref 20–32)
COLOR UR AUTO: YELLOW
CREAT SERPL-MCNC: 0.76 MG/DL (ref 0.52–1.04)
DIFFERENTIAL METHOD BLD: NORMAL
EOSINOPHIL # BLD AUTO: 0.3 10E9/L (ref 0–0.7)
EOSINOPHIL NFR BLD AUTO: 3.8 %
ERYTHROCYTE [DISTWIDTH] IN BLOOD BY AUTOMATED COUNT: 13.2 % (ref 10–15)
ERYTHROCYTE [SEDIMENTATION RATE] IN BLOOD BY WESTERGREN METHOD: 10 MM/H (ref 0–30)
GFR SERPL CREATININE-BSD FRML MDRD: 89 ML/MIN/{1.73_M2}
GLUCOSE SERPL-MCNC: 138 MG/DL (ref 70–99)
GLUCOSE UR STRIP-MCNC: NEGATIVE MG/DL
HBA1C MFR BLD: 6.2 % (ref 0–5.6)
HCT VFR BLD AUTO: 41.4 % (ref 35–47)
HGB BLD-MCNC: 13.6 G/DL (ref 11.7–15.7)
HGB UR QL STRIP: ABNORMAL
KETONES UR STRIP-MCNC: NEGATIVE MG/DL
LEUKOCYTE ESTERASE UR QL STRIP: NEGATIVE
LIPASE SERPL-CCNC: 82 U/L (ref 73–393)
LYMPHOCYTES # BLD AUTO: 2.3 10E9/L (ref 0.8–5.3)
LYMPHOCYTES NFR BLD AUTO: 29.6 %
MCH RBC QN AUTO: 29.9 PG (ref 26.5–33)
MCHC RBC AUTO-ENTMCNC: 32.9 G/DL (ref 31.5–36.5)
MCV RBC AUTO: 91 FL (ref 78–100)
MONOCYTES # BLD AUTO: 0.6 10E9/L (ref 0–1.3)
MONOCYTES NFR BLD AUTO: 8.2 %
NEUTROPHILS # BLD AUTO: 4.5 10E9/L (ref 1.6–8.3)
NEUTROPHILS NFR BLD AUTO: 58 %
NITRATE UR QL: NEGATIVE
NON-SQ EPI CELLS #/AREA URNS LPF: ABNORMAL /LPF
PH UR STRIP: 5 PH (ref 5–7)
PLATELET # BLD AUTO: 229 10E9/L (ref 150–450)
POTASSIUM SERPL-SCNC: 3.8 MMOL/L (ref 3.4–5.3)
PROT SERPL-MCNC: 7.5 G/DL (ref 6.8–8.8)
RBC # BLD AUTO: 4.55 10E12/L (ref 3.8–5.2)
RBC #/AREA URNS AUTO: ABNORMAL /HPF
SODIUM SERPL-SCNC: 139 MMOL/L (ref 133–144)
SOURCE: ABNORMAL
SP GR UR STRIP: <=1.005 (ref 1–1.03)
UROBILINOGEN UR STRIP-ACNC: 0.2 EU/DL (ref 0.2–1)
WBC # BLD AUTO: 7.7 10E9/L (ref 4–11)
WBC #/AREA URNS AUTO: ABNORMAL /HPF

## 2021-04-01 PROCEDURE — 80053 COMPREHEN METABOLIC PANEL: CPT | Performed by: PHYSICIAN ASSISTANT

## 2021-04-01 PROCEDURE — 99214 OFFICE O/P EST MOD 30 MIN: CPT | Performed by: PHYSICIAN ASSISTANT

## 2021-04-01 PROCEDURE — 81001 URINALYSIS AUTO W/SCOPE: CPT | Performed by: PHYSICIAN ASSISTANT

## 2021-04-01 PROCEDURE — 36415 COLL VENOUS BLD VENIPUNCTURE: CPT | Performed by: PHYSICIAN ASSISTANT

## 2021-04-01 PROCEDURE — 83036 HEMOGLOBIN GLYCOSYLATED A1C: CPT | Performed by: PHYSICIAN ASSISTANT

## 2021-04-01 PROCEDURE — 85652 RBC SED RATE AUTOMATED: CPT | Performed by: PHYSICIAN ASSISTANT

## 2021-04-01 PROCEDURE — 74019 RADEX ABDOMEN 2 VIEWS: CPT | Performed by: RADIOLOGY

## 2021-04-01 PROCEDURE — 82150 ASSAY OF AMYLASE: CPT | Performed by: PHYSICIAN ASSISTANT

## 2021-04-01 PROCEDURE — 85025 COMPLETE CBC W/AUTO DIFF WBC: CPT | Performed by: PHYSICIAN ASSISTANT

## 2021-04-01 PROCEDURE — 83690 ASSAY OF LIPASE: CPT | Performed by: PHYSICIAN ASSISTANT

## 2021-04-01 RX ORDER — ONDANSETRON 8 MG/1
8 TABLET, ORALLY DISINTEGRATING ORAL EVERY 8 HOURS PRN
Qty: 21 TABLET | Refills: 0 | Status: SHIPPED | OUTPATIENT
Start: 2021-04-01 | End: 2021-04-08

## 2021-04-01 NOTE — PATIENT INSTRUCTIONS
Patient Education     Discharge Instructions for Gallstones   Gallstones form when liquid stored in the gallbladder hardens into pieces of stone-like material. Stones in the gallbladder may or may not cause symptoms. They can cause pain or infection. You and your healthcare provider will decide on the best treatment for you. Here's what you can do.  Home care  These home care steps can help you after being diagnosed with gallstones:    Eat a low-fat diet. Fatty foods cause the gallbladder to release bile to help digest the fats. This can commonly cause pain with gallstones.  ? Read food labels to be sure the foods you are choosing are low in fat.  ? Limit the use of high-fat meats, dairy products, animal fats, and vegetable oils.    Keep all appointments with your healthcare provider. Your healthcare provider needs to monitor your condition.    Discuss your treatment choices with your healthcare provider, including:  ? Surgery to remove the gallbladder and gallstones  ? Medicine to dissolve the stones. This is mainly for people who can't have surgery. Take your medicines exactly as directed. Don't skip doses. Remember, it takes time for the medicine to take effect. Once the medicine is stopped, the gallstones usually come back. Also, the medicine doesn't work for all types of gallstones.  ? ERCP (endoscopic retrograde cholangiopancreatography). A healthcare provider uses a thin tube with video and X-rays to locate stones and remove them from the common bile duct.  Follow-up care  Make a follow-up appointment, or as advised.  When to call your healthcare provider  Call your healthcare provider right away if you have any of the following:    Severe pain in the upper belly, shoulder, or back    Fever of 100.4 F (38 C) or higher, or as directed by your healthcare provider    Nausea or vomiting    Yellowing of your skin or eyes (jaundice)  Luis last reviewed this educational content on 6/1/2019 2000-2020 [employer]  [employer]. All rights reserved. This information is not intended as a substitute for professional medical care. Always follow your healthcare professional's instructions.

## 2021-04-01 NOTE — PROGRESS NOTES
RUQ abdominal pain  - UA with Microscopic reflex to Culture  - CBC with platelets and differential  - ESR: Erythrocyte sedimentation rate  - Lipase  - Amylase  - Comprehensive metabolic panel (BMP + Alb, Alk Phos, ALT, AST, Total. Bili, TP)  - XR Abdomen 2 Views  - ondansetron (ZOFRAN-ODT) 8 MG ODT tab; Take 1 tablet (8 mg) by mouth every 8 hours as needed for nausea  - GENERAL SURG ADULT REFERRAL; Future    Nausea  - ondansetron (ZOFRAN-ODT) 8 MG ODT tab; Take 1 tablet (8 mg) by mouth every 8 hours as needed for nausea    Prediabetes  - Hemoglobin A1c (elevated at 6.2)  - NUTRITION REFERRAL    Healthy diet and exercise reviewed.  Limit pop and juice intake.  Encourage exercise of at least 20 min per day.  Limit sedentary time to one hour a day. Mediterranean/pescatarian diet best for weight maintenance and metabolic/cardiovascular health. Low carb/sugar strongly advised.       Gallstones  - GENERAL SURG ADULT REFERRAL; Future    Patient will follow up with general surgery for management of gallstones.     30 minutes spent on the date of the encounter doing chart review, history and exam, documentation and further activities per the note     See Patient Instructions  Patient Instructions     Patient Education     Discharge Instructions for Gallstones   Gallstones form when liquid stored in the gallbladder hardens into pieces of stone-like material. Stones in the gallbladder may or may not cause symptoms. They can cause pain or infection. You and your healthcare provider will decide on the best treatment for you. Here's what you can do.  Home care  These home care steps can help you after being diagnosed with gallstones:    Eat a low-fat diet. Fatty foods cause the gallbladder to release bile to help digest the fats. This can commonly cause pain with gallstones.  ? Read food labels to be sure the foods you are choosing are low in fat.  ? Limit the use of high-fat meats, dairy products, animal fats, and vegetable  oils.    Keep all appointments with your healthcare provider. Your healthcare provider needs to monitor your condition.    Discuss your treatment choices with your healthcare provider, including:  ? Surgery to remove the gallbladder and gallstones  ? Medicine to dissolve the stones. This is mainly for people who can't have surgery. Take your medicines exactly as directed. Don't skip doses. Remember, it takes time for the medicine to take effect. Once the medicine is stopped, the gallstones usually come back. Also, the medicine doesn't work for all types of gallstones.  ? ERCP (endoscopic retrograde cholangiopancreatography). A healthcare provider uses a thin tube with video and X-rays to locate stones and remove them from the common bile duct.  Follow-up care  Make a follow-up appointment, or as advised.  When to call your healthcare provider  Call your healthcare provider right away if you have any of the following:    Severe pain in the upper belly, shoulder, or back    Fever of 100.4 F (38 C) or higher, or as directed by your healthcare provider    Nausea or vomiting    Yellowing of your skin or eyes (jaundice)  [employer] last reviewed this educational content on 6/1/2019 2000-2020 [employer]. All rights reserved. This information is not intended as a substitute for professional medical care. Always follow your healthcare professional's instructions.               Steven Duarte PA-C  [facility]    Subjective   56 year old year old who presents to clinic today for the following health issues:    Abdominal Pain       HPI     Abdominal/Flank Pain  Onset/Duration: 4 days  Description:   Character: Sharp and Stabbing  Location: right upper quadrant  Radiation: Back  Intensity: moderate  Progression of Symptoms:  worsening and waxing and waning  Accompanying Signs & Symptoms:  Fever/Chills: no  Gas/Bloating: no  Nausea: YES  Vomitting: YES  Diarrhea: no  Constipation: no  Dysuria  or Hematuria: no  History:   Trauma: no  Previous similar pain: no  Previous tests done: CT in 2018 showed Cholelithiasis  Precipitating factors:   Does the pain change with:     Food: YES (makes worse)    Bowel Movement: no    Urination: no   Other factors:  no  Therapies tried and outcome: None  No LMP recorded. Patient is postmenopausal.    Patient also had elevated A1c several months ago and would like this to be rechecked.     Review of Systems   Review of Systems     See HPI    Objective    Temp: 98.4  F (36.9  C) Temp src: Tympanic BP: (!) 145/88 Pulse: 117   Resp: 16 SpO2: 100 %       Physical Exam   Physical Exam  Constitutional:       Appearance: Normal appearance. She is normal weight.   HENT:      Head: Normocephalic and atraumatic.   Cardiovascular:      Rate and Rhythm: Normal rate and regular rhythm.      Pulses: Normal pulses.      Heart sounds: Normal heart sounds. No murmur. No friction rub. No gallop.    Pulmonary:      Effort: Pulmonary effort is normal. No respiratory distress.      Breath sounds: Normal breath sounds. No stridor. No wheezing, rhonchi or rales.   Chest:      Chest wall: No tenderness.   Abdominal:      General: Abdomen is flat and protuberant. Bowel sounds are decreased.      Palpations: Abdomen is rigid. There is no shifting dullness, fluid wave, hepatomegaly, splenomegaly, mass or pulsatile mass.      Tenderness: There is abdominal tenderness in the right upper quadrant. There is guarding. There is no right CVA tenderness, left CVA tenderness or rebound. Positive signs include Grover's sign. Negative signs include Rovsing's sign, McBurney's sign, psoas sign and obturator sign.      Hernia: No hernia is present.   Neurological:      General: No focal deficit present.      Mental Status: She is alert and oriented to person, place, and time. Mental status is at baseline.      Gait: Gait normal.   Psychiatric:         Mood and Affect: Mood normal.         Behavior: Behavior  normal.         Thought Content: Thought content normal.         Judgment: Judgment normal.          Xray - Reviewed and interpreted by me.  Medium gallstones in RUQ. Awaiting radiology report.         Results for orders placed or performed in visit on 04/01/21   XR Abdomen 2 Views     Status: None    Narrative    XR ABDOMEN 2 VW   4/1/2021 2:10 PM     HISTORY: RUQ abdominal pain    COMPARISON: 1/11/2021. CT 4/19/2018.      Impression    IMPRESSION: No free air. Nonobstructive bowel gas pattern. Moderate  amount of formed stool in the colon predominantly in the hepatic  flexure and rectum with slightly decreased stool burden since  1/11/2020. Calcifications in the right upper quadrant are likely  calcified gallstones as seen on the prior CT on 4/9/2018.    THALIA DAWSON MD   UA with Microscopic reflex to Culture     Status: Abnormal    Specimen: Midstream Urine   Result Value Ref Range    Color Urine Yellow     Appearance Urine Clear     Glucose Urine Negative NEG^Negative mg/dL    Bilirubin Urine Negative NEG^Negative    Ketones Urine Negative NEG^Negative mg/dL    Specific Gravity Urine <=1.005 1.003 - 1.035    pH Urine 5.0 5.0 - 7.0 pH    Protein Albumin Urine Negative NEG^Negative mg/dL    Urobilinogen Urine 0.2 0.2 - 1.0 EU/dL    Nitrite Urine Negative NEG^Negative    Blood Urine Moderate (A) NEG^Negative    Leukocyte Esterase Urine Negative NEG^Negative    Source Midstream Urine     WBC Urine 0 - 5 OTO5^0 - 5 /HPF    RBC Urine O - 2 OTO2^O - 2 /HPF    Squamous Epithelial /LPF Urine Few FEW^Few /LPF    Bacteria Urine Few (A) NEG^Negative /HPF   CBC with platelets and differential     Status: None   Result Value Ref Range    WBC 7.7 4.0 - 11.0 10e9/L    RBC Count 4.55 3.8 - 5.2 10e12/L    Hemoglobin 13.6 11.7 - 15.7 g/dL    Hematocrit 41.4 35.0 - 47.0 %    MCV 91 78 - 100 fl    MCH 29.9 26.5 - 33.0 pg    MCHC 32.9 31.5 - 36.5 g/dL    RDW 13.2 10.0 - 15.0 %    Platelet Count 229 150 - 450 10e9/L    % Neutrophils  58.0 %    % Lymphocytes 29.6 %    % Monocytes 8.2 %    % Eosinophils 3.8 %    % Basophils 0.4 %    Absolute Neutrophil 4.5 1.6 - 8.3 10e9/L    Absolute Lymphocytes 2.3 0.8 - 5.3 10e9/L    Absolute Monocytes 0.6 0.0 - 1.3 10e9/L    Absolute Eosinophils 0.3 0.0 - 0.7 10e9/L    Absolute Basophils 0.0 0.0 - 0.2 10e9/L    Diff Method Automated Method    ESR: Erythrocyte sedimentation rate     Status: None   Result Value Ref Range    Sed Rate 10 0 - 30 mm/h   Amylase     Status: None   Result Value Ref Range    Amylase 79 30 - 110 U/L   Comprehensive metabolic panel (BMP + Alb, Alk Phos, ALT, AST, Total. Bili, TP)     Status: Abnormal   Result Value Ref Range    Sodium 139 133 - 144 mmol/L    Potassium 3.8 3.4 - 5.3 mmol/L    Chloride 108 94 - 109 mmol/L    Carbon Dioxide 29 20 - 32 mmol/L    Anion Gap 2 (L) 3 - 14 mmol/L    Glucose 138 (H) 70 - 99 mg/dL    Urea Nitrogen 9 7 - 30 mg/dL    Creatinine 0.76 0.52 - 1.04 mg/dL    GFR Estimate 89 >60 mL/min/[1.73_m2]    GFR Estimate If Black >90 >60 mL/min/[1.73_m2]    Calcium 9.6 8.5 - 10.1 mg/dL    Bilirubin Total 0.2 0.2 - 1.3 mg/dL    Albumin 3.5 3.4 - 5.0 g/dL    Protein Total 7.5 6.8 - 8.8 g/dL    Alkaline Phosphatase 85 40 - 150 U/L    ALT 38 0 - 50 U/L    AST 27 0 - 45 U/L   Hemoglobin A1c     Status: Abnormal   Result Value Ref Range    Hemoglobin A1C 6.2 (H) 0 - 5.6 %

## 2021-04-02 ENCOUNTER — TELEPHONE (OUTPATIENT)
Dept: SURGERY | Facility: CLINIC | Age: 56
End: 2021-04-02

## 2021-04-02 ENCOUNTER — OFFICE VISIT (OUTPATIENT)
Dept: SURGERY | Facility: CLINIC | Age: 56
End: 2021-04-02
Payer: COMMERCIAL

## 2021-04-02 ENCOUNTER — TELEPHONE (OUTPATIENT)
Dept: URGENT CARE | Facility: URGENT CARE | Age: 56
End: 2021-04-02

## 2021-04-02 VITALS
BODY MASS INDEX: 27 KG/M2 | HEART RATE: 95 BPM | DIASTOLIC BLOOD PRESSURE: 80 MMHG | HEIGHT: 66 IN | WEIGHT: 168 LBS | SYSTOLIC BLOOD PRESSURE: 120 MMHG

## 2021-04-02 DIAGNOSIS — K80.20 SYMPTOMATIC CHOLELITHIASIS: Primary | ICD-10-CM

## 2021-04-02 PROCEDURE — 99204 OFFICE O/P NEW MOD 45 MIN: CPT | Performed by: SURGERY

## 2021-04-02 ASSESSMENT — MIFFLIN-ST. JEOR: SCORE: 1378.79

## 2021-04-02 NOTE — Clinical Note
Patient would like to have ultrasound completed and then see me back in clinic to discuss surgery.  Can you please arrange for this?  Thanks!

## 2021-04-02 NOTE — LETTER
April 2, 2021          Steven Duarte PA-C  [address] [place] [address]      RE:   Graciela Castorena 1965      Dear Colleague,    Thank you for referring your patient, Graciela Castorena, to [facility] [place] location. Please see a copy of my visit note below.    [facility] Consultation        Chief Complaint   Patient presents for Consult of Gallbladder     HISTORY OF PRESENT ILLNESS: Graciela Castorena is a 56 year old female who is seen in consultation at the request of Steven Duarte PA-C for evaluation of gallbladder issues. The patient reports that she has been having abdominal symptoms for approximately 20 days. This week, her symptoms have been worse. She describes abdominal pain after eating. The pain is located in the right upper quadrant of the abdomen. She also describes associated heartburn and nausea/vomiting. Family history is significant for gallbladder disease in the patient's mother. The patient has not undergone any previous abdominal surgical procedures. She did undergo an abdominal CT scan in 2018, which demonstrated multiple gallstones. Abdominal x-ray completed yesterday suggested persistent cholelithiasis. LFTs are within normal limits.     ASSESSMENT:   Graciela Castorena is a 56 year old who presents with symptomatic cholelithiasis. Significant pertinent co-morbidities include: Obesity.     PLAN:   A thorough discussion was had today in clinic regarding the patient's symptomatic cholelithiasis. I recommended proceeding with cholecystectomy. Prior to surgical intervention, the patient reports she would feel more comfortable if we obtained a right upper quadrant ultrasound. Orders were placed for this study. After the study has been completed, the patient would like to return to the clinic to further discuss surgical intervention. We will make arrangements for this.       Again, thank you for allowing me to participate in the care of your patient.       Sincerely,      Nicole Mccoy MD

## 2021-04-02 NOTE — TELEPHONE ENCOUNTER
Nutrition Education Scheduling Outreach #1:    Call to patient to schedule. Left message with phone number to call to schedule.    Plan for 2nd outreach attempt within 1 week.    Agustina Carver  [facility]  Diabetes and Nutrition Scheduling

## 2021-04-02 NOTE — TELEPHONE ENCOUNTER
Your abdominal ultrasound is scheduled for 4/9/21 9:00am at [facility], check in at 8:45am at the [address] floor.  NPO 8 hours prior

## 2021-04-02 NOTE — PROGRESS NOTES
[facility] Consultation    CHIEF COMPLAINT:  Chief Complaint   Patient presents with     Consult     Gallbladder        HISTORY OF PRESENT ILLNESS:  Graciela Castorena is a 56 year old female who is seen in consultation at the request of Steven Duarte PA-C for evaluation of gallbladder issues.  The patient reports that she has been having abdominal symptoms for approximately 20 days.  This week, her symptoms have been worse.  She describes abdominal pain after eating.  The pain is located in the right upper quadrant of the abdomen.  She also describes associated heartburn and nausea/vomiting.  Family history is significant for gallbladder disease in the patient's mother.  The patient has not undergone any previous abdominal surgical procedures.  She did undergo an abdominal CT scan in 2018, which demonstrated multiple gallstones.  Abdominal x-ray completed yesterday suggested persistent cholelithiasis.  LFTs are within normal limits.    REVIEW OF SYSTEMS:  Constitutional: No fevers or chills  Eyes: Blurred vision  HENT: Reports headaches, No rhinorrhea, No sore throat  Respiratory: No cough or shortness of breath  Cardiovascular: Denies chest pain or palpitations  Gastrointestinal: Abdominal pain and nausea/vomiting  Genitourinary: Hematuria  Musculoskeletal: Right lower extremity pain  Neurologic: No numbness or tingling  Integumentary: No skin rashes    Past Medical History:   Diagnosis Date     Abnormal Pap smear of cervix 03/30/2015    see problem list     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 breast 2018    s/p lumpectomy and radiation; on Tamoxifen       Past Surgical History:   Procedure Laterality Date     COLONOSCOPY N/A 2/12/2018    Procedure: COLONOSCOPY;  colonoscopy;  Surgeon: Sabas Villegas MD;  Location:  GI     LUMPECTOMY BREAST WITH SEED LOCALIZATION Left 4/18/2018    Procedure: LUMPECTOMY BREAST WITH SEED LOCALIZATION;  SEED LOCALIZED LEFT BREAST LUMPECTOMY ;   "Surgeon: Meli Rodriguez MD;  Location: [facility]       Family History   Problem Relation Age of Onset     Diabetes Type 2  Brother      Diabetes Type 2  Sister      Myocardial Infarction No family hx of      Cerebrovascular Disease No family hx of      Coronary Artery Disease Early Onset No family hx of      Colon Cancer No family hx of      Breast Cancer No family hx of      Ovarian Cancer No family hx of        Social History     Tobacco Use     Smoking status: Never Smoker     Smokeless tobacco: Never Used   Substance Use Topics     Alcohol use: No     Alcohol/week: 0.0 standard drinks       Patient Active Problem List   Diagnosis     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 breast     Atypical squamous cells of undetermined significance on cytologic smear of cervix (ASC-US)     Latent tuberculosis     Environmental allergies       Allergies   Allergen Reactions     Ciprofloxacin Itching       Current Outpatient Medications   Medication Sig Dispense Refill     anastrozole (ARIMIDEX) 1 MG tablet Take 1 tablet (1 mg) by mouth daily 90 tablet 3     diclofenac (VOLTAREN) 1 % topical gel Place 4 g onto the skin 4 times daily 100 g 2     famotidine (PEPCID) 20 MG tablet Take 1 tablet (20 mg) by mouth daily 90 tablet 1     ibuprofen (ADVIL/MOTRIN) 400 MG tablet Take 1 tablet (400 mg) by mouth every 8 hours as needed 60 tablet 1     methocarbamol (ROBAXIN) 750 MG tablet Take 1 tablet (750 mg) by mouth 4 times daily as needed 30 tablet 0     ondansetron (ZOFRAN-ODT) 8 MG ODT tab Take 1 tablet (8 mg) by mouth every 8 hours as needed for nausea 21 tablet 0       Vitals: /80   Pulse 95   Ht 1.676 m (5' 6\")   Wt 76.2 kg (168 lb)   BMI 27.12 kg/m    BMI= Body mass index is 27.12 kg/m .    EXAM:  General: Vital signs reviewed, in no apparent distress  Eyes: Anicteric  HENT: Normocephalic, atraumatic, trachea midline   Respiratory: Breathing nonlabored  Cardiovascular: Regular rate and rhythm  GI: Abdomen soft, " nondistended, focally tender with palpation over the lateral right upper quadrant  Musculoskeletal: No gross deformities  Neurologic: Grossly nonfocal exam  Psychiatric: Normal mood, affect and insight  Integumentary: Warm and dry    All labs and imaging personally reviewed and significant for:   CT ABDOMEN/PELVIS HEMATURIA WITH AND WITHOUT IV CONTRAST April 9, 2018  4:28 PM      HISTORY: Microscopic hematuria.     TECHNIQUE: 100 mL Isovue-370. Radiation dose for this scan was reduced  using automated exposure control, adjustment of the mA and/or kV  according to patient size, or iterative reconstruction technique.     COMPARISON: 8/22/2017.     FINDINGS: No hydronephrosis or hydroureter. No urinary tract calculi.  Stable cyst in the anterior interpolar right kidney. No solid renal  mass. No filling defects in the renal collecting systems, ureters, or  urinary bladder.     The liver, spleen, pancreas, and adrenal glands are unremarkable.  There are calcified gallstones but no evidence of cholecystitis. No  abdominal or retroperitoneal adenopathy. No pelvic adenopathy or free  fluid.                                                                      IMPRESSION:  1. No cause for hematuria demonstrated.  2. Cholelithiasis.    XR ABDOMEN 2 VW   4/1/2021 2:10 PM      HISTORY: RUQ abdominal pain     COMPARISON: 1/11/2021. CT 4/19/2018.                                                                      IMPRESSION: No free air. Nonobstructive bowel gas pattern. Moderate  amount of formed stool in the colon predominantly in the hepatic  flexure and rectum with slightly decreased stool burden since  1/11/2020. Calcifications in the right upper quadrant are likely  calcified gallstones as seen on the prior CT on 4/9/2018.    Lab Results   Component Value Date    AST 27 04/01/2021     Lab Results   Component Value Date    ALT 38 04/01/2021     No results found for: BILICONJ   Lab Results   Component Value Date    BILITOTAL  0.2 04/01/2021     Lab Results   Component Value Date    ALBUMIN 3.5 04/01/2021     Lab Results   Component Value Date    PROTTOTAL 7.5 04/01/2021      Lab Results   Component Value Date    ALKPHOS 85 04/01/2021       ASSESSMENT:  Graciela Castorena is a 56 year old who presents with symptomatic cholelithiasis.  Significant pertinent co-morbidities include: Obesity.       PLAN:  A thorough discussion was had today in clinic regarding the patient's symptomatic cholelithiasis.  I recommended proceeding with cholecystectomy.  Prior to surgical intervention, the patient reports she would feel more comfortable if we obtained a right upper quadrant ultrasound.  Orders were placed for this study.  After the study has been completed, the patient would like to return to the clinic to further discuss surgical intervention.  We will make arrangements for this.    It was my pleasure to participate in the care of Graciela Castorena in clinic today. Thank you for this consultation.         Nicole Mccoy MD    Please route or send letter to:  Primary Care Provider (PCP) and Referring Provider

## 2021-04-09 ENCOUNTER — HOSPITAL ENCOUNTER (OUTPATIENT)
Dept: ULTRASOUND IMAGING | Facility: CLINIC | Age: 56
Discharge: HOME | End: 2021-04-09
Attending: SURGERY | Admitting: SURGERY
Payer: COMMERCIAL

## 2021-04-09 DIAGNOSIS — K80.20 SYMPTOMATIC CHOLELITHIASIS: ICD-10-CM

## 2021-04-09 PROCEDURE — 76705 ECHO EXAM OF ABDOMEN: CPT

## 2021-04-14 ENCOUNTER — OFFICE VISIT (OUTPATIENT)
Dept: FAMILY MEDICINE | Facility: CLINIC | Age: 56
End: 2021-04-14
Payer: COMMERCIAL

## 2021-04-14 VITALS
OXYGEN SATURATION: 99 % | HEART RATE: 87 BPM | HEIGHT: 66 IN | WEIGHT: 175 LBS | SYSTOLIC BLOOD PRESSURE: 139 MMHG | BODY MASS INDEX: 28.12 KG/M2 | DIASTOLIC BLOOD PRESSURE: 90 MMHG | TEMPERATURE: 97.3 F

## 2021-04-14 DIAGNOSIS — R03.0 ELEVATED BLOOD PRESSURE READING WITHOUT DIAGNOSIS OF HYPERTENSION: Primary | ICD-10-CM

## 2021-04-14 DIAGNOSIS — R79.89 ABNORMAL TSH: ICD-10-CM

## 2021-04-14 DIAGNOSIS — E78.5 DYSLIPIDEMIA: ICD-10-CM

## 2021-04-14 DIAGNOSIS — K80.80 BILIARY CALCULUS OF OTHER SITE WITHOUT OBSTRUCTION: ICD-10-CM

## 2021-04-14 DIAGNOSIS — K76.0 HEPATIC STEATOSIS: ICD-10-CM

## 2021-04-14 LAB
CHOLEST SERPL-MCNC: 180 MG/DL
HDLC SERPL-MCNC: 46 MG/DL
LDLC SERPL CALC-MCNC: 111 MG/DL
NONHDLC SERPL-MCNC: 134 MG/DL
TRIGL SERPL-MCNC: 117 MG/DL
TSH SERPL DL<=0.005 MIU/L-ACNC: 1.5 MU/L (ref 0.4–4)

## 2021-04-14 PROCEDURE — 84443 ASSAY THYROID STIM HORMONE: CPT | Performed by: INTERNAL MEDICINE

## 2021-04-14 PROCEDURE — 36415 COLL VENOUS BLD VENIPUNCTURE: CPT | Performed by: INTERNAL MEDICINE

## 2021-04-14 PROCEDURE — 80061 LIPID PANEL: CPT | Performed by: INTERNAL MEDICINE

## 2021-04-14 PROCEDURE — 99214 OFFICE O/P EST MOD 30 MIN: CPT | Performed by: INTERNAL MEDICINE

## 2021-04-14 ASSESSMENT — MIFFLIN-ST. JEOR: SCORE: 1410.29

## 2021-04-14 NOTE — PROGRESS NOTES
"    Assessment & Plan   Problem List Items Addressed This Visit     None      Visit Diagnoses     Elevated blood pressure reading without diagnosis of hypertension    -  Primary    Relevant Orders    24 Hour Blood Pressure Monitor - Adult    Dyslipidemia        Relevant Orders    Lipid panel reflex to direct LDL Fasting (Completed)    Abnormal TSH        Relevant Orders    TSH with free T4 reflex (Completed)    Hepatic steatosis        Biliary calculus of other site without obstruction             Advised to monitor BP 24 hr , if elevated need BP meds.  Recommend life style changes, wt loss and low salt diet  Follow up with general surgery who recommended cholecystectomy  Has borderline diabetes cut down on sugars, may consider metformin , will monitor HBA1C  Advised to apply moisturizer for her back.         BMI:   Estimated body mass index is 28.26 kg/m  as calculated from the following:    Height as of this encounter: 1.676 m (5' 5.98\").    Weight as of this encounter: 79.4 kg (175 lb).   Weight management plan: Discussed healthy diet and exercise guidelines    MEDICATIONS:  Continue current medications without change  Work on weight loss  Regular exercise  See Patient Instructions    Return in about 4 weeks (around 5/12/2021), or if symptoms worsen or fail to improve, for BP Recheck.    Tavares Velasco MD  [facility] Owens is a 56 year old who presents for the following health issues   HPI     ED/UC Followup:    Facility: [facility]  Date of visit: 4/1/2021  Reason for visit: RUQ Abdominal Pain, Nausea, Prediabetes, Gallstones  Current Status: abdominal pain has subsided, is worried about diabetes        Patient has some postprandial symptoms, seen general surgery who recommended cholecystectomy  Has elevated BP , no CP, dyspnea, shortness of breath,   No palpitations   Follows with oncology for breast cancer is on hormonal therapy will  Need Dexa " "scan per onc.  Reports some itching on back    Review of Systems   Constitutional, HEENT, cardiovascular, pulmonary, gi and gu systems are negative, except as otherwise noted.      Objective    BP (!) 139/90   Pulse 87   Temp 97.3  F (36.3  C) (Temporal)   Ht 1.676 m (5' 5.98\")   Wt 79.4 kg (175 lb)   SpO2 99%   BMI 28.26 kg/m    Body mass index is 28.26 kg/m .  Physical Exam   GENERAL: healthy, alert and no distress, pleasant  EYES: Eyes grossly normal to inspection, PERRL and conjunctivae and sclerae normal  NECK: no adenopathy, no asymmetry, masses, or scars and thyroid normal to palpation  RESP: lungs clear to auscultation - no rales, rhonchi or wheezes  CV: regular rate and rhythm, normal S1 S2, no S3 or S4, no murmur, click or rub, no peripheral edema and peripheral pulses strong  ABDOMEN: soft, mild RUQ direct discomfort on deep palpitation, no hepatosplenomegaly, no masses and bowel sounds normal  MS: no gross musculoskeletal defects noted, no edema  SKIN: no suspicious lesions or rashes  NEURO: Normal strength and tone, mentation intact and speech normal  PSYCH: mentation appears normal, affect normal/bright    Office Visit on 04/01/2021   Component Date Value Ref Range Status     Color Urine 04/01/2021 Yellow   Final     Appearance Urine 04/01/2021 Clear   Final     Glucose Urine 04/01/2021 Negative  NEG^Negative mg/dL Final     Bilirubin Urine 04/01/2021 Negative  NEG^Negative Final     Ketones Urine 04/01/2021 Negative  NEG^Negative mg/dL Final     Specific Gravity Urine 04/01/2021 <=1.005  1.003 - 1.035 Final     pH Urine 04/01/2021 5.0  5.0 - 7.0 pH Final     Protein Albumin Urine 04/01/2021 Negative  NEG^Negative mg/dL Final     Urobilinogen Urine 04/01/2021 0.2  0.2 - 1.0 EU/dL Final     Nitrite Urine 04/01/2021 Negative  NEG^Negative Final     Blood Urine 04/01/2021 Moderate* NEG^Negative Final     Leukocyte Esterase Urine 04/01/2021 Negative  NEG^Negative Final     Source 04/01/2021 Midstream " Urine   Final     WBC Urine 04/01/2021 0 - 5  OTO5^0 - 5 /HPF Final     RBC Urine 04/01/2021 O - 2  OTO2^O - 2 /HPF Final     Squamous Epithelial /LPF Urine 04/01/2021 Few  FEW^Few /LPF Final     Bacteria Urine 04/01/2021 Few* NEG^Negative /HPF Final     WBC 04/01/2021 7.7  4.0 - 11.0 10e9/L Final     RBC Count 04/01/2021 4.55  3.8 - 5.2 10e12/L Final     Hemoglobin 04/01/2021 13.6  11.7 - 15.7 g/dL Final     Hematocrit 04/01/2021 41.4  35.0 - 47.0 % Final     MCV 04/01/2021 91  78 - 100 fl Final     MCH 04/01/2021 29.9  26.5 - 33.0 pg Final     MCHC 04/01/2021 32.9  31.5 - 36.5 g/dL Final     RDW 04/01/2021 13.2  10.0 - 15.0 % Final     Platelet Count 04/01/2021 229  150 - 450 10e9/L Final     % Neutrophils 04/01/2021 58.0  % Final     % Lymphocytes 04/01/2021 29.6  % Final     % Monocytes 04/01/2021 8.2  % Final     % Eosinophils 04/01/2021 3.8  % Final     % Basophils 04/01/2021 0.4  % Final     Absolute Neutrophil 04/01/2021 4.5  1.6 - 8.3 10e9/L Final     Absolute Lymphocytes 04/01/2021 2.3  0.8 - 5.3 10e9/L Final     Absolute Monocytes 04/01/2021 0.6  0.0 - 1.3 10e9/L Final     Absolute Eosinophils 04/01/2021 0.3  0.0 - 0.7 10e9/L Final     Absolute Basophils 04/01/2021 0.0  0.0 - 0.2 10e9/L Final     Diff Method 04/01/2021 Automated Method   Final     Sed Rate 04/01/2021 10  0 - 30 mm/h Final     Lipase 04/01/2021 82  73 - 393 U/L Final     Amylase 04/01/2021 79  30 - 110 U/L Final     Sodium 04/01/2021 139  133 - 144 mmol/L Final     Potassium 04/01/2021 3.8  3.4 - 5.3 mmol/L Final     Chloride 04/01/2021 108  94 - 109 mmol/L Final     Carbon Dioxide 04/01/2021 29  20 - 32 mmol/L Final     Anion Gap 04/01/2021 2* 3 - 14 mmol/L Final     Glucose 04/01/2021 138* 70 - 99 mg/dL Final     Urea Nitrogen 04/01/2021 9  7 - 30 mg/dL Final     Creatinine 04/01/2021 0.76  0.52 - 1.04 mg/dL Final     GFR Estimate 04/01/2021 89  >60 mL/min/[1.73_m2] Final    Comment: Non  GFR Calc  Starting 12/18/2018,  serum creatinine based estimated GFR (eGFR) will be   calculated using the Chronic Kidney Disease Epidemiology Collaboration   (CKD-EPI) equation.       GFR Estimate If Black 04/01/2021 >90  >60 mL/min/[1.73_m2] Final    Comment:  GFR Calc  Starting 12/18/2018, serum creatinine based estimated GFR (eGFR) will be   calculated using the Chronic Kidney Disease Epidemiology Collaboration   (CKD-EPI) equation.       Calcium 04/01/2021 9.6  8.5 - 10.1 mg/dL Final     Bilirubin Total 04/01/2021 0.2  0.2 - 1.3 mg/dL Final     Albumin 04/01/2021 3.5  3.4 - 5.0 g/dL Final     Protein Total 04/01/2021 7.5  6.8 - 8.8 g/dL Final     Alkaline Phosphatase 04/01/2021 85  40 - 150 U/L Final     ALT 04/01/2021 38  0 - 50 U/L Final     AST 04/01/2021 27  0 - 45 U/L Final     Hemoglobin A1C 04/01/2021 6.2* 0 - 5.6 % Final    Comment: Normal <5.7% Prediabetes 5.7-6.4%  Diabetes 6.5% or higher - adopted from ADA   consensus guidelines.

## 2021-04-22 ENCOUNTER — IMMUNIZATION (OUTPATIENT)
Dept: NURSING | Facility: CLINIC | Age: 56
End: 2021-04-22
Payer: COMMERCIAL

## 2021-04-22 PROCEDURE — 91301 PR COVID VAC MODERNA 100 MCG/0.5 ML IM: CPT

## 2021-04-22 PROCEDURE — 0011A PR COVID VAC MODERNA 100 MCG/0.5 ML IM: CPT

## 2021-04-23 ENCOUNTER — OFFICE VISIT (OUTPATIENT)
Dept: SURGERY | Facility: CLINIC | Age: 56
End: 2021-04-23
Payer: COMMERCIAL

## 2021-04-23 DIAGNOSIS — K80.20 SYMPTOMATIC CHOLELITHIASIS: Primary | ICD-10-CM

## 2021-04-23 DIAGNOSIS — K21.9 GASTROESOPHAGEAL REFLUX DISEASE WITHOUT ESOPHAGITIS: ICD-10-CM

## 2021-04-23 PROCEDURE — 99213 OFFICE O/P EST LOW 20 MIN: CPT | Performed by: SURGERY

## 2021-04-23 NOTE — PROGRESS NOTES
[facility]     CHIEF COMPLAINT:  Chief Complaint   Patient presents with     RECHECK     Cholelithiasis       HISTORY OF PRESENT ILLNESS:  Graciela Castorena is a 56 year old female who returns to clinic today to review results of recent abdominal ultrasound and discuss management plan.  Patient reports that her abdominal pain has improved.  However, she is having significant heartburn and reflux of acid up into her mouth.  She reports that she has been taking famotidine but it has not been helping very much.  The images and results of her abdominal ultrasound were reviewed with the patient.      Past Medical History:   Diagnosis Date     Abnormal Pap smear of cervix 03/30/2015    see problem list     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 breast 2018    s/p lumpectomy and radiation; on Tamoxifen       Past Surgical History:   Procedure Laterality Date     COLONOSCOPY N/A 2/12/2018    Procedure: COLONOSCOPY;  colonoscopy;  Surgeon: Sabas Villegas MD;  Location:  GI     LUMPECTOMY BREAST WITH SEED LOCALIZATION Left 4/18/2018    Procedure: LUMPECTOMY BREAST WITH SEED LOCALIZATION;  SEED LOCALIZED LEFT BREAST LUMPECTOMY ;  Surgeon: Meli Rodriguez MD;  Location: [facility]       Family History   Problem Relation Age of Onset     Diabetes Type 2  Brother      Diabetes Type 2  Sister      Myocardial Infarction No family hx of      Cerebrovascular Disease No family hx of      Coronary Artery Disease Early Onset No family hx of      Colon Cancer No family hx of      Breast Cancer No family hx of      Ovarian Cancer No family hx of        Social History     Tobacco Use     Smoking status: Never Smoker     Smokeless tobacco: Never Used   Substance Use Topics     Alcohol use: No     Alcohol/week: 0.0 standard drinks       Patient Active Problem List   Diagnosis     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 breast     Atypical squamous cells of undetermined significance on cytologic smear of cervix  (ASC-US)     Latent tuberculosis     Environmental allergies       Allergies   Allergen Reactions     Ciprofloxacin Itching       Current Outpatient Medications   Medication Sig Dispense Refill     anastrozole (ARIMIDEX) 1 MG tablet Take 1 tablet (1 mg) by mouth daily 90 tablet 3     diclofenac (VOLTAREN) 1 % topical gel Place 4 g onto the skin 4 times daily 100 g 2     famotidine (PEPCID) 20 MG tablet Take 1 tablet (20 mg) by mouth daily 90 tablet 1     ibuprofen (ADVIL/MOTRIN) 400 MG tablet Take 1 tablet (400 mg) by mouth every 8 hours as needed 60 tablet 1     methocarbamol (ROBAXIN) 750 MG tablet Take 1 tablet (750 mg) by mouth 4 times daily as needed 30 tablet 0     EXAM:  General: in no apparent distress  Eyes: Anicteric  HENT: Normocephalic, atraumatic, trachea midline   Respiratory: Breathing nonlabored  Cardiovascular: Regular rate and rhythm  GI: Abdomen soft, nondistended, focal area of tenderness over the lateral right upper quadrant with palpation  Musculoskeletal: No gross deformities  Neurologic: Grossly nonfocal exam  Psychiatric: Normal mood, affect and insight  Integumentary: Warm and dry    All labs and imaging personally reviewed and significant for:   US ABDOMEN LIMITED 4/9/2021 9:20 AM     CLINICAL HISTORY: Right upper quadrant pain and history of  cholelithiasis on previous imaging; Symptomatic cholelithiasis  TECHNIQUE: Limited abdominal ultrasound.     COMPARISON: Radiograph 4/1/2021 and CT 4/9/2018     FINDINGS:     GALLBLADDER: Cholelithiasis with multiple mobile small gallstones. No  gallbladder wall thickening or pericholecystic fluid. Sonographic  Grover sign was negative.     BILE DUCTS: There is no biliary dilatation. The common duct measures  4mm.     LIVER: Diffuse hepatic steatosis. No focal masses.      RIGHT KIDNEY: Normal. No hydronephrosis.     PANCREAS: The visualized portions of the pancreas are normal.                                                                       IMPRESSION:  1.  Hepatic steatosis.  2.  Cholelithiasis.    ASSESSMENT:  Graciela Castorena is a 56 year old who presents with cholelithiasis.  Significant pertinent co-morbidities include: Obesity.       PLAN:  A thorough discussion was had today in clinic regarding management options for the patient's cholelithiasis.  Continued observation versus cholecystectomy was discussed.  Risks and benefits of surgery were discussed.  Patient reports that she wishes to undergo further work-up for her heartburn/acid reflux.  She is requesting evaluation with upper endoscopy.  I have placed an order for this.  The patient reports that she will consider surgical intervention, based on the findings of the EGD.  I encouraged her to contact our office directly if she chooses to proceed with surgical intervention.    It was my pleasure to participate in the care of Graciela Castorena in clinic today. Thank you for this consultation.         Nicole Mccoy MD    Please route or send letter to:  Primary Care Provider (PCP) and Referring Provider

## 2021-04-23 NOTE — LETTER
[facility]    [address], [address]  [place], [place] [address]  Phone [phone]  Fax [phone] [address], [address]  [facility] [place] [address]  Phone [phone]  Fax [phone]    [email or web address]     2021    Re: Graciela Castorena  : 1965      [facility]   CHIEF COMPLAINT:        Chief Complaint   Patient presents with     RECHECK     Cholelithiasis     HISTORY OF PRESENT ILLNESS: Graciela Castorena is a 56 year old female who returns to clinic today to review results of recent abdominal ultrasound and discuss management plan. Patient reports that her abdominal pain has improved. However, she is having significant heartburn and reflux of acid up into her mouth. She reports that she has been taking famotidine but it has not been helping very much. The images and results of her abdominal ultrasound were reviewed with the patient.   Past Medical History        Past Medical History:   Diagnosis Date     Abnormal Pap smear of cervix 2015    see problem list     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 breast     s/p lumpectomy and radiation; on Tamoxifen     Past Surgical History         Past Surgical History:   Procedure Laterality Date     COLONOSCOPY N/A 2018    Procedure: COLONOSCOPY; colonoscopy; Surgeon: Sabas Villegas MD; Location:  GI     LUMPECTOMY BREAST WITH SEED LOCALIZATION Left 2018    Procedure: LUMPECTOMY BREAST WITH SEED LOCALIZATION; SEED LOCALIZED LEFT BREAST LUMPECTOMY ; Surgeon: Meli Rodriguez MD; Location:  SD     Family History         Family History   Problem Relation Age of Onset     Diabetes Type 2  Brother      Diabetes Type 2  Sister      Myocardial Infarction No family hx of      Cerebrovascular Disease No family hx of      Coronary Artery Disease Early Onset No family hx of      Colon Cancer No family hx of      Breast  Cancer No family hx of      Ovarian Cancer No family hx of      Social History           Tobacco Use     Smoking status: Never Smoker     Smokeless tobacco: Never Used   Substance Use Topics     Alcohol use: No     Alcohol/week: 0.0 standard drinks         Patient Active Problem List   Diagnosis     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 breast     Atypical squamous cells of undetermined significance on cytologic smear of cervix (ASC-US)     Latent tuberculosis     Environmental allergies          Allergies   Allergen Reactions     Ciprofloxacin Itching     Current Outpatient Prescriptions          Current Outpatient Medications   Medication Sig Dispense Refill     anastrozole (ARIMIDEX) 1 MG tablet Take 1 tablet (1 mg) by mouth daily 90 tablet 3     diclofenac (VOLTAREN) 1 % topical gel Place 4 g onto the skin 4 times daily 100 g 2     famotidine (PEPCID) 20 MG tablet Take 1 tablet (20 mg) by mouth daily 90 tablet 1     ibuprofen (ADVIL/MOTRIN) 400 MG tablet Take 1 tablet (400 mg) by mouth every 8 hours as needed 60 tablet 1     methocarbamol (ROBAXIN) 750 MG tablet Take 1 tablet (750 mg) by mouth 4 times daily as needed 30 tablet 0   EXAM:   General: in no apparent distress   Eyes: Anicteric   HENT: Normocephalic, atraumatic, trachea midline   Respiratory: Breathing nonlabored   Cardiovascular: Regular rate and rhythm   GI: Abdomen soft, nondistended, focal area of tenderness over the lateral right upper quadrant with palpation   Musculoskeletal: No gross deformities   Neurologic: Grossly nonfocal exam   Psychiatric: Normal mood, affect and insight   Integumentary: Warm and dry   All labs and imaging personally reviewed and significant for:   US ABDOMEN LIMITED 4/9/2021 9:20 AM   CLINICAL HISTORY: Right upper quadrant pain and history of   cholelithiasis on previous imaging; Symptomatic cholelithiasis   TECHNIQUE: Limited abdominal ultrasound.   COMPARISON: Radiograph 4/1/2021 and CT 4/9/2018   FINDINGS:    GALLBLADDER: Cholelithiasis with multiple mobile small gallstones. No   gallbladder wall thickening or pericholecystic fluid. Sonographic   Grover sign was negative.   BILE DUCTS: There is no biliary dilatation. The common duct measures   4mm.   LIVER: Diffuse hepatic steatosis. No focal masses.   RIGHT KIDNEY: Normal. No hydronephrosis.   PANCREAS: The visualized portions of the pancreas are normal.   IMPRESSION:   1. Hepatic steatosis.   2. Cholelithiasis.   ASSESSMENT:   Graciela Castorena is a 56 year old who presents with cholelithiasis. Significant pertinent co-morbidities include: Obesity.   PLAN:   A thorough discussion was had today in clinic regarding management options for the patient's cholelithiasis. Continued observation versus cholecystectomy was discussed. Risks and benefits of surgery were discussed. Patient reports that she wishes to undergo further work-up for her heartburn/acid reflux. She is requesting evaluation with upper endoscopy. I have placed an order for this. The patient reports that she will consider surgical intervention, based on the findings of the EGD. I encouraged her to contact our office directly if she chooses to proceed with surgical intervention.   It was my pleasure to participate in the care of Graciela Castorena in clinic today. Thank you for this consultation.   Nicole Mccoy MD

## 2021-04-26 ENCOUNTER — HOSPITAL ENCOUNTER (OUTPATIENT)
Dept: CARDIOLOGY | Facility: CLINIC | Age: 56
Discharge: HOME | End: 2021-04-26
Attending: INTERNAL MEDICINE | Admitting: INTERNAL MEDICINE
Payer: COMMERCIAL

## 2021-04-26 DIAGNOSIS — R03.0 ELEVATED BLOOD PRESSURE READING WITHOUT DIAGNOSIS OF HYPERTENSION: ICD-10-CM

## 2021-04-26 PROCEDURE — 93788 AMBL BP MNTR W/SW A/R: CPT

## 2021-04-26 PROCEDURE — 93790 AMBL BP MNTR W/SW I&R: CPT | Performed by: INTERNAL MEDICINE

## 2021-05-06 DIAGNOSIS — K21.9 GASTROESOPHAGEAL REFLUX DISEASE WITHOUT ESOPHAGITIS: Primary | ICD-10-CM

## 2021-05-13 ENCOUNTER — VIRTUAL VISIT (OUTPATIENT)
Dept: NUTRITION | Facility: CLINIC | Age: 56
End: 2021-05-13
Payer: COMMERCIAL

## 2021-05-13 DIAGNOSIS — R73.03 PRE-DIABETES: Primary | ICD-10-CM

## 2021-05-13 PROCEDURE — 97802 MEDICAL NUTRITION INDIV IN: CPT

## 2021-05-13 NOTE — PROGRESS NOTES
MEDICAL NUTRITION THERAPY  Visit Type:Initial assessment and intervention    SUBJECTIVE:   Graciela Castorena presents today for MNT and education related to prediabetes.   She is accompanied by self.     PATIENT STATED GOAL(S) FOR THIS VISIT: Patient requesting short visit today as she is tired and today is a [place] holiday.  She would like information on how to reduce her risk of developing diabetes.    EATING HABITS:   Breakfast: tea sugar, 2-3 injera, sometimes rice,   Lunch: skips  Dinner: meat, tino,   Snacks:   Beverages: water, milk    Misses meals? lunch    Previous diet education:  No     Food allergies/intolerances: no    EXERCISE: minimal exercise    SOCIO/ECONOMIC:   Lives with: no assessed    OBJECTIVE:   Vitals: There were no vitals taken for this visit.    Weight Change: ~10# gain    ASSESSMENT:   Diet recall reveals typical [place] diet which is high in carbs (injera, rice, noodles, tino).  Patient usually consumes 2 meals per day (skips lunch).  She likes drinking tea with brown sugar.  Vegetable intake is low - states she doesn't like.  Exercise level is low - patient states she does not engage in purposeful activity.  Recommend carb controlled diet.  Discussed eating less injera/rice/pasta at meals and try to incorportate more veggies and some meat/protein.  Recommend 3 meals per day instead of 2 larger meals.    Recommend 10-15 minutes of exercise twice daily, if able.  Goal is a total of 30 minutes daily.  Weight loss of 10-15# would be helpful for BG levels.  If unable to make lifestyle changes, initiating Metformin would be beneficial.  Diet is high in: carbs  Diet is low in: fiber, fruits and vegetables    VERBAL AND WRITTEN INFORMATION GIVEN TO SUPPORT:  Discussed: weight reduction, consistent meals and portion control.  Education Materials Provided: My Plate Planner/Choose My Plate and Understanding Diabetes    PLAN:   PATIENT'S BEHAVIOR CHANGE GOALS:   See Patient Instructions for patient  stated behavior change goals. AVS was printed and given to patient at today's appointment.    FOLLOW UP:   Follow up with RD as needed.    Zenaida Stephenson RD LD CDE    Time spent in minutes: 20  Encounter: Individual

## 2021-05-20 ENCOUNTER — IMMUNIZATION (OUTPATIENT)
Dept: NURSING | Facility: CLINIC | Age: 56
End: 2021-05-20
Attending: INTERNAL MEDICINE
Payer: COMMERCIAL

## 2021-05-20 PROCEDURE — 91301 PR COVID VAC MODERNA 100 MCG/0.5 ML IM: CPT

## 2021-05-20 PROCEDURE — 0012A PR COVID VAC MODERNA 100 MCG/0.5 ML IM: CPT

## 2021-06-28 ENCOUNTER — TELEPHONE (OUTPATIENT)
Dept: ONCOLOGY | Facility: CLINIC | Age: 56
End: 2021-06-28

## 2021-06-28 DIAGNOSIS — Z12.31 ENCOUNTER FOR SCREENING MAMMOGRAM FOR BREAST CANCER: Primary | ICD-10-CM

## 2021-06-28 NOTE — TELEPHONE ENCOUNTER
Graciela called to inquire if she needs a mammogram prior to her upcoming appointment with Dr. Barahona on 7/14.    Last mammogram was 7/2020.  No order for mammogram and not mentioned in last visit.    Graciela is also due for follow-up and endometrial biopsy with Dr. Hatch.  She declines to schedule at this time and stated she would call to schedule when she is ready.    Routing to Dr. Barahona to order mammogram if needed and RNBRITTANY Canada to follow-up.    Seb Echeverria, LAURAN, RN, OCN  Oncology Care Coordinator  [facility]

## 2021-06-29 NOTE — TELEPHONE ENCOUNTER
Dr. Barahona placed order.     Message routed to scheduling team to assist with making an appointment.    Layne Kramer RN

## 2021-07-13 ENCOUNTER — HOSPITAL ENCOUNTER (OUTPATIENT)
Dept: MAMMOGRAPHY | Facility: CLINIC | Age: 56
Discharge: HOME | End: 2021-07-13
Attending: INTERNAL MEDICINE | Admitting: INTERNAL MEDICINE
Payer: COMMERCIAL

## 2021-07-13 DIAGNOSIS — Z12.31 ENCOUNTER FOR SCREENING MAMMOGRAM FOR BREAST CANCER: ICD-10-CM

## 2021-07-13 PROCEDURE — 77063 BREAST TOMOSYNTHESIS BI: CPT

## 2021-07-14 ENCOUNTER — TELEPHONE (OUTPATIENT)
Dept: ONCOLOGY | Facility: CLINIC | Age: 56
End: 2021-07-14

## 2021-07-14 ENCOUNTER — ONCOLOGY VISIT (OUTPATIENT)
Dept: ONCOLOGY | Facility: CLINIC | Age: 56
End: 2021-07-14
Attending: INTERNAL MEDICINE
Payer: COMMERCIAL

## 2021-07-14 VITALS
HEART RATE: 83 BPM | BODY MASS INDEX: 28.13 KG/M2 | WEIGHT: 174.2 LBS | DIASTOLIC BLOOD PRESSURE: 89 MMHG | OXYGEN SATURATION: 99 % | SYSTOLIC BLOOD PRESSURE: 129 MMHG | RESPIRATION RATE: 16 BRPM | TEMPERATURE: 97.7 F

## 2021-07-14 DIAGNOSIS — R10.13 DYSPEPSIA: ICD-10-CM

## 2021-07-14 DIAGNOSIS — D05.12 DUCTAL CARCINOMA IN SITU (DCIS) OF LEFT BREAST: ICD-10-CM

## 2021-07-14 DIAGNOSIS — K59.00 CONSTIPATION, UNSPECIFIED CONSTIPATION TYPE: Primary | ICD-10-CM

## 2021-07-14 PROCEDURE — 99214 OFFICE O/P EST MOD 30 MIN: CPT | Performed by: INTERNAL MEDICINE

## 2021-07-14 PROCEDURE — G0463 HOSPITAL OUTPT CLINIC VISIT: HCPCS

## 2021-07-14 RX ORDER — POLYETHYLENE GLYCOL 3350 17 G/17G
1 POWDER, FOR SOLUTION ORAL DAILY
Qty: 507 G | Refills: 1 | Status: SHIPPED | OUTPATIENT
Start: 2021-07-14 | End: 2022-03-04

## 2021-07-14 RX ORDER — FAMOTIDINE 20 MG/1
20 TABLET, FILM COATED ORAL DAILY
Qty: 90 TABLET | Refills: 3 | Status: SHIPPED | OUTPATIENT
Start: 2021-07-14 | End: 2021-08-17

## 2021-07-14 ASSESSMENT — PAIN SCALES - GENERAL: PAINLEVEL: SEVERE PAIN (7)

## 2021-07-14 NOTE — PROGRESS NOTES
"  Oncology Rooming Note    July 14, 2021 10:27 AM   Graciela Castorena is a 56 year old female who presents for:    Chief Complaint   Patient presents with     Oncology Clinic Visit     Initial Vitals: There were no vitals taken for this visit. Estimated body mass index is 28.26 kg/m  as calculated from the following:    Height as of 4/14/21: 1.676 m (5' 5.98\").    Weight as of 4/14/21: 79.4 kg (175 lb). There is no height or weight on file to calculate BSA.  Data Unavailable Comment: Data Unavailable   No LMP recorded. Patient is postmenopausal.  Allergies reviewed: Yes  Medications reviewed: Yes    Medications: MEDICATION REFILLS NEEDED TODAY. Provider was notified.  Pharmacy name entered into [employer]: [id number] [address]    Clinical concerns: Right leg - hard time walking   BK was notified.      Shari J. Schoenberger, [facility]            "

## 2021-07-14 NOTE — LETTER
"    7/14/2021         RE: Graciela Castorena  [address]  [place] [place] [address]        Dear Colleague,    Thank you for referring your patient, Graciela Castorena, to the [facility]. Please see a copy of my visit note below.      Oncology Rooming Note    July 14, 2021 10:27 AM   Graciela Castorena is a 56 year old female who presents for:    Chief Complaint   Patient presents with     Oncology Clinic Visit     Initial Vitals: There were no vitals taken for this visit. Estimated body mass index is 28.26 kg/m  as calculated from the following:    Height as of 4/14/21: 1.676 m (5' 5.98\").    Weight as of 4/14/21: 79.4 kg (175 lb). There is no height or weight on file to calculate BSA.  Data Unavailable Comment: Data Unavailable   No LMP recorded. Patient is postmenopausal.  Allergies reviewed: Yes  Medications reviewed: Yes    Medications: MEDICATION REFILLS NEEDED TODAY. Provider was notified.  Pharmacy name entered into [employer]: [id number] - [facility] [address]    Clinical concerns: Right leg - hard time walking   BK was notified.      Shari J. Schoenberger, [facility]              ONCOLOGY HISTORY: Mrs. 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      1.  Screening mammogram on 01/16/2018 revealed a new calcification cluster behind the left nipple.   -Left diagnostic mammogram on 03/12/2018 revealed an indeterminate cluster of microcalcification in retroareolar portion of left breast at 11:00.      2. Stereotactic left breast biopsy on 03/13/2018 reveals intermediate grade DCIS with microcalcification and comedonecrosis.  % positive and  MT 25% positive.      3.  Patient underwent left breast lumpectomy on 04/18/2018.  Pathology reveals grade 2 DCIS measuring 3 mm.  Margins are negative.     4. Radiation to left breast. 5056 cGy between 05/31/2018 and 06/27/2018.      5. Tamoxifen started in May, 2018. Stopped 0n 12/29/2020 due to vaginal " bleeding.  -Anastrazole started on 01/12/2021.  -Anastrazole stopped 07/14/2021 due to aches and pain.    SUBJECTIVE:  Mr. Castorena is a 56-year-old female with left breast DCIS status post lumpectomy and radiation.  She is on anastrozole.     The patient has been having joint aches and pain, especially in the lower extremities.  This has been going on since she started anastrozole.  She wants to stop anastrozole.     She has mild fatigue.  No headache.  No dizziness.  No chest pain.  No shortness of breath.  No cough.  No nausea or vomiting.  No abnormal bleeding.     Mammogram done yesterday is benign.     PHYSICAL EXAMINATION:    GENERAL:  She is alert and oriented x 3.  VITAL SIGNS:  Reviewed.  Not in any distress.  Rest of systems not examined.     ASSESSMENT:    1.  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  No evidence of recurrence.  2.  Muscle and joint pain.     PLAN:    1.  The patient is doing well from DCIS.  No evidence of recurrence.     She has been on anastrozole.  She is having side effects of aches and pain.  She wants to stop it.  I told the patient that 5 years of hormonal treatment is recommended.  She is taking it only for 3 years.  The patient says that because of aches and pain her walking is difficult.  As per her wishes, we will stop the anastrozole.     It will be interesting to see if her symptoms improve after stopping anastrozole.     2.  The patient advised to continue to take calcium and vitamin D twice a day.       3.  She wanted a refill on famotidine, which was given.    4.  I will see her in 6 months' time.  I advised her to call us with any questions or concerns.    This office note has been dictated.          Again, thank you for allowing me to participate in the care of your patient.        Sincerely,        Samreen Barahona MD

## 2021-07-14 NOTE — TELEPHONE ENCOUNTER
Oncology Distress Screening Follow-up  Clinical Social Work  [facility]    Identified Concern and Score From Distress Screenin. How concerned are you about your ability to eat?   4           2. How concerned are you about unintended weight loss or your current weight?   0           3. How concerned are you about feeling depressed or very sad?   4           4. How concerned are you about feeling anxious or very scared?   0           5. Do you struggle with the loss of meaning and jacqueline in your life?   Not at all           6. How concerned are you about work and home life issues that may be affected by your cancer?   8Abnormal            7. How concerned are you about knowing what resources are available to help you?   7Abnormal   transportation           8. Do you currently have what you would describe as Caodaism or spiritual struggles?              Not at all           You can also ask to be contacted by one of our Oncology Supportive Care professionals.    9. If you want to be contacted by one of our professionals, I can send a message to them right now.   Oncology Social  Worker             Date of Distress Screenin21    Intervention:   Graciela is a 56-year-old woman with a diagnosis of breast cancer, presently on anastrozole under Dr. Barahona's care at [facility]. This clinician called Graciela with goal of following up on elevated distress screen.     Roseann called Graciela with assistance of Beninese speaking  (#[phone]). Graciela endorsed transportation concerns. ROSEANN reviewed that she has free transportation through [employer], which Graciela reports she is aware of and utilizes. Graciela reports she would like assistance getting to non-medical visits via [employer]. Assisted in completion of [employer] application and mailed completed application to Graciela for signature. Graciela reported that she would mail independently to [employer] center after she receives and signs,  Graciela aware of 4-6week wait for processing. No other concerns reported today.     NELLIE Daugherty, [facility]  Phone: [phone]  [facility]: M, T  *every other Tue, 8am-4:30pm  [facility]: W, F, *every other Tue, 8am-4:30pm

## 2021-07-19 NOTE — PROGRESS NOTES
ONCOLOGY HISTORY: Mrs. 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      1.  Screening mammogram on 01/16/2018 revealed a new calcification cluster behind the left nipple.   -Left diagnostic mammogram on 03/12/2018 revealed an indeterminate cluster of microcalcification in retroareolar portion of left breast at 11:00.      2. Stereotactic left breast biopsy on 03/13/2018 reveals intermediate grade DCIS with microcalcification and comedonecrosis.  % positive and  AR 25% positive.      3.  Patient underwent left breast lumpectomy on 04/18/2018.  Pathology reveals grade 2 DCIS measuring 3 mm.  Margins are negative.     4. Radiation to left breast. 5056 cGy between 05/31/2018 and 06/27/2018.      5. Tamoxifen started in May, 2018. Stopped 0n 12/29/2020 due to vaginal bleeding.  -Anastrazole started on 01/12/2021.  -Anastrazole stopped 07/14/2021 due to aches and pain.    SUBJECTIVE:  Mr. Castorena is a 56-year-old female with left breast DCIS status post lumpectomy and radiation.  She is on anastrozole.     The patient has been having joint aches and pain, especially in the lower extremities.  This has been going on since she started anastrozole.  She wants to stop anastrozole.     She has mild fatigue.  No headache.  No dizziness.  No chest pain.  No shortness of breath.  No cough.  No nausea or vomiting.  No abnormal bleeding.     Mammogram done yesterday is benign.     PHYSICAL EXAMINATION:    GENERAL:  She is alert and oriented x 3.  VITAL SIGNS:  Reviewed.  Not in any distress.  Rest of systems not examined.     ASSESSMENT:    1.  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  No evidence of recurrence.  2.  Muscle and joint pain.     PLAN:    1.  The patient is doing well from DCIS.  No evidence of recurrence.     She has been on anastrozole.  She is having side effects of aches and pain.  She wants to stop it.  I told the patient that 5 years of hormonal treatment is recommended.  She is  taking it only for 3 years.  The patient says that because of aches and pain her walking is difficult.  As per her wishes, we will stop the anastrozole.     It will be interesting to see if her symptoms improve after stopping anastrozole.     2.  The patient advised to continue to take calcium and vitamin D twice a day.       3.  She wanted a refill on famotidine, which was given.    4.  I will see her in 6 months' time.  I advised her to call us with any questions or concerns.

## 2021-08-17 ENCOUNTER — OFFICE VISIT (OUTPATIENT)
Dept: FAMILY MEDICINE | Facility: CLINIC | Age: 56
End: 2021-08-17
Payer: COMMERCIAL

## 2021-08-17 ENCOUNTER — PATIENT OUTREACH (OUTPATIENT)
Dept: OBGYN | Facility: CLINIC | Age: 56
End: 2021-08-17

## 2021-08-17 VITALS
HEART RATE: 77 BPM | DIASTOLIC BLOOD PRESSURE: 86 MMHG | OXYGEN SATURATION: 97 % | TEMPERATURE: 97.7 F | BODY MASS INDEX: 28.12 KG/M2 | RESPIRATION RATE: 16 BRPM | HEIGHT: 66 IN | WEIGHT: 175 LBS | SYSTOLIC BLOOD PRESSURE: 129 MMHG

## 2021-08-17 DIAGNOSIS — D05.12 DUCTAL CARCINOMA IN SITU (DCIS) OF LEFT BREAST: ICD-10-CM

## 2021-08-17 DIAGNOSIS — R87.610 ATYPICAL SQUAMOUS CELLS OF UNDETERMINED SIGNIFICANCE ON CYTOLOGIC SMEAR OF CERVIX (ASC-US): ICD-10-CM

## 2021-08-17 DIAGNOSIS — Z00.00 ROUTINE HISTORY AND PHYSICAL EXAMINATION OF ADULT: Primary | ICD-10-CM

## 2021-08-17 DIAGNOSIS — R30.0 DYSURIA: ICD-10-CM

## 2021-08-17 DIAGNOSIS — Z11.59 NEED FOR HEPATITIS C SCREENING TEST: ICD-10-CM

## 2021-08-17 DIAGNOSIS — R73.03 PREDIABETES: ICD-10-CM

## 2021-08-17 DIAGNOSIS — K76.0 HEPATIC STEATOSIS: ICD-10-CM

## 2021-08-17 PROBLEM — K80.20 CHOLELITHIASIS: Status: ACTIVE | Noted: 2021-08-17

## 2021-08-17 LAB
ALBUMIN SERPL-MCNC: 3.7 G/DL (ref 3.4–5)
ALBUMIN UR-MCNC: NEGATIVE MG/DL
ALP SERPL-CCNC: 95 U/L (ref 40–150)
ALT SERPL W P-5'-P-CCNC: 47 U/L (ref 0–50)
ANION GAP SERPL CALCULATED.3IONS-SCNC: 3 MMOL/L (ref 3–14)
APPEARANCE UR: CLEAR
AST SERPL W P-5'-P-CCNC: 33 U/L (ref 0–45)
BACTERIA #/AREA URNS HPF: ABNORMAL /HPF
BILIRUB SERPL-MCNC: 0.3 MG/DL (ref 0.2–1.3)
BILIRUB UR QL STRIP: NEGATIVE
BUN SERPL-MCNC: 12 MG/DL (ref 7–30)
CALCIUM SERPL-MCNC: 9.6 MG/DL (ref 8.5–10.1)
CHLORIDE BLD-SCNC: 108 MMOL/L (ref 94–109)
CO2 SERPL-SCNC: 28 MMOL/L (ref 20–32)
COLOR UR AUTO: YELLOW
CREAT SERPL-MCNC: 0.68 MG/DL (ref 0.52–1.04)
GFR SERPL CREATININE-BSD FRML MDRD: >90 ML/MIN/1.73M2
GLUCOSE BLD-MCNC: 93 MG/DL (ref 70–99)
GLUCOSE UR STRIP-MCNC: NEGATIVE MG/DL
HBA1C MFR BLD: 5.7 % (ref 0–5.6)
HCV AB SERPL QL IA: NONREACTIVE
HGB UR QL STRIP: ABNORMAL
KETONES UR STRIP-MCNC: NEGATIVE MG/DL
LEUKOCYTE ESTERASE UR QL STRIP: ABNORMAL
NITRATE UR QL: NEGATIVE
PH UR STRIP: 6.5 [PH] (ref 5–7)
POTASSIUM BLD-SCNC: 3.8 MMOL/L (ref 3.4–5.3)
PROT SERPL-MCNC: 7.4 G/DL (ref 6.8–8.8)
RBC #/AREA URNS AUTO: ABNORMAL /HPF
SODIUM SERPL-SCNC: 139 MMOL/L (ref 133–144)
SP GR UR STRIP: 1.02 (ref 1–1.03)
SQUAMOUS #/AREA URNS AUTO: ABNORMAL /LPF
UROBILINOGEN UR STRIP-ACNC: 0.2 E.U./DL
WBC #/AREA URNS AUTO: ABNORMAL /HPF

## 2021-08-17 PROCEDURE — 36415 COLL VENOUS BLD VENIPUNCTURE: CPT | Performed by: INTERNAL MEDICINE

## 2021-08-17 PROCEDURE — 80053 COMPREHEN METABOLIC PANEL: CPT | Performed by: INTERNAL MEDICINE

## 2021-08-17 PROCEDURE — 81001 URINALYSIS AUTO W/SCOPE: CPT | Performed by: INTERNAL MEDICINE

## 2021-08-17 PROCEDURE — 99396 PREV VISIT EST AGE 40-64: CPT | Performed by: INTERNAL MEDICINE

## 2021-08-17 PROCEDURE — 83036 HEMOGLOBIN GLYCOSYLATED A1C: CPT | Performed by: INTERNAL MEDICINE

## 2021-08-17 PROCEDURE — 86803 HEPATITIS C AB TEST: CPT | Performed by: INTERNAL MEDICINE

## 2021-08-17 RX ORDER — NITROFURANTOIN 25; 75 MG/1; MG/1
100 CAPSULE ORAL 2 TIMES DAILY
Qty: 10 CAPSULE | Refills: 0 | Status: SHIPPED | OUTPATIENT
Start: 2021-08-17 | End: 2021-08-22

## 2021-08-17 ASSESSMENT — ENCOUNTER SYMPTOMS
COUGH: 0
CHILLS: 0
SORE THROAT: 1
HEMATOCHEZIA: 0
FEVER: 0
HEMATURIA: 1
ABDOMINAL PAIN: 1
HEADACHES: 1
EYE PAIN: 1

## 2021-08-17 ASSESSMENT — MIFFLIN-ST. JEOR: SCORE: 1410.22

## 2021-08-17 NOTE — LETTER
August 17, 2021      Graciela Castorena  [address]  [place] [address]        Dear ,    This letter is to remind you that you are due for your follow-up Pap smear and Human Papillomavirus (HPV) test.    Please call [phone] to schedule your appointment at your earliest convenience.    If you have completed the appointment outside of the [facility] system, please have the records forwarded to our office. We will update your chart for your provider to review before your next annual wellness visit.     Thank you for choosing [facility]!      Sincerely,    Your [facility] Care Team

## 2021-08-17 NOTE — PROGRESS NOTES
SUBJECTIVE:   CC: Graciela Castorena is an 56 year old woman who presents for preventive health visit.     Patient has been advised of split billing requirements and indicates understanding: Yes  Healthy Habits:     Getting at least 3 servings of Calcium per day:  NO    Bi-annual eye exam:  NO    Dental care twice a year:  Yes    Sleep apnea or symptoms of sleep apnea:  None    Diet:  Regular (no restrictions) and Other    Frequency of exercise:  None    Taking medications regularly:  Yes    Medication side effects:  Not applicable    PHQ-2 Total Score: 0    Additional concerns today:  No      Today's PHQ-2 Score:   PHQ-2 ( 1999 Pf[employer]) 8/17/2021   Q1: Little interest or pleasure in doing things 0   Q2: Feeling down, depressed or hopeless 0   PHQ-2 Score 0   Q1: Little interest or pleasure in doing things Not at all   Q2: Feeling down, depressed or hopeless Not at all   PHQ-2 Score 0       Abuse: Current or Past (Physical, Sexual or Emotional) - No  Do you feel safe in your environment? Yes    Have you ever done Advance Care Planning? (For example, a Health Directive, POLST, or a discussion with a medical provider or your loved ones about your wishes): No, advance care planning information given to patient to review.  Patient plans to discuss their wishes with loved ones or provider.      Social History     Tobacco Use     Smoking status: Never Smoker     Smokeless tobacco: Never Used   Substance Use Topics     Alcohol use: No     Alcohol/week: 0.0 standard drinks       Alcohol Use 8/17/2021   Prescreen: >3 drinks/day or >7 drinks/week? No   Prescreen: >3 drinks/day or >7 drinks/week? -   No flowsheet data found.      FHS-7:   Breast CA Risk Assessment (FHS-7) 7/13/2021   Did any of your first-degree relatives have breast or ovarian cancer? No   Did any of your relatives have bilateral breast cancer? No   Did any man in your family have breast cancer? No   Did any woman in your family have breast and ovarian cancer?  "No   Did any woman in your family have breast cancer before age 50 y? No   Do you have 2 or more relatives with breast and/or ovarian cancer? No   Do you have 2 or more relatives with breast and/or bowel cancer? No       PAP / HPV Latest Ref Rng & Units 1/31/2020 8/29/2018 3/30/2015   PAP (Historical) - ASC-US(A) ASC-US(A) ASC-US(A)   HPV16 NEG:Negative Negative Negative Negative   HPV18 NEG:Negative Negative Negative Negative   HRHPV NEG:Negative Negative Negative Negative     Reviewed and updated as needed this visit by clinical staff  Tobacco  Allergies  Meds              Reviewed and updated as needed this visit by Provider                Specialists: zelda (onc)  Problem list and medications reviewed and updated. See below for additional notes.    Social: nonsmoker  Exercise/Diet: as per above; admits does not exercise; education provided    Pap: hx ascus, due for pap, declines today, states she would like referral to [facility] gyn  Mammo: UTD  Colonoscopy: UTD  Hep C screening: due    COVID: UTD  Tdap: UTD  Shingrix: check with insurance    2018 L breast DCIS, s/p lumpectomy 4/2018 and radiation. Tamoxifen started 5/2018. Stopped due to vaginal bleeding. Anastrozole started but then stopped due to aches/pain    Review of Systems  10 point ROS of systems including Constitutional, Eyes, Respiratory, Cardiovascular, Gastroenterology, Genitourinary, Integumentary, Muscularskeletal, Psychiatric were all negative except for pertinent positives noted in my HPI.       OBJECTIVE:   /86 (BP Location: Left arm, Patient Position: Sitting, Cuff Size: Adult Large)   Pulse 77   Temp 97.7  F (36.5  C) (Temporal)   Resp 16   Ht 1.676 m (5' 5.98\")   Wt 79.4 kg (175 lb)   SpO2 97%   BMI 28.26 kg/m    Physical Exam    GENERAL APPEARANCE: AAOx3, no distress. Well developed.    RESP: Lungs CTA bilaterally. No w/r/r. No distress     CV: RRR, S1/S2 present. No m/r/c.     ABDOMEN:  soft, nontender, no " "distention. No rebound or guarding.     EXT: No c/c/e in lower extremities b/l. No rashes or deformities noted.    MSK: ROM and strength intact in four extremities. No gross deformities noted.    SKIN: no suspicious lesions or rashes    NEURO: AAOx3, Motor function intact w/ 5/5 strength bilaterally to UE and LE.  Speech is fluent and comprehension intact. No gait abnormalities noted.    PSYCH: appropriate mood and affect.     GYN: Declines    ASSESSMENT/PLAN:   Graciela was seen today for physical.    Diagnoses and all orders for this visit:    Routine history and physical examination of adult  Lipids recently checked.   Encouraged lifestyle modifications and starting exercise, taper up as tolerated.  Update labs  -     Comprehensive metabolic panel; Future    Atypical squamous cells of undetermined significance on cytologic smear of cervix (ASC-US)  Due for repeat pap due to hx ASCUS. She declines today. Has been evaluated by gyn onc in the past. She requests referral to [facility] for a new gynecologist. Referral provided.  -     Ob/Gyn Referral; Future    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 breast   Following Dr Barahona. VINICIO w/ mammo.    She was on three years hormone therapy, stopped due to intolerance.     Hepatic steatosis   Check lft    Prediabetes  Encouraged exercise and healthy diet  -     Hemoglobin A1c; Future    Need for hepatitis C screening test  -     Hepatitis C antibody; Future    Dysuria  Pnt reports some dysuria. Check UA  -     UA Macro with Reflex to Micro and Culture - lab collect; Future    Other orders  -     REVIEW OF HEALTH MAINTENANCE PROTOCOL ORDERS    COUNSELING:  Reviewed preventive health counseling, as reflected in patient instructions       Regular exercise       Healthy diet/nutrition    Estimated body mass index is 28.26 kg/m  as calculated from the following:    Height as of this encounter: 1.676 m (5' 5.98\").    Weight as of this encounter: 79.4 kg (175 " lb).        She reports that she has never smoked. She has never used smokeless tobacco.      Counseling Resources:  ATP IV Guidelines  Pooled Cohorts Equation Calculator  Breast Cancer Risk Calculator  BRCA-Related Cancer Risk Assessment: FHS-7 Tool  FRAX Risk Assessment  ICSI Preventive Guidelines  Dietary Guidelines for Americans, 2010  USDA's MyPlate  ASA Prophylaxis  Lung CA Screening    Shayy Armas DO  [facility]

## 2021-08-26 ENCOUNTER — OFFICE VISIT (OUTPATIENT)
Dept: URGENT CARE | Facility: URGENT CARE | Age: 56
End: 2021-08-26
Payer: COMMERCIAL

## 2021-08-26 VITALS
DIASTOLIC BLOOD PRESSURE: 94 MMHG | SYSTOLIC BLOOD PRESSURE: 158 MMHG | OXYGEN SATURATION: 98 % | TEMPERATURE: 97.8 F | BODY MASS INDEX: 28.26 KG/M2 | WEIGHT: 175 LBS | HEART RATE: 89 BPM

## 2021-08-26 DIAGNOSIS — N76.0 BACTERIAL VAGINOSIS: Primary | ICD-10-CM

## 2021-08-26 DIAGNOSIS — B96.89 BACTERIAL VAGINOSIS: Primary | ICD-10-CM

## 2021-08-26 DIAGNOSIS — R30.0 DYSURIA: ICD-10-CM

## 2021-08-26 DIAGNOSIS — R31.0 GROSS HEMATURIA: ICD-10-CM

## 2021-08-26 LAB
ALBUMIN UR-MCNC: NEGATIVE MG/DL
APPEARANCE UR: CLEAR
BACTERIA #/AREA URNS HPF: ABNORMAL /HPF
BILIRUB UR QL STRIP: NEGATIVE
CLUE CELLS: PRESENT
COLOR UR AUTO: YELLOW
GLUCOSE UR STRIP-MCNC: NEGATIVE MG/DL
HGB UR QL STRIP: ABNORMAL
KETONES UR STRIP-MCNC: NEGATIVE MG/DL
LEUKOCYTE ESTERASE UR QL STRIP: NEGATIVE
NITRATE UR QL: NEGATIVE
PH UR STRIP: 6 [PH] (ref 5–7)
RBC #/AREA URNS AUTO: ABNORMAL /HPF
SP GR UR STRIP: 1.01 (ref 1–1.03)
SQUAMOUS #/AREA URNS AUTO: ABNORMAL /LPF
TRICHOMONAS, WET PREP: ABNORMAL
UROBILINOGEN UR STRIP-ACNC: 0.2 E.U./DL
WBC #/AREA URNS AUTO: ABNORMAL /HPF
WBC'S/HIGH POWER FIELD, WET PREP: ABNORMAL
YEAST, WET PREP: ABNORMAL

## 2021-08-26 PROCEDURE — 99215 OFFICE O/P EST HI 40 MIN: CPT | Performed by: NURSE PRACTITIONER

## 2021-08-26 PROCEDURE — 87210 SMEAR WET MOUNT SALINE/INK: CPT

## 2021-08-26 PROCEDURE — 81001 URINALYSIS AUTO W/SCOPE: CPT

## 2021-08-26 RX ORDER — METRONIDAZOLE 500 MG/1
500 TABLET ORAL 2 TIMES DAILY
Qty: 14 TABLET | Refills: 0 | Status: SHIPPED | OUTPATIENT
Start: 2021-08-26 | End: 2021-09-02

## 2021-08-26 NOTE — PROGRESS NOTES
Chief Complaint   Patient presents with     Urgent Care     vaginal bleeding, hard to pee,  and left ear infection          ICD-10-CM    1. Bacterial vaginosis  N76.0 metroNIDAZOLE (FLAGYL) 500 MG tablet    B96.89    2. Dysuria  R30.0 UA macro with reflex to Microscopic and Culture - Clinc Collect     Urine Microscopic   3. Gross hematuria  R31.0 Adult Urology Referral   Will treat bacterial vaginosis with metronidazole and await results of urine culture.  She has had hematuria for over 6 months and this has not been looked into further than checking for UTIs.  Referred patient to urology to have this further evaluated.    Patient was also treated for breast cancer 3 years ago and was supposed to be taking anastrozole for 5 years after.  She spoke with her oncologist in July and she opted to stop taking anastrozole because she thought it was causing her side effects.  Oncologist encouraged her to continue taking but patient declined to do so.  Encouraged patient to make follow-up appointment if urology does not find a cause of her symptoms and as previously directed.    42 minutes total time spent reviewing chart, completing history and physical exam, discussing plan of care, and completing documentation.    Results for orders placed or performed in visit on 08/26/21 (from the past 24 hour(s))   UA macro with reflex to Microscopic and Culture - Clinc Collect    Specimen: Urine, Clean Catch   Result Value Ref Range    Color Urine Yellow Colorless, Straw, Light Yellow, Yellow    Appearance Urine Clear Clear    Glucose Urine Negative Negative mg/dL    Bilirubin Urine Negative Negative    Ketones Urine Negative Negative mg/dL    Specific Gravity Urine 1.015 1.003 - 1.035    Blood Urine Moderate (A) Negative    pH Urine 6.0 5.0 - 7.0    Protein Albumin Urine Negative Negative mg/dL    Urobilinogen Urine 0.2 0.2, 1.0 E.U./dL    Nitrite Urine Negative Negative    Leukocyte Esterase Urine Negative Negative   Wet prep -  Clinic Collect    Specimen: Vagina; Swab   Result Value Ref Range    Trichomonas Absent Absent    Yeast Absent Absent    Clue Cells Present (A) Absent    WBCs/high power field 2+ (A) None   Urine Microscopic   Result Value Ref Range    Bacteria Urine None Seen None Seen /HPF    RBC Urine  (A) 0-2 /HPF /HPF    WBC Urine 0-5 0-5 /HPF /HPF    Squamous Epithelials Urine Few (A) None Seen /LPF    Narrative    Urine Culture not indicated       Subjective     Graciela Castorena is an 56 year old female who presents to clinic today for left ear throbbing for 3 months, hears noise in the ear at night that keeps her awake.    She also is complaining of dysuria and blood in her urine.  She has noted the blood in her urine for months but this has not been looked into further.    ROS: 10 point ROS neg other than the symptoms noted above in the HPI.       Objective    BP (!) 158/94   Pulse 89   Temp 97.8  F (36.6  C)   Wt 79.4 kg (175 lb)   SpO2 98%   BMI 28.26 kg/m      Physical Exam       GENERAL APPEARANCE: healthy appearing, alert     EYES: PERRL, EOMI, sclera non-icteric     HENT: oral exam benign, mucus membranes intact, without ulcers or lesions     NECK: no adenopathy or asymmetry, thyroid normal to palpation     RESP: lungs clear to auscultation - no rales, rhonchi or wheezes     CV: regular rates and rhythm, no murmurs, rubs, or gallop     ABDOMEN:  soft, nontender, no HSM or masses and bowel sounds normal, no CVA tenderness     MS: extremities normal- no gross deformities noted; normal muscle tone.     SKIN: no suspicious lesions or rashes     NEURO: Normal strength and tone, mentation intact and speech normal     PSYCH: normal thought process; no significant mood disturbance    Patient Instructions     Patient Education     Blood in the Urine    Blood in the urine (hematuria) has many possible causes. If it occurs after an injury (such as a car accident or fall), it is most often a sign of bruising to the  kidney or bladder. Common causes of blood in the urine include urinary tract infections, kidney stones, inflammation, tumors, or certain other diseases of the kidney or bladder. Menstruation can cause blood to appear in the urine sample, but it is not coming from the urinary tract.   If only a tiny (trace) amount of blood is present, it will show up on the urine test, even though the urine may be yellow and not pink or red. This may occur with any of the above conditions, as well as heavy exercise or high fever. In this case, your healthcare provider may want to repeat the urine test on another day. This will show if there is still blood in the urine. If there is, then other tests can be done to find out the cause.   Home care  Follow these home care guidelines:    If your urine does not look bloody (pink, brown, or red) then you don't need to restrict your activity in any way.    If you can see blood in your urine, rest and don't do any strenuous activity until your next exam. Don't use aspirin, blood thinners, or anti-platelet or anti-inflammatory medicines. These include ibuprofen and naproxen. These thin the blood and may increase bleeding. Call your healthcare provider to talk about using these medicines.  Follow-up care  Follow up with your healthcare provider, or as advised. If you were injured and had blood in your urine, you should have a repeat urine test in 1 to 2 days. Contact your provider for this test.   A radiologist will review any X-rays that were taken. You will be told of any new findings that may affect your care.   When to seek medical advice  Call your healthcare provider right away if any of these occur:    Bright red blood or blood clots in the urine (if you did not have this before)    Weakness, dizziness or fainting    New groin, belly, or back pain    Fever of 100.4 F (38 C) or higher, or as directed by your provider    Repeated vomiting    Bleeding from the nose or gums or easy  bruising  [employer] last reviewed this educational content on 9/1/2019 2000-2021 [employer]. All rights reserved. This information is not intended as a substitute for professional medical care. Always follow your healthcare professional's instructions.           Patient Education     Vaginal Infection: Bacterial Vaginosis  Both good and bad bacteria are present in a healthy vagina. Bacterial vaginosis (BV) occurs when these bacteria get out of balance. The numbers of good bacteria decrease. This allows the numbers of bad bacteria to increase and cause BV. In most cases, BV is not a serious problem.   Causes of bacterial vaginosis  The cause of BV is not clear. Douching may lead to it. Having sex with a new partner or more than 1 partner makes it more likely.   Symptoms of bacterial vaginosis  Symptoms of BV vary for each woman. Some women have few symptoms or none at all. If symptoms are present, they can include:     Thin, milky white or gray or sometimes green discharge    Unpleasant  fishy  odor    Irritation, itching, and burning at opening of vagina. This may mean it's caused by more than one type of bacteria.     Burning or irritation with sex or urination. This may mean it's caused by more than one type of bacteria.  Diagnosing bacterial vaginosis  Your healthcare provider will ask about your symptoms and health history. He or she will also do a pelvic exam. This is an exam of your vagina and cervix. A sample of vaginal fluid or discharge may be taken. This sample is checked for signs of BV.   Treating bacterial vaginosis  BV is often treated with antibiotics. They may be given in oral pill form or as a vaginal cream. To use these medicines:     Be sure to take all of your medicine, even if your symptoms go away.    If you re taking antibiotic pills, don't drink alcohol until you re finished with all of your medicine.    If you re using vaginal cream, apply it as directed. Be aware that the  cream may make condoms and diaphragms less effective.    Call your healthcare provider if symptoms dot go away within 4 days of starting treatment. Also call if you have a reaction to the medicine.  Why treatment matters  Even if you have no symptoms or your symptoms are mild, BV should be treated. Untreated BV can lead to health problems such as:     Increased risk for  delivery if you re pregnant    Increased risk for complications after surgery on the reproductive organs    Possible increased risk for pelvic inflammatory disease (PID)  [employer] last reviewed this educational content on 2020-2021 [employer]. All rights reserved. This information is not intended as a substitute for professional medical care. Always follow your healthcare professional's instructions.               CHRIS Mcdonald, CNP  [facility] Provider

## 2021-08-26 NOTE — PATIENT INSTRUCTIONS
Patient Education     Blood in the Urine    Blood in the urine (hematuria) has many possible causes. If it occurs after an injury (such as a car accident or fall), it is most often a sign of bruising to the kidney or bladder. Common causes of blood in the urine include urinary tract infections, kidney stones, inflammation, tumors, or certain other diseases of the kidney or bladder. Menstruation can cause blood to appear in the urine sample, but it is not coming from the urinary tract.   If only a tiny (trace) amount of blood is present, it will show up on the urine test, even though the urine may be yellow and not pink or red. This may occur with any of the above conditions, as well as heavy exercise or high fever. In this case, your healthcare provider may want to repeat the urine test on another day. This will show if there is still blood in the urine. If there is, then other tests can be done to find out the cause.   Home care  Follow these home care guidelines:    If your urine does not look bloody (pink, brown, or red) then you don't need to restrict your activity in any way.    If you can see blood in your urine, rest and don't do any strenuous activity until your next exam. Don't use aspirin, blood thinners, or anti-platelet or anti-inflammatory medicines. These include ibuprofen and naproxen. These thin the blood and may increase bleeding. Call your healthcare provider to talk about using these medicines.  Follow-up care  Follow up with your healthcare provider, or as advised. If you were injured and had blood in your urine, you should have a repeat urine test in 1 to 2 days. Contact your provider for this test.   A radiologist will review any X-rays that were taken. You will be told of any new findings that may affect your care.   When to seek medical advice  Call your healthcare provider right away if any of these occur:    Bright red blood or blood clots in the urine (if you did not have this  before)    Weakness, dizziness or fainting    New groin, belly, or back pain    Fever of 100.4 F (38 C) or higher, or as directed by your provider    Repeated vomiting    Bleeding from the nose or gums or easy bruising  [employer] last reviewed this educational content on 9/1/2019 2000-2021 [employer]. All rights reserved. This information is not intended as a substitute for professional medical care. Always follow your healthcare professional's instructions.           Patient Education     Vaginal Infection: Bacterial Vaginosis  Both good and bad bacteria are present in a healthy vagina. Bacterial vaginosis (BV) occurs when these bacteria get out of balance. The numbers of good bacteria decrease. This allows the numbers of bad bacteria to increase and cause BV. In most cases, BV is not a serious problem.   Causes of bacterial vaginosis  The cause of BV is not clear. Douching may lead to it. Having sex with a new partner or more than 1 partner makes it more likely.   Symptoms of bacterial vaginosis  Symptoms of BV vary for each woman. Some women have few symptoms or none at all. If symptoms are present, they can include:     Thin, milky white or gray or sometimes green discharge    Unpleasant  fishy  odor    Irritation, itching, and burning at opening of vagina. This may mean it's caused by more than one type of bacteria.     Burning or irritation with sex or urination. This may mean it's caused by more than one type of bacteria.  Diagnosing bacterial vaginosis  Your healthcare provider will ask about your symptoms and health history. He or she will also do a pelvic exam. This is an exam of your vagina and cervix. A sample of vaginal fluid or discharge may be taken. This sample is checked for signs of BV.   Treating bacterial vaginosis  BV is often treated with antibiotics. They may be given in oral pill form or as a vaginal cream. To use these medicines:     Be sure to take all of your medicine,  even if your symptoms go away.    If you re taking antibiotic pills, don't drink alcohol until you re finished with all of your medicine.    If you re using vaginal cream, apply it as directed. Be aware that the cream may make condoms and diaphragms less effective.    Call your healthcare provider if symptoms dot go away within 4 days of starting treatment. Also call if you have a reaction to the medicine.  Why treatment matters  Even if you have no symptoms or your symptoms are mild, BV should be treated. Untreated BV can lead to health problems such as:     Increased risk for  delivery if you re pregnant    Increased risk for complications after surgery on the reproductive organs    Possible increased risk for pelvic inflammatory disease (PID)  [employer] last reviewed this educational content on 2020-2021 [employer]. All rights reserved. This information is not intended as a substitute for professional medical care. Always follow your healthcare professional's instructions.

## 2021-08-27 ENCOUNTER — TELEPHONE (OUTPATIENT)
Dept: UROLOGY | Facility: CLINIC | Age: 56
End: 2021-08-27

## 2021-08-27 NOTE — TELEPHONE ENCOUNTER
[facility] Call Center    Phone Message    May a detailed message be left on voicemail: yes     Reason for Call: Appointment Intake    Referring Provider Name: Kalie Riggs CNP in  URGENT CARE    Diagnosis and/or Symptoms: Gross Hematuria    Action Taken: Message routed to:  [facility] ua uro    Travel Screening: Not Applicable

## 2021-08-29 ENCOUNTER — HOSPITAL ENCOUNTER (EMERGENCY)
Facility: CLINIC | Age: 56
Discharge: HOME | End: 2021-08-29
Attending: EMERGENCY MEDICINE | Admitting: EMERGENCY MEDICINE
Payer: COMMERCIAL

## 2021-08-29 ENCOUNTER — APPOINTMENT (OUTPATIENT)
Dept: CT IMAGING | Facility: CLINIC | Age: 56
End: 2021-08-29
Attending: EMERGENCY MEDICINE
Payer: COMMERCIAL

## 2021-08-29 VITALS
HEIGHT: 63 IN | RESPIRATION RATE: 20 BRPM | WEIGHT: 160 LBS | OXYGEN SATURATION: 100 % | TEMPERATURE: 98.4 F | DIASTOLIC BLOOD PRESSURE: 78 MMHG | BODY MASS INDEX: 28.35 KG/M2 | SYSTOLIC BLOOD PRESSURE: 138 MMHG | HEART RATE: 76 BPM

## 2021-08-29 DIAGNOSIS — R10.9 FLANK PAIN: ICD-10-CM

## 2021-08-29 DIAGNOSIS — R35.0 URINARY FREQUENCY: ICD-10-CM

## 2021-08-29 DIAGNOSIS — R31.0 GROSS HEMATURIA: ICD-10-CM

## 2021-08-29 LAB
ALBUMIN UR-MCNC: NEGATIVE MG/DL
ANION GAP SERPL CALCULATED.3IONS-SCNC: 2 MMOL/L (ref 3–14)
APPEARANCE UR: CLEAR
BASOPHILS # BLD AUTO: 0.1 10E3/UL (ref 0–0.2)
BASOPHILS NFR BLD AUTO: 1 %
BILIRUB UR QL STRIP: NEGATIVE
BUN SERPL-MCNC: 11 MG/DL (ref 7–30)
CALCIUM SERPL-MCNC: 8.9 MG/DL (ref 8.5–10.1)
CHLORIDE BLD-SCNC: 108 MMOL/L (ref 94–109)
CO2 SERPL-SCNC: 27 MMOL/L (ref 20–32)
COLOR UR AUTO: ABNORMAL
CREAT SERPL-MCNC: 0.81 MG/DL (ref 0.52–1.04)
EOSINOPHIL # BLD AUTO: 0.3 10E3/UL (ref 0–0.7)
EOSINOPHIL NFR BLD AUTO: 4 %
ERYTHROCYTE [DISTWIDTH] IN BLOOD BY AUTOMATED COUNT: 13.3 % (ref 10–15)
GFR SERPL CREATININE-BSD FRML MDRD: 83 ML/MIN/1.73M2
GLUCOSE BLD-MCNC: 118 MG/DL (ref 70–99)
GLUCOSE UR STRIP-MCNC: NEGATIVE MG/DL
HCT VFR BLD AUTO: 43.9 % (ref 35–47)
HGB BLD-MCNC: 14.5 G/DL (ref 11.7–15.7)
HGB UR QL STRIP: ABNORMAL
IMM GRANULOCYTES # BLD: 0 10E3/UL
IMM GRANULOCYTES NFR BLD: 0 %
KETONES UR STRIP-MCNC: ABNORMAL MG/DL
LEUKOCYTE ESTERASE UR QL STRIP: ABNORMAL
LYMPHOCYTES # BLD AUTO: 2.4 10E3/UL (ref 0.8–5.3)
LYMPHOCYTES NFR BLD AUTO: 26 %
MCH RBC QN AUTO: 29.3 PG (ref 26.5–33)
MCHC RBC AUTO-ENTMCNC: 33 G/DL (ref 31.5–36.5)
MCV RBC AUTO: 89 FL (ref 78–100)
MONOCYTES # BLD AUTO: 0.7 10E3/UL (ref 0–1.3)
MONOCYTES NFR BLD AUTO: 8 %
MUCOUS THREADS #/AREA URNS LPF: PRESENT /LPF
NEUTROPHILS # BLD AUTO: 5.6 10E3/UL (ref 1.6–8.3)
NEUTROPHILS NFR BLD AUTO: 61 %
NITRATE UR QL: NEGATIVE
NRBC # BLD AUTO: 0 10E3/UL
NRBC BLD AUTO-RTO: 0 /100
PH UR STRIP: 6 [PH] (ref 5–7)
PLATELET # BLD AUTO: 248 10E3/UL (ref 150–450)
POTASSIUM BLD-SCNC: 4.2 MMOL/L (ref 3.4–5.3)
RBC # BLD AUTO: 4.95 10E6/UL (ref 3.8–5.2)
RBC URINE: 14 /HPF
SODIUM SERPL-SCNC: 137 MMOL/L (ref 133–144)
SP GR UR STRIP: 1.02 (ref 1–1.03)
SQUAMOUS EPITHELIAL: 3 /HPF
UROBILINOGEN UR STRIP-MCNC: NORMAL MG/DL
WBC # BLD AUTO: 9.1 10E3/UL (ref 4–11)
WBC URINE: 3 /HPF

## 2021-08-29 PROCEDURE — 96374 THER/PROPH/DIAG INJ IV PUSH: CPT

## 2021-08-29 PROCEDURE — 87086 URINE CULTURE/COLONY COUNT: CPT | Performed by: EMERGENCY MEDICINE

## 2021-08-29 PROCEDURE — 250N000011 HC RX IP 250 OP 636: Performed by: EMERGENCY MEDICINE

## 2021-08-29 PROCEDURE — 99285 EMERGENCY DEPT VISIT HI MDM: CPT | Mod: 25

## 2021-08-29 PROCEDURE — 80048 BASIC METABOLIC PNL TOTAL CA: CPT | Performed by: EMERGENCY MEDICINE

## 2021-08-29 PROCEDURE — 258N000003 HC RX IP 258 OP 636: Performed by: EMERGENCY MEDICINE

## 2021-08-29 PROCEDURE — 85025 COMPLETE CBC W/AUTO DIFF WBC: CPT | Performed by: EMERGENCY MEDICINE

## 2021-08-29 PROCEDURE — 36415 COLL VENOUS BLD VENIPUNCTURE: CPT | Performed by: EMERGENCY MEDICINE

## 2021-08-29 PROCEDURE — 96361 HYDRATE IV INFUSION ADD-ON: CPT

## 2021-08-29 PROCEDURE — 81001 URINALYSIS AUTO W/SCOPE: CPT | Performed by: EMERGENCY MEDICINE

## 2021-08-29 PROCEDURE — 74176 CT ABD & PELVIS W/O CONTRAST: CPT

## 2021-08-29 RX ORDER — MORPHINE SULFATE 4 MG/ML
4 INJECTION, SOLUTION INTRAMUSCULAR; INTRAVENOUS
Status: COMPLETED | OUTPATIENT
Start: 2021-08-29 | End: 2021-08-29

## 2021-08-29 RX ADMIN — SODIUM CHLORIDE 1000 ML: 9 INJECTION, SOLUTION INTRAVENOUS at 22:45

## 2021-08-29 RX ADMIN — MORPHINE SULFATE 4 MG: 4 INJECTION, SOLUTION INTRAMUSCULAR; INTRAVENOUS at 22:44

## 2021-08-29 ASSESSMENT — MIFFLIN-ST. JEOR: SCORE: 1294.89

## 2021-08-29 ASSESSMENT — ENCOUNTER SYMPTOMS
BACK PAIN: 1
NAUSEA: 0
VOMITING: 0

## 2021-08-30 NOTE — ED TRIAGE NOTES
Patient here with frequent urination with some blood ongoing since last week. She denies having a fever . She also c/o having high blood pressure

## 2021-08-30 NOTE — DISCHARGE INSTRUCTIONS
*You may resume diet and activities as tolerated.  Drink plenty of fluids.  *No new medications.  *Follow up with urology as scheduled.   *Return if you develop fever, vomiting, have decreased or no urine output, faint or feel like you will faint or become worse in any way.

## 2021-08-30 NOTE — ED PROVIDER NOTES
History   Chief Complaint:  Urinary Frequency       HPI   Graciela Castorena is a 56 year old female with who presents for an evaluation of urinary frequency. The patient stated that she started experiencing frequent need for urination, visited  and was prescribed antibiotic. Today she presents with similar symptoms of frequent urination with some blood in the urine along with a back pain. She additionally said that has a high blood pressure. She denies nausea or vomiting and is not sure about history of kidney stones.  She said she does have a appointment set up with urology regarding the hematuria.  Review of care everywhere shows that she was diagnosed with bacterial vaginitis and started on Flagyl.  Urinalysis did not show any signs of infection at that time.    Review of Systems   Gastrointestinal: Negative for nausea and vomiting.   Genitourinary: Positive for urgency.   Musculoskeletal: Positive for back pain.   All other systems reviewed and are negative.    Allergies:  Ciprofloxacin    Medications:  metroNIDAZOLE (FLAGYL) 500 MG tablet  polyethylene glycol (MIRALAX) 17 GM/Dose powder    Past Medical History:    Abnormal pap smear of cervix  Ductal carcinoma in situ of left breast  Cholelithiasis   Hepatic steatosis   Prediabetes  ASCUS     Past Surgical History:    Colonoscopy  Lumpectomy     Family History:    Diabetes type 2    Brother, sister     Social History:  The patient presents to the emergency department alone.  She took an Uber to get here and plans to take an Uber to get home.  She reports that she immigrated to the [place] in [place].  One of her daughters is a nurse.  Another daughter is in medical school and doing research in [place] with [employer].  She has a son who is special needs and goes to [employer].    Physical Exam     Patient Vitals for the past 24 hrs:   BP Temp Temp src Pulse Resp SpO2 Height Weight   08/29/21 2200 (!) 140/79 -- -- 89 -- 97 % -- --   08/29/21 2105 (!) 144/92 98.4  F (36.9  " C) Oral 93 20 99 % 1.6 m (5' 3\") 72.6 kg (160 lb)       Physical Exam  General: Well-nourished, appears to be resting comfortably when I enter the room  Eyes: PERRL, conjunctivae pink no scleral icterus or conjunctival injection  ENT:  Moist mucus membranes, posterior oropharynx clear without erythema or exudates  Respiratory:  Lungs clear to auscultation bilaterally, no crackles/rubs/wheezes.  Good air movement  CV: Normal rate and rhythm, no murmurs/rubs/gallops  GI:  Abdomen soft and non-distended.  Normoactive BS.  Mild left lower abdominal tenderness, no guarding or rebound  Skin: Warm, dry.  No rashes or petechiae  Musculoskeletal: No peripheral edema or calf tenderness. + Mild left flank tenderness to percussion  Neuro: Alert and oriented to person/place/time  Psychiatric: Normal affect      Emergency Department Course     Imaging:  CT Abdomen Pelvis without Contrast (stone protocol)   Final Result   IMPRESSION:    1.  No definite etiology for symptoms. No urinary tract stone or hydronephrosis. No inflammatory focus involving bowel.   2.  Cholelithiasis unchanged.           Laboratory:  CBC: WBC 9.1, HGB 14.5,      BMP: Glucose 118 (H), Anion Gap: 2 (L), o/w WNL (Creatinine: 0.81)    UA: Color: orange; Ketones: Trace, Blood: Moderate, Leukocyte Esterase: Small, RBC: 14 (H), Squamous Epithelial: 3 (H), Mucous: Present, o/w Negative    Emergency Department Course:    Reviewed:  I reviewed nursing notes, vitals, past medical history and care everywhere    Assessments/Consults  2149 I obtained history and examined the patient as noted above.     Interventions:  2244 Morphine 4 mg IV  2245 NS 1000 mL IV    Disposition:  The patient was discharged to home.       Impression & Plan     CMS Diagnoses: None    Medical Decision Making:  Graciela Castorena is a delightful 56 year old female who comes today with hematuria as well as some abdominal pain, urinary frequency and left-sided flank pain.  Urinalysis at " urgent care did not appear to be infected.  Her laboratory studies today reveal a normal hemoglobin and normal kidney function.  Urinalysis shows hematuria but no definite signs of infection.  I did send this for urine culture regardless given her symptoms.  A CT scan was obtained to rule out a kidney stone or another obvious mass.  This showed cholelithiasis without signs of cholecystitis or any other significant abnormality.  The patient was given fluid hydration.  She is not having urinary retention.  I discussed with her that she should take ibuprofen or Tylenol for pain.  She declined anything stronger.  I discussed with her that she should try to stay well-hydrated.  She is going to follow-up with the urologist as scheduled.  She is asked to return if any worsening.  At this time, with reasonable clinical confidence, I do believe she safe for discharge home.    Covid-19  Graciela Castorena was evaluated during a global COVID-19 pandemic, which necessitated consideration that the patient might be at risk for infection with the SARS-CoV-2 virus that causes COVID-19.   Applicable protocols for evaluation were followed during the patient's care.   COVID-19 was considered as part of the patient's evaluation.    Diagnosis:    ICD-10-CM    1. Gross hematuria  R31.0    2. Flank pain  R10.9    3. Urinary frequency  R35.0        Discharge Medications:  New Prescriptions    No medications on file       Scribe Disclosure:  Dominick SIERRA, am serving as a scribe at 10:07 PM on 8/29/2021 to document services personally performed by Kailey Miller MD based on my observations and the provider's statements to me.            Kailey Miller MD  08/30/21 0216

## 2021-08-31 LAB — BACTERIA UR CULT: NO GROWTH

## 2021-08-31 NOTE — RESULT ENCOUNTER NOTE
"Final urine culture report shows \"NO GROWTH\" and is NEGATIVE.  Recommendations in treatment per [facility] ED Lab result Urine culture protocol.    "

## 2021-09-03 ENCOUNTER — OFFICE VISIT (OUTPATIENT)
Dept: UROLOGY | Facility: CLINIC | Age: 56
End: 2021-09-03
Payer: COMMERCIAL

## 2021-09-03 VITALS
WEIGHT: 160 LBS | DIASTOLIC BLOOD PRESSURE: 82 MMHG | BODY MASS INDEX: 26.66 KG/M2 | HEIGHT: 65 IN | OXYGEN SATURATION: 99 % | SYSTOLIC BLOOD PRESSURE: 122 MMHG | HEART RATE: 96 BPM

## 2021-09-03 DIAGNOSIS — R31.0 GROSS HEMATURIA: Primary | ICD-10-CM

## 2021-09-03 LAB
ALBUMIN UR-MCNC: NEGATIVE MG/DL
APPEARANCE UR: CLEAR
BILIRUB UR QL STRIP: NEGATIVE
COLOR UR AUTO: YELLOW
GLUCOSE UR STRIP-MCNC: NEGATIVE MG/DL
HGB UR QL STRIP: ABNORMAL
KETONES UR STRIP-MCNC: NEGATIVE MG/DL
LEUKOCYTE ESTERASE UR QL STRIP: NEGATIVE
NITRATE UR QL: NEGATIVE
PH UR STRIP: 5.5 [PH] (ref 5–7)
SP GR UR STRIP: 1.01 (ref 1–1.03)
UROBILINOGEN UR STRIP-ACNC: 0.2 E.U./DL

## 2021-09-03 PROCEDURE — 99204 OFFICE O/P NEW MOD 45 MIN: CPT | Performed by: PHYSICIAN ASSISTANT

## 2021-09-03 PROCEDURE — 81003 URINALYSIS AUTO W/O SCOPE: CPT | Performed by: PHYSICIAN ASSISTANT

## 2021-09-03 PROCEDURE — 88112 CYTOPATH CELL ENHANCE TECH: CPT | Performed by: PATHOLOGY

## 2021-09-03 ASSESSMENT — MIFFLIN-ST. JEOR: SCORE: 1326.64

## 2021-09-03 ASSESSMENT — PAIN SCALES - GENERAL: PAINLEVEL: MILD PAIN (2)

## 2021-09-03 NOTE — PATIENT INSTRUCTIONS
- Urine cytology to look for abnormal cells.  - CT scan  - Cystoscopy with the  urologist to evaluate the interior of the bladder. Follow up as recommended by the urologist.

## 2021-09-03 NOTE — PROGRESS NOTES
CC: Hematuria.    HPI: It is a pleasure to see Ms. Graciela Castorena, a pleasant 56 year old female, asked to be seen in consultation by Kalie Riggs CNP for evaluation of gross hematuria.     The patient has had several episodes of gross hematuria with clots. CT w/o and UC neg in ER on 8/29/21. Ms. Castorena voids without difficulty.  She currently denies any pyuria, hesitancy, intermittency, feelings of incomplete emptying, or any recent history of urinary tract infections or stones.      Had benign micro hematuria eval in 2018 with Dr. Cummins.     Hematuria Risk Factors:  Age >40: Yes     Smoking history: no  Occupational exposure to chemicals or dyes (ie, benzenes, aromatic amines): no  History of urologic disorder or disease: no  History of irritative voiding symptoms: no  History of urinary tract infection: no  Analgesic abuse: no  History of pelvic irradiation: no    Past Medical History:   Diagnosis Date     Abnormal Pap smear of cervix 03/30/2015    see problem list     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 breast 2018    s/p lumpectomy and radiation; on Tamoxifen     Palpitations      Tuberculosis      Past Surgical History:   Procedure Laterality Date     COLONOSCOPY N/A 2/12/2018    Procedure: COLONOSCOPY;  colonoscopy;  Surgeon: Sabas Villegas MD;  Location:  GI     LUMPECTOMY BREAST WITH SEED LOCALIZATION Left 4/18/2018    Procedure: LUMPECTOMY BREAST WITH SEED LOCALIZATION;  SEED LOCALIZED LEFT BREAST LUMPECTOMY ;  Surgeon: Meli Rodriguez MD;  Location: [facility]     Current Outpatient Medications   Medication Sig Dispense Refill     calcium carbonate-vitamin D (OSCAL W/D) 500-200 MG-UNIT tablet Take 1 tablet by mouth 2 times daily 180 tablet 3     ibuprofen (ADVIL/MOTRIN) 400 MG tablet Take 1 tablet (400 mg) by mouth every 8 hours as needed 60 tablet 1     polyethylene glycol (MIRALAX) 17 GM/Dose powder Take 17 g (1 capful) by mouth daily 507 g 1     Allergies   Allergen Reactions      Ciprofloxacin Itching     FAMILY HISTORY: There is no reported history of genitourinary carcinoma.  There is no history of urolithiasis.      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 Not Asked   Social History Narrative    .    8 adult kids - 7 are here. 1 still in [place].     One grandchild (as of 2019).    Walks a lot.      Social Determinants of Health     Financial Resource Strain:      Difficulty of Paying Living Expenses:    Food Insecurity:      Worried About Running Out of Food in the Last Year:      Ran Out of Food in the Last Year:    Transportation Needs:      Lack of Transportation (Medical):      Lack of Transportation (Non-Medical):    Physical Activity:      Days of Exercise per Week:      Minutes of Exercise per Session:    Stress:      Feeling of Stress :    Social Connections:      Frequency of Communication with Friends and Family:      Frequency of Social Gatherings with Friends and Family:      Attends Church Services:      Active Member of Clubs or Organizations:      Attends Club or Organization Meetings:      Marital Status:    Intimate Partner Violence:      Fear of Current or Ex-Partner:      Emotionally Abused:      Physically Abused:      Sexually Abused:        ROS: A comprehensive 10 point ROS was obtained and negative except for that outlined above in the HPI.    PHYSICAL EXAM:   Vitals:    09/03/21 1259   BP: 122/82   BP Location: Left arm  "  Patient Position: Sitting   Cuff Size: Adult Regular   Pulse: 96   SpO2: 99%   Weight: 72.6 kg (160 lb)   Height: 1.651 m (5' 5\")     PSYCH: NAD  EYES: EOMI  MOUTH: MMM  NEURO: AAO x3    Admission on 08/29/2021, Discharged on 08/29/2021   Component Date Value Ref Range Status     Color Urine 08/29/2021 Orange* Colorless, Straw, Light Yellow, Yellow Final     Appearance Urine 08/29/2021 Clear  Clear Final     Glucose Urine 08/29/2021 Negative  Negative mg/dL Final     Bilirubin Urine 08/29/2021 Negative  Negative Final     Ketones Urine 08/29/2021 Trace* Negative mg/dL Final     Specific Gravity Urine 08/29/2021 1.016  1.003 - 1.035 Final     Blood Urine 08/29/2021 Moderate* Negative Final     pH Urine 08/29/2021 6.0  5.0 - 7.0 Final     Protein Albumin Urine 08/29/2021 Negative  Negative mg/dL Final     Urobilinogen Urine 08/29/2021 Normal  Normal, 2.0 mg/dL Final     Nitrite Urine 08/29/2021 Negative  Negative Final     Leukocyte Esterase Urine 08/29/2021 Small* Negative Final     Mucus Urine 08/29/2021 Present* None Seen /LPF Final     RBC Urine 08/29/2021 14* <=2 /HPF Final     WBC Urine 08/29/2021 3  <=5 /HPF Final     Squamous Epithelials Urine 08/29/2021 3* <=1 /HPF Final     Sodium 08/29/2021 137  133 - 144 mmol/L Final     Potassium 08/29/2021 4.2  3.4 - 5.3 mmol/L Final    Specimen slightly hemolyzed, potassium may be falsely elevated.     Chloride 08/29/2021 108  94 - 109 mmol/L Final     Carbon Dioxide (CO2) 08/29/2021 27  20 - 32 mmol/L Final     Anion Gap 08/29/2021 2* 3 - 14 mmol/L Final     Urea Nitrogen 08/29/2021 11  7 - 30 mg/dL Final     Creatinine 08/29/2021 0.81  0.52 - 1.04 mg/dL Final     Calcium 08/29/2021 8.9  8.5 - 10.1 mg/dL Final     Glucose 08/29/2021 118* 70 - 99 mg/dL Final     GFR Estimate 08/29/2021 83  >60 mL/min/1.73m2 Final    As of July 11, 2021, eGFR is calculated by the CKD-EPI creatinine equation, without race adjustment. eGFR can be influenced by muscle mass, exercise, and " diet. The reported eGFR is an estimation only and is only applicable if the renal function is stable.     WBC Count 08/29/2021 9.1  4.0 - 11.0 10e3/uL Final     RBC Count 08/29/2021 4.95  3.80 - 5.20 10e6/uL Final     Hemoglobin 08/29/2021 14.5  11.7 - 15.7 g/dL Final     Hematocrit 08/29/2021 43.9  35.0 - 47.0 % Final     MCV 08/29/2021 89  78 - 100 fL Final     MCH 08/29/2021 29.3  26.5 - 33.0 pg Final     MCHC 08/29/2021 33.0  31.5 - 36.5 g/dL Final     RDW 08/29/2021 13.3  10.0 - 15.0 % Final     Platelet Count 08/29/2021 248  150 - 450 10e3/uL Final     % Neutrophils 08/29/2021 61  % Final     % Lymphocytes 08/29/2021 26  % Final     % Monocytes 08/29/2021 8  % Final     % Eosinophils 08/29/2021 4  % Final     % Basophils 08/29/2021 1  % Final     % Immature Granulocytes 08/29/2021 0  % Final     NRBCs per 100 WBC 08/29/2021 0  <1 /100 Final     Absolute Neutrophils 08/29/2021 5.6  1.6 - 8.3 10e3/uL Final     Absolute Lymphocytes 08/29/2021 2.4  0.8 - 5.3 10e3/uL Final     Absolute Monocytes 08/29/2021 0.7  0.0 - 1.3 10e3/uL Final     Absolute Eosinophils 08/29/2021 0.3  0.0 - 0.7 10e3/uL Final     Absolute Basophils 08/29/2021 0.1  0.0 - 0.2 10e3/uL Final     Absolute Immature Granulocytes 08/29/2021 0.0  <=0.0 10e3/uL Final     Absolute NRBCs 08/29/2021 0.0  10e3/uL Final     Culture 08/29/2021 No Growth   Final       ASSESSMENT and PLAN:    56 year old female with gross hematuria.  The differential diagnosis at this point includes stone disease, infection, vaginal contaminant, urothelial malignancy, renal disorder versus another yet unknown diagnosis.    At this time, recommend proceeding with comprehensive hematuria evaluation to include:  - Urine cytology to look for cells concerning for malignancy.  - CT w/o was done 8/29/21, needs CT urogram for further eval  - Cystoscopy with the first available urologist to evaluate the interior of the bladder. Follow up for hematuria as recommended by urologist  performing cystoscopic evaluation.    Thank you for allowing me to participate in Ms. Castorena's care. I will keep you updated of her progress, but please do not hesitate to contact me with any questions.    Lorena Cabrera PA-C  [facility]  24 minutes spent on the date of the encounter doing chart review, review of outside records, review of test results, interpretation of tests, patient visit and documentation.

## 2021-09-03 NOTE — LETTER
9/3/2021       RE: Graciela Castorena  [address]  [place] [address]     Dear Colleague,    Thank you for referring your patient, Graciela Castorena, to the [facility] at [facility]. Please see a copy of my visit note below.    CC: Hematuria.    HPI: It is a pleasure to see Ms. Graciela Castoerna, a pleasant 56 year old female, asked to be seen in consultation by Kalie Riggs CNP for evaluation of gross hematuria.     The patient has had several episodes of gross hematuria with clots. CT w/o and UC neg in ER on 8/29/21. Ms. Castorena voids without difficulty.  She currently denies any pyuria, hesitancy, intermittency, feelings of incomplete emptying, or any recent history of urinary tract infections or stones.      Had benign micro hematuria eval in 2018 with Dr. Cummins.     Hematuria Risk Factors:  Age >40: Yes     Smoking history: no  Occupational exposure to chemicals or dyes (ie, benzenes, aromatic amines): no  History of urologic disorder or disease: no  History of irritative voiding symptoms: no  History of urinary tract infection: no  Analgesic abuse: no  History of pelvic irradiation: no    Past Medical History:   Diagnosis Date     Abnormal Pap smear of cervix 03/30/2015    see problem list     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 breast 2018    s/p lumpectomy and radiation; on Tamoxifen     Palpitations      Tuberculosis      Past Surgical History:   Procedure Laterality Date     COLONOSCOPY N/A 2/12/2018    Procedure: COLONOSCOPY;  colonoscopy;  Surgeon: Sabas Villegas MD;  Location:  GI     LUMPECTOMY BREAST WITH SEED LOCALIZATION Left 4/18/2018    Procedure: LUMPECTOMY BREAST WITH SEED LOCALIZATION;  SEED LOCALIZED LEFT BREAST LUMPECTOMY ;  Surgeon: Meli Rodriguez MD;  Location:  [facility]     Current Outpatient Medications   Medication Sig Dispense Refill     calcium carbonate-vitamin D (OSCAL W/D) 500-200  MG-UNIT tablet Take 1 tablet by mouth 2 times daily 180 tablet 3     ibuprofen (ADVIL/MOTRIN) 400 MG tablet Take 1 tablet (400 mg) by mouth every 8 hours as needed 60 tablet 1     polyethylene glycol (MIRALAX) 17 GM/Dose powder Take 17 g (1 capful) by mouth daily 507 g 1     Allergies   Allergen Reactions     Ciprofloxacin Itching     FAMILY HISTORY: There is no reported history of genitourinary carcinoma.  There is no history of urolithiasis.      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 Not Asked   Social History Narrative    .    8 adult kids - 7 are here. 1 still in [place].     One grandchild (as of 2019).    Walks a lot.      Social Determinants of Health     Financial Resource Strain:      Difficulty of Paying Living Expenses:    Food Insecurity:      Worried About Running Out of Food in the Last Year:      Ran Out of Food in the Last Year:    Transportation Needs:      Lack of Transportation (Medical):      Lack of Transportation (Non-Medical):    Physical Activity:      Days of Exercise per Week:      Minutes of Exercise per Session:    Stress:      Feeling of Stress :    Social Connections:      Frequency of Communication with Friends and Family:      Frequency of Social Gatherings with Friends and Family:      Attends [employer] Services:      Active Member of Clubs or Organizations:      Attends Club or Organization Meetings:      Marital Status:    Intimate Partner  "Violence:      Fear of Current or Ex-Partner:      Emotionally Abused:      Physically Abused:      Sexually Abused:        ROS: A comprehensive 10 point ROS was obtained and negative except for that outlined above in the HPI.    PHYSICAL EXAM:   Vitals:    09/03/21 1259   BP: 122/82   BP Location: Left arm   Patient Position: Sitting   Cuff Size: Adult Regular   Pulse: 96   SpO2: 99%   Weight: 72.6 kg (160 lb)   Height: 1.651 m (5' 5\")     PSYCH: NAD  EYES: EOMI  MOUTH: MMM  NEURO: AAO x3    Admission on 08/29/2021, Discharged on 08/29/2021   Component Date Value Ref Range Status     Color Urine 08/29/2021 Orange* Colorless, Straw, Light Yellow, Yellow Final     Appearance Urine 08/29/2021 Clear  Clear Final     Glucose Urine 08/29/2021 Negative  Negative mg/dL Final     Bilirubin Urine 08/29/2021 Negative  Negative Final     Ketones Urine 08/29/2021 Trace* Negative mg/dL Final     Specific Gravity Urine 08/29/2021 1.016  1.003 - 1.035 Final     Blood Urine 08/29/2021 Moderate* Negative Final     pH Urine 08/29/2021 6.0  5.0 - 7.0 Final     Protein Albumin Urine 08/29/2021 Negative  Negative mg/dL Final     Urobilinogen Urine 08/29/2021 Normal  Normal, 2.0 mg/dL Final     Nitrite Urine 08/29/2021 Negative  Negative Final     Leukocyte Esterase Urine 08/29/2021 Small* Negative Final     Mucus Urine 08/29/2021 Present* None Seen /LPF Final     RBC Urine 08/29/2021 14* <=2 /HPF Final     WBC Urine 08/29/2021 3  <=5 /HPF Final     Squamous Epithelials Urine 08/29/2021 3* <=1 /HPF Final     Sodium 08/29/2021 137  133 - 144 mmol/L Final     Potassium 08/29/2021 4.2  3.4 - 5.3 mmol/L Final    Specimen slightly hemolyzed, potassium may be falsely elevated.     Chloride 08/29/2021 108  94 - 109 mmol/L Final     Carbon Dioxide (CO2) 08/29/2021 27  20 - 32 mmol/L Final     Anion Gap 08/29/2021 2* 3 - 14 mmol/L Final     Urea Nitrogen 08/29/2021 11  7 - 30 mg/dL Final     Creatinine 08/29/2021 0.81  0.52 - 1.04 mg/dL Final     " Calcium 08/29/2021 8.9  8.5 - 10.1 mg/dL Final     Glucose 08/29/2021 118* 70 - 99 mg/dL Final     GFR Estimate 08/29/2021 83  >60 mL/min/1.73m2 Final    As of July 11, 2021, eGFR is calculated by the CKD-EPI creatinine equation, without race adjustment. eGFR can be influenced by muscle mass, exercise, and diet. The reported eGFR is an estimation only and is only applicable if the renal function is stable.     WBC Count 08/29/2021 9.1  4.0 - 11.0 10e3/uL Final     RBC Count 08/29/2021 4.95  3.80 - 5.20 10e6/uL Final     Hemoglobin 08/29/2021 14.5  11.7 - 15.7 g/dL Final     Hematocrit 08/29/2021 43.9  35.0 - 47.0 % Final     MCV 08/29/2021 89  78 - 100 fL Final     MCH 08/29/2021 29.3  26.5 - 33.0 pg Final     MCHC 08/29/2021 33.0  31.5 - 36.5 g/dL Final     RDW 08/29/2021 13.3  10.0 - 15.0 % Final     Platelet Count 08/29/2021 248  150 - 450 10e3/uL Final     % Neutrophils 08/29/2021 61  % Final     % Lymphocytes 08/29/2021 26  % Final     % Monocytes 08/29/2021 8  % Final     % Eosinophils 08/29/2021 4  % Final     % Basophils 08/29/2021 1  % Final     % Immature Granulocytes 08/29/2021 0  % Final     NRBCs per 100 WBC 08/29/2021 0  <1 /100 Final     Absolute Neutrophils 08/29/2021 5.6  1.6 - 8.3 10e3/uL Final     Absolute Lymphocytes 08/29/2021 2.4  0.8 - 5.3 10e3/uL Final     Absolute Monocytes 08/29/2021 0.7  0.0 - 1.3 10e3/uL Final     Absolute Eosinophils 08/29/2021 0.3  0.0 - 0.7 10e3/uL Final     Absolute Basophils 08/29/2021 0.1  0.0 - 0.2 10e3/uL Final     Absolute Immature Granulocytes 08/29/2021 0.0  <=0.0 10e3/uL Final     Absolute NRBCs 08/29/2021 0.0  10e3/uL Final     Culture 08/29/2021 No Growth   Final       ASSESSMENT and PLAN:    56 year old female with gross hematuria.  The differential diagnosis at this point includes stone disease, infection, vaginal contaminant, urothelial malignancy, renal disorder versus another yet unknown diagnosis.    At this time, recommend proceeding with  comprehensive hematuria evaluation to include:  - Urine cytology to look for cells concerning for malignancy.  - CT w/o was done 8/29/21, needs CT urogram for further eval  - Cystoscopy with the first available urologist to evaluate the interior of the bladder. Follow up for hematuria as recommended by urologist performing cystoscopic evaluation.    Thank you for allowing me to participate in Ms. Castorena's care. I will keep you updated of her progress, but please do not hesitate to contact me with any questions.    Lorena Cabrera PA-C  [facility]  24 minutes spent on the date of the encounter doing chart review, review of outside records, review of test results, interpretation of tests, patient visit and documentation.

## 2021-09-03 NOTE — NURSING NOTE
Chief Complaint   Patient presents with     Hematuria     patient states her urine is itchy and burns and sometimes sees blood   Arlyn Braga LPN

## 2021-09-08 LAB
PATH REPORT.COMMENTS IMP SPEC: NORMAL
PATH REPORT.FINAL DX SPEC: NORMAL
PATH REPORT.GROSS SPEC: NORMAL
PATH REPORT.MICROSCOPIC SPEC OTHER STN: NORMAL
PATH REPORT.RELEVANT HX SPEC: NORMAL

## 2021-09-17 NOTE — TELEPHONE ENCOUNTER
Patient due for Pap and HPV.    Reminder done today via telephone call. Spoke to pt.  Assisted with scheduling appt 10/1/ 21.

## 2021-09-21 ENCOUNTER — HOSPITAL ENCOUNTER (OUTPATIENT)
Dept: CT IMAGING | Facility: CLINIC | Age: 56
Discharge: HOME | End: 2021-09-21
Attending: PHYSICIAN ASSISTANT | Admitting: PHYSICIAN ASSISTANT
Payer: COMMERCIAL

## 2021-09-21 DIAGNOSIS — R31.0 GROSS HEMATURIA: ICD-10-CM

## 2021-09-21 PROCEDURE — 74178 CT ABD&PLV WO CNTR FLWD CNTR: CPT

## 2021-09-21 PROCEDURE — 250N000011 HC RX IP 250 OP 636: Performed by: PHYSICIAN ASSISTANT

## 2021-09-21 PROCEDURE — 250N000009 HC RX 250: Performed by: PHYSICIAN ASSISTANT

## 2021-09-21 RX ORDER — IOPAMIDOL 755 MG/ML
120 INJECTION, SOLUTION INTRAVASCULAR ONCE
Status: COMPLETED | OUTPATIENT
Start: 2021-09-21 | End: 2021-09-21

## 2021-09-21 RX ADMIN — SODIUM CHLORIDE 100 ML: 9 INJECTION, SOLUTION INTRAVENOUS at 17:18

## 2021-09-21 RX ADMIN — IOPAMIDOL 79 ML: 755 INJECTION, SOLUTION INTRAVENOUS at 17:17

## 2021-10-03 ENCOUNTER — HEALTH MAINTENANCE LETTER (OUTPATIENT)
Age: 56
End: 2021-10-03

## 2021-10-08 DIAGNOSIS — N28.1 ACQUIRED CYST OF KIDNEY: ICD-10-CM

## 2021-10-08 DIAGNOSIS — R31.0 GROSS HEMATURIA: Primary | ICD-10-CM

## 2021-10-11 ENCOUNTER — OFFICE VISIT (OUTPATIENT)
Dept: UROLOGY | Facility: CLINIC | Age: 56
End: 2021-10-11
Payer: COMMERCIAL

## 2021-10-11 VITALS
DIASTOLIC BLOOD PRESSURE: 80 MMHG | HEART RATE: 89 BPM | HEIGHT: 65 IN | BODY MASS INDEX: 26.66 KG/M2 | WEIGHT: 160 LBS | SYSTOLIC BLOOD PRESSURE: 130 MMHG | OXYGEN SATURATION: 99 %

## 2021-10-11 DIAGNOSIS — R31.0 GROSS HEMATURIA: Primary | ICD-10-CM

## 2021-10-11 DIAGNOSIS — N28.9 KIDNEY LESION, NATIVE, RIGHT: ICD-10-CM

## 2021-10-11 LAB
ALBUMIN UR-MCNC: NEGATIVE MG/DL
APPEARANCE UR: CLEAR
BILIRUB UR QL STRIP: NEGATIVE
COLOR UR AUTO: YELLOW
GLUCOSE UR STRIP-MCNC: NEGATIVE MG/DL
HGB UR QL STRIP: ABNORMAL
KETONES UR STRIP-MCNC: NEGATIVE MG/DL
LEUKOCYTE ESTERASE UR QL STRIP: NEGATIVE
NITRATE UR QL: NEGATIVE
PH UR STRIP: 5 [PH] (ref 5–7)
SP GR UR STRIP: >=1.03 (ref 1–1.03)
UROBILINOGEN UR STRIP-ACNC: 0.2 E.U./DL

## 2021-10-11 PROCEDURE — 81003 URINALYSIS AUTO W/O SCOPE: CPT | Mod: QW | Performed by: STUDENT IN AN ORGANIZED HEALTH CARE EDUCATION/TRAINING PROGRAM

## 2021-10-11 PROCEDURE — 52000 CYSTOURETHROSCOPY: CPT | Performed by: STUDENT IN AN ORGANIZED HEALTH CARE EDUCATION/TRAINING PROGRAM

## 2021-10-11 PROCEDURE — 99213 OFFICE O/P EST LOW 20 MIN: CPT | Mod: 25 | Performed by: STUDENT IN AN ORGANIZED HEALTH CARE EDUCATION/TRAINING PROGRAM

## 2021-10-11 ASSESSMENT — PAIN SCALES - GENERAL: PAINLEVEL: NO PAIN (0)

## 2021-10-11 ASSESSMENT — MIFFLIN-ST. JEOR: SCORE: 1326.64

## 2021-10-11 NOTE — LETTER
"10/11/2021       RE: Graciela Castorena  [address]  [facility] [address]     Dear Colleague,    Thank you for referring your patient, Graciela Castorena, to the [facility] at [facility]. Please see a copy of my visit note below.      CHIEF COMPLAINT   Graciela Castorena who is a 56 year old female returns today for follow-up of gross hematuria.      HPI   Graciela Castorena is a 56 year old female who presents with a history of gross hematuria.  Saw Lorena Cabrera 9/3/2021. Hematuria has resolved. Had a prior microhematuria evaluation 2018 with [facility] which was benign.    PHYSICAL EXAM  Patient is a 56 year old  female   Vitals: Blood pressure 130/80, pulse 89, height 1.651 m (5' 5\"), weight 72.6 kg (160 lb), SpO2 99 %, not currently breastfeeding.  Body mass index is 26.63 kg/m .  General Appearance Adult:   Alert, no acute distress, oriented  HENT: throat/mouth:normal, good dentition  Lungs: no respiratory distress, or pursed lip breathing  Heart: No obvious jugular venous distension present  Abdomen: soft, nontender, no organomegaly or masses  Musculoskeltal: extremities normal, no peripheral edema  Skin: no suspicious lesions or rashes  Neuro: Alert, oriented, speech and mentation normal  Psych: affect and mood normal  Gait: Normal    All pertinent imaging reviewed:    CT urogram 9/21/2021    I reviewed the images myself. There is a small right renal lesion, appears to be a complex cyst    IMPRESSION:  1.  No hydronephrosis or calcified, obstructing urinary tract stone on either side.     2.  There is a 7 mm cystic lesion in the right kidney which is too small for definitive characterization. Consider ultrasound surveillance.     3.  No evidence of solid renal mass or collecting system or bladder mass.     4.  Cholelithiasis.    Cytology 9/3/2021     Negative for High Grade Urothelial Carcinoma       PRE-PROCEDURE DIAGNOSIS: gross " hematuria    POST-PROCEDURE DIAGNOSIS: gross hematuria    PROCEDURE: Cystoscopy    DESCRIPTION OF PROCEDURE: After informed consent was obtained, the patient was brought to the procedure room where she was placed in the lithotomy position with all pressure points well padded.  The vulva was prepped and draped in sterile fashion. A flexible cystoscope was introduced through a well-lubricated urethra.     Normal urethra  Unremarkable cystoscopy, no cellules or diverticulae, no bladder stones  No concerning urothelial lesions    The flexible cystoscope was removed and the findings were described to the patient.     ASSESSMENT AND PLAN: 55 yo F with gross hematuria. Cystoscopy unremarkable. CT urogram with no obvious cause of hematuria but there is a small right renal lesion, appears to be a complex cyst but is indeterminate    Follow up 6 months with renal ultrasound to follow the indterminate 7 mm right renal hypodensity      Isaiah Richter MD   [facility]  [phone] clinic phone

## 2021-10-11 NOTE — PROGRESS NOTES
"  CHIEF COMPLAINT   Graciela Castorena who is a 56 year old female returns today for follow-up of gross hematuria.      HPI   Graciela Castorena is a 56 year old female who presents with a history of gross hematuria.  Saw Lorena Cabrera 9/3/2021. Hematuria has resolved. Had a prior microhematuria evaluation 2018 with [facility] which was benign.    PHYSICAL EXAM  Patient is a 56 year old  female   Vitals: Blood pressure 130/80, pulse 89, height 1.651 m (5' 5\"), weight 72.6 kg (160 lb), SpO2 99 %, not currently breastfeeding.  Body mass index is 26.63 kg/m .  General Appearance Adult:   Alert, no acute distress, oriented  HENT: throat/mouth:normal, good dentition  Lungs: no respiratory distress, or pursed lip breathing  Heart: No obvious jugular venous distension present  Abdomen: soft, nontender, no organomegaly or masses  Musculoskeltal: extremities normal, no peripheral edema  Skin: no suspicious lesions or rashes  Neuro: Alert, oriented, speech and mentation normal  Psych: affect and mood normal  Gait: Normal    All pertinent imaging reviewed:    CT urogram 9/21/2021    I reviewed the images myself. There is a small right renal lesion, appears to be a complex cyst    IMPRESSION:  1.  No hydronephrosis or calcified, obstructing urinary tract stone on either side.     2.  There is a 7 mm cystic lesion in the right kidney which is too small for definitive characterization. Consider ultrasound surveillance.     3.  No evidence of solid renal mass or collecting system or bladder mass.     4.  Cholelithiasis.    Cytology 9/3/2021     Negative for High Grade Urothelial Carcinoma       PRE-PROCEDURE DIAGNOSIS: gross hematuria    POST-PROCEDURE DIAGNOSIS: gross hematuria    PROCEDURE: Cystoscopy    DESCRIPTION OF PROCEDURE: After informed consent was obtained, the patient was brought to the procedure room where she was placed in the lithotomy position with all pressure points well padded.  The vulva was prepped and draped in sterile " fashion. A flexible cystoscope was introduced through a well-lubricated urethra.     Normal urethra  Unremarkable cystoscopy, no cellules or diverticulae, no bladder stones  No concerning urothelial lesions    The flexible cystoscope was removed and the findings were described to the patient.     ASSESSMENT AND PLAN: 55 yo F with gross hematuria. Cystoscopy unremarkable. CT urogram with no obvious cause of hematuria but there is a small right renal lesion, appears to be a complex cyst but is indeterminate    Follow up 6 months with renal ultrasound to follow the indterminate 7 mm right renal hypodensity      Isaiah Richter MD   [facility]  [phone] clinic phone

## 2021-10-11 NOTE — PATIENT INSTRUCTIONS

## 2021-10-11 NOTE — NURSING NOTE
Chief Complaint   Patient presents with     Gross hematuria     Here for a in office cystoscopy      Prior to the start of the procedure and with procedural staff participation, I verbally confirmed the patient s identity using two indicators, relevant allergies, that the procedure was appropriate and matched the consent or emergent situation, and that the correct equipment/implants were available. Immediately prior to starting the procedure I conducted the Time Out with the procedural staff and re-confirmed the patient s name, procedure, and site/side. I have wiped the patient off with the povidone-Iodine solution, draped them,  used Lidocaine hydrochloride jelly, and instilled sterile water into the bladder. (The Joint Commission universal protocol was followed.)  Yes    Sedation (Moderate or Deep): None    Felecia Montana

## 2021-10-20 ENCOUNTER — OFFICE VISIT (OUTPATIENT)
Dept: FAMILY MEDICINE | Facility: CLINIC | Age: 56
End: 2021-10-20
Payer: COMMERCIAL

## 2021-10-20 VITALS
HEART RATE: 87 BPM | TEMPERATURE: 97.3 F | OXYGEN SATURATION: 100 % | HEIGHT: 65 IN | RESPIRATION RATE: 17 BRPM | SYSTOLIC BLOOD PRESSURE: 137 MMHG | BODY MASS INDEX: 29.32 KG/M2 | WEIGHT: 176 LBS | DIASTOLIC BLOOD PRESSURE: 81 MMHG

## 2021-10-20 DIAGNOSIS — N89.8 VAGINAL DISCHARGE: ICD-10-CM

## 2021-10-20 DIAGNOSIS — Z12.4 ENCOUNTER FOR PAPANICOLAOU SMEAR FOR CERVICAL CANCER SCREENING: ICD-10-CM

## 2021-10-20 DIAGNOSIS — R45.86 MOOD CHANGE: ICD-10-CM

## 2021-10-20 DIAGNOSIS — R53.83 FATIGUE, UNSPECIFIED TYPE: ICD-10-CM

## 2021-10-20 DIAGNOSIS — R03.0 ELEVATED BLOOD PRESSURE READING WITHOUT DIAGNOSIS OF HYPERTENSION: ICD-10-CM

## 2021-10-20 DIAGNOSIS — Z13.220 LIPID SCREENING: ICD-10-CM

## 2021-10-20 DIAGNOSIS — K76.0 FATTY LIVER: ICD-10-CM

## 2021-10-20 DIAGNOSIS — R87.610 ASCUS OF CERVIX WITH NEGATIVE HIGH RISK HPV: Primary | ICD-10-CM

## 2021-10-20 LAB
CLUE CELLS: ABNORMAL
TRICHOMONAS, WET PREP: ABNORMAL
WBC'S/HIGH POWER FIELD, WET PREP: ABNORMAL
YEAST, WET PREP: ABNORMAL

## 2021-10-20 PROCEDURE — 99214 OFFICE O/P EST MOD 30 MIN: CPT | Performed by: INTERNAL MEDICINE

## 2021-10-20 PROCEDURE — 80061 LIPID PANEL: CPT | Performed by: INTERNAL MEDICINE

## 2021-10-20 PROCEDURE — 80053 COMPREHEN METABOLIC PANEL: CPT | Performed by: INTERNAL MEDICINE

## 2021-10-20 PROCEDURE — 87624 HPV HI-RISK TYP POOLED RSLT: CPT | Performed by: INTERNAL MEDICINE

## 2021-10-20 PROCEDURE — 36415 COLL VENOUS BLD VENIPUNCTURE: CPT | Performed by: INTERNAL MEDICINE

## 2021-10-20 PROCEDURE — 84443 ASSAY THYROID STIM HORMONE: CPT | Performed by: INTERNAL MEDICINE

## 2021-10-20 PROCEDURE — G0145 SCR C/V CYTO,THINLAYER,RESCR: HCPCS | Performed by: INTERNAL MEDICINE

## 2021-10-20 PROCEDURE — G0124 SCREEN C/V THIN LAYER BY MD: HCPCS | Performed by: PATHOLOGY

## 2021-10-20 PROCEDURE — 87210 SMEAR WET MOUNT SALINE/INK: CPT | Performed by: INTERNAL MEDICINE

## 2021-10-20 ASSESSMENT — MIFFLIN-ST. JEOR: SCORE: 1399.21

## 2021-10-20 NOTE — PROGRESS NOTES
"  Assessment & Plan   Problem List Items Addressed This Visit        Other    ASCUS of cervix with negative high risk HPV - Primary      Other Visit Diagnoses     Encounter for Papanicolaou smear for cervical cancer screening        Relevant Orders    Pap Screen with HPV - recommended age 30 - 65 years    Elevated blood pressure reading without diagnosis of hypertension        24 hr BP monitor.  life style changes, low salt, incraese exercise, and weigth loss.    Relevant Orders    Home Blood Pressure Monitor Order for DME - ONLY FOR DME    Comprehensive metabolic panel (BMP + Alb, Alk Phos, ALT, AST, Total. Bili, TP) (Completed)    Lipid screening        Relevant Orders    Lipid panel reflex to direct LDL Fasting (Completed)    Fatigue, unspecified type        check Thyroid test ? apnea,   increase exercise activities as tolerated    Relevant Orders    TSH with free T4 reflex (Completed)    Fatty liver        repeat labs, LFT, life style changes counseled    Relevant Orders    Comprehensive metabolic panel (BMP + Alb, Alk Phos, ALT, AST, Total. Bili, TP) (Completed)    Vaginal discharge        Relevant Orders    Wet prep - Clinic Collect (Completed)    Mood change        stable              BMI:   Estimated body mass index is 29.29 kg/m  as calculated from the following:    Height as of this encounter: 1.651 m (5' 5\").    Weight as of this encounter: 79.8 kg (176 lb).   Weight management plan: Discussed healthy diet and exercise guidelines    Work on weight loss  Regular exercise  See Patient Instructions    Return in about 3 months (around 1/20/2022), or if symptoms worsen or fail to improve, for As needed and if symptoms worsen, Physical Exam, BP Recheck.    Tavares Velasco MD  [facility] Owens is a 56 year old who presents for the following health issues     HPI     Patient presenting for PAP smear   Has some vaginal itching, no discharge, no vaginal bleed,   No urinary " "symptoms      Review of Systems   Constitutional, HEENT, cardiovascular, pulmonary, gi and gu systems are negative, except as otherwise noted.      Objective    /81 (BP Location: Left arm, Patient Position: Chair, Cuff Size: Adult Large)   Pulse 87   Temp 97.3  F (36.3  C) (Temporal)   Resp 17   Ht 1.651 m (5' 5\")   Wt 79.8 kg (176 lb)   SpO2 100%   Breastfeeding No   BMI 29.29 kg/m    Body mass index is 29.29 kg/m .  Physical Exam   GENERAL: healthy, alert and no distress  EYES: Eyes grossly normal to inspection, PERRL and conjunctivae and sclerae normal  ABDOMEN: soft, nontender, no hepatosplenomegaly, no masses and bowel sounds normal   (female): normal female external genitalia, normal urethral meatus, vaginal mucosa pink, moist, well rugated, and normal cervix/adnexa/uterus without masses or discharge  MS: no gross musculoskeletal defects noted, no edema  SKIN: no suspicious lesions or rashes  NEURO: Normal strength and tone, mentation intact and speech normal  PSYCH: mentation appears normal, affect normal/bright    Office Visit on 10/11/2021   Component Date Value Ref Range Status     Color Urine 10/11/2021 Yellow  Colorless, Straw, Light Yellow, Yellow Final     Appearance Urine 10/11/2021 Clear  Clear Final     Glucose Urine 10/11/2021 Negative  Negative mg/dL Final     Bilirubin Urine 10/11/2021 Negative  Negative Final     Ketones Urine 10/11/2021 Negative  Negative mg/dL Final     Specific Gravity Urine 10/11/2021 >=1.030  1.003 - 1.035 Final     Blood Urine 10/11/2021 Moderate* Negative Final     pH Urine 10/11/2021 5.0  5.0 - 7.0 Final     Protein Albumin Urine 10/11/2021 Negative  Negative mg/dL Final     Urobilinogen Urine 10/11/2021 0.2  0.2, 1.0 E.U./dL Final     Nitrite Urine 10/11/2021 Negative  Negative Final     Leukocyte Esterase Urine 10/11/2021 Negative  Negative Final           "

## 2021-10-21 LAB
ALBUMIN SERPL-MCNC: 3.7 G/DL (ref 3.4–5)
ALP SERPL-CCNC: 102 U/L (ref 40–150)
ALT SERPL W P-5'-P-CCNC: 40 U/L (ref 0–50)
ANION GAP SERPL CALCULATED.3IONS-SCNC: 5 MMOL/L (ref 3–14)
AST SERPL W P-5'-P-CCNC: 29 U/L (ref 0–45)
BILIRUB SERPL-MCNC: 0.4 MG/DL (ref 0.2–1.3)
BUN SERPL-MCNC: 12 MG/DL (ref 7–30)
CALCIUM SERPL-MCNC: 8.8 MG/DL (ref 8.5–10.1)
CHLORIDE BLD-SCNC: 107 MMOL/L (ref 94–109)
CHOLEST SERPL-MCNC: 175 MG/DL
CO2 SERPL-SCNC: 27 MMOL/L (ref 20–32)
CREAT SERPL-MCNC: 0.8 MG/DL (ref 0.52–1.04)
FASTING STATUS PATIENT QL REPORTED: NO
GFR SERPL CREATININE-BSD FRML MDRD: 84 ML/MIN/1.73M2
GLUCOSE BLD-MCNC: 139 MG/DL (ref 70–99)
HDLC SERPL-MCNC: 40 MG/DL
LDLC SERPL CALC-MCNC: 106 MG/DL
NONHDLC SERPL-MCNC: 135 MG/DL
POTASSIUM BLD-SCNC: 3.8 MMOL/L (ref 3.4–5.3)
PROT SERPL-MCNC: 7.3 G/DL (ref 6.8–8.8)
SODIUM SERPL-SCNC: 139 MMOL/L (ref 133–144)
TRIGL SERPL-MCNC: 146 MG/DL
TSH SERPL DL<=0.005 MIU/L-ACNC: 1.58 MU/L (ref 0.4–4)

## 2021-10-25 LAB
BKR LAB AP GYN ADEQUACY: ABNORMAL
BKR LAB AP GYN INTERPRETATION: ABNORMAL
BKR LAB AP HPV REFLEX: ABNORMAL
BKR LAB AP PREVIOUS ABNORMAL: ABNORMAL
PATH REPORT.COMMENTS IMP SPEC: ABNORMAL
PATH REPORT.RELEVANT HX SPEC: ABNORMAL

## 2021-10-26 LAB
HUMAN PAPILLOMA VIRUS 16 DNA: NEGATIVE
HUMAN PAPILLOMA VIRUS 18 DNA: NEGATIVE
HUMAN PAPILLOMA VIRUS FINAL DIAGNOSIS: NORMAL
HUMAN PAPILLOMA VIRUS OTHER HR: NEGATIVE

## 2021-10-27 ENCOUNTER — PATIENT OUTREACH (OUTPATIENT)
Dept: FAMILY MEDICINE | Facility: CLINIC | Age: 56
End: 2021-10-27

## 2021-12-08 ENCOUNTER — HOSPITAL ENCOUNTER (EMERGENCY)
Facility: CLINIC | Age: 56
Discharge: HOME | End: 2021-12-09
Attending: EMERGENCY MEDICINE | Admitting: EMERGENCY MEDICINE
Payer: COMMERCIAL

## 2021-12-08 DIAGNOSIS — I10 ESSENTIAL HYPERTENSION: ICD-10-CM

## 2021-12-08 DIAGNOSIS — R07.9 CHEST PAIN, UNSPECIFIED TYPE: ICD-10-CM

## 2021-12-08 LAB
ATRIAL RATE - MUSE: 70 BPM
DIASTOLIC BLOOD PRESSURE - MUSE: NORMAL MMHG
INTERPRETATION ECG - MUSE: NORMAL
P AXIS - MUSE: 61 DEGREES
PR INTERVAL - MUSE: 176 MS
QRS DURATION - MUSE: 108 MS
QT - MUSE: 396 MS
QTC - MUSE: 427 MS
R AXIS - MUSE: -13 DEGREES
SYSTOLIC BLOOD PRESSURE - MUSE: NORMAL MMHG
T AXIS - MUSE: 68 DEGREES
VENTRICULAR RATE- MUSE: 70 BPM

## 2021-12-08 PROCEDURE — 99285 EMERGENCY DEPT VISIT HI MDM: CPT | Mod: 25

## 2021-12-08 PROCEDURE — C9803 HOPD COVID-19 SPEC COLLECT: HCPCS

## 2021-12-08 PROCEDURE — 93005 ELECTROCARDIOGRAM TRACING: CPT | Mod: 76

## 2021-12-08 PROCEDURE — 93005 ELECTROCARDIOGRAM TRACING: CPT

## 2021-12-08 ASSESSMENT — MIFFLIN-ST. JEOR: SCORE: 1410.54

## 2021-12-09 ENCOUNTER — APPOINTMENT (OUTPATIENT)
Dept: GENERAL RADIOLOGY | Facility: CLINIC | Age: 56
End: 2021-12-09
Attending: EMERGENCY MEDICINE
Payer: COMMERCIAL

## 2021-12-09 VITALS
SYSTOLIC BLOOD PRESSURE: 134 MMHG | WEIGHT: 175 LBS | HEART RATE: 65 BPM | DIASTOLIC BLOOD PRESSURE: 69 MMHG | OXYGEN SATURATION: 99 % | HEIGHT: 66 IN | TEMPERATURE: 96.9 F | BODY MASS INDEX: 28.12 KG/M2 | RESPIRATION RATE: 25 BRPM

## 2021-12-09 LAB
ANION GAP SERPL CALCULATED.3IONS-SCNC: 5 MMOL/L (ref 3–14)
ATRIAL RATE - MUSE: 64 BPM
BASOPHILS # BLD AUTO: 0.1 10E3/UL (ref 0–0.2)
BASOPHILS NFR BLD AUTO: 1 %
BUN SERPL-MCNC: 12 MG/DL (ref 7–30)
CALCIUM SERPL-MCNC: 8.8 MG/DL (ref 8.5–10.1)
CHLORIDE BLD-SCNC: 109 MMOL/L (ref 94–109)
CO2 SERPL-SCNC: 27 MMOL/L (ref 20–32)
CREAT SERPL-MCNC: 0.78 MG/DL (ref 0.52–1.04)
D DIMER PPP FEU-MCNC: 0.31 UG/ML FEU (ref 0–0.5)
DIASTOLIC BLOOD PRESSURE - MUSE: NORMAL MMHG
EOSINOPHIL # BLD AUTO: 0.3 10E3/UL (ref 0–0.7)
EOSINOPHIL NFR BLD AUTO: 5 %
ERYTHROCYTE [DISTWIDTH] IN BLOOD BY AUTOMATED COUNT: 13 % (ref 10–15)
FLUAV RNA SPEC QL NAA+PROBE: NEGATIVE
FLUBV RNA RESP QL NAA+PROBE: NEGATIVE
GFR SERPL CREATININE-BSD FRML MDRD: 86 ML/MIN/1.73M2
GLUCOSE BLD-MCNC: 93 MG/DL (ref 70–99)
HCT VFR BLD AUTO: 42.2 % (ref 35–47)
HGB BLD-MCNC: 14.1 G/DL (ref 11.7–15.7)
IMM GRANULOCYTES # BLD: 0 10E3/UL
IMM GRANULOCYTES NFR BLD: 0 %
INTERPRETATION ECG - MUSE: NORMAL
LYMPHOCYTES # BLD AUTO: 2.3 10E3/UL (ref 0.8–5.3)
LYMPHOCYTES NFR BLD AUTO: 36 %
MCH RBC QN AUTO: 29.6 PG (ref 26.5–33)
MCHC RBC AUTO-ENTMCNC: 33.4 G/DL (ref 31.5–36.5)
MCV RBC AUTO: 89 FL (ref 78–100)
MONOCYTES # BLD AUTO: 0.5 10E3/UL (ref 0–1.3)
MONOCYTES NFR BLD AUTO: 8 %
NEUTROPHILS # BLD AUTO: 3.2 10E3/UL (ref 1.6–8.3)
NEUTROPHILS NFR BLD AUTO: 50 %
NRBC # BLD AUTO: 0 10E3/UL
NRBC BLD AUTO-RTO: 0 /100
P AXIS - MUSE: 44 DEGREES
PLATELET # BLD AUTO: 229 10E3/UL (ref 150–450)
POTASSIUM BLD-SCNC: 3.8 MMOL/L (ref 3.4–5.3)
PR INTERVAL - MUSE: 176 MS
QRS DURATION - MUSE: 106 MS
QT - MUSE: 436 MS
QTC - MUSE: 449 MS
R AXIS - MUSE: 18 DEGREES
RBC # BLD AUTO: 4.77 10E6/UL (ref 3.8–5.2)
SARS-COV-2 RNA RESP QL NAA+PROBE: NEGATIVE
SODIUM SERPL-SCNC: 141 MMOL/L (ref 133–144)
SYSTOLIC BLOOD PRESSURE - MUSE: NORMAL MMHG
T AXIS - MUSE: 0 DEGREES
TROPONIN I SERPL HS-MCNC: 7 NG/L
TROPONIN I SERPL HS-MCNC: 7 NG/L
VENTRICULAR RATE- MUSE: 64 BPM
WBC # BLD AUTO: 6.3 10E3/UL (ref 4–11)

## 2021-12-09 PROCEDURE — 36415 COLL VENOUS BLD VENIPUNCTURE: CPT | Performed by: EMERGENCY MEDICINE

## 2021-12-09 PROCEDURE — 85379 FIBRIN DEGRADATION QUANT: CPT | Performed by: EMERGENCY MEDICINE

## 2021-12-09 PROCEDURE — 71046 X-RAY EXAM CHEST 2 VIEWS: CPT

## 2021-12-09 PROCEDURE — 96374 THER/PROPH/DIAG INJ IV PUSH: CPT

## 2021-12-09 PROCEDURE — 85025 COMPLETE CBC W/AUTO DIFF WBC: CPT | Performed by: EMERGENCY MEDICINE

## 2021-12-09 PROCEDURE — 258N000003 HC RX IP 258 OP 636: Performed by: EMERGENCY MEDICINE

## 2021-12-09 PROCEDURE — 80048 BASIC METABOLIC PNL TOTAL CA: CPT | Performed by: EMERGENCY MEDICINE

## 2021-12-09 PROCEDURE — 87636 SARSCOV2 & INF A&B AMP PRB: CPT | Performed by: EMERGENCY MEDICINE

## 2021-12-09 PROCEDURE — 250N000009 HC RX 250: Performed by: EMERGENCY MEDICINE

## 2021-12-09 PROCEDURE — 96361 HYDRATE IV INFUSION ADD-ON: CPT

## 2021-12-09 PROCEDURE — 84484 ASSAY OF TROPONIN QUANT: CPT | Performed by: EMERGENCY MEDICINE

## 2021-12-09 RX ADMIN — FAMOTIDINE 20 MG: 10 INJECTION, SOLUTION INTRAVENOUS at 01:38

## 2021-12-09 RX ADMIN — SODIUM CHLORIDE 500 ML: 9 INJECTION, SOLUTION INTRAVENOUS at 01:38

## 2021-12-09 ASSESSMENT — ENCOUNTER SYMPTOMS
FATIGUE: 1
FEVER: 0
DIZZINESS: 1
COUGH: 0
SHORTNESS OF BREATH: 0
BLOOD IN STOOL: 0
ABDOMINAL PAIN: 0

## 2021-12-09 NOTE — ED PROVIDER NOTES
"  History   Chief Complaint:  Chest Pain     The history is provided by the patient.      Graciela Castorena is a 56 year old female with history of prediabetes who presents for evaluation of chest pain amongst other complaints. Patient and her daughter report that the patient has had 2 days ago chest pain/pressure, lightheadedness, fatigue, vision changes and intermittent dark urine. Patient further reports that she has been struggling with uncontrolled blood pressure over the past year and her main concern is for the elevated blood pressure. Patient has history of breast cancer though in remission. She reports her chest pain has been constant without exacerbating or relieving symptoms. She denies any fever, cough, shortness of breath, abdominal pain, blood in stool, headache, focal weakness, paresthesias, dysuria and any other known symptoms at this time. She is COVID vaccinated minus booster. Patient does admit to increased \"stressors.\"    Review of Systems   Constitutional: Positive for fatigue. Negative for fever.   Eyes: Positive for visual disturbance.   Respiratory: Negative for cough and shortness of breath.    Cardiovascular: Positive for chest pain.   Gastrointestinal: Negative for abdominal pain and blood in stool.   Genitourinary:        Dark urine   Neurological: Positive for dizziness.   All other systems reviewed and are negative.    Allergies:  Ciprofloxacin    Medications:  Prilosec  Calcium Carbonate-Vitamin D    Past Medical History:    Abnormal Pap smear of cervix   Ductal carcinoma in situ of left breast   Palpitations   Latent Tuberculosis   Cholelithiasis  Prediabetes     Past Surgical History:    Colonoscopy  Lumpectomy breast w seed localization     Family History:    Diabetes Type II x2    Social History:  The patient presents to the ED with daughter.  Alcohol Use: No  Drug Use: No    Physical Exam     Patient Vitals for the past 24 hrs:   BP Temp Temp src Pulse Resp SpO2 Height Weight " "  12/09/21 0030 (!) 140/78 -- -- 71 25 100 % -- --   12/08/21 2233 (!) 180/85 96.9  F (36.1  C) Temporal 72 18 100 % 1.676 m (5' 6\") 79.4 kg (175 lb)       Physical Exam  General: Well-nourished, nontoxic  Eyes: PERRL, EOMI, no nystagmus.  No scleral icterus or conjunctival injection.    ENT:  Moist mucus membranes, posterior oropharynx clear without erythema or exudates. TM normal bilaterally  Neck: Supple with full range of motion  Respiratory:  Lungs clear to auscultation bilaterally, no crackles/rubs/wheezes.  Good air movement  CV: Normal rate and rhythm, no murmurs/rubs/gallops  GI:  Abdomen soft and non-distended.  No tenderness, guarding or rebound  Skin: Warm, dry.  No rashes or petechiae  MSK: No peripheral edema or calf tenderness  Neuro: Alert and oriented to person/place/time.  No aphasia/facial droop/dysarthria.  Tongue midline, normal strength at SCM/trapezius/BUE/BLE.  Normal finger to nose. Normal gait.  Sensation intact over face/BUE/BLE  Psychiatric: Normal affect    Emergency Department Course     ECG:  Indication: Chest Pain  Completed at 2238.  Read at 2240.   Normal sinus rhythm. Cannot rule out anterior infarct, age undetermined. Abnormal ECG.   Rate 70 bpm. TX interval 176. QRS duration 108. QT/QTc 396/427. P-R-T axes 61 -13 68.    Indication: Repeat  Completed at 0252.  Read at 0252.   Normal sinus rhythm. Cannot rule out anterior infarct, age undetermined. Abnormal ECG.    Rate 64 bpm. TX interval 176. QRS duration 106. QT/QTc 436/449. P-R-T axes 44 18 0.    Imaging:  XR Chest 2 Views:  No evidence of active cardiopulmonary disease.  Report per radiology     Laboratory:  CBC: WBC 6.3, HGB 14.1,     BMP: All WNL     D-Dimer: 0.31    Troponin I (0017): 7     Troponin I (0307): 7    Symptomatic Influenza A/B & COVID-19: Negative    Emergency Department Course:  Reviewed:  I reviewed nursing notes, vitals, past medical history and care everywhere    Assessments:  0007 I performed a " "physical exam of the patient. Findings as above.     0119 Patient rechecked and updated.     0308 Patient rechecked and updated. Plan of care discussed and questions answered.     Interventions:  0138 NaCl 0.9% BOLUS 500 mL IV  0138 Pepcid 20 mg IV    Disposition:  The patient was discharged to home.     Impression & Plan   Medical Decision Making:  Graciela Castorena is a 56 year old female who presents with a myriad of complaints predominately chest pain, headache and fatigue.  She is nontoxic, neurologically intact though noted to be hypertensive on arrival.  Her blood pressure down trended during her time in the ED without intervention.  Serial EKGs without ischemic changes or underlying arrhythmia.  Screening troponin negative x2.  I have a lower clinical suspicion for ACS at this point in time.  D-dimer negative, clinically doubt PE.  Chest x-ray without focal pneumonia, fluid overload, widened mediastinum or pneumothorax.  Labs overall reassuring without evidence to suggest profound electrolyte derangement, anemia or endorgan dysfunction.  She is neurologically intact and denies any concerning headache, I doubt serious neurologic sequelae and do not feel emergent neuroimaging is warranted.  No evidence to suggest hypertensive emergency at this point in time.  I did discuss with patient recommendation to maintain blood pressure diary for the next week and to monitor for increasing headache, chest pain, dyspnea or should symptoms worsen or change to promptly represent to the ED.  She reports ability to follow-up closely with PCP as she is aware that antihypertensives may be indicated.  Patient did test negative for COVID-19/influenza during her time in the ED.  She is comfortable with plan of care and admits that she tends to develop the symptoms when she is \"stressed.\"    Diagnosis:    ICD-10-CM    1. Essential hypertension  I10    2. Chest pain, unspecified type  R07.9      Discharge Medications:  New " Prescriptions    No medications on file     Scribe Disclosure:  I, Alek Sam, am serving as a scribe at 12:05 AM on 12/9/2021 to document services personally performed by Mariaa Steinberg DO based on my observations and the provider's statements to me.     [facility]         Mariaa Steinberg,   12/09/21 0327

## 2022-01-13 ENCOUNTER — ONCOLOGY VISIT (OUTPATIENT)
Dept: ONCOLOGY | Facility: CLINIC | Age: 57
End: 2022-01-13
Attending: INTERNAL MEDICINE
Payer: COMMERCIAL

## 2022-01-13 VITALS
OXYGEN SATURATION: 100 % | RESPIRATION RATE: 16 BRPM | HEIGHT: 66 IN | SYSTOLIC BLOOD PRESSURE: 110 MMHG | BODY MASS INDEX: 27.68 KG/M2 | DIASTOLIC BLOOD PRESSURE: 70 MMHG | TEMPERATURE: 97.6 F | HEART RATE: 75 BPM | WEIGHT: 172.2 LBS

## 2022-01-13 DIAGNOSIS — R10.13 DYSPEPSIA: ICD-10-CM

## 2022-01-13 DIAGNOSIS — D05.12 DUCTAL CARCINOMA IN SITU (DCIS) OF LEFT BREAST: ICD-10-CM

## 2022-01-13 DIAGNOSIS — Z12.31 ENCOUNTER FOR SCREENING MAMMOGRAM FOR MALIGNANT NEOPLASM OF BREAST: Primary | ICD-10-CM

## 2022-01-13 PROCEDURE — G0463 HOSPITAL OUTPT CLINIC VISIT: HCPCS

## 2022-01-13 PROCEDURE — 99214 OFFICE O/P EST MOD 30 MIN: CPT | Performed by: INTERNAL MEDICINE

## 2022-01-13 RX ORDER — FAMOTIDINE 20 MG/1
20 TABLET, FILM COATED ORAL 2 TIMES DAILY
Qty: 90 TABLET | Refills: 3 | Status: SHIPPED | OUTPATIENT
Start: 2022-01-13 | End: 2022-03-04

## 2022-01-13 ASSESSMENT — MIFFLIN-ST. JEOR: SCORE: 1392.84

## 2022-01-13 ASSESSMENT — PAIN SCALES - GENERAL: PAINLEVEL: NO PAIN (0)

## 2022-01-13 NOTE — PROGRESS NOTES
"Oncology Rooming Note    January 13, 2022 10:15 AM   Graciela Castorena is a 57 year old female who presents for:    Chief Complaint   Patient presents with     Oncology Clinic Visit     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 breast     Initial Vitals: /70   Pulse 75   Temp 97.6  F (36.4  C) (Oral)   Resp 16   Ht 1.676 m (5' 6\")   Wt 78.1 kg (172 lb 3.2 oz)   SpO2 100%   BMI 27.79 kg/m   Estimated body mass index is 27.79 kg/m  as calculated from the following:    Height as of this encounter: 1.676 m (5' 6\").    Weight as of this encounter: 78.1 kg (172 lb 3.2 oz). Body surface area is 1.91 meters squared.  No Pain (0) Comment: Data Unavailable   No LMP recorded. Patient is postmenopausal.  Allergies reviewed: Yes  Medications reviewed: Yes    Medications: Medication refills not needed today.  Pharmacy name entered into [email or web address]: [id number] - [place], [place] - [address]    Clinical concerns: None       Delia Hart MA              "

## 2022-01-13 NOTE — LETTER
"    1/13/2022         RE: Graciela Castorena  [address]  [facility] [address]        Dear Colleague,    Thank you for referring your patient, Graciela Castorena, to the [facility]. Please see a copy of my visit note below.    Oncology Rooming Note    January 13, 2022 10:15 AM   Graciela Castorena is a 57 year old female who presents for:    Chief Complaint   Patient presents with     Oncology Clinic Visit     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 breast     Initial Vitals: /70   Pulse 75   Temp 97.6  F (36.4  C) (Oral)   Resp 16   Ht 1.676 m (5' 6\")   Wt 78.1 kg (172 lb 3.2 oz)   SpO2 100%   BMI 27.79 kg/m   Estimated body mass index is 27.79 kg/m  as calculated from the following:    Height as of this encounter: 1.676 m (5' 6\").    Weight as of this encounter: 78.1 kg (172 lb 3.2 oz). Body surface area is 1.91 meters squared.  No Pain (0) Comment: Data Unavailable   No LMP recorded. Patient is postmenopausal.  Allergies reviewed: Yes  Medications reviewed: Yes    Medications: Medication refills not needed today.  Pharmacy name entered into [employer]: [id number] - [facility] [address]    Clinical concerns: None       Delia Hart MA                ONCOLOGY HISTORY: Mrs. 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      1.  Screening mammogram on 01/16/2018 revealed a new calcification cluster behind the left nipple.   -Left diagnostic mammogram on 03/12/2018 revealed an indeterminate cluster of microcalcification in retroareolar portion of left breast at 11:00.      2. Stereotactic left breast biopsy on 03/13/2018 reveals intermediate grade DCIS with microcalcification and comedonecrosis.  % positive and  MO 25% positive.      3.  Patient underwent left breast lumpectomy on 04/18/2018.  Pathology reveals grade 2 DCIS measuring 3 mm.  Margins are negative.     4. Radiation to left breast. 5056 cGy between 05/31/2018 and " 06/27/2018.      5. Tamoxifen started in May, 2018. Stopped 0n 12/29/2020 due to vaginal bleeding.  -Anastrazole started on 01/12/2021.  -Anastrazole stopped 07/14/2021 due to aches and pain.    SUBJECTIVE:  Ms. Castorena is a 57-year-old female with left breast DCIS.  She had lumpectomy and radiation.  She was on tamoxifen and anastrozole.  They were stopped because of side effects.  She does not want any more hormonal treatment.     The patient gets episodes of chest discomfort and palpitations.  She has been evaluated in the Emergency Room.  Multiple investigations done. Likely, it is from anxiety.     No headache.  No dizziness.  No shortness of breath.  No abdominal pain.  No nausea or vomiting.  She gets some heartburn.  She takes famotidine; that helps.  No urinary or bowel complaints.  No abnormal bleeding.  No fevers or chills.  All other review of systems negative.     PHYSICAL EXAMINATION:   GENERAL:  Alert and oriented x 3.   VITAL SIGNS:  Reviewed.  ECOG PS of 1.     EYES:  No icterus.   THROAT:  No ulcer. No thrush.   NECK:  Supple. No lymphadenopathy. No thyromegaly.   AXILLAE:  No lymphadenopathy.   LUNGS:  Good air entry bilaterally.  No crackles or wheezing.   HEART:  Regular.  No murmur.   GI:  Abdomen is soft.  Nontender. No mass.   EXTREMITIES:  No pedal edema.  No calf swelling or tenderness.   SKIN:  No rash.   BREASTS:  Examined in the presence of the nurse.   -Right breast exam is normal.  No mass.  No lymphadenopathy.  No skin changes.  No axillary lymphadenopathy.   -Left breast exam reveals post-surgical changes.  No mass.  No suspicious skin lesion.  No axillary lymphadenopathy.      LABORATORY DATA:  CBC and BMP done on 12/09/2021 reviewed.     ASSESSMENT:    1.  A 57-year-old female with left breast ductal carcinoma in situ diagnosed in 2018.  No evidence of disease.  2.  Heartburn.  3.  Episodes of chest discomfort and palpitations.     PLAN:  1.  Discussed regarding DCIS.  She is doing  well.  No evidence of recurrence of breast cancer.  She does not want any further hormonal treatment because of side effects.  Risk of recurrence is low.  She will get mammogram once a year. Next one will be in mid July.  2.  For DCIS, I will see her once a year.  I advised her to see a physician sooner if she has any lump, unexplained weight loss, unexplained pain, worsening weakness, or any other concerns.  3.  The patient advised to follow up with her PCP for her other problems.  I advised her to call us with any questions or concerns.    This office note has been dictated.          Again, thank you for allowing me to participate in the care of your patient.        Sincerely,        Samreen Barahona MD

## 2022-01-13 NOTE — LETTER
2022      Re: Graciela Castorena  [address]  [facility] [place] [facility] [address]         To whom it may concern,     I am writing on behalf of patient, Judit Castorena, : [phone], who was seen in my office today.     I am advising do to cold weather exacerbating her symptoms that she be given preference for a garage when one is available for medically necessary reasons.       Sincerely,      Samreen Barahona Md

## 2022-01-16 NOTE — PROGRESS NOTES
ONCOLOGY HISTORY: Mrs. 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      1.  Screening mammogram on 01/16/2018 revealed a new calcification cluster behind the left nipple.   -Left diagnostic mammogram on 03/12/2018 revealed an indeterminate cluster of microcalcification in retroareolar portion of left breast at 11:00.      2. Stereotactic left breast biopsy on 03/13/2018 reveals intermediate grade DCIS with microcalcification and comedonecrosis.  % positive and  AZ 25% positive.      3.  Patient underwent left breast lumpectomy on 04/18/2018.  Pathology reveals grade 2 DCIS measuring 3 mm.  Margins are negative.     4. Radiation to left breast. 5056 cGy between 05/31/2018 and 06/27/2018.      5. Tamoxifen started in May, 2018. Stopped 0n 12/29/2020 due to vaginal bleeding.  -Anastrazole started on 01/12/2021.  -Anastrazole stopped 07/14/2021 due to aches and pain.    SUBJECTIVE:  Ms. Castorena is a 57-year-old female with left breast DCIS.  She had lumpectomy and radiation.  She was on tamoxifen and anastrozole.  They were stopped because of side effects.  She does not want any more hormonal treatment.     The patient gets episodes of chest discomfort and palpitations.  She has been evaluated in the Emergency Room.  Multiple investigations done. Likely, it is from anxiety.     No headache.  No dizziness.  No shortness of breath.  No abdominal pain.  No nausea or vomiting.  She gets some heartburn.  She takes famotidine; that helps.  No urinary or bowel complaints.  No abnormal bleeding.  No fevers or chills.  All other review of systems negative.     PHYSICAL EXAMINATION:   GENERAL:  Alert and oriented x 3.   VITAL SIGNS:  Reviewed.  ECOG PS of 1.     EYES:  No icterus.   THROAT:  No ulcer. No thrush.   NECK:  Supple. No lymphadenopathy. No thyromegaly.   AXILLAE:  No lymphadenopathy.   LUNGS:  Good air entry bilaterally.  No crackles or wheezing.   HEART:  Regular.  No murmur.   GI:  Abdomen is soft.  Nontender.  No mass.   EXTREMITIES:  No pedal edema.  No calf swelling or tenderness.   SKIN:  No rash.   BREASTS:  Examined in the presence of the nurse.   -Right breast exam is normal.  No mass.  No lymphadenopathy.  No skin changes.  No axillary lymphadenopathy.   -Left breast exam reveals post-surgical changes.  No mass.  No suspicious skin lesion.  No axillary lymphadenopathy.      LABORATORY DATA:  CBC and BMP done on 12/09/2021 reviewed.     ASSESSMENT:    1.  A 57-year-old female with left breast ductal carcinoma in situ diagnosed in 2018.  No evidence of disease.  2.  Heartburn.  3.  Episodes of chest discomfort and palpitations.     PLAN:  1.  Discussed regarding DCIS.  She is doing well.  No evidence of recurrence of breast cancer.  She does not want any further hormonal treatment because of side effects.  Risk of recurrence is low.  She will get mammogram once a year. Next one will be in mid July.  2.  For DCIS, I will see her once a year.  I advised her to see a physician sooner if she has any lump, unexplained weight loss, unexplained pain, worsening weakness, or any other concerns.  3.  The patient advised to follow up with her PCP for her other problems.  I advised her to call us with any questions or concerns.

## 2022-03-02 ENCOUNTER — OFFICE VISIT (OUTPATIENT)
Dept: URGENT CARE | Facility: URGENT CARE | Age: 57
End: 2022-03-02
Payer: COMMERCIAL

## 2022-03-02 VITALS
TEMPERATURE: 97.4 F | DIASTOLIC BLOOD PRESSURE: 83 MMHG | HEART RATE: 105 BPM | SYSTOLIC BLOOD PRESSURE: 131 MMHG | OXYGEN SATURATION: 100 %

## 2022-03-02 DIAGNOSIS — M54.50 ACUTE LOW BACK PAIN WITHOUT SCIATICA, UNSPECIFIED BACK PAIN LATERALITY: ICD-10-CM

## 2022-03-02 DIAGNOSIS — R07.9 CHEST PAIN, UNSPECIFIED TYPE: Primary | ICD-10-CM

## 2022-03-02 LAB
ALBUMIN SERPL-MCNC: 3.5 G/DL (ref 3.4–5)
ALBUMIN UR-MCNC: NEGATIVE MG/DL
ALP SERPL-CCNC: 105 U/L (ref 40–150)
ALT SERPL W P-5'-P-CCNC: 30 U/L (ref 0–50)
ANION GAP SERPL CALCULATED.3IONS-SCNC: 5 MMOL/L (ref 3–14)
APPEARANCE UR: CLEAR
AST SERPL W P-5'-P-CCNC: 19 U/L (ref 0–45)
BACTERIA #/AREA URNS HPF: ABNORMAL /HPF
BASOPHILS # BLD AUTO: 0 10E3/UL (ref 0–0.2)
BASOPHILS NFR BLD AUTO: 0 %
BILIRUB SERPL-MCNC: 0.2 MG/DL (ref 0.2–1.3)
BILIRUB UR QL STRIP: NEGATIVE
BUN SERPL-MCNC: 12 MG/DL (ref 7–30)
CALCIUM SERPL-MCNC: 9.2 MG/DL (ref 8.5–10.1)
CHLORIDE BLD-SCNC: 106 MMOL/L (ref 94–109)
CO2 SERPL-SCNC: 28 MMOL/L (ref 20–32)
COLOR UR AUTO: YELLOW
CREAT SERPL-MCNC: 0.83 MG/DL (ref 0.52–1.04)
EOSINOPHIL # BLD AUTO: 0.2 10E3/UL (ref 0–0.7)
EOSINOPHIL NFR BLD AUTO: 3 %
ERYTHROCYTE [DISTWIDTH] IN BLOOD BY AUTOMATED COUNT: 13.6 % (ref 10–15)
GFR SERPL CREATININE-BSD FRML MDRD: 83 ML/MIN/1.73M2
GLUCOSE BLD-MCNC: 98 MG/DL (ref 70–99)
GLUCOSE UR STRIP-MCNC: NEGATIVE MG/DL
HCT VFR BLD AUTO: 43.4 % (ref 35–47)
HGB BLD-MCNC: 14.3 G/DL (ref 11.7–15.7)
HGB UR QL STRIP: ABNORMAL
KETONES UR STRIP-MCNC: NEGATIVE MG/DL
LEUKOCYTE ESTERASE UR QL STRIP: NEGATIVE
LYMPHOCYTES # BLD AUTO: 2.2 10E3/UL (ref 0.8–5.3)
LYMPHOCYTES NFR BLD AUTO: 28 %
MCH RBC QN AUTO: 29.5 PG (ref 26.5–33)
MCHC RBC AUTO-ENTMCNC: 32.9 G/DL (ref 31.5–36.5)
MCV RBC AUTO: 90 FL (ref 78–100)
MONOCYTES # BLD AUTO: 0.7 10E3/UL (ref 0–1.3)
MONOCYTES NFR BLD AUTO: 9 %
NEUTROPHILS # BLD AUTO: 4.8 10E3/UL (ref 1.6–8.3)
NEUTROPHILS NFR BLD AUTO: 61 %
NITRATE UR QL: NEGATIVE
PH UR STRIP: 6 [PH] (ref 5–7)
PLATELET # BLD AUTO: 226 10E3/UL (ref 150–450)
POTASSIUM BLD-SCNC: 3.9 MMOL/L (ref 3.4–5.3)
PROT SERPL-MCNC: 7.5 G/DL (ref 6.8–8.8)
RBC # BLD AUTO: 4.84 10E6/UL (ref 3.8–5.2)
RBC #/AREA URNS AUTO: ABNORMAL /HPF
SODIUM SERPL-SCNC: 139 MMOL/L (ref 133–144)
SP GR UR STRIP: 1.01 (ref 1–1.03)
SQUAMOUS #/AREA URNS AUTO: ABNORMAL /LPF
UROBILINOGEN UR STRIP-ACNC: 0.2 E.U./DL
WBC # BLD AUTO: 7.9 10E3/UL (ref 4–11)
WBC #/AREA URNS AUTO: ABNORMAL /HPF

## 2022-03-02 PROCEDURE — 99214 OFFICE O/P EST MOD 30 MIN: CPT | Performed by: PHYSICIAN ASSISTANT

## 2022-03-02 PROCEDURE — 80050 GENERAL HEALTH PANEL: CPT | Performed by: PHYSICIAN ASSISTANT

## 2022-03-02 PROCEDURE — 36415 COLL VENOUS BLD VENIPUNCTURE: CPT | Performed by: PHYSICIAN ASSISTANT

## 2022-03-02 PROCEDURE — 93000 ELECTROCARDIOGRAM COMPLETE: CPT | Performed by: PHYSICIAN ASSISTANT

## 2022-03-02 PROCEDURE — 81001 URINALYSIS AUTO W/SCOPE: CPT | Performed by: PHYSICIAN ASSISTANT

## 2022-03-02 ASSESSMENT — PAIN SCALES - GENERAL: PAINLEVEL: WORST PAIN (10)

## 2022-03-03 NOTE — PATIENT INSTRUCTIONS
Chest pain:  Patient has been evaluated multiple times in ER for intermittent chest pain for a year. Symptoms have been stable. I reviewed lab results from Dec and it was normal. I recommend seeing PCP for a holter monitor. Perhaps she is having intermittent arrhythmias that would be better characterized with an EKG. See your primary care provider if symptoms in 7 days. Seek emergency care if you develop severe chest pain, nausea, vomiting, shortness of breath, or extremity weakness.     Kidney pain:  Patient has also een evaluated multiple times in ER for intermittent kidney pain. She is seeing urology for hematuria. No evidence of infection on UA. Conservative measures include drinking fluids (water). See your primary care provider if symptoms do not improve in 7 days. Seek emergency care if you develop severe abdominal/flank pain, or fever.

## 2022-03-03 NOTE — PROGRESS NOTES
URGENT CARE VISIT:    SUBJECTIVE:   Graciela Castorena is a 57 year old female who presents for intermittent chest pain for a year. It is tight and lasts a few minutes. She gets it multiple times a week. Has been worked up at the ER a few times with no clear cause. No treatments tried. Symptoms are stable.     She is also here for bilateral kidney pain for a few days. She has had this pain before and seen at ER. She is seeing urology for blood in urine. Pain is constant and sharp. Symptoms are stable. She denies dysuria, urgency, or urinary frequency.     PMH:   Past Medical History:   Diagnosis Date     Abnormal Pap smear of cervix 03/30/2015    03/30/15, 10/20/21     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 breast 2018    s/p lumpectomy and radiation; on Tamoxifen     Palpitations      Tuberculosis      Allergies: Ciprofloxacin  Medications:   Current Outpatient Medications   Medication Sig Dispense Refill     calcium carbonate-vitamin D (OSCAL W/D) 500-200 MG-UNIT tablet Take 1 tablet by mouth 2 times daily 180 tablet 3     esomeprazole (NEXIUM) 40 MG DR capsule Take 1 capsule (40 mg) by mouth every morning (before breakfast) Take 30-60 minutes before eating. 30 capsule 0     famotidine (PEPCID) 20 MG tablet Take 1 tablet (20 mg) by mouth 2 times daily as needed (heart burn; dyspepsia) 60 tablet 1     ibuprofen (ADVIL/MOTRIN) 400 MG tablet Take 1 tablet (400 mg) by mouth every 8 hours as needed 60 tablet 1     polyethylene glycol (MIRALAX) 17 GM/Dose powder Take 17 g (1 capful) by mouth daily 507 g 0     Social History:   Social History     Tobacco Use     Smoking status: Never Smoker     Smokeless tobacco: Never Used   Substance Use Topics     Alcohol use: No     Alcohol/week: 0.0 standard drinks       ROS: ROS otherwise found to be negative except as noted above.     OBJECTIVE:  /83   Pulse 105   Temp 97.4  F (36.3  C)   SpO2 100%   General: WDWN in NAD  Eyes: EOMI,  PERRL, conjunctiva clear  Neck: Supple,  non-tender  Cardiac: RRR without murmurs, rubs, or gallops.  Respiratory: LCTAB without adventitious sounds. Non-labored breathing.  Abdominal: Active bowel sounds in all four quadrants. Soft, non-tender without guarding or rebound.   Extremities: No peripheral edema or tenderness, peripheral pulses normal  Musculoskeletal: No gross deformities noted, no erythema, FROM noted in all extremities  Neuro: Normal strength and tone, sensory exam grossly normal,  normal speech and mentation  Integumentary: No suspicious rashes or lesions.     Labs:  Results for orders placed or performed in visit on 03/02/22   UA macro with reflex to Microscopic and Culture - Clinc Collect     Status: Abnormal    Specimen: Urine, Midstream   Result Value Ref Range    Color Urine Yellow Colorless, Straw, Light Yellow, Yellow    Appearance Urine Clear Clear    Glucose Urine Negative Negative mg/dL    Bilirubin Urine Negative Negative    Ketones Urine Negative Negative mg/dL    Specific Gravity Urine 1.010 1.003 - 1.035    Blood Urine Large (A) Negative    pH Urine 6.0 5.0 - 7.0    Protein Albumin Urine Negative Negative mg/dL    Urobilinogen Urine 0.2 0.2, 1.0 E.U./dL    Nitrite Urine Negative Negative    Leukocyte Esterase Urine Negative Negative   Urine Microscopic     Status: Abnormal   Result Value Ref Range    Bacteria Urine Few (A) None Seen /HPF    RBC Urine 2-5 (A) 0-2 /HPF /HPF    WBC Urine 0-5 0-5 /HPF /HPF    Squamous Epithelials Urine Few (A) None Seen /LPF    Narrative    Urine Culture not indicated   Comprehensive metabolic panel     Status: Normal   Result Value Ref Range    Sodium 139 133 - 144 mmol/L    Potassium 3.9 3.4 - 5.3 mmol/L    Chloride 106 94 - 109 mmol/L    Carbon Dioxide (CO2) 28 20 - 32 mmol/L    Anion Gap 5 3 - 14 mmol/L    Urea Nitrogen 12 7 - 30 mg/dL    Creatinine 0.83 0.52 - 1.04 mg/dL    Calcium 9.2 8.5 - 10.1 mg/dL    Glucose 98 70 - 99 mg/dL    Alkaline Phosphatase 105 40 - 150 U/L    AST 19 0 - 45 U/L     ALT 30 0 - 50 U/L    Protein Total 7.5 6.8 - 8.8 g/dL    Albumin 3.5 3.4 - 5.0 g/dL    Bilirubin Total 0.2 0.2 - 1.3 mg/dL    GFR Estimate 83 >60 mL/min/1.73m2   CBC with platelets and differential     Status: None   Result Value Ref Range    WBC Count 7.9 4.0 - 11.0 10e3/uL    RBC Count 4.84 3.80 - 5.20 10e6/uL    Hemoglobin 14.3 11.7 - 15.7 g/dL    Hematocrit 43.4 35.0 - 47.0 %    MCV 90 78 - 100 fL    MCH 29.5 26.5 - 33.0 pg    MCHC 32.9 31.5 - 36.5 g/dL    RDW 13.6 10.0 - 15.0 %    Platelet Count 226 150 - 450 10e3/uL    % Neutrophils 61 %    % Lymphocytes 28 %    % Monocytes 9 %    % Eosinophils 3 %    % Basophils 0 %    Absolute Neutrophils 4.8 1.6 - 8.3 10e3/uL    Absolute Lymphocytes 2.2 0.8 - 5.3 10e3/uL    Absolute Monocytes 0.7 0.0 - 1.3 10e3/uL    Absolute Eosinophils 0.2 0.0 - 0.7 10e3/uL    Absolute Basophils 0.0 0.0 - 0.2 10e3/uL   CBC with platelets and differential     Status: None    Narrative    The following orders were created for panel order CBC with platelets and differential.  Procedure                               Abnormality         Status                     ---------                               -----------         ------                     CBC with platelets and d...[[id number]]                      Final result                 Please view results for these tests on the individual orders.       ASSESSMENT:     ICD-10-CM    1. Chest pain, unspecified type  R07.9 EKG 12-lead complete w/read - Clinics     CBC with platelets and differential     Comprehensive metabolic panel     CBC with platelets and differential     Comprehensive metabolic panel   2. Acute low back pain without sciatica, unspecified back pain laterality  M54.50 UA macro with reflex to Microscopic and Culture - Clinc Collect     Urine Microscopic        PLAN:  30 minutes spent on the date of the encounter doing chart review, review of outside records, review of test results, interpretation of tests, patient visit and  documentation.   Patient Instructions   Chest pain:  Patient has been evaluated multiple times in ER for intermittent chest pain for a year. Symptoms have been stable. I reviewed lab results from Dec and it was normal. I recommend seeing PCP for a holter monitor. Perhaps she is having intermittent arrhythmias that would be better characterized with a holter monitor. See your primary care provider if symptoms in 7 days. Seek emergency care if you develop severe chest pain, nausea, vomiting, shortness of breath, or extremity weakness.     Kidney pain:  Patient has also een evaluated multiple times in ER for intermittent kidney pain. CT scan showed no kidney stones or abnormalities. She is seeing urology for hematuria. CT urogram was normal. No evidence of infection on UA today. Unclear etiology of pain. Could be MSK. Conservative measures include drinking fluids (water) and analgesics. CBC and CMP are pending. See your primary care provider if symptoms do not improve in 7 days. Seek emergency care if you develop severe abdominal/flank pain, or fever.     Patient verbalized understanding and is agreeable to plan. The patient was discharged ambulatory and in stable condition.    Ying Jama PA-C ....................  3/2/2022   7:35 PM

## 2022-03-04 ENCOUNTER — OFFICE VISIT (OUTPATIENT)
Dept: FAMILY MEDICINE | Facility: CLINIC | Age: 57
End: 2022-03-04
Payer: COMMERCIAL

## 2022-03-04 VITALS
SYSTOLIC BLOOD PRESSURE: 123 MMHG | BODY MASS INDEX: 27.32 KG/M2 | DIASTOLIC BLOOD PRESSURE: 83 MMHG | RESPIRATION RATE: 16 BRPM | TEMPERATURE: 97.7 F | HEART RATE: 89 BPM | WEIGHT: 170 LBS | HEIGHT: 66 IN | OXYGEN SATURATION: 100 %

## 2022-03-04 DIAGNOSIS — R00.2 PALPITATIONS: Primary | ICD-10-CM

## 2022-03-04 DIAGNOSIS — R41.3 MEMORY CHANGES: ICD-10-CM

## 2022-03-04 DIAGNOSIS — R10.13 DYSPEPSIA: ICD-10-CM

## 2022-03-04 DIAGNOSIS — K59.00 CONSTIPATION, UNSPECIFIED CONSTIPATION TYPE: ICD-10-CM

## 2022-03-04 LAB — TSH SERPL DL<=0.005 MIU/L-ACNC: 2.01 MU/L (ref 0.4–4)

## 2022-03-04 PROCEDURE — 99214 OFFICE O/P EST MOD 30 MIN: CPT | Performed by: INTERNAL MEDICINE

## 2022-03-04 RX ORDER — ESOMEPRAZOLE MAGNESIUM 40 MG/1
40 CAPSULE, DELAYED RELEASE ORAL
Qty: 30 CAPSULE | Refills: 0 | Status: SHIPPED | OUTPATIENT
Start: 2022-03-04 | End: 2022-04-15

## 2022-03-04 RX ORDER — POLYETHYLENE GLYCOL 3350 17 G/17G
1 POWDER, FOR SOLUTION ORAL DAILY
Qty: 507 G | Refills: 0 | Status: SHIPPED | OUTPATIENT
Start: 2022-03-04 | End: 2022-09-09

## 2022-03-04 RX ORDER — FAMOTIDINE 20 MG/1
20 TABLET, FILM COATED ORAL 2 TIMES DAILY PRN
Qty: 60 TABLET | Refills: 1 | Status: SHIPPED | OUTPATIENT
Start: 2022-03-04 | End: 2024-01-04

## 2022-03-04 ASSESSMENT — PAIN SCALES - GENERAL: PAINLEVEL: NO PAIN (0)

## 2022-03-04 NOTE — PROGRESS NOTES
Assessment & Plan   Problem List Items Addressed This Visit     None      Visit Diagnoses     Palpitations    -  Primary    Relevant Orders    Leadless EKG Monitor 3 to 7 Days    Dyspepsia        Relevant Medications    famotidine (PEPCID) 20 MG tablet    esomeprazole (NEXIUM) 40 MG DR capsule    Other Relevant Orders    Adult Gastro Ref - Consult Only    Helicobacter pylori Antigen Stool    Constipation, unspecified constipation type        Relevant Medications    polyethylene glycol (MIRALAX) 17 GM/Dose powder    Other Relevant Orders    Adult Gastro Ref - Consult Only    Memory changes        Relevant Orders    TSH with free T4 reflex    Vitamin B12    Vitamin D Deficiency         Refer to GI for dyspepsia check stool H. pylori.  For the palpitations will do a Zio patch for 7 days.  Reviewed cardiac work-up in the past normal stress echo.  We will check TSH.  Check vitamin levels D and B12.  We will check TSH given her memory issues.  We will start on PPI given her reflux symptoms as well.  She takes MiraLAX daily for constipation.  Keep well-hydrated.  Further recommendation pending the Zio patch reading.  All questions answered       Work on weight loss  Regular exercise  See Patient Instructions    Return in about 4 weeks (around 4/1/2022), or if symptoms worsen or fail to improve, for As needed and if symptoms worsen.    Tavares Velasco MD  [facility] Owens is a 57 year old who presents for the following health issues \  HPI     Chest concern  Adilene presenting for follow-up from urgent care and ER.  Seen in ER on 1221 in urgent care yesterday.  She had extensive work-up including cardiac work-up cardiac stress test that was nonrevealing.  D-dimer is negative.  Patient describes episode of palpitations heart pounding with minimal exertion such as playing or watching with a clean kitchen moving.  Symptoms not at rest.  She also has some regurg/GERD-like symptoms she takes  "Pepcid does not help.  She has been having some memory issues as well.  Being forgetful.  She has history of left breast cancer status post radiation therapy and lumpectomy.  She was intolerant to hormonal therapy.      Review of Systems   Constitutional, HEENT, cardiovascular, pulmonary, gi and gu systems are negative, except as otherwise noted.      Objective    /83 (BP Location: Right arm, Patient Position: Sitting, Cuff Size: Adult Regular)   Pulse 89   Temp 97.7  F (36.5  C) (Temporal)   Resp 16   Ht 1.676 m (5' 6\")   Wt 77.1 kg (170 lb)   SpO2 100%   BMI 27.44 kg/m    Body mass index is 27.44 kg/m .  Physical Exam   GENERAL: healthy, alert and no distress  NECK: no adenopathy, no asymmetry, masses, or scars and thyroid normal to palpation  RESP: lungs clear to auscultation - no rales, rhonchi or wheezes  CV: regular rate and rhythm, normal S1 S2, no S3 or S4, no murmur, click or rub, no peripheral edema and peripheral pulses strong  ABDOMEN: soft, nontender, no hepatosplenomegaly, no masses and bowel sounds normal  MS: no gross musculoskeletal defects noted, no edema  SKIN: no suspicious lesions or rashes  NEURO: Normal strength and tone, mentation intact and speech normal  PSYCH: mentation appears normal, affect normal/bright    Office Visit on 03/02/2022   Component Date Value Ref Range Status     Color Urine 03/02/2022 Yellow  Colorless, Straw, Light Yellow, Yellow Final     Appearance Urine 03/02/2022 Clear  Clear Final     Glucose Urine 03/02/2022 Negative  Negative mg/dL Final     Bilirubin Urine 03/02/2022 Negative  Negative Final     Ketones Urine 03/02/2022 Negative  Negative mg/dL Final     Specific Gravity Urine 03/02/2022 1.010  1.003 - 1.035 Final     Blood Urine 03/02/2022 Large (A) Negative Final     pH Urine 03/02/2022 6.0  5.0 - 7.0 Final     Protein Albumin Urine 03/02/2022 Negative  Negative mg/dL Final     Urobilinogen Urine 03/02/2022 0.2  0.2, 1.0 E.U./dL Final     Nitrite " Urine 03/02/2022 Negative  Negative Final     Leukocyte Esterase Urine 03/02/2022 Negative  Negative Final     Bacteria Urine 03/02/2022 Few (A) None Seen /HPF Final     RBC Urine 03/02/2022 2-5 (A) 0-2 /HPF /HPF Final     WBC Urine 03/02/2022 0-5  0-5 /HPF /HPF Final     Squamous Epithelials Urine 03/02/2022 Few (A) None Seen /LPF Final     Sodium 03/02/2022 139  133 - 144 mmol/L Final     Potassium 03/02/2022 3.9  3.4 - 5.3 mmol/L Final     Chloride 03/02/2022 106  94 - 109 mmol/L Final     Carbon Dioxide (CO2) 03/02/2022 28  20 - 32 mmol/L Final     Anion Gap 03/02/2022 5  3 - 14 mmol/L Final     Urea Nitrogen 03/02/2022 12  7 - 30 mg/dL Final     Creatinine 03/02/2022 0.83  0.52 - 1.04 mg/dL Final     Calcium 03/02/2022 9.2  8.5 - 10.1 mg/dL Final     Glucose 03/02/2022 98  70 - 99 mg/dL Final     Alkaline Phosphatase 03/02/2022 105  40 - 150 U/L Final     AST 03/02/2022 19  0 - 45 U/L Final     ALT 03/02/2022 30  0 - 50 U/L Final     Protein Total 03/02/2022 7.5  6.8 - 8.8 g/dL Final     Albumin 03/02/2022 3.5  3.4 - 5.0 g/dL Final     Bilirubin Total 03/02/2022 0.2  0.2 - 1.3 mg/dL Final     GFR Estimate 03/02/2022 83  >60 mL/min/1.73m2 Final    Effective December 21, 2021 eGFRcr in adults is calculated using the 2021 CKD-EPI creatinine equation which includes age and gender (Jaqueline et al., NEJ, DOI: 10.1056/PDWJtn3818268)     WBC Count 03/02/2022 7.9  4.0 - 11.0 10e3/uL Final     RBC Count 03/02/2022 4.84  3.80 - 5.20 10e6/uL Final     Hemoglobin 03/02/2022 14.3  11.7 - 15.7 g/dL Final     Hematocrit 03/02/2022 43.4  35.0 - 47.0 % Final     MCV 03/02/2022 90  78 - 100 fL Final     MCH 03/02/2022 29.5  26.5 - 33.0 pg Final     MCHC 03/02/2022 32.9  31.5 - 36.5 g/dL Final     RDW 03/02/2022 13.6  10.0 - 15.0 % Final     Platelet Count 03/02/2022 226  150 - 450 10e3/uL Final     % Neutrophils 03/02/2022 61  % Final     % Lymphocytes 03/02/2022 28  % Final     % Monocytes 03/02/2022 9  % Final     % Eosinophils  03/02/2022 3  % Final     % Basophils 03/02/2022 0  % Final     Absolute Neutrophils 03/02/2022 4.8  1.6 - 8.3 10e3/uL Final     Absolute Lymphocytes 03/02/2022 2.2  0.8 - 5.3 10e3/uL Final     Absolute Monocytes 03/02/2022 0.7  0.0 - 1.3 10e3/uL Final     Absolute Eosinophils 03/02/2022 0.2  0.0 - 0.7 10e3/uL Final     Absolute Basophils 03/02/2022 0.0  0.0 - 0.2 10e3/uL Final

## 2022-03-07 PROCEDURE — 87338 HPYLORI STOOL AG IA: CPT | Performed by: INTERNAL MEDICINE

## 2022-03-08 LAB — H PYLORI AG STL QL IA: NEGATIVE

## 2022-03-09 NOTE — RESULT ENCOUNTER NOTE
Graciela, test for H. pylori stool antigen is negative.  Please be reassured there is no stomach infection with H. pylori.Thyroid test called TSH is normalDr. Krista

## 2022-03-20 ENCOUNTER — TRANSFERRED RECORDS (OUTPATIENT)
Dept: HEALTH INFORMATION MANAGEMENT | Facility: CLINIC | Age: 57
End: 2022-03-20

## 2022-03-24 ENCOUNTER — TRANSFERRED RECORDS (OUTPATIENT)
Dept: HEALTH INFORMATION MANAGEMENT | Facility: CLINIC | Age: 57
End: 2022-03-24

## 2022-04-14 DIAGNOSIS — R10.13 DYSPEPSIA: ICD-10-CM

## 2022-04-15 RX ORDER — ESOMEPRAZOLE MAGNESIUM 40 MG/1
40 CAPSULE, DELAYED RELEASE ORAL
Qty: 30 CAPSULE | Refills: 0 | Status: SHIPPED | OUTPATIENT
Start: 2022-04-15 | End: 2022-09-09 | Stop reason: ALTCHOICE

## 2022-04-15 NOTE — TELEPHONE ENCOUNTER
Prescription approved per [facility] Refill Protocol.  Supposed to see VIN NDIAYE RN

## 2022-05-06 ENCOUNTER — TELEPHONE (OUTPATIENT)
Dept: UROLOGY | Facility: CLINIC | Age: 57
End: 2022-05-06
Payer: COMMERCIAL

## 2022-05-06 NOTE — TELEPHONE ENCOUNTER
----- Message from Crystal Cuadra, EMT sent at 5/6/2022  9:30 AM CDT -----  Regarding: US needed  This patient has an appt with Dr. Richter on Friday 5/13 to review a renal US, however there is no US resulted or scheduled.  Please schedule him for his US.    Thanks,    Crystal

## 2022-05-12 ENCOUNTER — HOSPITAL ENCOUNTER (OUTPATIENT)
Dept: ULTRASOUND IMAGING | Facility: CLINIC | Age: 57
Discharge: HOME | End: 2022-05-12
Attending: STUDENT IN AN ORGANIZED HEALTH CARE EDUCATION/TRAINING PROGRAM | Admitting: STUDENT IN AN ORGANIZED HEALTH CARE EDUCATION/TRAINING PROGRAM
Payer: COMMERCIAL

## 2022-05-12 DIAGNOSIS — N28.1 ACQUIRED CYST OF KIDNEY: ICD-10-CM

## 2022-05-12 PROCEDURE — 76770 US EXAM ABDO BACK WALL COMP: CPT

## 2022-05-23 ENCOUNTER — OFFICE VISIT (OUTPATIENT)
Dept: UROLOGY | Facility: CLINIC | Age: 57
End: 2022-05-23
Payer: COMMERCIAL

## 2022-05-23 VITALS
BODY MASS INDEX: 27.32 KG/M2 | WEIGHT: 170 LBS | DIASTOLIC BLOOD PRESSURE: 70 MMHG | HEIGHT: 66 IN | SYSTOLIC BLOOD PRESSURE: 130 MMHG

## 2022-05-23 DIAGNOSIS — R31.0 GROSS HEMATURIA: ICD-10-CM

## 2022-05-23 DIAGNOSIS — N28.9 KIDNEY LESION, NATIVE, RIGHT: ICD-10-CM

## 2022-05-23 DIAGNOSIS — N30.00 ACUTE CYSTITIS WITHOUT HEMATURIA: ICD-10-CM

## 2022-05-23 DIAGNOSIS — R30.0 DYSURIA: Primary | ICD-10-CM

## 2022-05-23 PROCEDURE — 87086 URINE CULTURE/COLONY COUNT: CPT | Performed by: STUDENT IN AN ORGANIZED HEALTH CARE EDUCATION/TRAINING PROGRAM

## 2022-05-23 PROCEDURE — 87186 SC STD MICRODIL/AGAR DIL: CPT | Performed by: STUDENT IN AN ORGANIZED HEALTH CARE EDUCATION/TRAINING PROGRAM

## 2022-05-23 PROCEDURE — 81003 URINALYSIS AUTO W/O SCOPE: CPT | Mod: QW | Performed by: STUDENT IN AN ORGANIZED HEALTH CARE EDUCATION/TRAINING PROGRAM

## 2022-05-23 PROCEDURE — 99214 OFFICE O/P EST MOD 30 MIN: CPT | Performed by: STUDENT IN AN ORGANIZED HEALTH CARE EDUCATION/TRAINING PROGRAM

## 2022-05-23 PROCEDURE — 87088 URINE BACTERIA CULTURE: CPT | Performed by: STUDENT IN AN ORGANIZED HEALTH CARE EDUCATION/TRAINING PROGRAM

## 2022-05-23 RX ORDER — ESTRADIOL 0.1 MG/G
1 CREAM VAGINAL
Qty: 42.5 G | Refills: 3 | Status: SHIPPED | OUTPATIENT
Start: 2022-05-23 | End: 2024-01-30

## 2022-05-23 ASSESSMENT — PAIN SCALES - GENERAL: PAINLEVEL: NO PAIN (0)

## 2022-05-23 NOTE — LETTER
"5/23/2022       RE: Graciela Castorena  [address]  [place] [address]     Dear Colleague,    Thank you for referring your patient, Graciela Castorena, to the [facility] at [facility]. Please see a copy of my visit note below.    CHIEF COMPLAINT   Graciela Castorena who is a 57 year old female returns today for follow-up of h/o gross hematuria, right renal cyst     HPI   Graciela Castorena is a 57 year old female returns today for follow-up of h/o gross hematuria, right renal cyst     She denies any further gross hematuria. She says she sometimes feels like she has itching when she urinates    PHYSICAL EXAM  Patient is a 57 year old  female   Vitals: Blood pressure 130/70, height 1.676 m (5' 6\"), weight 77.1 kg (170 lb), not currently breastfeeding.  Body mass index is 27.44 kg/m .  General Appearance Adult:   Alert, no acute distress, oriented  HENT: throat/mouth:normal, good dentition  Lungs: no respiratory distress, or pursed lip breathing  Heart: No obvious jugular venous distension present  Abdomen: nondistended  Musculoskeltal: extremities normal, no peripheral edema  Skin: no suspicious lesions or rashes  Neuro: Alert, oriented, speech and mentation normal  Psych: affect and mood normal  Gait: Normal  : deferred    All pertinent imaging reviewed:    Renal ultrasound 5/12/2022  IMPRESSION:  1.  Normal kidney ultrasound.      No concerning right renal lesions     Collected 5/23/2022  4:22 PM     Status: Final result     Visible to patient: Yes (not seen)     Dx: Dysuria    Specimen Information: Urine, Midstream         2 Result Notes     1 Patient Communication    Culture 10,000-50,000 CFU/mL Staphylococcus epidermidis Abnormal        <10,000 CFU/mL Mixture of urogenital scooter            Resulting Agency: [facility]       Susceptibility     Staphylococcus epidermidis     JOAQUIN     Ciprofloxacin <=0.5 ug/mL Susceptible     Gentamicin " <=0.5 ug/mL Susceptible     Levofloxacin <=0.12 ug/mL Susceptible     Nitrofurantoin <=16.0 ug/mL Susceptible     Oxacillin <=0.25 ug/mL Susceptible 1     Tetracycline <=1.0 ug/mL Susceptible     Trimethoprim/Sulfamethoxazole  Susceptible     Vancomycin 1.0 ug/mL Susceptible                   ASSESSMENT and PLAN  57 year old female returns today for follow-up of h/o gross hematuria, right renal cyst     Denies further gross hematuria    No concerning right renal cyst on the ultrasound today    Dysuria: UA/UC today. We will start vaginal estrogen given that the patient is post menopausal. Estrace 0.1 mg/gram three times a week    *addendum* 10-50k cfu/ml S. Epi. macrobid x3 days if remains symptomatic    - follow up 1 year with urinalysis, PVR      Isaiah Richter MD   [facility] Urology  [facility] Phone: [phone]

## 2022-05-23 NOTE — PROGRESS NOTES
"CHIEF COMPLAINT   Graciela Castorena who is a 57 year old female returns today for follow-up of h/o gross hematuria, right renal cyst     HPI   Graciela Castorena is a 57 year old female returns today for follow-up of h/o gross hematuria, right renal cyst     She denies any further gross hematuria. She says she sometimes feels like she has itching when she urinates    PHYSICAL EXAM  Patient is a 57 year old  female   Vitals: Blood pressure 130/70, height 1.676 m (5' 6\"), weight 77.1 kg (170 lb), not currently breastfeeding.  Body mass index is 27.44 kg/m .  General Appearance Adult:   Alert, no acute distress, oriented  HENT: throat/mouth:normal, good dentition  Lungs: no respiratory distress, or pursed lip breathing  Heart: No obvious jugular venous distension present  Abdomen: nondistended  Musculoskeltal: extremities normal, no peripheral edema  Skin: no suspicious lesions or rashes  Neuro: Alert, oriented, speech and mentation normal  Psych: affect and mood normal  Gait: Normal  : deferred    All pertinent imaging reviewed:    Renal ultrasound 5/12/2022  IMPRESSION:  1.  Normal kidney ultrasound.      No concerning right renal lesions     Collected 5/23/2022  4:22 PM     Status: Final result     Visible to patient: Yes (not seen)     Dx: Dysuria    Specimen Information: Urine, Midstream         2 Result Notes     1 Patient Communication    Culture 10,000-50,000 CFU/mL Staphylococcus epidermidis Abnormal        <10,000 CFU/mL Mixture of urogenital scooter            Resulting Agency: [facility]       Susceptibility     Staphylococcus epidermidis     JOAQUIN     Ciprofloxacin <=0.5 ug/mL Susceptible     Gentamicin <=0.5 ug/mL Susceptible     Levofloxacin <=0.12 ug/mL Susceptible     Nitrofurantoin <=16.0 ug/mL Susceptible     Oxacillin <=0.25 ug/mL Susceptible 1     Tetracycline <=1.0 ug/mL Susceptible     Trimethoprim/Sulfamethoxazole  Susceptible     Vancomycin 1.0 ug/mL Susceptible                   ASSESSMENT and PLAN  57 " year old female returns today for follow-up of h/o gross hematuria, right renal cyst     Denies further gross hematuria    No concerning right renal cyst on the ultrasound today    Dysuria: UA/UC today. We will start vaginal estrogen given that the patient is post menopausal. Estrace 0.1 mg/gram three times a week    *addendum* 10-50k cfu/ml S. Epi. macrobid x3 days if remains symptomatic    - follow up 1 year with urinalysis, PVR      Isaiah Richter MD   [facility]  [facility] Phone: [phone]

## 2022-05-26 LAB
BACTERIA UR CULT: ABNORMAL
BACTERIA UR CULT: ABNORMAL

## 2022-06-03 ENCOUNTER — HOSPITAL ENCOUNTER (OUTPATIENT)
Dept: CARDIOLOGY | Facility: CLINIC | Age: 57
Discharge: HOME | End: 2022-06-03
Attending: INTERNAL MEDICINE | Admitting: INTERNAL MEDICINE
Payer: COMMERCIAL

## 2022-06-03 DIAGNOSIS — R00.2 PALPITATIONS: ICD-10-CM

## 2022-06-03 DIAGNOSIS — N39.0 URINARY TRACT INFECTION: Primary | ICD-10-CM

## 2022-06-03 PROCEDURE — 93242 EXT ECG>48HR<7D RECORDING: CPT

## 2022-06-03 PROCEDURE — 93244 EXT ECG>48HR<7D REV&INTERPJ: CPT | Performed by: INTERNAL MEDICINE

## 2022-06-03 RX ORDER — NITROFURANTOIN 25; 75 MG/1; MG/1
100 CAPSULE ORAL 2 TIMES DAILY
Qty: 10 CAPSULE | Refills: 0 | Status: SHIPPED | OUTPATIENT
Start: 2022-06-03 | End: 2022-09-09

## 2022-07-24 ENCOUNTER — OFFICE VISIT (OUTPATIENT)
Dept: URGENT CARE | Facility: URGENT CARE | Age: 57
End: 2022-07-24
Payer: COMMERCIAL

## 2022-07-24 ENCOUNTER — ANCILLARY PROCEDURE (OUTPATIENT)
Dept: GENERAL RADIOLOGY | Facility: CLINIC | Age: 57
End: 2022-07-24
Attending: FAMILY MEDICINE
Payer: COMMERCIAL

## 2022-07-24 VITALS
BODY MASS INDEX: 26.95 KG/M2 | RESPIRATION RATE: 12 BRPM | TEMPERATURE: 98.7 F | SYSTOLIC BLOOD PRESSURE: 140 MMHG | WEIGHT: 167 LBS | DIASTOLIC BLOOD PRESSURE: 80 MMHG | HEART RATE: 100 BPM | OXYGEN SATURATION: 100 %

## 2022-07-24 DIAGNOSIS — M17.31 POST-TRAUMATIC OSTEOARTHRITIS OF RIGHT KNEE: ICD-10-CM

## 2022-07-24 DIAGNOSIS — M25.561 CHRONIC PAIN OF RIGHT KNEE: Primary | ICD-10-CM

## 2022-07-24 DIAGNOSIS — M25.561 CHRONIC PAIN OF RIGHT KNEE: ICD-10-CM

## 2022-07-24 DIAGNOSIS — G89.29 CHRONIC PAIN OF RIGHT KNEE: ICD-10-CM

## 2022-07-24 DIAGNOSIS — G89.29 CHRONIC PAIN OF RIGHT KNEE: Primary | ICD-10-CM

## 2022-07-24 PROCEDURE — 99214 OFFICE O/P EST MOD 30 MIN: CPT | Performed by: FAMILY MEDICINE

## 2022-07-24 PROCEDURE — 73552 X-RAY EXAM OF FEMUR 2/>: CPT | Mod: TC | Performed by: RADIOLOGY

## 2022-07-24 NOTE — PROGRESS NOTES
ASSESSMENT/  PLAN:  Chronic pain of right knee     - XR Knee Right 3 Views; Future  - XR Femur Right 2 Views; Future  - diclofenac (VOLTAREN) 1 % topical gel; Apply 4 g topically 4 times daily    Post-traumatic osteoarthritis of right knee     - Orthopedic  Referral; Future  - Physical Therapy Referral; Future  - diclofenac (VOLTAREN) 1 % topical gel; Apply 4 g topically 4 times daily     Patient has   Osteoarthritis of the knee and patella-  She says there was some injury very young and she has had right knee pain all of her life,  Now it is making mobility difficult      Ibuprofen/  acetaminophen as alternative for pain  Follow-up with  orthopedics for possible steroid injection and/ or discussion of  Total knee arthroplasty-   Will follow-up with physical therapy to assess if exercise, or supports could provide pain relief of the right knee         -----------------------------------------------------------------------------------------------------------    SUBJECTIVE:  Chief Complaint   Patient presents with     Urgent Care     Right hip pain radiates down to the foot onset 2-3 years has worsen since last week.      Graciela Castorena is a 57 year old female who has chronic right knee pain, with difficulty walking.   She says she had right knee injury in childhood with persistent walking difficulties.   Mechanism of injury: - she does not remember, but worsening pain in the past 3 years.     Past Medical History:   Diagnosis Date     Abnormal Pap smear of cervix 03/30/2015    03/30/15, 10/20/21     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 breast 2018    s/p lumpectomy and radiation; on Tamoxifen     Palpitations      Tuberculosis      Patient Active Problem List   Diagnosis     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 breast     ASCUS of cervix with negative high risk HPV     Atypical squamous cells of undetermined significance on cytologic smear of cervix (ASC-US)     Latent tuberculosis      Environmental allergies     Cholelithiasis     Hepatic steatosis     Prediabetes       ALLERGIES:  Ciprofloxacin    MEDs  esomeprazole (NEXIUM) 40 MG DR capsule, Take 1 capsule (40 mg) by mouth every morning (before breakfast) Take 30-60 minutes before eating.  estradiol (ESTRACE) 0.1 MG/GM vaginal cream, Place 1 g vaginally three times a week  famotidine (PEPCID) 20 MG tablet, Take 1 tablet (20 mg) by mouth 2 times daily as needed (heart burn; dyspepsia)  ibuprofen (ADVIL/MOTRIN) 400 MG tablet, Take 1 tablet (400 mg) by mouth every 8 hours as needed  polyethylene glycol (MIRALAX) 17 GM/Dose powder, Take 17 g (1 capful) by mouth daily  calcium carbonate-vitamin D (OSCAL W/D) 500-200 MG-UNIT tablet, Take 1 tablet by mouth 2 times daily (Patient not taking: Reported on 7/24/2022)  nitroFURantoin macrocrystal-monohydrate (MACROBID) 100 MG capsule, Take 1 capsule (100 mg) by mouth 2 times daily (Patient not taking: Reported on 7/24/2022)    No current facility-administered medications on file prior to visit.      Social History     Tobacco Use     Smoking status: Never Smoker     Smokeless tobacco: Never Used   Substance Use Topics     Alcohol use: No     Alcohol/week: 0.0 standard drinks       Family History   Problem Relation Age of Onset     Diabetes Type 2  Brother      Diabetes Type 2  Sister      Myocardial Infarction No family hx of      Cerebrovascular Disease No family hx of      Coronary Artery Disease Early Onset No family hx of      Colon Cancer No family hx of      Breast Cancer No family hx of      Ovarian Cancer No family hx of         ROS:  CONSTITUTIONAL:NEGATIVE for fever, chills,    INTEGUMENTARY/SKIN: NEGATIVE for worrisome rashes or lesions  EYES: NEGATIVE for vision changes or irritation  ENT/MOUTH: NEGATIVE for ear, mouth and throat problems  RESP:NEGATIVE for significant cough or SOB    OBJECTIVE:  BP (!) 140/80 (BP Location: Left arm, Patient Position: Sitting, Cuff Size: Adult Regular)   Pulse  100   Temp 98.7  F (37.1  C) (Tympanic)   Resp 12   Wt 75.8 kg (167 lb)   SpO2 100%   BMI 26.95 kg/m    Appearance: alert and cooperative.  Limping favoring the right leg    Knee exam: right      pain with ROM of the patella       pain with stress to the medial collateral ligament    pain with stress to the lateral collateral ligament  no pain with compression of the meniscus in the medial and lateral compartments  no knee instability with Lachman     able to bear weight and ambulate with  Sharp pain with bearing weight on the right knee  There is not compromise to the distal circulation. Distal pulses are 2+ and capillary refill is brisk.  Motor and sensory function distal to the injured knee is normal     X-ray: Right knee degenerative changes of the patella and medial and lateral knee   x-ray read by me Ashley Armstrong MD    Results for orders placed or performed in visit on 07/24/22   XR Femur Right 2 Views     Status: None    Narrative    EXAM: XR FEMUR RIGHT 2 VIEWS  LOCATION: [facility]  DATE/TIME: 7/24/2022 12:04 PM    INDICATION:  Chronic pain of right knee, Chronic pain of right knee  COMPARISON: None.      Impression    IMPRESSION: No fracture. Moderate degenerative changes in the right knee.   Results for orders placed or performed in visit on 07/24/22   XR Knee Right 3 Views     Status: None    Narrative    EXAM: XR KNEE RIGHT 3 VIEWS  LOCATION: [facility]  DATE/TIME: 7/24/2022 12:04 PM    INDICATION:  Chronic pain of right knee, Chronic pain of right knee  COMPARISON: None.      Impression    IMPRESSION: No fracture or effusion. Moderate degenerative changes in the medial and lateral compartments. Mild degenerative changes in the patellofemoral compartment.

## 2022-07-29 ENCOUNTER — HOSPITAL ENCOUNTER (OUTPATIENT)
Dept: MAMMOGRAPHY | Facility: CLINIC | Age: 57
Discharge: HOME | End: 2022-07-29
Attending: INTERNAL MEDICINE | Admitting: INTERNAL MEDICINE
Payer: COMMERCIAL

## 2022-07-29 DIAGNOSIS — D05.12 DUCTAL CARCINOMA IN SITU (DCIS) OF LEFT BREAST: ICD-10-CM

## 2022-07-29 DIAGNOSIS — Z12.31 ENCOUNTER FOR SCREENING MAMMOGRAM FOR MALIGNANT NEOPLASM OF BREAST: ICD-10-CM

## 2022-07-29 PROCEDURE — 77067 SCR MAMMO BI INCL CAD: CPT

## 2022-09-09 ENCOUNTER — OFFICE VISIT (OUTPATIENT)
Dept: FAMILY MEDICINE | Facility: CLINIC | Age: 57
End: 2022-09-09
Payer: COMMERCIAL

## 2022-09-09 VITALS
RESPIRATION RATE: 16 BRPM | DIASTOLIC BLOOD PRESSURE: 85 MMHG | SYSTOLIC BLOOD PRESSURE: 122 MMHG | BODY MASS INDEX: 28.38 KG/M2 | HEART RATE: 79 BPM | HEIGHT: 64 IN | OXYGEN SATURATION: 99 % | TEMPERATURE: 96.8 F | WEIGHT: 166.2 LBS

## 2022-09-09 DIAGNOSIS — R10.13 DYSPEPSIA: ICD-10-CM

## 2022-09-09 DIAGNOSIS — R87.610 ASCUS OF CERVIX WITH NEGATIVE HIGH RISK HPV: ICD-10-CM

## 2022-09-09 DIAGNOSIS — K59.00 CONSTIPATION, UNSPECIFIED CONSTIPATION TYPE: ICD-10-CM

## 2022-09-09 DIAGNOSIS — Z12.4 CERVICAL CANCER SCREENING: ICD-10-CM

## 2022-09-09 DIAGNOSIS — Z00.00 ENCOUNTER FOR PREVENTIVE CARE: Primary | ICD-10-CM

## 2022-09-09 DIAGNOSIS — Z23 NEED FOR PROPHYLACTIC VACCINATION AND INOCULATION AGAINST INFLUENZA: ICD-10-CM

## 2022-09-09 LAB
BASOPHILS # BLD AUTO: 0 10E3/UL (ref 0–0.2)
BASOPHILS NFR BLD AUTO: 1 %
EOSINOPHIL # BLD AUTO: 0.2 10E3/UL (ref 0–0.7)
EOSINOPHIL NFR BLD AUTO: 3 %
ERYTHROCYTE [DISTWIDTH] IN BLOOD BY AUTOMATED COUNT: 13.1 % (ref 10–15)
HCT VFR BLD AUTO: 42 % (ref 35–47)
HGB BLD-MCNC: 13.8 G/DL (ref 11.7–15.7)
IMM GRANULOCYTES # BLD: 0 10E3/UL
IMM GRANULOCYTES NFR BLD: 0 %
LYMPHOCYTES # BLD AUTO: 2.3 10E3/UL (ref 0.8–5.3)
LYMPHOCYTES NFR BLD AUTO: 34 %
MCH RBC QN AUTO: 29.9 PG (ref 26.5–33)
MCHC RBC AUTO-ENTMCNC: 32.9 G/DL (ref 31.5–36.5)
MCV RBC AUTO: 91 FL (ref 78–100)
MONOCYTES # BLD AUTO: 0.5 10E3/UL (ref 0–1.3)
MONOCYTES NFR BLD AUTO: 7 %
NEUTROPHILS # BLD AUTO: 3.9 10E3/UL (ref 1.6–8.3)
NEUTROPHILS NFR BLD AUTO: 55 %
PLATELET # BLD AUTO: 229 10E3/UL (ref 150–450)
RBC # BLD AUTO: 4.62 10E6/UL (ref 3.8–5.2)
VIT B12 SERPL-MCNC: 413 PG/ML (ref 232–1245)
WBC # BLD AUTO: 7 10E3/UL (ref 4–11)

## 2022-09-09 PROCEDURE — 82607 VITAMIN B-12: CPT | Performed by: INTERNAL MEDICINE

## 2022-09-09 PROCEDURE — 90682 RIV4 VACC RECOMBINANT DNA IM: CPT | Performed by: INTERNAL MEDICINE

## 2022-09-09 PROCEDURE — 84443 ASSAY THYROID STIM HORMONE: CPT | Performed by: INTERNAL MEDICINE

## 2022-09-09 PROCEDURE — 80048 BASIC METABOLIC PNL TOTAL CA: CPT | Performed by: INTERNAL MEDICINE

## 2022-09-09 PROCEDURE — 36415 COLL VENOUS BLD VENIPUNCTURE: CPT | Performed by: INTERNAL MEDICINE

## 2022-09-09 PROCEDURE — 99396 PREV VISIT EST AGE 40-64: CPT | Mod: 25 | Performed by: INTERNAL MEDICINE

## 2022-09-09 PROCEDURE — 80061 LIPID PANEL: CPT | Performed by: INTERNAL MEDICINE

## 2022-09-09 PROCEDURE — 99213 OFFICE O/P EST LOW 20 MIN: CPT | Mod: 25 | Performed by: INTERNAL MEDICINE

## 2022-09-09 PROCEDURE — 82306 VITAMIN D 25 HYDROXY: CPT | Performed by: INTERNAL MEDICINE

## 2022-09-09 PROCEDURE — 85025 COMPLETE CBC W/AUTO DIFF WBC: CPT | Performed by: INTERNAL MEDICINE

## 2022-09-09 PROCEDURE — 90471 IMMUNIZATION ADMIN: CPT | Performed by: INTERNAL MEDICINE

## 2022-09-09 RX ORDER — POLYETHYLENE GLYCOL 3350 17 G/17G
1 POWDER, FOR SOLUTION ORAL DAILY
Qty: 507 G | Refills: 0 | Status: SHIPPED | OUTPATIENT
Start: 2022-09-09 | End: 2024-01-04

## 2022-09-09 ASSESSMENT — PAIN SCALES - GENERAL: PAINLEVEL: NO PAIN (0)

## 2022-09-09 NOTE — PROGRESS NOTES
SUBJECTIVE:   CC: Graciela Castorena is an 57 year old woman who presents for preventive health visit.   New to me. Established in clinic.    Chronic constipation.  miralax helps.    GERD: on PPI.  Requests refill.      ASCUS: by last check    Patient has been advised of split billing requirements and indicates understanding: Yes  Healthy Habits:     Getting at least 3 servings of Calcium per day:  NO    Bi-annual eye exam:  NO    Dental care twice a year:  Yes    Sleep apnea or symptoms of sleep apnea:  None    Diet:  Regular (no restrictions)    Frequency of exercise:  None    Duration of exercise:  N/A    Taking medications regularly:  Yes    Barriers to taking medications:  None    Medication side effects:  None    PHQ-2 Total Score: 0    Additional concerns today:  No              Today's PHQ-2 Score:   PHQ-2 ( 1999 [employer]) 9/9/2022   Q1: Little interest or pleasure in doing things 0   Q2: Feeling down, depressed or hopeless 0   PHQ-2 Score 0   PHQ-2 Total Score (12-17 Years)- Positive if 3 or more points; Administer PHQ-A if positive -   Q1: Little interest or pleasure in doing things -   Q2: Feeling down, depressed or hopeless -   PHQ-2 Score -       Abuse: Current or Past (Physical, Sexual or Emotional) - No  Do you feel safe in your environment? Yes        Social History     Tobacco Use     Smoking status: Never Smoker     Smokeless tobacco: Never Used   Substance Use Topics     Alcohol use: No     Alcohol/week: 0.0 standard drinks     If you drink alcohol do you typically have >3 drinks per day or >7 drinks per week? Not applicable    Alcohol Use 8/17/2021   Prescreen: >3 drinks/day or >7 drinks/week? No   Prescreen: >3 drinks/day or >7 drinks/week? -       Reviewed orders with patient.  Reviewed health maintenance and updated orders accordingly - Yes  Lab work is in process    Breast Cancer Screening:    FHS-7:   Breast CA Risk Assessment (FHS-7) 7/13/2021 7/29/2022   Did any of your first-degree  "relatives have breast or ovarian cancer? No No   Did any of your relatives have bilateral breast cancer? No No   Did any man in your family have breast cancer? No No   Did any woman in your family have breast and ovarian cancer? No No   Did any woman in your family have breast cancer before age 50 y? No No   Do you have 2 or more relatives with breast and/or ovarian cancer? No No   Do you have 2 or more relatives with breast and/or bowel cancer? No No           History of abnormal Pap smear:   PAP / HPV Latest Ref Rng & Units 10/20/2021 1/31/2020 8/29/2018   PAP   Atypical squamous cells of undetermined significance (ASC-US)(A) - -   PAP (Historical) - - ASC-US(A) ASC-US(A)   HPV16 Negative Negative Negative Negative   HPV18 Negative Negative Negative Negative   HRHPV Negative Negative Negative Negative     Reviewed and updated as needed this visit by clinical staff   Tobacco  Allergies  Meds                Reviewed and updated as needed this visit by Provider                       Review of Systems       OBJECTIVE:   /85 (BP Location: Left arm, Patient Position: Sitting, Cuff Size: Adult Large)   Pulse 79   Temp 96.8  F (36  C) (Temporal)   Resp 16   Ht 1.615 m (5' 3.58\")   Wt 75.4 kg (166 lb 3.2 oz)   SpO2 99%   Breastfeeding No   BMI 28.90 kg/m    Physical Exam  GENERAL APPEARANCE: healthy, alert and no distress  EYES: Eyes grossly normal to inspection, PERRL and conjunctivae and sclerae normal  HENT: ear canals and TM's normal, nose and mouth without ulcers or lesions, oropharynx clear and oral mucous membranes moist  NECK: no adenopathy, no asymmetry, masses, or scars and thyroid normal to palpation  RESP: lungs clear to auscultation - no rales, rhonchi or wheezes  CV: regular rate and rhythm, normal S1 S2, no S3 or S4, no murmur, click or rub, no peripheral edema and peripheral pulses strong  ABDOMEN: soft, nontender, no hepatosplenomegaly, no masses and bowel sounds normal  MS: no " "musculoskeletal defects are noted and gait is age appropriate without ataxia  SKIN: no suspicious lesions or rashes  NEURO: Normal strength and tone, sensory exam grossly normal, mentation intact and speech normal  PSYCH: mentation appears normal and affect normal/bright        ASSESSMENT/PLAN:       ICD-10-CM    1. Encounter for preventive care   Age and gender appropriate preventive care and screenings are discussed.  Particular attention to personal preventive care and age appropriate lifestyle including the incorporation of healthy diet and physical activity is made.     Z00.00 CBC with Platelets & Differential     Basic metabolic panel     Lipid panel reflex to direct LDL Fasting     TSH with free T4 reflex   2. Cervical cancer screening   ASCUS in the past.  Pt would like to see gyne, specifically in [place].   Z12.4    3. Constipation, unspecified constipation type   Urged regular use of miralax rather than once in a while K59.00 polyethylene glycol (MIRALAX) 17 GM/Dose powder   4. ASCUS of cervix with negative high risk HPV   As above R87.610 Ob/Gyn Referral   5. Dyspepsia   PPI renewed R10.13 omeprazole (PRILOSEC) 20 MG DR capsule   6. Need for prophylactic vaccination and inoculation against influenza  Z23 INFLUENZA QUAD, RECOMBINANT, P-FREE (RIV4) (FLUBLOK)           COUNSELING:  Reviewed preventive health counseling, as reflected in patient instructions    Estimated body mass index is 28.9 kg/m  as calculated from the following:    Height as of this encounter: 1.615 m (5' 3.58\").    Weight as of this encounter: 75.4 kg (166 lb 3.2 oz).        She reports that she has never smoked. She has never used smokeless tobacco.      Hua Delcid MD  [facility]  "

## 2022-09-12 LAB
ANION GAP SERPL CALCULATED.3IONS-SCNC: 6 MMOL/L (ref 3–14)
BUN SERPL-MCNC: 13 MG/DL (ref 7–30)
CALCIUM SERPL-MCNC: 9.2 MG/DL (ref 8.5–10.1)
CHLORIDE BLD-SCNC: 108 MMOL/L (ref 94–109)
CHOLEST SERPL-MCNC: 184 MG/DL
CO2 SERPL-SCNC: 25 MMOL/L (ref 20–32)
CREAT SERPL-MCNC: 0.69 MG/DL (ref 0.52–1.04)
DEPRECATED CALCIDIOL+CALCIFEROL SERPL-MC: 22 UG/L (ref 20–75)
FASTING STATUS PATIENT QL REPORTED: NO
GFR SERPL CREATININE-BSD FRML MDRD: >90 ML/MIN/1.73M2
GLUCOSE BLD-MCNC: 97 MG/DL (ref 70–99)
HDLC SERPL-MCNC: 46 MG/DL
LDLC SERPL CALC-MCNC: 114 MG/DL
NONHDLC SERPL-MCNC: 138 MG/DL
POTASSIUM BLD-SCNC: 4 MMOL/L (ref 3.4–5.3)
SODIUM SERPL-SCNC: 139 MMOL/L (ref 133–144)
TRIGL SERPL-MCNC: 122 MG/DL
TSH SERPL DL<=0.005 MIU/L-ACNC: 1.92 MU/L (ref 0.4–4)

## 2022-10-04 ENCOUNTER — PATIENT OUTREACH (OUTPATIENT)
Dept: FAMILY MEDICINE | Facility: CLINIC | Age: 57
End: 2022-10-04

## 2022-10-04 NOTE — LETTER
October 4, 2022      Graciela Castorena  [address]  [place] [address]        Dear MsDelroyRaffaele,    This letter is to remind you that you are due for your follow-up Pap smear and Human Papillomavirus (HPV) test.    Please call [phone] to schedule your appointment at your earliest convenience. If you would like to see OB/GYN, please call [phone].    If you have completed the appointment outside of the [facility] system, please have the records forwarded to our office. We will update your chart for your provider to review before your next annual wellness visit.     Thank you for choosing [facility]!      Sincerely,    Your [facility] Care Team

## 2022-10-26 ENCOUNTER — OFFICE VISIT (OUTPATIENT)
Dept: OBGYN | Facility: CLINIC | Age: 57
End: 2022-10-26
Payer: COMMERCIAL

## 2022-10-26 VITALS
DIASTOLIC BLOOD PRESSURE: 80 MMHG | SYSTOLIC BLOOD PRESSURE: 114 MMHG | HEIGHT: 63 IN | WEIGHT: 166.8 LBS | BODY MASS INDEX: 29.55 KG/M2

## 2022-10-26 DIAGNOSIS — R30.0 DYSURIA: ICD-10-CM

## 2022-10-26 DIAGNOSIS — Z13.6 SCREENING FOR ISCHEMIC HEART DISEASE: ICD-10-CM

## 2022-10-26 DIAGNOSIS — Z01.419 ENCOUNTER FOR GYNECOLOGICAL EXAMINATION WITHOUT ABNORMAL FINDING: Primary | ICD-10-CM

## 2022-10-26 DIAGNOSIS — Z12.4 SCREENING FOR CERVICAL CANCER: ICD-10-CM

## 2022-10-26 PROCEDURE — 88175 CYTOPATH C/V AUTO FLUID REDO: CPT | Performed by: NURSE PRACTITIONER

## 2022-10-26 PROCEDURE — 80061 LIPID PANEL: CPT | Performed by: NURSE PRACTITIONER

## 2022-10-26 PROCEDURE — 99396 PREV VISIT EST AGE 40-64: CPT | Performed by: NURSE PRACTITIONER

## 2022-10-26 PROCEDURE — 87624 HPV HI-RISK TYP POOLED RSLT: CPT | Performed by: NURSE PRACTITIONER

## 2022-10-26 PROCEDURE — 36415 COLL VENOUS BLD VENIPUNCTURE: CPT | Performed by: NURSE PRACTITIONER

## 2022-10-26 RX ORDER — PANTOPRAZOLE SODIUM 40 MG/1
TABLET, DELAYED RELEASE ORAL
COMMUNITY
Start: 2022-03-24 | End: 2024-01-04

## 2022-10-26 RX ORDER — ESTRADIOL 0.1 MG/G
1 CREAM VAGINAL
Qty: 42.5 G | Refills: 3 | Status: CANCELLED | OUTPATIENT
Start: 2022-10-26

## 2022-10-27 LAB
CHOLEST SERPL-MCNC: 173 MG/DL
FASTING STATUS PATIENT QL REPORTED: YES
HDLC SERPL-MCNC: 52 MG/DL
LDLC SERPL CALC-MCNC: 101 MG/DL
NONHDLC SERPL-MCNC: 121 MG/DL
TRIGL SERPL-MCNC: 101 MG/DL

## 2022-10-28 LAB
BKR LAB AP GYN ADEQUACY: NORMAL
BKR LAB AP GYN INTERPRETATION: NORMAL
BKR LAB AP HPV REFLEX: NORMAL
BKR LAB AP PREVIOUS ABNL DX: NORMAL
BKR LAB AP PREVIOUS ABNORMAL: NORMAL
PATH REPORT.COMMENTS IMP SPEC: NORMAL
PATH REPORT.COMMENTS IMP SPEC: NORMAL
PATH REPORT.RELEVANT HX SPEC: NORMAL

## 2022-11-04 ENCOUNTER — PATIENT OUTREACH (OUTPATIENT)
Dept: OBGYN | Facility: CLINIC | Age: 57
End: 2022-11-04

## 2022-11-04 NOTE — LETTER
November 4, 2022      Graciela Castorena  [address]  [place] [address]        Dear MsDelroyRaffaele,    We are happy to inform you that your recent Pap smear and Human Papillomavirus (HPV) test results are normal and negative.    It is recommended that you have your next Pap smear and Human Papillomavirus (HPV) test in 1 year. You will also need to return to the clinic every year for an annual wellness visit.    If you have additional questions regarding this result, please contact our office and we will be happy to assist you.      Sincerely,    Your [facility] Care Team

## 2023-01-05 ENCOUNTER — ONCOLOGY VISIT (OUTPATIENT)
Dept: ONCOLOGY | Facility: CLINIC | Age: 58
End: 2023-01-05
Attending: INTERNAL MEDICINE
Payer: COMMERCIAL

## 2023-01-05 VITALS
DIASTOLIC BLOOD PRESSURE: 81 MMHG | BODY MASS INDEX: 29 KG/M2 | HEART RATE: 65 BPM | WEIGHT: 165 LBS | SYSTOLIC BLOOD PRESSURE: 136 MMHG | RESPIRATION RATE: 16 BRPM | OXYGEN SATURATION: 99 %

## 2023-01-05 DIAGNOSIS — Z12.31 ENCOUNTER FOR SCREENING MAMMOGRAM FOR MALIGNANT NEOPLASM OF BREAST: ICD-10-CM

## 2023-01-05 DIAGNOSIS — D05.12 DUCTAL CARCINOMA IN SITU (DCIS) OF LEFT BREAST: Primary | ICD-10-CM

## 2023-01-05 PROCEDURE — G0463 HOSPITAL OUTPT CLINIC VISIT: HCPCS | Performed by: INTERNAL MEDICINE

## 2023-01-05 PROCEDURE — 99214 OFFICE O/P EST MOD 30 MIN: CPT | Performed by: INTERNAL MEDICINE

## 2023-01-05 ASSESSMENT — PAIN SCALES - GENERAL: PAINLEVEL: NO PAIN (0)

## 2023-01-05 NOTE — LETTER
"    1/5/2023         RE: Graciela Castorena  [address]  [place] [address]        Dear Colleague,    Thank you for referring your patient, Graciela Castorena, to the [facility]. Please see a copy of my visit note below.    Oncology Rooming Note    January 5, 2023 8:19 AM   Graciela Castorena is a 58 year old female who presents for:    Chief Complaint   Patient presents with     Oncology Clinic Visit     Initial Vitals: /81   Pulse 65   Resp 16   Wt 74.8 kg (165 lb)   SpO2 99%   BMI 29.00 kg/m   Estimated body mass index is 29 kg/m  as calculated from the following:    Height as of 10/26/22: 1.607 m (5' 3.25\").    Weight as of this encounter: 74.8 kg (165 lb). Body surface area is 1.83 meters squared.  No Pain (0) Comment: Data Unavailable   No LMP recorded. Patient is postmenopausal.  Allergies reviewed: Yes  Medications reviewed: Yes    Medications: Medication refills not needed today.  Pharmacy name entered into [employer]: [id number] - [facility] [address]    Clinical concerns: no      Zandra Jiang, [facility]              ONCOLOGY HISTORY: Mrs. 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      1.  Screening mammogram on 01/16/2018 revealed a new calcification cluster behind the left nipple.   -Left diagnostic mammogram on 03/12/2018 revealed an indeterminate cluster of microcalcification in retroareolar portion of left breast at 11:00.      2. Stereotactic left breast biopsy on 03/13/2018 reveals intermediate grade DCIS with microcalcification and comedonecrosis.  % positive and  MI 25% positive.      3.  Patient underwent left breast lumpectomy on 04/18/2018.  Pathology reveals grade 2 DCIS measuring 3 mm.  Margins are negative.     4. Radiation to left breast. 5056 cGy between 05/31/2018 and 06/27/2018.      5. Tamoxifen started in May, 2018. Stopped 0n 12/29/2020 due to vaginal bleeding.  -Anastrazole started on 01/12/2021.  -Anastrazole " stopped 07/14/2021 due to aches and pain.     SUBJECTIVE:  Mrs. Castorena is a 58-year-old female with left breast DCIS s/p lumpectomy and radiation in 2018. She was on tamoxifen and anastrozole. It was discontinued because of side effects.       Patient gets intermittent aches and pain.  Patient says that aches and pains are generalized.  Sometimes in the muscles, sometimes in the joint and sometimes in the bones.  Over-the-counter pain medications help.  She has mild fatigue.  She is still active and able to do all her activities. No headache.  No dizziness.  No chest pain. No shortness of breath.  No abdominal pain.  No nausea or vomiting. She gets some heartburn.  She takes famotidine/prilosec/protonix. It helps.  No urinary or bowel complaints.  No bleeding.  No fevers or night sweats.  All other review of systems negative.     PHYSICAL EXAMINATION:   GENERAL:  Alert and oriented x 3.   VITAL SIGNS:  Reviewed.     EYES:  No icterus.   NECK:  Supple. No lymphadenopathy. No thyromegaly.   AXILLAE:  No lymphadenopathy.   LUNGS:  Good air entry bilaterally.  No crackles or wheezing.   HEART:  Regular.  No murmur.   GI:  Abdomen is soft.  Nontender. No mass.   EXTREMITIES:  No pedal edema.  No calf swelling or tenderness.   SKIN:  No rash.   BREASTS:  Examined in the presence of the nurse.   -Right breast exam is normal.  No mass.  No skin changes.  No axillary lymphadenopathy.   -Left breast exam reveals post-surgical changes.  No mass.  No suspicious skin lesion.  No axillary lymphadenopathy.      ASSESSMENT:    1.  A 58-year-old female with left breast ductal carcinoma in situ diagnosed in 2018.  No evidence of recurrence.  2.  Aches and pain.     PLAN:  1.  Patient is doing well from DCIS.  It is almost 5 years since diagnosis.  There is no evidence of disease.  Explained to her that risk of recurrence of breast cancer is very low.    2.  For follow-up, she needs yearly mammogram and exam.  Explained to the  patient that she can follow-up with her PCP.  Patient wants to follow-up with oncology clinic.  I will see her in 1 year time.  On routine basis, no labs or scans will be done.    3.  She will get mammogram in August of this year.    4.  Patient gets some aches and pain.  It is mild.  Over-the-counter pain medication helps.  If it gets worse, she will see her PCP and may have to see a rheumatologist.    5.  She had few questions which were all answered.  I will see her in 1 year time.  Advised her to call us with any questions or concerns.    Total visit time of 30 minutes.  Time is spent in today's visit, review of chart/investigations today and documentation today.      Again, thank you for allowing me to participate in the care of your patient.        Sincerely,        Samreen Barahona MD

## 2023-01-05 NOTE — PROGRESS NOTES
"Oncology Rooming Note    January 5, 2023 8:19 AM   Graciela Castorena is a 58 year old female who presents for:    Chief Complaint   Patient presents with     Oncology Clinic Visit     Initial Vitals: /81   Pulse 65   Resp 16   Wt 74.8 kg (165 lb)   SpO2 99%   BMI 29.00 kg/m   Estimated body mass index is 29 kg/m  as calculated from the following:    Height as of 10/26/22: 1.607 m (5' 3.25\").    Weight as of this encounter: 74.8 kg (165 lb). Body surface area is 1.83 meters squared.  No Pain (0) Comment: Data Unavailable   No LMP recorded. Patient is postmenopausal.  Allergies reviewed: Yes  Medications reviewed: Yes    Medications: Medication refills not needed today.  Pharmacy name entered into [employer]: [id number] - [place], [place] - [address]    Clinical concerns: no      Zandra Jiang CMA            "

## 2023-01-05 NOTE — PROGRESS NOTES
ONCOLOGY HISTORY: Mrs. 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      1.  Screening mammogram on 01/16/2018 revealed a new calcification cluster behind the left nipple.   -Left diagnostic mammogram on 03/12/2018 revealed an indeterminate cluster of microcalcification in retroareolar portion of left breast at 11:00.      2. Stereotactic left breast biopsy on 03/13/2018 reveals intermediate grade DCIS with microcalcification and comedonecrosis.  % positive and  TX 25% positive.      3.  Patient underwent left breast lumpectomy on 04/18/2018.  Pathology reveals grade 2 DCIS measuring 3 mm.  Margins are negative.     4. Radiation to left breast. 5056 cGy between 05/31/2018 and 06/27/2018.      5. Tamoxifen started in May, 2018. Stopped 0n 12/29/2020 due to vaginal bleeding.  -Anastrazole started on 01/12/2021.  -Anastrazole stopped 07/14/2021 due to aches and pain.     SUBJECTIVE:  Mrs. Castorena is a 58-year-old female with left breast DCIS s/p lumpectomy and radiation in 2018. She was on tamoxifen and anastrozole. It was discontinued because of side effects.       Patient gets intermittent aches and pain.  Patient says that aches and pains are generalized.  Sometimes in the muscles, sometimes in the joint and sometimes in the bones.  Over-the-counter pain medications help.  She has mild fatigue.  She is still active and able to do all her activities. No headache.  No dizziness.  No chest pain. No shortness of breath.  No abdominal pain.  No nausea or vomiting. She gets some heartburn.  She takes famotidine/prilosec/protonix. It helps.  No urinary or bowel complaints.  No bleeding.  No fevers or night sweats.  All other review of systems negative.     PHYSICAL EXAMINATION:   GENERAL:  Alert and oriented x 3.   VITAL SIGNS:  Reviewed.     EYES:  No icterus.   NECK:  Supple. No lymphadenopathy. No thyromegaly.   AXILLAE:  No lymphadenopathy.   LUNGS:  Good air entry bilaterally.  No crackles or wheezing.   HEART:   Regular.  No murmur.   GI:  Abdomen is soft.  Nontender. No mass.   EXTREMITIES:  No pedal edema.  No calf swelling or tenderness.   SKIN:  No rash.   BREASTS:  Examined in the presence of the nurse.   -Right breast exam is normal.  No mass.  No skin changes.  No axillary lymphadenopathy.   -Left breast exam reveals post-surgical changes.  No mass.  No suspicious skin lesion.  No axillary lymphadenopathy.      ASSESSMENT:    1.  A 58-year-old female with left breast ductal carcinoma in situ diagnosed in 2018.  No evidence of recurrence.  2.  Aches and pain.     PLAN:  1.  Patient is doing well from DCIS.  It is almost 5 years since diagnosis.  There is no evidence of disease.  Explained to her that risk of recurrence of breast cancer is very low.    2.  For follow-up, she needs yearly mammogram and exam.  Explained to the patient that she can follow-up with her PCP.  Patient wants to follow-up with oncology clinic.  I will see her in 1 year time.  On routine basis, no labs or scans will be done.    3.  She will get mammogram in August of this year.    4.  Patient gets some aches and pain.  It is mild.  Over-the-counter pain medication helps.  If it gets worse, she will see her PCP and may have to see a rheumatologist.    5.  She had few questions which were all answered.  I will see her in 1 year time.  Advised her to call us with any questions or concerns.    Total visit time of 30 minutes.  Time is spent in today's visit, review of chart/investigations today and documentation today.

## 2023-03-03 ENCOUNTER — HOSPITAL ENCOUNTER (EMERGENCY)
Facility: CLINIC | Age: 58
Discharge: HOME | End: 2023-03-04
Attending: EMERGENCY MEDICINE | Admitting: EMERGENCY MEDICINE
Payer: COMMERCIAL

## 2023-03-03 ENCOUNTER — APPOINTMENT (OUTPATIENT)
Dept: GENERAL RADIOLOGY | Facility: CLINIC | Age: 58
End: 2023-03-03
Attending: EMERGENCY MEDICINE
Payer: COMMERCIAL

## 2023-03-03 DIAGNOSIS — M25.551 HIP PAIN, RIGHT: ICD-10-CM

## 2023-03-03 PROCEDURE — 73502 X-RAY EXAM HIP UNI 2-3 VIEWS: CPT

## 2023-03-03 PROCEDURE — 99283 EMERGENCY DEPT VISIT LOW MDM: CPT

## 2023-03-03 ASSESSMENT — ACTIVITIES OF DAILY LIVING (ADL): ADLS_ACUITY_SCORE: 33

## 2023-03-03 ASSESSMENT — ENCOUNTER SYMPTOMS: ARTHRALGIAS: 1

## 2023-03-04 VITALS
WEIGHT: 160 LBS | RESPIRATION RATE: 14 BRPM | HEART RATE: 71 BPM | OXYGEN SATURATION: 100 % | SYSTOLIC BLOOD PRESSURE: 146 MMHG | TEMPERATURE: 98.3 F | DIASTOLIC BLOOD PRESSURE: 96 MMHG | BODY MASS INDEX: 28.12 KG/M2

## 2023-03-04 PROCEDURE — 250N000013 HC RX MED GY IP 250 OP 250 PS 637: Performed by: EMERGENCY MEDICINE

## 2023-03-04 RX ORDER — ACETAMINOPHEN 500 MG
1000 TABLET ORAL ONCE
Status: COMPLETED | OUTPATIENT
Start: 2023-03-04 | End: 2023-03-04

## 2023-03-04 RX ADMIN — ACETAMINOPHEN 1000 MG: 500 TABLET ORAL at 00:06

## 2023-03-04 NOTE — ED PROVIDER NOTES
History   Chief Complaint:  Hip Pain     The history is provided by the patient and medical records.      Graciela Castorena is a 58 year old female who presents with right hip pain. The patient states has had intermittent right lower extremity pain and right hip pain. She has increased pain with ambulation. She noted a lump to the lateral aspect of her upper thigh/hip area. She denies any recent falls or injuries. She denies rashes. She denies history of hip problems. She has been able to ambulate. She has no symptoms to her left lower extremity.     Independent Historian:   None - Patient Only    Review of External Notes:    ROS:  Review of Systems   Musculoskeletal: Positive for arthralgias.   Skin: Negative for rash.   All other systems reviewed and are negative.    Allergies:  Ciprofloxacin     Medications:    Pepcid  Prilosec  Protonix     Past Medical History:   Abnormal Pap smear of cervix  Ductal carcinoma in situ of left breast   Latent tuberculosis   Cholelithiasis     Past Surgical History:    Colonoscopy  Breast lumpectomy with seed localization, left      Family History:    family history includes Diabetes Type 2  in her brother and sister.    Social History:  The patient was unaccompanied to the emergency department.   reports that she has never smoked. She has never used smokeless tobacco. She reports that she does not drink alcohol and does not use drugs.  PCP: Tavares Velasco     Physical Exam     Patient Vitals for the past 24 hrs:   BP Temp Temp src Pulse Resp SpO2 Weight   03/03/23 2105 (!) 170/96 98.3  F (36.8  C) Temporal 82 12 100 % 72.6 kg (160 lb)      Physical Exam  Constitutional: Well appearing.  HEENT: Atraumatic.  Moist mucous membranes.  Neck: Soft.  Supple.   Cardiac: Regular rate and rhythm.  No murmur or rub.  Respiratory: Clear to auscultation bilaterally.  No respiratory distress.  No wheezing, rhonchi, or rales.  Abdomen: Soft and nontender. Nondistended.  Musculoskeletal:  Full passive and active range of motion of the right hip.  There is no effusion or erythema of the right hip.  There is a firm palpable small what feels like a lymph node in the posterior lateral right hip no overlying erythema or warmth.  Distal pulses intact and symmetric in lower extremities.  Cap refill less than 3 seconds throughout no edema.  Normal range of motion.  Neurologic: Alert and oriented.  Normal tone and bulk.  5/5 strength in bilateral lower extremities.  Sensation to light touch intact throughout.  Normal gait.  Skin: No rashes.  No edema.  Psych: Normal affect.  Normal behavior.        Emergency Department Course   Imaging:  XR Pelvis w Hip Right 1 View   Final Result   IMPRESSION: No radiographic evidence of acute fracture or malalignment. Minimal polyarticular osteoarthritis.      Report per radiology    Procedures       Emergency Department Course & Assessments:     Interventions:  Medications - No data to display     Assessments:  2252 I obtained history and performed an exam of the patient as documented above.    Independent Interpretation (X-rays, CTs, rhythm strip):  I reviewed the patient's hip and pelvis x-ray.     Consultations/Discussion of Management or Tests:  None     Social Determinants of Health affecting care:   None    Disposition:  The patient was discharged to home.     Impression & Plan    Medical Decision Making:  Graciela Castorena Is a 58-year-old woman who is afebrile and hemodynamically stable.  X-ray of the left hip demonstrates no acute abnormalities.  There are some firmness posterior and lateral to the right hip and informal bedside ultrasound reveals no obvious fluid collection but likely slightly enlarged lymph node versus bursa sac.  We discussed potential bursitis versus other.  She is neurovascular intact in the right leg and no signs of DVT, acute septic joint, acute ischemic limb, or other emergent or life-threatening or limb threatening conditions.  Discussed  plan for Tylenol and NSAIDs, rest, and close orthopedic and primary care follow-up and she is understanding.  Her questions were answered and she was in no distress at time of discharge    Diagnosis:    ICD-10-CM    1. Hip pain, right  M25.551            Discharge Medications:  New Prescriptions    No medications on file      Scribe Disclosure:  I, Crhistin Siddiqui, am serving as a scribe at 10:21 PM on 3/3/2023 to document services personally performed by Harjinder Flores MD based on my observations and the provider's statements to me.      Harjinder Flores MD  03/04/23 [id number]

## 2023-03-04 NOTE — ED TRIAGE NOTES
Patient complaints of right hip, buttock pain.  Feels like a lump inside of hip.  Denies injury. Pain for 3 days.  Also some pain to right leg sometimes.     Triage Assessment     Row Name 03/03/23 0631       Triage Assessment (Adult)    Airway WDL WDL       Respiratory WDL    Respiratory WDL WDL       Skin Circulation/Temperature WDL    Skin Circulation/Temperature WDL WDL       Cardiac WDL    Cardiac WDL WDL       Peripheral/Neurovascular WDL    Peripheral Neurovascular WDL WDL       Cognitive/Neuro/Behavioral WDL    Cognitive/Neuro/Behavioral WDL WDL

## 2023-04-24 ENCOUNTER — OFFICE VISIT (OUTPATIENT)
Dept: URGENT CARE | Facility: URGENT CARE | Age: 58
End: 2023-04-24
Payer: COMMERCIAL

## 2023-04-24 ENCOUNTER — APPOINTMENT (OUTPATIENT)
Dept: GENERAL RADIOLOGY | Facility: CLINIC | Age: 58
End: 2023-04-24
Attending: EMERGENCY MEDICINE
Payer: COMMERCIAL

## 2023-04-24 ENCOUNTER — HOSPITAL ENCOUNTER (EMERGENCY)
Facility: CLINIC | Age: 58
Discharge: HOME | End: 2023-04-24
Attending: EMERGENCY MEDICINE | Admitting: EMERGENCY MEDICINE
Payer: COMMERCIAL

## 2023-04-24 VITALS
RESPIRATION RATE: 27 BRPM | DIASTOLIC BLOOD PRESSURE: 101 MMHG | HEART RATE: 85 BPM | OXYGEN SATURATION: 97 % | TEMPERATURE: 98.3 F | SYSTOLIC BLOOD PRESSURE: 158 MMHG

## 2023-04-24 VITALS
RESPIRATION RATE: 18 BRPM | HEART RATE: 85 BPM | DIASTOLIC BLOOD PRESSURE: 80 MMHG | TEMPERATURE: 98.5 F | SYSTOLIC BLOOD PRESSURE: 134 MMHG | OXYGEN SATURATION: 99 % | BODY MASS INDEX: 28.12 KG/M2 | WEIGHT: 160 LBS

## 2023-04-24 DIAGNOSIS — R07.9 ACUTE CHEST PAIN: ICD-10-CM

## 2023-04-24 DIAGNOSIS — I44.7 LBBB (LEFT BUNDLE BRANCH BLOCK): ICD-10-CM

## 2023-04-24 DIAGNOSIS — R07.9 CHEST PAIN, UNSPECIFIED TYPE: Primary | ICD-10-CM

## 2023-04-24 DIAGNOSIS — I44.7 LEFT BUNDLE BRANCH BLOCK: ICD-10-CM

## 2023-04-24 LAB
ALBUMIN SERPL BCG-MCNC: 4.2 G/DL (ref 3.5–5.2)
ALP SERPL-CCNC: 72 U/L (ref 35–104)
ALT SERPL W P-5'-P-CCNC: 17 U/L (ref 10–35)
ANION GAP SERPL CALCULATED.3IONS-SCNC: 8 MMOL/L (ref 7–15)
AST SERPL W P-5'-P-CCNC: 25 U/L (ref 10–35)
ATRIAL RATE - MUSE: 78 BPM
BASOPHILS # BLD AUTO: 0.1 10E3/UL (ref 0–0.2)
BASOPHILS NFR BLD AUTO: 1 %
BILIRUB SERPL-MCNC: 0.2 MG/DL
BUN SERPL-MCNC: 11.8 MG/DL (ref 6–20)
CALCIUM SERPL-MCNC: 9.4 MG/DL (ref 8.6–10)
CHLORIDE SERPL-SCNC: 105 MMOL/L (ref 98–107)
CREAT SERPL-MCNC: 0.72 MG/DL (ref 0.51–0.95)
D DIMER PPP FEU-MCNC: 0.27 UG/ML FEU (ref 0–0.5)
DEPRECATED HCO3 PLAS-SCNC: 27 MMOL/L (ref 22–29)
DIASTOLIC BLOOD PRESSURE - MUSE: NORMAL MMHG
EOSINOPHIL # BLD AUTO: 0.4 10E3/UL (ref 0–0.7)
EOSINOPHIL NFR BLD AUTO: 6 %
ERYTHROCYTE [DISTWIDTH] IN BLOOD BY AUTOMATED COUNT: 13.3 % (ref 10–15)
GFR SERPL CREATININE-BSD FRML MDRD: >90 ML/MIN/1.73M2
GLUCOSE SERPL-MCNC: 80 MG/DL (ref 70–99)
HCT VFR BLD AUTO: 42.1 % (ref 35–47)
HGB BLD-MCNC: 14.1 G/DL (ref 11.7–15.7)
IMM GRANULOCYTES # BLD: 0 10E3/UL
IMM GRANULOCYTES NFR BLD: 0 %
INTERPRETATION ECG - MUSE: NORMAL
LIPASE SERPL-CCNC: 24 U/L (ref 13–60)
LYMPHOCYTES # BLD AUTO: 2.5 10E3/UL (ref 0.8–5.3)
LYMPHOCYTES NFR BLD AUTO: 35 %
MCH RBC QN AUTO: 29.7 PG (ref 26.5–33)
MCHC RBC AUTO-ENTMCNC: 33.5 G/DL (ref 31.5–36.5)
MCV RBC AUTO: 89 FL (ref 78–100)
MONOCYTES # BLD AUTO: 0.6 10E3/UL (ref 0–1.3)
MONOCYTES NFR BLD AUTO: 9 %
NEUTROPHILS # BLD AUTO: 3.6 10E3/UL (ref 1.6–8.3)
NEUTROPHILS NFR BLD AUTO: 49 %
NRBC # BLD AUTO: 0 10E3/UL
NRBC BLD AUTO-RTO: 0 /100
P AXIS - MUSE: 61 DEGREES
PLATELET # BLD AUTO: 238 10E3/UL (ref 150–450)
POTASSIUM SERPL-SCNC: 4 MMOL/L (ref 3.4–5.3)
PR INTERVAL - MUSE: 182 MS
PROT SERPL-MCNC: 7 G/DL (ref 6.4–8.3)
QRS DURATION - MUSE: 146 MS
QT - MUSE: 444 MS
QTC - MUSE: 506 MS
R AXIS - MUSE: -16 DEGREES
RBC # BLD AUTO: 4.75 10E6/UL (ref 3.8–5.2)
SODIUM SERPL-SCNC: 140 MMOL/L (ref 136–145)
SYSTOLIC BLOOD PRESSURE - MUSE: NORMAL MMHG
T AXIS - MUSE: 124 DEGREES
TROPONIN T SERPL HS-MCNC: <6 NG/L
VENTRICULAR RATE- MUSE: 78 BPM
WBC # BLD AUTO: 7.1 10E3/UL (ref 4–11)

## 2023-04-24 PROCEDURE — 85025 COMPLETE CBC W/AUTO DIFF WBC: CPT | Performed by: EMERGENCY MEDICINE

## 2023-04-24 PROCEDURE — 36415 COLL VENOUS BLD VENIPUNCTURE: CPT | Performed by: EMERGENCY MEDICINE

## 2023-04-24 PROCEDURE — 93000 ELECTROCARDIOGRAM COMPLETE: CPT | Performed by: FAMILY MEDICINE

## 2023-04-24 PROCEDURE — 71046 X-RAY EXAM CHEST 2 VIEWS: CPT

## 2023-04-24 PROCEDURE — 99214 OFFICE O/P EST MOD 30 MIN: CPT | Performed by: FAMILY MEDICINE

## 2023-04-24 PROCEDURE — 93005 ELECTROCARDIOGRAM TRACING: CPT

## 2023-04-24 PROCEDURE — 80053 COMPREHEN METABOLIC PANEL: CPT | Performed by: EMERGENCY MEDICINE

## 2023-04-24 PROCEDURE — 83690 ASSAY OF LIPASE: CPT | Performed by: EMERGENCY MEDICINE

## 2023-04-24 PROCEDURE — 84484 ASSAY OF TROPONIN QUANT: CPT | Performed by: EMERGENCY MEDICINE

## 2023-04-24 PROCEDURE — 99285 EMERGENCY DEPT VISIT HI MDM: CPT | Mod: 25

## 2023-04-24 PROCEDURE — 85379 FIBRIN DEGRADATION QUANT: CPT | Performed by: EMERGENCY MEDICINE

## 2023-04-24 ASSESSMENT — ACTIVITIES OF DAILY LIVING (ADL): ADLS_ACUITY_SCORE: 35

## 2023-04-24 NOTE — PROGRESS NOTES
SUBJECTIVE: Graciela Castorena is a 58 year old female presenting with a chief complaint of intermittent Chest pain.  Onset of symptoms was 2-3 week(s) ago.  Course of illness is waxing and waning.    Current and Associated symptoms: none    Past Medical History:   Diagnosis Date     Abnormal Pap smear of cervix 03/30/2015    03/30/15, 10/20/21     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 breast 2018    s/p lumpectomy and radiation; on Tamoxifen     Palpitations      Tuberculosis      Allergies   Allergen Reactions     Ciprofloxacin Itching     Social History     Tobacco Use     Smoking status: Never     Smokeless tobacco: Never   Vaping Use     Vaping status: Not on file   Substance Use Topics     Alcohol use: No     Alcohol/week: 0.0 standard drinks of alcohol       ROS:  SKIN: no rash  GI: no vomiting    OBJECTIVE:  /80   Pulse 85   Temp 98.5  F (36.9  C) (Tympanic)   Resp 18   Wt 72.6 kg (160 lb)   SpO2 99%   BMI 28.12 kg/m  GENERAL APPEARANCE: healthy, alert and no distress  EYES: EOMI,  PERRL, conjunctiva clear  HENT: ear canals and TM's normal.  Nose and mouth without ulcers, erythema or lesions  RESP: lungs clear to auscultation - no rales, rhonchi or wheezes  CV: regular rates and rhythm, normal S1 S2, no murmur noted  SKIN: no suspicious lesions or rashes    EKG NSR with LBBB      ICD-10-CM    1. Chest pain, unspecified type  R07.9 EKG 12-lead complete w/read - Clinics      2. LBBB (left bundle branch block)  I44.7         Pt will go through ED for w/u of Chest Pain

## 2023-04-24 NOTE — ED TRIAGE NOTES
"Pt describing palpitations like intermittent episodes that she feels she is missing a beat or her heart is \"shaking\".      Triage Assessment     Row Name 04/24/23 8000       Triage Assessment (Adult)    Airway WDL WDL       Respiratory WDL    Respiratory WDL WDL       Skin Circulation/Temperature WDL    Skin Circulation/Temperature WDL WDL       Cardiac WDL    Cardiac WDL X;chest pain  palpitations              "

## 2023-04-24 NOTE — ADDENDUM NOTE
Addended by: CAMPBELL ROBERTSON JR. on: 4/24/2023 01:09 PM     Modules accepted: Level of Service

## 2023-04-24 NOTE — ED PROVIDER NOTES
"  History     Chief Complaint:  Chest Pain and Palpitations     The history is provided by the patient.      Graciela Castorena is a 58 year old female who presents from urgent care for chest pain. Patient states that she is not experiencing palpitations but rather, feels as though the \"sides of her chest are coming together.\" She says that she has been experiencing episodes of pain for the past two weeks and they are intermittent, lasting about one hour each time. She says the pain is provoked with movement but not exertion, not related to eating. Patient denies current chest pain. She denies fever or cough. Patient denies a personal history of heart or lung conditions.  No leg swelling or history of DVT or PE.  She states it feels that there is a \"electric shock\" in the center of her chest at times.    Review of External Notes: I personally reviewed the note from urgent care today, noting that she was seen by Dr. Mahajan in urgent care who referred her to the emergency department for expedited evaluation of her chest pain.    ROS:  Review of Systems   All other systems reviewed and are negative.    Allergies:  Ciprofloxacin     Medications:    Famotidine   Omeprazole   Pantoprazole    Past Medical History:    Ductal carcinoma in situ of left breast  Palpitations     Family History:    Diabetes mellitus     Social History:  Patient presents alone via personal vehicle   PCP: Tavares Velasco     Physical Exam     Patient Vitals for the past 24 hrs:   BP Temp Temp src Pulse Resp SpO2   04/24/23 1800 (!) 158/101 -- -- 85 27 97 %   04/24/23 1700 (!) 149/86 -- -- 75 20 99 %   04/24/23 1639 -- -- -- 70 11 100 %   04/24/23 1634 (!) 150/84 -- -- 72 -- 94 %   04/24/23 1421 (!) 173/86 98.3  F (36.8  C) Oral 78 16 98 %      Physical Exam  General: Woman recumbent in room 22  HENT: mucous membranes moist  CV: rate as above, regular rhythm, no lower extremity edema, no JVD, palpable symmetric radial pulses, no murmur " audible  Resp: normal effort, speaks in full phrases, no stridor, no cough observed  GI: abdomen soft and nontender, no guarding, negative Grover's sign  MSK: no bony tenderness to chest  Skin: appropriately warm and dry, no erythema or vesicles to chest wall  Neuro: alert, clear speech, oriented  Psych: cooperative, anxious, pleasant    Emergency Department Course   ECG  ECG results from 04/24/23   EKG 12 lead     Value    Systolic Blood Pressure     Diastolic Blood Pressure     Ventricular Rate 78    Atrial Rate 78    W[place] Interval 182    QRS Duration 146        QTc 506    P Axis 61    R AXIS -16    T Axis 124    Interpretation ECG      ECG taken at 1419 and read at 1631  Sinus rhythm  Left bundle branch block  When compared with ECG of 09-DEC-2021 02:52,  Left bundle branch block is now Present  Minimal criteria for Anterior infarct are no longer Present           Imaging:  XR Chest 2 Views   Final Result   IMPRESSION: Negative chest.         Report per radiology    Laboratory:  Labs Ordered and Resulted from Time of ED Arrival to Time of ED Departure   COMPREHENSIVE METABOLIC PANEL - Normal       Result Value    Sodium 140      Potassium 4.0      Chloride 105      Carbon Dioxide (CO2) 27      Anion Gap 8      Urea Nitrogen 11.8      Creatinine 0.72      Calcium 9.4      Glucose 80      Alkaline Phosphatase 72      AST 25      ALT 17      Protein Total 7.0      Albumin 4.2      Bilirubin Total 0.2      GFR Estimate >90     LIPASE - Normal    Lipase 24     TROPONIN T, HIGH SENSITIVITY - Normal    Troponin T, High Sensitivity <6     D DIMER QUANTITATIVE - Normal    D-Dimer Quantitative 0.27     CBC WITH PLATELETS AND DIFFERENTIAL    WBC Count 7.1      RBC Count 4.75      Hemoglobin 14.1      Hematocrit 42.1      MCV 89      MCH 29.7      MCHC 33.5      RDW 13.3      Platelet Count 238      % Neutrophils 49      % Lymphocytes 35      % Monocytes 9      % Eosinophils 6      % Basophils 1      % Immature  Granulocytes 0      NRBCs per 100 WBC 0      Absolute Neutrophils 3.6      Absolute Lymphocytes 2.5      Absolute Monocytes 0.6      Absolute Eosinophils 0.4      Absolute Basophils 0.1      Absolute Immature Granulocytes 0.0      Absolute NRBCs 0.0        Emergency Department Course & Assessments:     Assessments:  1635 I obtained history and examined the patient as noted above.     Independent Interpretation (X-rays, CTs, rhythm strip):  I personally reviewed her chest x-ray images, no pneumothorax or infiltrate is seen.       Disposition:  The patient was discharged to home.     Impression & Plan      Medical Decision Making:  Regarding her chest pain, this is nonexertional and was certainly considered an acute coronary syndrome, her negative troponin after many days of symptoms adequately rules this out.  Underlying coronary artery disease is not completely excluded and further testing and cardiology evaluation may be beneficial though I think this can be safely deferred to the outpatient setting, this was reviewed with her.  Left bundle branch block also discussed with her.  She does not have arrhythmia at this time, no palpitations currently or syncope.  I considered upper abdominal causes such as choledocholithiasis or pancreatitis.  Her abdominal exam is benign.  No fevers or other features suggestive of infection such as pneumonia.  Chest x-ray shows no pneumothorax.  Highly doubt aortic dissection, there is no widened mediastinum, her vascular exam is good, pain does not radiate to her back.  I do not think she requires CT of the chest, also noting that a normal D-dimer makes pulmonary embolism extremely unlikely given low pretest suspicion.  The diagnostic uncertainty and reassurances of today's test were discussed directly with the patient who is comfortable with plan for discharge home, outpatient follow-up, return precautions reviewed for acute worsening at any hour.    Diagnosis:    ICD-10-CM    1.  Acute chest pain  R07.9       2. Left bundle branch block  I44.7         Scribe Disclosure:  I, Elle Yris, am serving as a scribe at 6:41 PM on 4/24/2023 to document services personally performed by Hiro Abdi MD based on my observations and the provider's statements to me.     4/24/2023   Hiro Abdi MD Reitsema, Jeffrey Alan, MD  04/25/23 0816

## 2023-04-25 ENCOUNTER — TELEPHONE (OUTPATIENT)
Dept: FAMILY MEDICINE | Facility: CLINIC | Age: 58
End: 2023-04-25
Payer: COMMERCIAL

## 2023-04-25 NOTE — TELEPHONE ENCOUNTER
Reason for Call:  Other appointment    Detailed comments: Patient would like to schedule ED f/u with provider fo acute chest pain and palpitations within the next two days - writer was unable to find anything with provider - patient refused to see someone else unless provider is not able to get her in then she will see someone else.    Phone Number Patient can be reached at: Cell number on file:    Telephone Information:   Mobile [phone]       Best Time: anytime    Can we leave a detailed message on this number? YES    Call taken on 4/25/2023 at 9:06 AM by Ashley Friedman

## 2023-04-25 NOTE — TELEPHONE ENCOUNTER
Pt was in ER yesterday     Per below needs ER follow up visit     Routing to T[facility] to contact patient to assist with scheduling a follow up visit Thank you.     Agustina SILVA, Triage RN  [facility]

## 2023-04-26 ENCOUNTER — OFFICE VISIT (OUTPATIENT)
Dept: FAMILY MEDICINE | Facility: CLINIC | Age: 58
End: 2023-04-26
Payer: COMMERCIAL

## 2023-04-26 VITALS
TEMPERATURE: 96.9 F | DIASTOLIC BLOOD PRESSURE: 70 MMHG | SYSTOLIC BLOOD PRESSURE: 132 MMHG | RESPIRATION RATE: 20 BRPM | BODY MASS INDEX: 29.23 KG/M2 | OXYGEN SATURATION: 98 % | HEART RATE: 97 BPM | WEIGHT: 166.3 LBS

## 2023-04-26 DIAGNOSIS — R10.13 DYSPEPSIA: ICD-10-CM

## 2023-04-26 DIAGNOSIS — R32 URINARY INCONTINENCE, UNSPECIFIED TYPE: ICD-10-CM

## 2023-04-26 DIAGNOSIS — I44.7 LBBB (LEFT BUNDLE BRANCH BLOCK): Primary | ICD-10-CM

## 2023-04-26 DIAGNOSIS — M54.6 ACUTE BILATERAL THORACIC BACK PAIN: ICD-10-CM

## 2023-04-26 DIAGNOSIS — R07.89 OTHER CHEST PAIN: ICD-10-CM

## 2023-04-26 DIAGNOSIS — R31.9 HEMATURIA, UNSPECIFIED TYPE: ICD-10-CM

## 2023-04-26 DIAGNOSIS — Z12.11 COLON CANCER SCREENING: ICD-10-CM

## 2023-04-26 LAB
ALBUMIN UR-MCNC: NEGATIVE MG/DL
APPEARANCE UR: CLEAR
BACTERIA #/AREA URNS HPF: ABNORMAL /HPF
BILIRUB UR QL STRIP: NEGATIVE
COLOR UR AUTO: YELLOW
GLUCOSE UR STRIP-MCNC: NEGATIVE MG/DL
HGB UR QL STRIP: ABNORMAL
KETONES UR STRIP-MCNC: NEGATIVE MG/DL
LEUKOCYTE ESTERASE UR QL STRIP: NEGATIVE
NITRATE UR QL: NEGATIVE
PH UR STRIP: 5.5 [PH] (ref 5–7)
RBC #/AREA URNS AUTO: ABNORMAL /HPF
SP GR UR STRIP: 1.02 (ref 1–1.03)
SQUAMOUS #/AREA URNS AUTO: ABNORMAL /LPF
TROPONIN T SERPL HS-MCNC: 14 NG/L
UROBILINOGEN UR STRIP-ACNC: 0.2 E.U./DL
WBC #/AREA URNS AUTO: ABNORMAL /HPF

## 2023-04-26 PROCEDURE — 81001 URINALYSIS AUTO W/SCOPE: CPT | Performed by: INTERNAL MEDICINE

## 2023-04-26 PROCEDURE — 93000 ELECTROCARDIOGRAM COMPLETE: CPT | Performed by: INTERNAL MEDICINE

## 2023-04-26 PROCEDURE — 84484 ASSAY OF TROPONIN QUANT: CPT | Performed by: INTERNAL MEDICINE

## 2023-04-26 PROCEDURE — 87086 URINE CULTURE/COLONY COUNT: CPT | Performed by: INTERNAL MEDICINE

## 2023-04-26 PROCEDURE — 85379 FIBRIN DEGRADATION QUANT: CPT | Performed by: INTERNAL MEDICINE

## 2023-04-26 PROCEDURE — 99215 OFFICE O/P EST HI 40 MIN: CPT | Performed by: INTERNAL MEDICINE

## 2023-04-26 PROCEDURE — 36415 COLL VENOUS BLD VENIPUNCTURE: CPT | Performed by: INTERNAL MEDICINE

## 2023-04-26 RX ORDER — METHOCARBAMOL 500 MG/1
500 TABLET, FILM COATED ORAL 3 TIMES DAILY PRN
Qty: 30 TABLET | Refills: 0 | Status: SHIPPED | OUTPATIENT
Start: 2023-04-26 | End: 2024-01-04

## 2023-04-26 ASSESSMENT — PAIN SCALES - GENERAL: PAINLEVEL: MODERATE PAIN (5)

## 2023-04-26 NOTE — PROGRESS NOTES
"  Assessment & Plan   Problem List Items Addressed This Visit    None  Visit Diagnoses     LBBB (left bundle branch block)    -  Primary    Relevant Orders    EKG 12-lead complete w/read - Clinics (Completed)    Adult Cardiology Eval  Referral    Other chest pain        Relevant Orders    EKG 12-lead complete w/read - Clinics (Completed)    Troponin T, High Sensitivity (Completed)    Adult GI  Referral - Procedure Only    Colonoscopy Screening  Referral    D dimer, quantitative (Completed)    CTA Chest with Contrast    Colon cancer screening        Relevant Orders    Fecal colorectal cancer screen (FIT)    Dyspepsia        Relevant Orders    Helicobacter pylori Antigen Stool    Acute bilateral thoracic back pain        Relevant Medications    methocarbamol (ROBAXIN) 500 MG tablet    Urinary incontinence, unspecified type        Relevant Orders    UA with Microscopic reflex to Culture - lab collect (Completed)    Adult Uro/Gyn  Referral    UA Microscopic with Reflex to Culture (Completed)    Hematuria, unspecified type        Relevant Orders    Urine Culture Aerobic Bacterial - lab collect (Completed)         Atypical symptoms of chest pain squeezing from front to the back, \"shock like\" symptoms of chest pain as she reports. ED Cardiac work-up has been nonrevealing, had a Holter monitor back in June 2022 that showed SVTs.She had a stress echo test in March 2020 that was negative.  When she had 24-hour blood pressure that was normal as well back in 2021.  EKG shows a new finding of left bundle branch block.  Will need further cardiac risk stratification, refer to cardiology.  We will repeat troponin level today.  Patient would like testing for H. pylori.  She describes occasional blood in the stools, we will do a fit test as well.  She will see GI for EGD and colonoscopy.  Referral placed.  She does have some tenderness on exam palpating of her thoracic paravertebral back muscles.  " "Possible musculoskeletal cause of her pain as well ,we will start on methocarbamol 1 tablet 3 times a day as needed for Back pain, advised medicine can cause drowsiness or dizziness, instructions given on precautions..  Further recommendation pending lab results and further evaluation also schedule CT angiogram of  chest although I doubt that we are dealing with a aortic dissection she describes some crushing pain sensation from front to the back of her chest, her pulses in upper extremities equal ,blood pressures normal ,heart rate slightly elevated.  D-dimers was negative in the ER ,we will repeat a D-dimer and a troponin level.  All questions answered follow-up in 1 week     MED REC REQUIRED  Post Medication Reconciliation Status:       BMI:   Estimated body mass index is 29.23 kg/m  as calculated from the following:    Height as of 10/26/22: 1.607 m (5' 3.25\").    Weight as of this encounter: 75.4 kg (166 lb 4.8 oz).   Weight management plan: Discussed healthy diet and exercise guidelines        Tavares Velasco MD  [facility]  Total time spent was 64 minutes review of records, and addressing multiple health conditions    Subjective   Graciela is a 58 year old, presenting for the following health issues:  ER F/U (Patient here for an ER Follow up for Chest pain.)         View : No data to display.              [facility]       ED/ Followup:Facility:  [facility] Emergency Dept  Date of visit: 4/24/2023   Reason for visit: Acute chest pain     Left bundle branch block    Current Status: Does not feel better since being in the ER.  Patient presenting as ED follow-up.  She feels squeezing in her chest.    Pain going from chest to the back and associated with some blurry vision at times.  Associated with this?  Some fluttering in the chest.  Feels \"like shock\" in her chest \"like electricity in her chest\" sometimes feels heart pounding as well    On times feels presyncope as " well.    Describes some blood in the stools and feels some urine incontinence as well    Feels symptoms of food regurgitation; she is on chronic Pepcid and pantoprazole.    The 10-year ASCVD risk score (Dillon WILD, et al., 2019) is: 3.6%    Values used to calculate the score:      Age: 58 years      Sex: Female      Is Non- : Yes      Diabetic: No      Tobacco smoker: No      Systolic Blood Pressure: 132 mmHg      Is BP treated: No      HDL Cholesterol: 52 mg/dL      Total Cholesterol: 173 mg/dL      Review of Systems   Constitutional, HEENT, cardiovascular, pulmonary, gi and gu systems are negative, except as otherwise noted.      Objective    /70 (BP Location: Left arm, Patient Position: Sitting, Cuff Size: Adult Large)   Pulse 97   Temp 96.9  F (36.1  C) (Temporal)   Resp 20   Wt 75.4 kg (166 lb 4.8 oz)   SpO2 98%   BMI 29.23 kg/m    Body mass index is 29.23 kg/m .  Physical Exam   GENERAL: healthy, alert and no distress, poor historian  EYES: Eyes grossly normal to inspection, PERRL and conjunctivae and sclerae normal  RESP: lungs clear to auscultation - no rales, rhonchi or wheezes  CV: regular rate and rhythm, normal S1 S2, no S3 or S4, no murmur, click or rub, no peripheral edema and peripheral pulses strong equal pulses upper extremity  ABDOMEN: soft, nontender, no hepatosplenomegaly, no masses and bowel sounds normal  MS: no gross musculoskeletal defects noted, no edema, has tenderness in her lower back bilaterally over the paravertebral muscular area.  SKIN: no suspicious lesions or rashes  NEURO: Normal strength and tone, mentation intact and speech normal  PSYCH: mentation appears normal, affect normal/bright    Admission on 04/24/2023, Discharged on 04/24/2023   Component Date Value Ref Range Status     Ventricular Rate 04/24/2023 78  BPM Final     Atrial Rate 04/24/2023 78  BPM Final     GA Interval 04/24/2023 182  ms Final     QRS Duration 04/24/2023 146  ms Final      QT 04/24/2023 444  ms Final     QTc 04/24/2023 506  ms Final     P Axis 04/24/2023 61  degrees Final     R AXIS 04/24/2023 -16  degrees Final     T Axis 04/24/2023 124  degrees Final     Interpretation ECG 04/24/2023    Final                    Value:Sinus rhythm  Left bundle branch block  Abnormal ECG  When compared with ECG of 09-DEC-2021 02:52,  Left bundle branch block is now Present  Minimal criteria for Anterior infarct are no longer Present  Confirmed by GENERATED REPORT, COMPUTER ([id number]),  RENÉ MOSLEY ([id number]) on 4/24/2023 2:50:49 PM       Sodium 04/24/2023 140  136 - 145 mmol/L Final     Potassium 04/24/2023 4.0  3.4 - 5.3 mmol/L Final     Chloride 04/24/2023 105  98 - 107 mmol/L Final     Carbon Dioxide (CO2) 04/24/2023 27  22 - 29 mmol/L Final     Anion Gap 04/24/2023 8  7 - 15 mmol/L Final     Urea Nitrogen 04/24/2023 11.8  6.0 - 20.0 mg/dL Final     Creatinine 04/24/2023 0.72  0.51 - 0.95 mg/dL Final     Calcium 04/24/2023 9.4  8.6 - 10.0 mg/dL Final     Glucose 04/24/2023 80  70 - 99 mg/dL Final     Alkaline Phosphatase 04/24/2023 72  35 - 104 U/L Final     AST 04/24/2023 25  10 - 35 U/L Final     ALT 04/24/2023 17  10 - 35 U/L Final     Protein Total 04/24/2023 7.0  6.4 - 8.3 g/dL Final     Albumin 04/24/2023 4.2  3.5 - 5.2 g/dL Final     Bilirubin Total 04/24/2023 0.2  <=1.2 mg/dL Final     GFR Estimate 04/24/2023 >90  >60 mL/min/1.73m2 Final    eGFR calculated using 2021 CKD-EPI equation.     Lipase 04/24/2023 24  13 - 60 U/L Final     Troponin T, High Sensitivity 04/24/2023 <6  <=14 ng/L Final    Either a High Sensitivity Troponin T baseline (0 hours) value = 100 ng/L, or an increase in High Sensitivity Troponin T = 7 ng/L at 2 hours compared to 0 hours (2-0 hours), suggests myocardial injury, and urgent clinical attention is required.    If the 2-0 hours increase is <7 ng/L, a High Sensitivity Troponin T result above gender-specific reference ranges warrants further evaluation.    Recommendations for further evaluation include correlation with clinical decision-making tool (e.g., HEART), a 3rd High Sensitivity Troponin T test 2 hours after the 2nd (a 20% change from baseline would represent concern), admission for observation, close PCC/cardiology follow-up, or urgent outpatient provocative testing.     D-Dimer Quantitative 04/24/2023 0.27  0.00 - 0.50 ug/mL FEU Final     WBC Count 04/24/2023 7.1  4.0 - 11.0 10e3/uL Final     RBC Count 04/24/2023 4.75  3.80 - 5.20 10e6/uL Final     Hemoglobin 04/24/2023 14.1  11.7 - 15.7 g/dL Final     Hematocrit 04/24/2023 42.1  35.0 - 47.0 % Final     MCV 04/24/2023 89  78 - 100 fL Final     MCH 04/24/2023 29.7  26.5 - 33.0 pg Final     MCHC 04/24/2023 33.5  31.5 - 36.5 g/dL Final     RDW 04/24/2023 13.3  10.0 - 15.0 % Final     Platelet Count 04/24/2023 238  150 - 450 10e3/uL Final     % Neutrophils 04/24/2023 49  % Final     % Lymphocytes 04/24/2023 35  % Final     % Monocytes 04/24/2023 9  % Final     % Eosinophils 04/24/2023 6  % Final     % Basophils 04/24/2023 1  % Final     % Immature Granulocytes 04/24/2023 0  % Final     NRBCs per 100 WBC 04/24/2023 0  <1 /100 Final     Absolute Neutrophils 04/24/2023 3.6  1.6 - 8.3 10e3/uL Final     Absolute Lymphocytes 04/24/2023 2.5  0.8 - 5.3 10e3/uL Final     Absolute Monocytes 04/24/2023 0.6  0.0 - 1.3 10e3/uL Final     Absolute Eosinophils 04/24/2023 0.4  0.0 - 0.7 10e3/uL Final     Absolute Basophils 04/24/2023 0.1  0.0 - 0.2 10e3/uL Final     Absolute Immature Granulocytes 04/24/2023 0.0  <=0.4 10e3/uL Final     Absolute NRBCs 04/24/2023 0.0  10e3/uL Final

## 2023-04-27 ENCOUNTER — TELEPHONE (OUTPATIENT)
Dept: FAMILY MEDICINE | Facility: CLINIC | Age: 58
End: 2023-04-27
Payer: COMMERCIAL

## 2023-04-27 DIAGNOSIS — R07.89 OTHER CHEST PAIN: Primary | ICD-10-CM

## 2023-04-27 LAB — D DIMER PPP FEU-MCNC: <0.27 UG/ML FEU (ref 0–0.5)

## 2023-04-27 NOTE — RESULT ENCOUNTER NOTE
Patric Owens, troponin is 14 which at cut point for being abnormal.  This is a heart enzyme.    I advise you to repeat echo stress test for the heart, I will order.  You will be contacted by the heart center to schedule.  [After you do the CT angiogram of the chest].  You will be contacted by radiology also to schedule your CT angiogram as well.    Please schedule follow-up with this provider next week.  If worsening of chest symptoms or increased pain severity please go again to the ER immediately.    Dr Velasco

## 2023-04-27 NOTE — RESULT ENCOUNTER NOTE
Please advise patient EKG continues to show left bundle branch block with normal sinus rhythm ,advised that she follow-up with cardiology as discussed.  Dr Velasco

## 2023-04-27 NOTE — TELEPHONE ENCOUNTER
Sabino from [facility] cardiology called requesting order for lexiscan stress test since  echocardiogram exercise stress test is contraindicated with left bundle branch block.     New order pended. Please sign if agree with order. Sabino will call pt to reschedule appt.

## 2023-04-27 NOTE — TELEPHONE ENCOUNTER
Tavares Velasco MD   4/27/2023  2:30 PM CDT Back to Top      Please reassure D-Dimer test for blood clotting remains negative    Tavares Velasco MD   4/27/2023  8:33 AM CDT       Patric Owens, troponin is 14 which at cut point for being abnormal.  This is a heart enzyme.     I advise you to repeat echo stress test for the heart, I will order.  You will be contacted by the heart center to schedule.  [After you do the CT angiogram of the chest].  You will be contacted by radiology also to schedule your CT angiogram as well.     Please schedule follow-up with this provider next week.  If worsening of chest symptoms or increased pain severity please go again to the ER immediately.     Dr Krista Velasco MD   4/26/2023  8:31 PM CDT       Please notify patient urinalysis shows blood minimal, RBC 5-10, this could be kidney stones or could be cystitis [inflammation of the bladder] ; waiting for urine culture results doubt urine infection.  I do recommend patient sees urology for further work-up; may need even a cystoscopy for further evaluation.  I did place referral to urology already.  Dr Velasco     *Please see other result notes     Please advise patient EKG continues to show left bundle branch block with normal sinus rhythm ,advised that she follow-up with cardiology as discussed.  Dr Velasco

## 2023-04-27 NOTE — TELEPHONE ENCOUNTER
Patient Contact    Attempt # 1    Was call answered? No.    Left message for patient to call triage back.    Pina Choi RN

## 2023-04-28 LAB — BACTERIA UR CULT: NORMAL

## 2023-04-28 NOTE — TELEPHONE ENCOUNTER
Spoke to patient and she has two appointments  Scheduled with urology and cardiac.     She will call the image department to scheduled her CT scan.       Patient was given results per Dr. Velasco.     Dorinda Villarreal RN  [facility]

## 2023-04-28 NOTE — TELEPHONE ENCOUNTER
Patient Contact    Attempt # 2    Was call answered? No.    Left message for patient to call triage back.    Pina Choi RN

## 2023-04-29 NOTE — RESULT ENCOUNTER NOTE
Please advise patient urine culture shows urogenital scooter which is a contaminant ;no urine infection.  Advised patient to follow-up with urogynecology for further evaluation of blood in the urine, advise repeat urinalysis in the next 2 to 3 weeks .  Increase fluid intake  Referral has already been placed.

## 2023-05-01 ENCOUNTER — TELEPHONE (OUTPATIENT)
Dept: FAMILY MEDICINE | Facility: CLINIC | Age: 58
End: 2023-05-01
Payer: COMMERCIAL

## 2023-05-01 ENCOUNTER — HOSPITAL ENCOUNTER (OUTPATIENT)
Facility: CLINIC | Age: 58
End: 2023-05-01
Payer: COMMERCIAL

## 2023-05-01 NOTE — TELEPHONE ENCOUNTER
Patient Contact    Attempt # 1    Was call answered?  No.  Left message on voicemail with information to call triage back.    On callback - please review PCP's result note with patient     Per chart review, patient is already scheduled for future urology appt:    6/2/2023 11:00 AM (Arrive by 10:45 AM) Ro Valencia, PA-C [facility] DALJIT Ramsey     Imani Carrasco RN  [facility]

## 2023-05-01 NOTE — TELEPHONE ENCOUNTER
----- Message from Tavares Velasco MD sent at 4/28/2023  8:29 PM CDT -----  Please advise patient urine culture shows urogenital scooter which is a contaminant ;no urine infection.  Advised patient to follow-up with urogynecology for further evaluation of blood in the urine, advise repeat urinalysis in the next 2 to 3 weeks .  Increase fluid intake  Referral has already been placed.

## 2023-05-02 ENCOUNTER — OFFICE VISIT (OUTPATIENT)
Dept: CARDIOLOGY | Facility: CLINIC | Age: 58
End: 2023-05-02
Attending: INTERNAL MEDICINE
Payer: COMMERCIAL

## 2023-05-02 VITALS
BODY MASS INDEX: 29.23 KG/M2 | HEART RATE: 78 BPM | DIASTOLIC BLOOD PRESSURE: 82 MMHG | WEIGHT: 165 LBS | HEIGHT: 63 IN | SYSTOLIC BLOOD PRESSURE: 146 MMHG | OXYGEN SATURATION: 97 %

## 2023-05-02 DIAGNOSIS — I44.7 LBBB (LEFT BUNDLE BRANCH BLOCK): ICD-10-CM

## 2023-05-02 DIAGNOSIS — R00.2 PALPITATIONS: Primary | ICD-10-CM

## 2023-05-02 PROCEDURE — 99204 OFFICE O/P NEW MOD 45 MIN: CPT | Performed by: INTERNAL MEDICINE

## 2023-05-02 NOTE — PROGRESS NOTES
CARDIOLOGY CLINIC CONSULTATION    PRIMARY CARE PHYSICIAN:  Tavares Velasco    Tests reviewed/interpreted independently in clinic today:   1. EKG: Normal sinus rhythm, no ST changes  2. Echocardiogram: None  3. Blood work:   4. Renal ultrasound-normal kidneys  5. Zio patch- 8 brief episodes of SVT.  Patient was asymptomatic.     The level of medical decision making during this visit was of moderate complexity.    HISTORY OF PRESENT ILLNESS:  Today, I had the pleasure of connecting with Graciela Castorena.  She is a very pleasant 58-year-old lady with past medical history of DCIS, GERD and paroxysmal SVT who presents to the clinic in initial consultation.  Her daughter who is final year medical student is also available on the phone.  The patient tells me that for the past few months she has noticed pain in the center of the chest.  The pain is described as squeezing or pressure.  Symptoms are nonexertional and can occur at random times.  No radiation, exacerbating or relieving factors.  She was recently seen in the emergency department on 4/24/2023 where they did basic work-up with EKG and troponin.  Everything was normal and she was discharged home and told to follow-up with cardiology.  They did schedule her to undergo a nuclear stress test as well as CT of the chest.  She has not gotten any of these tests done yet.  She is otherwise able to do activities of daily living and walk without any difficulty.  She denies palpitations, orthopnea, PND, lower extremity edema, presyncope or syncope.    PAST MEDICAL HISTORY:  Past Medical History:   Diagnosis Date     Abnormal Pap smear of cervix 03/30/2015    03/30/15, 10/20/21     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 breast 2018    s/p lumpectomy and radiation; on Tamoxifen     Palpitations      Tuberculosis        MEDICATIONS:  Current Outpatient Medications   Medication     diclofenac (VOLTAREN) 1 % topical gel     estradiol (ESTRACE) 0.1 MG/GM vaginal cream      famotidine (PEPCID) 20 MG tablet     methocarbamol (ROBAXIN) 500 MG tablet     omeprazole (PRILOSEC) 20 MG DR capsule     pantoprazole (PROTONIX) 40 MG EC tablet     polyethylene glycol (MIRALAX) 17 GM/Dose powder     No current facility-administered medications for this visit.       ALLERGIES:  Allergies   Allergen Reactions     Ciprofloxacin Itching       SOCIAL HISTORY:  I have reviewed this patient's social history and updated it with pertinent information if needed. Graciela Castorena  reports that she has never smoked. She has never used smokeless tobacco. She reports that she does not drink alcohol and does not use drugs.    FAMILY HISTORY:  I have reviewed this patient's family history and updated it with pertinent information if needed.   Family History   Problem Relation Age of Onset     Diabetes Type 2  Brother      Diabetes Type 2  Sister      Myocardial Infarction No family hx of      Cerebrovascular Disease No family hx of      Coronary Artery Disease Early Onset No family hx of      Colon Cancer No family hx of      Breast Cancer No family hx of      Ovarian Cancer No family hx of        REVIEW OF SYSTEMS:  Skin:  not assessed     Eyes:  not assessed    ENT:  not assessed    Respiratory:  Positive for dyspnea on exertion  Cardiovascular:    Positive for;chest pain;lightheadedness  Gastroenterology: not assessed    Genitourinary:  not assessed    Musculoskeletal:  not assessed    Neurologic:  not assessed    Psychiatric:  not assessed    Heme/Lymph/Imm:  not assessed    Endocrine:  not assessed        PHYSICAL EXAM:      BP: (!) 146/82 Pulse: 78     SpO2: 97 %      Vital Signs with Ranges  Pulse:  [78] 78  BP: (146)/(82) 146/82  SpO2:  [97 %] 97 %  165 lbs 0 oz    Constitutional: alert, no distress  Respiratory: Good bilateral air entry  Cardiovascular: Normal S1-S2, no murmurs  GI: nondistended  Neuropsychiatric: appropriate affact    ASSESSMENT: Pertinent issues addressed/ reviewed during this  cardiology visit    1. Atypical chest pain  2. Paroxysmal SVT  3. Obesity  4. Elevated blood pressure without diagnosis of hypertension    RECOMMENDATIONS:  1. A nuclear stress test was ordered during presentation to the emergency department.  Given her body habitus I am not sure if this is a good test for her.  Moreover I do not believe she will be able to exercise on a treadmill.  I will order a CT coronary angiogram.  This is also what her daughter wants..  2. In addition, we will order transthoracic echocardiogram.  3. She has history of paroxysmal SVT which was noted on the rhythm monitor in the past.  She is asymptomatic from it and I do not believe we need to do anything for it right now  4. We will continue to monitor her blood pressure and if it continues to be elevated start her on antihypertensives.    Orders Placed This Encounter   Procedures     Echocardiogram Complete       It was a pleasure seeing this patient in clinic today. Please do not hesitate to contact me with any future questions.     Rashad MULLER, FACC, FASE  Cardiology - [facility]  May 2, 2023    This note was completed in part using dictation via the Dragon voice recognition software. Some word and grammatical errors may occur and must be interpreted in the appropriate clinical context.  If there are any questions pertaining to this issue, please contact me for further clarification.

## 2023-05-02 NOTE — LETTER
5/2/2023    Tavares Velasco MD  [address]  [place] [address]    RE: Graciela Castorena       Dear Colleague,     I had the pleasure of seeing Gr[facility]iel[facility] in the [facility].  CARDIOLOGY CLINIC CONSULTATION    PRIMARY CARE PHYSICIAN:  Tavares Velasco    Tests reviewed/interpreted independently in clinic today:   EKG: Normal sinus rhythm, no ST changes  Echocardiogram: None  Blood work:   Renal ultrasound-normal kidneys  Zio patch- 8 brief episodes of SVT.  Patient was asymptomatic.     The level of medical decision making during this visit was of moderate complexity.    HISTORY OF PRESENT ILLNESS:  Today, I had the pleasure of connecting with Graciela Castorena.  She is a very pleasant 58-year-old lady with past medical history of DCIS, GERD and paroxysmal SVT who presents to the clinic in initial consultation.  Her daughter who is final year medical student is also available on the phone.  The patient tells me that for the past few months she has noticed pain in the center of the chest.  The pain is described as squeezing or pressure.  Symptoms are nonexertional and can occur at random times.  No radiation, exacerbating or relieving factors.  She was recently seen in the emergency department on 4/24/2023 where they did basic work-up with EKG and troponin.  Everything was normal and she was discharged home and told to follow-up with cardiology.  They did schedule her to undergo a nuclear stress test as well as CT of the chest.  She has not gotten any of these tests done yet.  She is otherwise able to do activities of daily living and walk without any difficulty.  She denies palpitations, orthopnea, PND, lower extremity edema, presyncope or syncope.    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       ALLERGIES:  Allergies   Allergen Reactions    Ciprofloxacin Itching       SOCIAL HISTORY:  I have reviewed this patient's social history and updated it with pertinent information if needed. Graciela Castorena  reports that she has never smoked. She has never used smokeless tobacco. She reports that she does not drink alcohol and does not use drugs.    FAMILY HISTORY:  I have reviewed this patient's family history and updated it with pertinent information if needed.   Family History   Problem Relation Age of Onset    Diabetes Type 2  Brother     Diabetes Type 2  Sister     Myocardial Infarction No family hx of     Cerebrovascular Disease No family hx of     Coronary Artery Disease Early Onset No family hx of     Colon Cancer No family hx of     Breast Cancer No family hx of     Ovarian Cancer No family hx of        REVIEW OF SYSTEMS:  Skin:  not assessed     Eyes:  not assessed    ENT:  not assessed    Respiratory:  Positive for dyspnea on exertion  Cardiovascular:    Positive for;chest pain;lightheadedness  Gastroenterology: not assessed    Genitourinary:  not assessed    Musculoskeletal:  not assessed    Neurologic:  not assessed    Psychiatric:  not assessed    Heme/Lymph/Imm:  not assessed    Endocrine:  not assessed        PHYSICAL EXAM:      BP: (!) 146/82 Pulse: 78     SpO2: 97 %      Vital Signs with Ranges  Pulse:  [78] 78  BP: (146)/(82) 146/82  SpO2:  [97 %] 97 %  165 lbs 0 oz    Constitutional: alert, no distress  Respiratory: Good bilateral air  entry  Cardiovascular: Normal S1-S2, no murmurs  GI: nondistended  Neuropsychiatric: appropriate affact    ASSESSMENT: Pertinent issues addressed/ reviewed during this cardiology visit    Atypical chest pain  Paroxysmal SVT  Obesity  Elevated blood pressure without diagnosis of hypertension    RECOMMENDATIONS:  A nuclear stress test was ordered during presentation to the emergency department.  Given her body habitus I am not sure if this is a good test for her.  Moreover I do not believe she will be able to exercise on a treadmill.  I will order a CT coronary angiogram.  This is also what her daughter wants..  In addition, we will order transthoracic echocardiogram.  She has history of paroxysmal SVT which was noted on the rhythm monitor in the past.  She is asymptomatic from it and I do not believe we need to do anything for it right now  We will continue to monitor her blood pressure and if it continues to be elevated start her on antihypertensives.    Orders Placed This Encounter   Procedures    Echocardiogram Complete       It was a pleasure seeing this patient in clinic today. Please do not hesitate to contact me with any future questions.     Rashad MULLER, FACC, [facility]  Cardiology - [facility]  May 2, 2023    This note was completed in part using dictation via the Dragon voice recognition software. Some word and grammatical errors may occur and must be interpreted in the appropriate clinical context.  If there are any questions pertaining to this issue, please contact me for further clarification.        Thank you for allowing me to participate in the care of your patient.      Sincerely,     Rashad Fischer MD     [facility]  cc:   Tavares Velasco MD  [address] [address]

## 2023-05-03 NOTE — TELEPHONE ENCOUNTER
Patient Contact    Attempt # 3    Was call answered?  No.  Left message on answering machine for patient to call back.     Didi ANTONIO RN,BSN    May 3, 2023  5:17 PM

## 2023-05-04 ENCOUNTER — HOSPITAL ENCOUNTER (OUTPATIENT)
Dept: CT IMAGING | Facility: CLINIC | Age: 58
Discharge: HOME | End: 2023-05-04
Attending: INTERNAL MEDICINE | Admitting: INTERNAL MEDICINE
Payer: COMMERCIAL

## 2023-05-04 DIAGNOSIS — R07.89 OTHER CHEST PAIN: ICD-10-CM

## 2023-05-04 PROCEDURE — 250N000009 HC RX 250: Performed by: INTERNAL MEDICINE

## 2023-05-04 PROCEDURE — 250N000011 HC RX IP 250 OP 636: Performed by: INTERNAL MEDICINE

## 2023-05-04 PROCEDURE — 71275 CT ANGIOGRAPHY CHEST: CPT

## 2023-05-04 RX ORDER — IOPAMIDOL 755 MG/ML
80 INJECTION, SOLUTION INTRAVASCULAR ONCE
Status: COMPLETED | OUTPATIENT
Start: 2023-05-04 | End: 2023-05-04

## 2023-05-04 RX ADMIN — SODIUM CHLORIDE 80 ML: 9 INJECTION, SOLUTION INTRAVENOUS at 09:01

## 2023-05-04 RX ADMIN — IOPAMIDOL 80 ML: 755 INJECTION, SOLUTION INTRAVENOUS at 09:01

## 2023-05-04 NOTE — TELEPHONE ENCOUNTER
See other encounter pt was called regarding lab results    Agustina SILVA, Triage RN  [facility]

## 2023-05-06 NOTE — RESULT ENCOUNTER NOTE
Please advise patient of the following.    Graciela- I reviewed CT angiogram of the chest that shows no vessel abnormality, there was mention of some gallbladder stones which we will monitor.  If abdominal pain on the right upper side we can refer you to the general surgeon for further evaluation.    There is mention of lung nodules largest measures 6 mm, since you do not have any history of smoking --You, will be considered low risk and no routine follow-up CT scan is recommended.    Any further questions please let me know  Dr Velasco

## 2023-05-08 ENCOUNTER — TELEPHONE (OUTPATIENT)
Dept: CARDIOLOGY | Facility: CLINIC | Age: 58
End: 2023-05-08
Payer: COMMERCIAL

## 2023-05-08 NOTE — TELEPHONE ENCOUNTER
Writer received call from Jamie in [facility].  She is asking about Graciela having a Lexiscan tomorrow, as this is a current appointment.   In review of Dr. Fischer's OV note of 5\2\23, he does NOT think this is a good test for Graciela.  He writes that she would be best served by a CT Coronary Angiogram and that is the wishes of the daughter and pt. as well.    We are going to cancel the Lexiscan, and keep the appointment for the CT.  Jamie will call the patient, and writer will contact the PCP who ordered the Lexiscan in the first place, so that her doctor will be in the loop.    Sent Staff message to PCP Dr. Tavares Johnson RN on 5/8/2023 at 2:59 PM

## 2023-05-10 ENCOUNTER — HOSPITAL ENCOUNTER (OUTPATIENT)
Dept: CARDIOLOGY | Facility: CLINIC | Age: 58
Discharge: HOME | End: 2023-05-10
Attending: INTERNAL MEDICINE | Admitting: INTERNAL MEDICINE
Payer: COMMERCIAL

## 2023-05-10 VITALS — SYSTOLIC BLOOD PRESSURE: 161 MMHG | DIASTOLIC BLOOD PRESSURE: 87 MMHG | HEART RATE: 63 BPM

## 2023-05-10 DIAGNOSIS — R00.2 PALPITATIONS: ICD-10-CM

## 2023-05-10 DIAGNOSIS — I44.7 LBBB (LEFT BUNDLE BRANCH BLOCK): ICD-10-CM

## 2023-05-10 PROCEDURE — 75574 CT ANGIO HRT W/3D IMAGE: CPT

## 2023-05-10 PROCEDURE — 250N000011 HC RX IP 250 OP 636: Performed by: INTERNAL MEDICINE

## 2023-05-10 PROCEDURE — 250N000013 HC RX MED GY IP 250 OP 250 PS 637: Performed by: INTERNAL MEDICINE

## 2023-05-10 PROCEDURE — 75574 CT ANGIO HRT W/3D IMAGE: CPT | Mod: 26 | Performed by: INTERNAL MEDICINE

## 2023-05-10 PROCEDURE — 250N000009 HC RX 250: Performed by: INTERNAL MEDICINE

## 2023-05-10 RX ORDER — METOPROLOL TARTRATE 1 MG/ML
5-15 INJECTION, SOLUTION INTRAVENOUS
Status: DISCONTINUED | OUTPATIENT
Start: 2023-05-10 | End: 2023-05-11 | Stop reason: HOSPADM

## 2023-05-10 RX ORDER — ONDANSETRON 2 MG/ML
4 INJECTION INTRAMUSCULAR; INTRAVENOUS
Status: DISCONTINUED | OUTPATIENT
Start: 2023-05-10 | End: 2023-05-11 | Stop reason: HOSPADM

## 2023-05-10 RX ORDER — METHYLPREDNISOLONE SODIUM SUCCINATE 125 MG/2ML
125 INJECTION, POWDER, LYOPHILIZED, FOR SOLUTION INTRAMUSCULAR; INTRAVENOUS
Status: DISCONTINUED | OUTPATIENT
Start: 2023-05-10 | End: 2023-05-11 | Stop reason: HOSPADM

## 2023-05-10 RX ORDER — DIPHENHYDRAMINE HCL 25 MG
25 CAPSULE ORAL
Status: DISCONTINUED | OUTPATIENT
Start: 2023-05-10 | End: 2023-05-11 | Stop reason: HOSPADM

## 2023-05-10 RX ORDER — IOPAMIDOL 755 MG/ML
500 INJECTION, SOLUTION INTRAVASCULAR ONCE
Status: COMPLETED | OUTPATIENT
Start: 2023-05-10 | End: 2023-05-10

## 2023-05-10 RX ORDER — DILTIAZEM HCL 60 MG
120 TABLET ORAL
Status: DISCONTINUED | OUTPATIENT
Start: 2023-05-10 | End: 2023-05-11 | Stop reason: HOSPADM

## 2023-05-10 RX ORDER — METOPROLOL TARTRATE 25 MG/1
25-100 TABLET, FILM COATED ORAL
Status: COMPLETED | OUTPATIENT
Start: 2023-05-10 | End: 2023-05-10

## 2023-05-10 RX ORDER — IVABRADINE 5 MG/1
5-15 TABLET, FILM COATED ORAL
Status: COMPLETED | OUTPATIENT
Start: 2023-05-10 | End: 2023-05-10

## 2023-05-10 RX ORDER — NITROGLYCERIN 0.4 MG/1
0.4 TABLET SUBLINGUAL
Status: COMPLETED | OUTPATIENT
Start: 2023-05-10 | End: 2023-05-10

## 2023-05-10 RX ORDER — DILTIAZEM HYDROCHLORIDE 5 MG/ML
10-15 INJECTION INTRAVENOUS
Status: DISCONTINUED | OUTPATIENT
Start: 2023-05-10 | End: 2023-05-11 | Stop reason: HOSPADM

## 2023-05-10 RX ORDER — DIPHENHYDRAMINE HYDROCHLORIDE 50 MG/ML
25-50 INJECTION INTRAMUSCULAR; INTRAVENOUS
Status: DISCONTINUED | OUTPATIENT
Start: 2023-05-10 | End: 2023-05-11 | Stop reason: HOSPADM

## 2023-05-10 RX ORDER — ACYCLOVIR 200 MG/1
0-1 CAPSULE ORAL
Status: DISCONTINUED | OUTPATIENT
Start: 2023-05-10 | End: 2023-05-11 | Stop reason: HOSPADM

## 2023-05-10 RX ADMIN — NITROGLYCERIN 0.4 MG: 0.4 TABLET SUBLINGUAL at 09:40

## 2023-05-10 RX ADMIN — IVABRADINE 15 MG: 5 TABLET, FILM COATED ORAL at 08:31

## 2023-05-10 RX ADMIN — METOPROLOL TARTRATE 10 MG: 5 INJECTION INTRAVENOUS at 09:31

## 2023-05-10 RX ADMIN — NITROGLYCERIN 0.4 MG: 0.4 TABLET SUBLINGUAL at 10:14

## 2023-05-10 RX ADMIN — IOPAMIDOL 220 ML: 755 INJECTION, SOLUTION INTRAVENOUS at 10:10

## 2023-05-10 RX ADMIN — METOPROLOL TARTRATE 100 MG: 50 TABLET, FILM COATED ORAL at 08:31

## 2023-05-11 ENCOUNTER — TELEPHONE (OUTPATIENT)
Dept: CARDIOLOGY | Facility: CLINIC | Age: 58
End: 2023-05-11
Payer: COMMERCIAL

## 2023-05-11 NOTE — TELEPHONE ENCOUNTER
"Team received \"incidental findings\" results from radiologist.      IMPRESSION: 5 mm nodule on the right image 16 series 5. 4 mm fissural  nodule on the right image 10.    These are less than 6 mm, so typically would not need follow up.  However, writer wanted to be sure that Graciela's PCP is aware of this, so I have sent a Staff Message to Dr. Tavares Velasco regarding this.    Mariella Johnson RN on 5/11/2023 at 10:06 AM    "

## 2023-05-11 NOTE — TELEPHONE ENCOUNTER
"Received results information from Dr. Fischer, after he reviewed the CT Calcium screening score and the Radiology incidental findings.  Writer has already sent a Staff Message note to her PCP about the 2 small nodules that were seen on the incidental findings.    Writer called to patient to report the calcium score of zero.  She has an upcoming Echocardiogram 5\17 next week.    Mariella Johnson RN on 5/11/2023 at 1:24 PM      \"PCP to address the CT chest finding. Nothing to do for anomalous RCA. It is not the cause for her pain. Follow up with us as needed. \"            "

## 2023-05-15 ENCOUNTER — TRANSFERRED RECORDS (OUTPATIENT)
Dept: HEALTH INFORMATION MANAGEMENT | Facility: CLINIC | Age: 58
End: 2023-05-15

## 2023-05-16 ENCOUNTER — HOSPITAL ENCOUNTER (OUTPATIENT)
Dept: CARDIOLOGY | Facility: CLINIC | Age: 58
Discharge: HOME | End: 2023-05-16
Attending: INTERNAL MEDICINE | Admitting: INTERNAL MEDICINE
Payer: COMMERCIAL

## 2023-05-16 DIAGNOSIS — I44.7 LBBB (LEFT BUNDLE BRANCH BLOCK): ICD-10-CM

## 2023-05-16 DIAGNOSIS — R00.2 PALPITATIONS: ICD-10-CM

## 2023-05-16 LAB
BI-PLANE LVEF ECHO: NORMAL
LVEF ECHO: NORMAL

## 2023-05-16 PROCEDURE — 255N000002 HC RX 255 OP 636: Performed by: INTERNAL MEDICINE

## 2023-05-16 PROCEDURE — 93306 TTE W/DOPPLER COMPLETE: CPT | Mod: 26 | Performed by: INTERNAL MEDICINE

## 2023-05-16 PROCEDURE — 999N000208 ECHOCARDIOGRAM COMPLETE

## 2023-05-16 RX ADMIN — HUMAN ALBUMIN MICROSPHERES AND PERFLUTREN 9 ML: 10; .22 INJECTION, SOLUTION INTRAVENOUS at 11:33

## 2023-05-18 DIAGNOSIS — I42.8 NON-ISCHEMIC CARDIOMYOPATHY (H): Primary | ICD-10-CM

## 2023-05-28 ENCOUNTER — HOSPITAL ENCOUNTER (EMERGENCY)
Facility: CLINIC | Age: 58
Discharge: HOME | End: 2023-05-28
Attending: EMERGENCY MEDICINE | Admitting: EMERGENCY MEDICINE
Payer: COMMERCIAL

## 2023-05-28 ENCOUNTER — TELEPHONE (OUTPATIENT)
Dept: EMERGENCY MEDICINE | Facility: CLINIC | Age: 58
End: 2023-05-28

## 2023-05-28 VITALS
BODY MASS INDEX: 28.82 KG/M2 | SYSTOLIC BLOOD PRESSURE: 180 MMHG | RESPIRATION RATE: 18 BRPM | WEIGHT: 164 LBS | OXYGEN SATURATION: 99 % | HEART RATE: 106 BPM | DIASTOLIC BLOOD PRESSURE: 99 MMHG | TEMPERATURE: 97.1 F

## 2023-05-28 DIAGNOSIS — R11.0 NAUSEA: ICD-10-CM

## 2023-05-28 DIAGNOSIS — R19.7 DIARRHEA, UNSPECIFIED TYPE: ICD-10-CM

## 2023-05-28 LAB
ALBUMIN SERPL BCG-MCNC: 4.4 G/DL (ref 3.5–5.2)
ALP SERPL-CCNC: 75 U/L (ref 35–104)
ALT SERPL W P-5'-P-CCNC: 15 U/L (ref 10–35)
ANION GAP SERPL CALCULATED.3IONS-SCNC: 13 MMOL/L (ref 7–15)
AST SERPL W P-5'-P-CCNC: 20 U/L (ref 10–35)
BASOPHILS # BLD AUTO: 0 10E3/UL (ref 0–0.2)
BASOPHILS NFR BLD AUTO: 0 %
BILIRUB SERPL-MCNC: 0.4 MG/DL
BUN SERPL-MCNC: 7.7 MG/DL (ref 6–20)
C COLI+JEJUNI+LARI FUSA STL QL NAA+PROBE: NOT DETECTED
C DIFF TOX B STL QL: NEGATIVE
CALCIUM SERPL-MCNC: 9.6 MG/DL (ref 8.6–10)
CHLORIDE SERPL-SCNC: 104 MMOL/L (ref 98–107)
CREAT SERPL-MCNC: 0.84 MG/DL (ref 0.51–0.95)
DEPRECATED HCO3 PLAS-SCNC: 19 MMOL/L (ref 22–29)
EC STX1 GENE STL QL NAA+PROBE: NOT DETECTED
EC STX2 GENE STL QL NAA+PROBE: NOT DETECTED
EOSINOPHIL # BLD AUTO: 0.1 10E3/UL (ref 0–0.7)
EOSINOPHIL NFR BLD AUTO: 1 %
ERYTHROCYTE [DISTWIDTH] IN BLOOD BY AUTOMATED COUNT: 13 % (ref 10–15)
GFR SERPL CREATININE-BSD FRML MDRD: 81 ML/MIN/1.73M2
GLUCOSE SERPL-MCNC: 99 MG/DL (ref 70–99)
HCT VFR BLD AUTO: 45.1 % (ref 35–47)
HGB BLD-MCNC: 15.3 G/DL (ref 11.7–15.7)
IMM GRANULOCYTES # BLD: 0 10E3/UL
IMM GRANULOCYTES NFR BLD: 0 %
LYMPHOCYTES # BLD AUTO: 0.8 10E3/UL (ref 0.8–5.3)
LYMPHOCYTES NFR BLD AUTO: 8 %
MCH RBC QN AUTO: 29.9 PG (ref 26.5–33)
MCHC RBC AUTO-ENTMCNC: 33.9 G/DL (ref 31.5–36.5)
MCV RBC AUTO: 88 FL (ref 78–100)
MONOCYTES # BLD AUTO: 0.9 10E3/UL (ref 0–1.3)
MONOCYTES NFR BLD AUTO: 9 %
NEUTROPHILS # BLD AUTO: 7.9 10E3/UL (ref 1.6–8.3)
NEUTROPHILS NFR BLD AUTO: 82 %
NOROV GI+II ORF1-ORF2 JNC STL QL NAA+PR: NOT DETECTED
NRBC # BLD AUTO: 0 10E3/UL
NRBC BLD AUTO-RTO: 0 /100
PLATELET # BLD AUTO: 194 10E3/UL (ref 150–450)
POTASSIUM SERPL-SCNC: 3.7 MMOL/L (ref 3.4–5.3)
PROT SERPL-MCNC: 7.8 G/DL (ref 6.4–8.3)
RBC # BLD AUTO: 5.11 10E6/UL (ref 3.8–5.2)
RVA NSP5 STL QL NAA+PROBE: DETECTED
SALMONELLA SP RPOD STL QL NAA+PROBE: NOT DETECTED
SHIGELLA SP+EIEC IPAH STL QL NAA+PROBE: NOT DETECTED
SODIUM SERPL-SCNC: 136 MMOL/L (ref 136–145)
V CHOL+PARA RFBL+TRKH+TNAA STL QL NAA+PR: NOT DETECTED
WBC # BLD AUTO: 9.7 10E3/UL (ref 4–11)
Y ENTERO RECN STL QL NAA+PROBE: NOT DETECTED

## 2023-05-28 PROCEDURE — 250N000011 HC RX IP 250 OP 636: Performed by: EMERGENCY MEDICINE

## 2023-05-28 PROCEDURE — 87209 SMEAR COMPLEX STAIN: CPT | Performed by: EMERGENCY MEDICINE

## 2023-05-28 PROCEDURE — 80053 COMPREHEN METABOLIC PANEL: CPT | Performed by: EMERGENCY MEDICINE

## 2023-05-28 PROCEDURE — 99284 EMERGENCY DEPT VISIT MOD MDM: CPT | Mod: 25

## 2023-05-28 PROCEDURE — 87328 CRYPTOSPORIDIUM AG IA: CPT | Performed by: EMERGENCY MEDICINE

## 2023-05-28 PROCEDURE — 87506 IADNA-DNA/RNA PROBE TQ 6-11: CPT | Performed by: EMERGENCY MEDICINE

## 2023-05-28 PROCEDURE — 87329 GIARDIA AG IA: CPT | Mod: XU | Performed by: EMERGENCY MEDICINE

## 2023-05-28 PROCEDURE — 87493 C DIFF AMPLIFIED PROBE: CPT | Mod: XU | Performed by: EMERGENCY MEDICINE

## 2023-05-28 PROCEDURE — 85025 COMPLETE CBC W/AUTO DIFF WBC: CPT | Performed by: EMERGENCY MEDICINE

## 2023-05-28 PROCEDURE — 258N000003 HC RX IP 258 OP 636: Performed by: EMERGENCY MEDICINE

## 2023-05-28 PROCEDURE — 96361 HYDRATE IV INFUSION ADD-ON: CPT

## 2023-05-28 PROCEDURE — 36415 COLL VENOUS BLD VENIPUNCTURE: CPT | Performed by: EMERGENCY MEDICINE

## 2023-05-28 PROCEDURE — 96374 THER/PROPH/DIAG INJ IV PUSH: CPT

## 2023-05-28 PROCEDURE — 250N000013 HC RX MED GY IP 250 OP 250 PS 637: Performed by: EMERGENCY MEDICINE

## 2023-05-28 RX ORDER — ACETAMINOPHEN 325 MG/1
975 TABLET ORAL ONCE
Status: COMPLETED | OUTPATIENT
Start: 2023-05-28 | End: 2023-05-28

## 2023-05-28 RX ORDER — ONDANSETRON 4 MG/1
4 TABLET, ORALLY DISINTEGRATING ORAL EVERY 8 HOURS PRN
Qty: 10 TABLET | Refills: 0 | Status: SHIPPED | OUTPATIENT
Start: 2023-05-28 | End: 2023-05-31

## 2023-05-28 RX ORDER — ONDANSETRON 2 MG/ML
4 INJECTION INTRAMUSCULAR; INTRAVENOUS
Status: COMPLETED | OUTPATIENT
Start: 2023-05-28 | End: 2023-05-28

## 2023-05-28 RX ADMIN — SODIUM CHLORIDE 1000 ML: 9 INJECTION, SOLUTION INTRAVENOUS at 10:02

## 2023-05-28 RX ADMIN — ONDANSETRON 4 MG: 2 INJECTION INTRAMUSCULAR; INTRAVENOUS at 10:02

## 2023-05-28 RX ADMIN — ACETAMINOPHEN 975 MG: 325 TABLET ORAL at 12:34

## 2023-05-28 ASSESSMENT — ACTIVITIES OF DAILY LIVING (ADL)
ADLS_ACUITY_SCORE: 37

## 2023-05-28 NOTE — ED TRIAGE NOTES
Pt c/o diarrhea for 4 days and dizziness     Triage Assessment     Row Name 05/28/23 0736       Triage Assessment (Adult)    Airway WDL WDL       Respiratory WDL    Respiratory WDL WDL       Skin Circulation/Temperature WDL    Skin Circulation/Temperature WDL WDL       Cardiac WDL    Cardiac WDL WDL       Peripheral/Neurovascular WDL    Peripheral Neurovascular WDL WDL       Cognitive/Neuro/Behavioral WDL    Cognitive/Neuro/Behavioral WDL WDL

## 2023-05-28 NOTE — TELEPHONE ENCOUNTER
[facility] Emergency Department/Urgent Care Lab result notification  [Note:  ED Lab Results RN will reference the [facility] Emergency Dept visit note prior to contacting patient AND/OR prior to consulting Emergency Dept Provider.  Highlights of Emergency Dept visit in information summary at the bottom of this telephone note]    1. Reason for call    Notify of lab results    Assess patient symptoms [if necessary]    Review ED Providers recommendations/discharge instructions (if necessary)    Advise per [facility] ED lab result protocol    2. Lab Result (including Rx patient on, if applicable).  If culture, copy of lab report at bottom.  Final Enteric Bacteria and Virus Panel by JOEY Stool is POSITIVE for Rotavirus  Recommendations in treatment per [facility] ED Lab Result Enteric Bacteria and Virus Panel protocol.    3. RN Assessment (Patient's current Symptoms):  Time of call: 3:50PM   Left voicemail message requesting a call back to [facility] ED Lab Result RN at [phone].  RN is available every day between 9 a.m. and 5:30 p.m.    Information summary from Emergency Dept/Urgent Care visit on 5/28/23  Symptoms reported at ED visit (Chief complaint, HPI) Diarrhea        HPI   Graciela Castorena is a 58 year old female who complains of abdominal pain and diarrhea.  The patient states she developed diarrhea 4 days ago and its been very frequent throughout the day, watery.  No black or bloody stool.  Not improving.  Associated nausea but not vomiting.  She feels warm and feverish but has not measured her temperature at home.  She feels very rundown and fatigued.  Periodically has abdominal cramping throughout the abdomen.  Denies recent antibiotic use or other illness.  She states her daughter also feels ill but is not sure exactly of what her daughter symptoms are.  Patient denies being immunocompromised in any way.  No other complaints.   ED providers Impression and Plan  (applicable information) This patient presents with concern of fatigue nausea and profuse diarrhea.  Considered a host of infectious etiologies regarding her symptoms.  Lab work so far reassuring and the bulk of the stool testing is pending although the C. difficile test is negative.  She was hydrated intravenously and provided a dose of Zofran and improved well enough to the point where she felt she could be discharged home for further outpatient management of her symptoms.  I prescribed additional Zofran and encouraged hydration.  I counseled the patient that she will be contacted if any of the stool tests would prompt changes in her treatment such as antibiotics.  She understands.  Otherwise recommended primary care follow-up.   Miscellaneous   Information (ED Provider, diagnosis, etc)   na       Copy of Lab report (if applicable)  Component      Latest Ref Rng 5/28/2023  8:29 AM   Campylobacter group by JOEY      Not Detected  Not Detected    Salmonella species by JOEY      Not Detected  Not Detected    Shigella species by JOEY      Not Detected  Not Detected    Vibrio group by JOEY      Not Detected  Not Detected    Rotavirus A by JOEY      Not Detected  Detected !    Shiga toxin 1 gene by JOEY      Not Detected  Not Detected    Shiga toxin 2 gene by JOEY      Not Detected  Not Detected    Norovirus I and II by JOEY      Not Detected  Not Detected    Yersinia enterocolitica by JOEY      Not Detected  Not Detected       Legend:  ! Abnormal        Debi Cornell RN  [facility] [employer] [place]  Emergency Dept Lab Result RN  Ph# [phone]

## 2023-05-28 NOTE — ED PROVIDER NOTES
History     Chief Complaint:  Diarrhea       HPI   Graciela Castorena is a 58 year old female who complains of abdominal pain and diarrhea.  The patient states she developed diarrhea 4 days ago and its been very frequent throughout the day, watery.  No black or bloody stool.  Not improving.  Associated nausea but not vomiting.  She feels warm and feverish but has not measured her temperature at home.  She feels very rundown and fatigued.  Periodically has abdominal cramping throughout the abdomen.  Denies recent antibiotic use or other illness.  She states her daughter also feels ill but is not sure exactly of what her daughter symptoms are.  Patient denies being immunocompromised in any way.  No other complaints.      Independent Historian:   None - Patient Only    Review of External Notes:   none       Medications:    ondansetron (ZOFRAN ODT) 4 MG ODT tab  diclofenac (VOLTAREN) 1 % topical gel  estradiol (ESTRACE) 0.1 MG/GM vaginal cream  famotidine (PEPCID) 20 MG tablet  methocarbamol (ROBAXIN) 500 MG tablet  omeprazole (PRILOSEC) 20 MG DR capsule  pantoprazole (PROTONIX) 40 MG EC tablet  polyethylene glycol (MIRALAX) 17 GM/Dose powder        Past Medical History:    Past Medical History:   Diagnosis Date     Abnormal Pap smear of cervix 03/30/2015     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 breast 2018     Palpitations      Tuberculosis        Past Surgical History:    Past Surgical History:   Procedure Laterality Date     COLONOSCOPY N/A 2/12/2018    Procedure: COLONOSCOPY;  colonoscopy;  Surgeon: Sabas Villegas MD;  Location:  GI     LUMPECTOMY BREAST WITH SEED LOCALIZATION Left 4/18/2018    Procedure: LUMPECTOMY BREAST WITH SEED LOCALIZATION;  SEED LOCALIZED LEFT BREAST LUMPECTOMY ;  Surgeon: Meli Rodriguez MD;  Location: [facility]        Physical Exam     Patient Vitals for the past 24 hrs:   BP Temp Temp src Pulse Resp SpO2 Weight   05/28/23 0724 (!) 180/99 97.1  F (36.2  C) Temporal 106 18 99 %  74.4 kg (164 lb)        Physical Exam  SKIN:  Warm, dry.  No jaundice.  HEMATOLOGIC/IMMUNOLOGIC/LYMPHATIC:  No pallor.  HENT:  Moist oral mucosa.  EYES:  Conjunctivae normal.  Anicteric.  CARDIOVASCULAR: Tachycardic rate with regular rhythm.  No murmur.  RESPIRATORY:  No respiratory distress, breath sounds equal and normal.  GASTROINTESTINAL:  Soft, nontender abdomen with active bowel sounds.  No distention.  MUSCULOSKELETAL: Overweight body habitus.  NEUROLOGIC:  Alert, conversant.  PSYCHIATRIC:  Normal mood.    Emergency Department Course     Laboratory:  Labs Ordered and Resulted from Time of ED Arrival to Time of ED Departure   COMPREHENSIVE METABOLIC PANEL - Abnormal       Result Value    Sodium 136      Potassium 3.7      Chloride 104      Carbon Dioxide (CO2) 19 (*)     Anion Gap 13      Urea Nitrogen 7.7      Creatinine 0.84      Calcium 9.6      Glucose 99      Alkaline Phosphatase 75      AST 20      ALT 15      Protein Total 7.8      Albumin 4.4      Bilirubin Total 0.4      GFR Estimate 81     C. DIFFICILE TOXIN B PCR WITH REFLEX TO C. DIFFICILE ANTIGEN AND TOXINS A/B EIA - Normal    C Difficile Toxin B by PCR Negative     CBC WITH PLATELETS AND DIFFERENTIAL    WBC Count 9.7      RBC Count 5.11      Hemoglobin 15.3      Hematocrit 45.1      MCV 88      MCH 29.9      MCHC 33.9      RDW 13.0      Platelet Count 194      % Neutrophils 82      % Lymphocytes 8      % Monocytes 9      % Eosinophils 1      % Basophils 0      % Immature Granulocytes 0      NRBCs per 100 WBC 0      Absolute Neutrophils 7.9      Absolute Lymphocytes 0.8      Absolute Monocytes 0.9      Absolute Eosinophils 0.1      Absolute Basophils 0.0      Absolute Immature Granulocytes 0.0      Absolute NRBCs 0.0     ENTERIC BACTERIA AND VIRUS PANEL BY JOEY STOOL   CRYPTOSPORIDIUM/GIARDIA IMMUNOASSAY   ROUTINE PARASITOLOGY EXAM        Procedures   None     Emergency Department Course & Assessments:     Interventions:  Medications   0.9% sodium  chloride BOLUS (1,000 mLs Intravenous $New Bag 5/28/23 1002)   ondansetron (ZOFRAN) injection 4 mg (4 mg Intravenous $Given 5/28/23 1002)        Assessments:  History obtained and physical exam performed    Independent Interpretation (X-rays, CTs, rhythm strip):  None    Consultations/Discussion of Management or Tests:  None        Social Determinants of Health affecting care:   None    Disposition:  The patient was discharged to home.     Impression & Plan      Medical Decision Making:  This patient presents with concern of fatigue nausea and profuse diarrhea.  Considered a host of infectious etiologies regarding her symptoms.  Lab work so far reassuring and the bulk of the stool testing is pending although the C. difficile test is negative.  She was hydrated intravenously and provided a dose of Zofran and improved well enough to the point where she felt she could be discharged home for further outpatient management of her symptoms.  I prescribed additional Zofran and encouraged hydration.  I counseled the patient that she will be contacted if any of the stool tests would prompt changes in her treatment such as antibiotics.  She understands.  Otherwise recommended primary care follow-up.    Diagnosis:    ICD-10-CM    1. Diarrhea, unspecified type  R19.7       2. Nausea  R11.0            Discharge Medications:  New Prescriptions    ONDANSETRON (ZOFRAN ODT) 4 MG ODT TAB    Take 1 tablet (4 mg) by mouth every 8 hours as needed for nausea      No but  5/28/2023   Bran Rincon MD Moe, James Thomas, MD  05/28/23 9610

## 2023-05-29 LAB
C PARVUM AG STL QL IA: NEGATIVE
G LAMBLIA AG STL QL IA: NEGATIVE

## 2023-05-29 NOTE — TELEPHONE ENCOUNTER
"[facility] Emergency Department/Urgent Care Lab result notification     1. Reason for call    Notify of lab results    Assess patient symptoms [if necessary]    Review ED Providers recommendations/discharge instructions (if necessary)    Advise per [facility] ED lab result protocol     2. Lab Result (including Rx patient on, if applicable).  If culture, copy of lab report at bottom.  Final Enteric Bacteria and Virus Panel by JOEY Stool is POSITIVE for Rotavirus  Recommendations in treatment per [facility] ED Lab Result Enteric Bacteria and Virus Panel protocol.    3. RN Assessment (Patient's current Symptoms):    Time of call: 2:30PM    Assessment: Patient states she is \"alittle\" better today. Still having some diarrhea, no blood noted.     Last urinated at 8am. Has not been drinking fluids.     4. RN Recommendations/Instructions per [place] ED lab result protocol    [facility] ED lab result protocol used: Rotavirus    Patient was notified of lab result and treatment recommendations    RN reviewed information about Rotavirus from protocol patient education. Also sent [employer] information on Rotavirus.    Advised to follow up with the PCP as directed by the ED provider and to return to ED with any worsening symptoms.    Advised to really work on increasing her fluids.     The patient is comfortable with the information given and has no further questions.     5. Please Contact your PCP clinic or return to the Emergency department if your:    Symptoms worsen or other concerning symptoms.    Debi Cornell RN  [facility] [place]  Emergency Dept Lab Result RN  Ph# [phone]          "

## 2023-05-30 LAB — O+P STL MICRO: NEGATIVE

## 2023-06-19 ENCOUNTER — OFFICE VISIT (OUTPATIENT)
Dept: FAMILY MEDICINE | Facility: CLINIC | Age: 58
End: 2023-06-19
Payer: COMMERCIAL

## 2023-06-19 VITALS
WEIGHT: 164.1 LBS | SYSTOLIC BLOOD PRESSURE: 126 MMHG | DIASTOLIC BLOOD PRESSURE: 84 MMHG | RESPIRATION RATE: 20 BRPM | BODY MASS INDEX: 28.84 KG/M2 | OXYGEN SATURATION: 98 % | TEMPERATURE: 97.2 F | HEART RATE: 74 BPM

## 2023-06-19 DIAGNOSIS — M94.0 COSTOCHONDRITIS: ICD-10-CM

## 2023-06-19 DIAGNOSIS — R10.13 DYSPEPSIA: ICD-10-CM

## 2023-06-19 DIAGNOSIS — R03.0 ELEVATED BLOOD PRESSURE READING WITHOUT DIAGNOSIS OF HYPERTENSION: Primary | ICD-10-CM

## 2023-06-19 DIAGNOSIS — I42.8 NONISCHEMIC CARDIOMYOPATHY (H): ICD-10-CM

## 2023-06-19 DIAGNOSIS — K21.9 GASTROESOPHAGEAL REFLUX DISEASE, UNSPECIFIED WHETHER ESOPHAGITIS PRESENT: ICD-10-CM

## 2023-06-19 PROBLEM — I50.9 CHF (CONGESTIVE HEART FAILURE) (H): Status: ACTIVE | Noted: 2023-06-19

## 2023-06-19 PROCEDURE — 99214 OFFICE O/P EST MOD 30 MIN: CPT | Performed by: INTERNAL MEDICINE

## 2023-06-19 ASSESSMENT — PAIN SCALES - GENERAL: PAINLEVEL: NO PAIN (0)

## 2023-06-19 NOTE — PROGRESS NOTES
Assessment & Plan   Problem List Items Addressed This Visit    None  Visit Diagnoses     Elevated blood pressure reading without diagnosis of hypertension    -  Primary    Relevant Orders    24 Hour Blood Pressure Monitor - Adult    Gastroesophageal reflux disease, unspecified whether esophagitis present        Relevant Orders    Adult GI  Referral - Procedure Only    Dyspepsia        Relevant Orders    Adult GI  Referral - Procedure Only    Costochondritis        Relevant Medications    diclofenac (VOLTAREN) 1 % topical gel    Nonischemic cardiomyopathy (H)        Follow-up with cardiology, consider cardiac MRI, repeat 24-hour blood pressure/consider small dose of ARB         Repeat blood pressure was 126/84.  We will check 24-hour blood pressure monitor.  She does have chronic dyspepsia symptoms we will order EGD with Dr. Villegas per patient request..  She is on PPI therapy with pantoprazole daily.  Reviewed all labs and imaging echo does suggest slightly hypofunctioning globally of the LV; ejection fraction in the mid 40s to low 50s.  Will need to closely monitor, she has seen cardiology she has short runs of SVT not concerning per cardiology, her CT angiogram of the coronaries did not reveal any obstructive coronary artery disease and calcium score was 0 which is reassuring, her 10-year atherosclerotic cardiovascular disease risk  is 4% which is also reassuring no need for statins at this point.  Diarrhea has resolved.  No need for repeat labs today.  Further recommendations pending the 24-hour blood pressure monitor.  All questions answered.  Addendum on physical exam she had tenderness at the costosternal junction on the left side suspect costochondritis.  Advised to use some diclofenac gel topically and see if that helps with her symptoms.  24-hour blood pressure monitor is elevated we will go ahead and start her on a low-dose of an ARB given her mild hypofunctioning of the LV.  We will  keep closely monitoring the LV function repeat an echo in a year or so.  We will check for cardiology if any further recommendations such as cardiac MRI to delineate further the mild LV hypofunctioning.       MED REC REQUIRED  Post Medication Reconciliation Status:       Work on weight loss  Regular exercise  See Patient Instructions    Tavares Velasco MD  [facility] Owens is a 58 year old, presenting for the following health issues:  ER F/U (Patient here for an ER follow up from 5/28/2023.  For nausea and chest pain.)         View : No data to display.              HPI       ED/UC Followup:  Facility:  [facility] Emergency Dept    Date of visit: 5/28/2023   Reason for visit: Diarrhea, unspecified type     Nausea    Current Status: Stable.  Patient was seen by cardiology she had an echocardiogramThat showed an ejection fraction of 45 to 50% biplane was 50% with mild global hypokinesia of the left ventricle.    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  CT of the chest part showed 5 mm nodule on the right image 4 mm fissure nodule on the right image.  Patient never smoked.    Colonoscopy 2018 was normal there was diverticulosis repeat in 10 years.    Feels the chest is jittery and Holter was suggestive of SVT longest was 18 bpm    Urinalysis was positive for hematuria patient was referred to urogynecology she missed her appointment due to her daughter's graduation.    Ova and parasites study 4 years ago was positive for blastomycosis but repeat (it was negative.  She had diarrhea back on 528 was positive for rotavirus.    She had small pulmonary nodules on CT scan of the chest but patient is never smoked before.  According to guidelines no follow-up routine is recommended if she is low  risk.    Chest x-ray was negative also in.  On 4/24/2023.  Mammogram was benign.      The 10-year ASCVD risk score (Dillon WILD, et al., 2019) is: 3.1%    Values used to calculate the score:      Age: 58 years      Sex: Female      Is Non- : Yes      Diabetic: No      Tobacco smoker: No      Systolic Blood Pressure: 126 mmHg      Is BP treated: No      HDL Cholesterol: 52 mg/dL      Total Cholesterol: 173 mg/dL    Review of Systems   Constitutional, HEENT, cardiovascular, pulmonary, gi and gu systems are negative, except as otherwise noted.      Objective    /84   Pulse 74   Temp 97.2  F (36.2  C) (Temporal)   Resp 20   Wt 74.4 kg (164 lb 1.6 oz)   SpO2 98%   BMI 28.84 kg/m    Body mass index is 28.84 kg/m .  Physical Exam   GENERAL: healthy, alert and no distress  EYES: Eyes grossly normal to inspection, PERRL and conjunctivae and sclerae normal  HENT: ear canals and TM's normal, nose and mouth without ulcers or lesions  NECK: no adenopathy, no asymmetry, masses, or scars and thyroid normal to palpation  RESP: lungs clear to auscultation - no rales, rhonchi or wheezes  Chest: Has direct tenderness at the costal sternal junction left side mainly.  Reproducible.  CV: regular rate and rhythm, normal S1 S2, no S3 or S4, no murmur, click or rub, no peripheral edema and peripheral pulses strong  ABDOMEN: soft, nontender, no hepatosplenomegaly, no masses and bowel sounds normal  MS: no gross musculoskeletal defects noted, no edema  SKIN: no suspicious lesions or rashes  NEURO: Normal strength and tone, mentation intact and speech normal  PSYCH: mentation appears normal, affect normal/bright    Admission on 05/28/2023, Discharged on 05/28/2023   Component Date Value Ref Range Status     Sodium 05/28/2023 136  136 - 145 mmol/L Final     Potassium 05/28/2023 3.7  3.4 - 5.3 mmol/L Final     Chloride 05/28/2023 104  98 - 107 mmol/L Final     Carbon Dioxide (CO2) 05/28/2023 19 (L)  22 - 29  mmol/L Final     Anion Gap 05/28/2023 13  7 - 15 mmol/L Final     Urea Nitrogen 05/28/2023 7.7  6.0 - 20.0 mg/dL Final     Creatinine 05/28/2023 0.84  0.51 - 0.95 mg/dL Final     Calcium 05/28/2023 9.6  8.6 - 10.0 mg/dL Final     Glucose 05/28/2023 99  70 - 99 mg/dL Final     Alkaline Phosphatase 05/28/2023 75  35 - 104 U/L Final     AST 05/28/2023 20  10 - 35 U/L Final     ALT 05/28/2023 15  10 - 35 U/L Final     Protein Total 05/28/2023 7.8  6.4 - 8.3 g/dL Final     Albumin 05/28/2023 4.4  3.5 - 5.2 g/dL Final     Bilirubin Total 05/28/2023 0.4  <=1.2 mg/dL Final     GFR Estimate 05/28/2023 81  >60 mL/min/1.73m2 Final    eGFR calculated using 2021 CKD-EPI equation.     Campylobacter group 05/28/2023 Not Detected  Not Detected Final     Salmonella species 05/28/2023 Not Detected  Not Detected Final     Shigella species 05/28/2023 Not Detected  Not Detected Final     Vibrio group 05/28/2023 Not Detected  Not Detected Final     Rotavirus 05/28/2023 Detected (A)  Not Detected Final     Shiga toxin 1 gene 05/28/2023 Not Detected  Not Detected Final     Shiga toxin 2 gene 05/28/2023 Not Detected  Not Detected Final     Norovirus I and II 05/28/2023 Not Detected  Not Detected Final     Yersinia enterocolitica 05/28/2023 Not Detected  Not Detected Final     C Difficile Toxin B by PCR 05/28/2023 Negative  Negative Final    A negative result does not exclude actual disease due to C. difficile and may be due to improper collection, handling and storage of the specimen or the number of organisms in the specimen is below the detection limit of the assay.     Cryptosporidium parvum antigen 05/28/2023 Negative  Negative Final     Giardia lamblia antigen 05/28/2023 Negative  Negative Final     OVA AND PARASITE EXAM 05/28/2023 Negative  Negative Final    A single negative specimen does not rule out parasitic infection.     WBC Count 05/28/2023 9.7  4.0 - 11.0 10e3/uL Final     RBC Count 05/28/2023 5.11  3.80 - 5.20 10e6/uL Final      Hemoglobin 05/28/2023 15.3  11.7 - 15.7 g/dL Final     Hematocrit 05/28/2023 45.1  35.0 - 47.0 % Final     MCV 05/28/2023 88  78 - 100 fL Final     MCH 05/28/2023 29.9  26.5 - 33.0 pg Final     MCHC 05/28/2023 33.9  31.5 - 36.5 g/dL Final     RDW 05/28/2023 13.0  10.0 - 15.0 % Final     Platelet Count 05/28/2023 194  150 - 450 10e3/uL Final     % Neutrophils 05/28/2023 82  % Final     % Lymphocytes 05/28/2023 8  % Final     % Monocytes 05/28/2023 9  % Final     % Eosinophils 05/28/2023 1  % Final     % Basophils 05/28/2023 0  % Final     % Immature Granulocytes 05/28/2023 0  % Final     NRBCs per 100 WBC 05/28/2023 0  <1 /100 Final     Absolute Neutrophils 05/28/2023 7.9  1.6 - 8.3 10e3/uL Final     Absolute Lymphocytes 05/28/2023 0.8  0.8 - 5.3 10e3/uL Final     Absolute Monocytes 05/28/2023 0.9  0.0 - 1.3 10e3/uL Final     Absolute Eosinophils 05/28/2023 0.1  0.0 - 0.7 10e3/uL Final     Absolute Basophils 05/28/2023 0.0  0.0 - 0.2 10e3/uL Final     Absolute Immature Granulocytes 05/28/2023 0.0  <=0.4 10e3/uL Final     Absolute NRBCs 05/28/2023 0.0  10e3/uL Final

## 2023-06-24 ENCOUNTER — TELEPHONE (OUTPATIENT)
Dept: FAMILY MEDICINE | Facility: CLINIC | Age: 58
End: 2023-06-24
Payer: COMMERCIAL

## 2023-06-24 NOTE — TELEPHONE ENCOUNTER
Rashad Fischer MD Sayegh, Kamil Nadim, MD   [facility] Dr. Velasco,     I think her mildly reduced EF is from Lbbb. I don't suspect infliltrative disease so I think we can hold off on mri. Low dose Arb is a good idea.  We can repeat echo in 1 yr from last one.     Thank you,   Rashad           Previous Messages       ----- Message -----   From: Tavares Velasco MD   Sent: 6/19/2023   2:55 PM CDT   To: Tavares Velasco MD; Rashad Fischer MD     Hi Dr. Nichole, I saw this mutual patient today, are you concerned about mild hypofunctioning of the LV.  Blood pressure is in the mid 120s over mid 80s and ordering a 24-hour blood pressure monitor again.  She complains of atypical chest symptoms suspected costochondritis on exam.   When would you consider repeating echocardiogram for her.  If elevated 24-hour blood pressure will start her on low-dose ARB, would you consider cardiac MR to assess better slight LV global hypofunctioning   Any other suggestions?   Thanks for follow-up   Tavares

## 2023-06-29 ENCOUNTER — PATIENT OUTREACH (OUTPATIENT)
Dept: CARE COORDINATION | Facility: CLINIC | Age: 58
End: 2023-06-29
Payer: COMMERCIAL

## 2023-07-27 ENCOUNTER — PATIENT OUTREACH (OUTPATIENT)
Dept: CARE COORDINATION | Facility: CLINIC | Age: 58
End: 2023-07-27
Payer: COMMERCIAL

## 2023-08-07 ENCOUNTER — HOSPITAL ENCOUNTER (OUTPATIENT)
Dept: MAMMOGRAPHY | Facility: CLINIC | Age: 58
Discharge: HOME | End: 2023-08-07
Attending: INTERNAL MEDICINE | Admitting: INTERNAL MEDICINE
Payer: COMMERCIAL

## 2023-08-07 DIAGNOSIS — Z12.31 ENCOUNTER FOR SCREENING MAMMOGRAM FOR MALIGNANT NEOPLASM OF BREAST: ICD-10-CM

## 2023-08-07 PROCEDURE — 77067 SCR MAMMO BI INCL CAD: CPT

## 2023-09-26 ENCOUNTER — PATIENT OUTREACH (OUTPATIENT)
Dept: CARE COORDINATION | Facility: CLINIC | Age: 58
End: 2023-09-26
Payer: COMMERCIAL

## 2023-10-10 ENCOUNTER — PATIENT OUTREACH (OUTPATIENT)
Dept: CARE COORDINATION | Facility: CLINIC | Age: 58
End: 2023-10-10
Payer: COMMERCIAL

## 2023-10-10 ENCOUNTER — PATIENT OUTREACH (OUTPATIENT)
Dept: OBGYN | Facility: CLINIC | Age: 58
End: 2023-10-10
Payer: COMMERCIAL

## 2023-10-10 ENCOUNTER — TELEPHONE (OUTPATIENT)
Dept: FAMILY MEDICINE | Facility: CLINIC | Age: 58
End: 2023-10-10
Payer: COMMERCIAL

## 2023-10-10 DIAGNOSIS — R87.610 ASCUS OF CERVIX WITH NEGATIVE HIGH RISK HPV: Primary | ICD-10-CM

## 2023-10-10 NOTE — TELEPHONE ENCOUNTER
Reason for Call:  Appointment Request    Patient requesting this type of appt:  Preventive     Requested provider: Tavares Velasco    Reason patient unable to be scheduled: Not within requested timeframe    When does patient want to be seen/preferred time:  Soonest appt     Comments: Pt is open to seeing any female provider available if PCP is unavailable.      [facility] or would you prefer to receive a phone call?:   Patient would prefer a phone call   Okay to leave a detailed message?: Yes at Cell number on file:    Telephone Information:   Mobile [phone]       Call taken on 10/10/2023 at 11:43 AM by JACINTO JEAN-BAPTISTE

## 2023-10-12 NOTE — TELEPHONE ENCOUNTER
Attempt#: 1    Called Patient at Phone#: [phone]     Left voicemail for patient to callback regarding: scheduling preventative appointment with Dr. Velasco.

## 2023-11-09 ENCOUNTER — ANCILLARY PROCEDURE (OUTPATIENT)
Dept: GENERAL RADIOLOGY | Facility: CLINIC | Age: 58
End: 2023-11-09
Attending: PHYSICIAN ASSISTANT
Payer: COMMERCIAL

## 2023-11-09 ENCOUNTER — OFFICE VISIT (OUTPATIENT)
Dept: OBGYN | Facility: CLINIC | Age: 58
End: 2023-11-09
Payer: COMMERCIAL

## 2023-11-09 ENCOUNTER — OFFICE VISIT (OUTPATIENT)
Dept: URGENT CARE | Facility: URGENT CARE | Age: 58
End: 2023-11-09
Payer: COMMERCIAL

## 2023-11-09 VITALS
WEIGHT: 165 LBS | HEART RATE: 72 BPM | SYSTOLIC BLOOD PRESSURE: 162 MMHG | OXYGEN SATURATION: 98 % | BODY MASS INDEX: 29 KG/M2 | DIASTOLIC BLOOD PRESSURE: 93 MMHG | TEMPERATURE: 97.1 F

## 2023-11-09 VITALS
HEIGHT: 63 IN | WEIGHT: 165.6 LBS | BODY MASS INDEX: 29.34 KG/M2 | SYSTOLIC BLOOD PRESSURE: 140 MMHG | DIASTOLIC BLOOD PRESSURE: 84 MMHG

## 2023-11-09 DIAGNOSIS — R10.84 ABDOMINAL PAIN, GENERALIZED: ICD-10-CM

## 2023-11-09 DIAGNOSIS — K92.1 HEMATOCHEZIA: ICD-10-CM

## 2023-11-09 DIAGNOSIS — B37.31 CANDIDIASIS OF VAGINA: ICD-10-CM

## 2023-11-09 DIAGNOSIS — Z12.4 SCREENING FOR CERVICAL CANCER: Primary | ICD-10-CM

## 2023-11-09 DIAGNOSIS — K59.00 CONSTIPATION, UNSPECIFIED CONSTIPATION TYPE: ICD-10-CM

## 2023-11-09 DIAGNOSIS — R31.9 HEMATURIA, UNSPECIFIED TYPE: ICD-10-CM

## 2023-11-09 DIAGNOSIS — R03.0 ELEVATED BLOOD PRESSURE READING: Primary | ICD-10-CM

## 2023-11-09 LAB
ALBUMIN SERPL BCG-MCNC: 4.3 G/DL (ref 3.5–5.2)
ALBUMIN UR-MCNC: NEGATIVE MG/DL
ALP SERPL-CCNC: 72 U/L (ref 35–104)
ALT SERPL W P-5'-P-CCNC: 13 U/L (ref 0–50)
AMORPH CRY #/AREA URNS HPF: ABNORMAL /HPF
ANION GAP SERPL CALCULATED.3IONS-SCNC: 10 MMOL/L (ref 7–15)
APPEARANCE UR: CLEAR
AST SERPL W P-5'-P-CCNC: 21 U/L (ref 0–45)
BACTERIA #/AREA URNS HPF: ABNORMAL /HPF
BASOPHILS # BLD AUTO: 0.1 10E3/UL (ref 0–0.2)
BASOPHILS NFR BLD AUTO: 1 %
BILIRUB SERPL-MCNC: 0.3 MG/DL
BILIRUB UR QL STRIP: NEGATIVE
BUN SERPL-MCNC: 11.8 MG/DL (ref 6–20)
CALCIUM SERPL-MCNC: 9.4 MG/DL (ref 8.6–10)
CHLORIDE SERPL-SCNC: 104 MMOL/L (ref 98–107)
CLUE CELLS: ABNORMAL
COLOR UR AUTO: YELLOW
CREAT SERPL-MCNC: 0.68 MG/DL (ref 0.51–0.95)
DEPRECATED HCO3 PLAS-SCNC: 25 MMOL/L (ref 22–29)
EGFRCR SERPLBLD CKD-EPI 2021: >90 ML/MIN/1.73M2
EOSINOPHIL # BLD AUTO: 0.3 10E3/UL (ref 0–0.7)
EOSINOPHIL NFR BLD AUTO: 4 %
ERYTHROCYTE [DISTWIDTH] IN BLOOD BY AUTOMATED COUNT: 12.8 % (ref 10–15)
GLUCOSE SERPL-MCNC: 84 MG/DL (ref 70–99)
GLUCOSE UR STRIP-MCNC: NEGATIVE MG/DL
HCT VFR BLD AUTO: 44.4 % (ref 35–47)
HGB BLD-MCNC: 14.7 G/DL (ref 11.7–15.7)
HGB UR QL STRIP: ABNORMAL
IMM GRANULOCYTES # BLD: 0 10E3/UL
IMM GRANULOCYTES NFR BLD: 0 %
KETONES UR STRIP-MCNC: NEGATIVE MG/DL
LEUKOCYTE ESTERASE UR QL STRIP: NEGATIVE
LYMPHOCYTES # BLD AUTO: 2.8 10E3/UL (ref 0.8–5.3)
LYMPHOCYTES NFR BLD AUTO: 36 %
MCH RBC QN AUTO: 29.8 PG (ref 26.5–33)
MCHC RBC AUTO-ENTMCNC: 33.1 G/DL (ref 31.5–36.5)
MCV RBC AUTO: 90 FL (ref 78–100)
MONOCYTES # BLD AUTO: 0.6 10E3/UL (ref 0–1.3)
MONOCYTES NFR BLD AUTO: 8 %
NEUTROPHILS # BLD AUTO: 4.1 10E3/UL (ref 1.6–8.3)
NEUTROPHILS NFR BLD AUTO: 52 %
NITRATE UR QL: NEGATIVE
PH UR STRIP: 7.5 [PH] (ref 5–7)
PLATELET # BLD AUTO: 233 10E3/UL (ref 150–450)
POTASSIUM SERPL-SCNC: 4.3 MMOL/L (ref 3.4–5.3)
PROT SERPL-MCNC: 7.3 G/DL (ref 6.4–8.3)
RBC # BLD AUTO: 4.93 10E6/UL (ref 3.8–5.2)
RBC #/AREA URNS AUTO: ABNORMAL /HPF
SODIUM SERPL-SCNC: 139 MMOL/L (ref 135–145)
SP GR UR STRIP: 1.01 (ref 1–1.03)
TRICHOMONAS, WET PREP: ABNORMAL
UROBILINOGEN UR STRIP-ACNC: 0.2 E.U./DL
WBC # BLD AUTO: 7.9 10E3/UL (ref 4–11)
WBC #/AREA URNS AUTO: ABNORMAL /HPF
WBC'S/HIGH POWER FIELD, WET PREP: ABNORMAL
YEAST, WET PREP: PRESENT

## 2023-11-09 PROCEDURE — 80053 COMPREHEN METABOLIC PANEL: CPT | Performed by: PHYSICIAN ASSISTANT

## 2023-11-09 PROCEDURE — 99214 OFFICE O/P EST MOD 30 MIN: CPT | Performed by: PHYSICIAN ASSISTANT

## 2023-11-09 PROCEDURE — 74019 RADEX ABDOMEN 2 VIEWS: CPT | Mod: TC | Performed by: RADIOLOGY

## 2023-11-09 PROCEDURE — 36415 COLL VENOUS BLD VENIPUNCTURE: CPT | Performed by: PHYSICIAN ASSISTANT

## 2023-11-09 PROCEDURE — 99212 OFFICE O/P EST SF 10 MIN: CPT | Performed by: NURSE PRACTITIONER

## 2023-11-09 PROCEDURE — 87624 HPV HI-RISK TYP POOLED RSLT: CPT | Performed by: NURSE PRACTITIONER

## 2023-11-09 PROCEDURE — 85025 COMPLETE CBC W/AUTO DIFF WBC: CPT | Performed by: PHYSICIAN ASSISTANT

## 2023-11-09 PROCEDURE — 88175 CYTOPATH C/V AUTO FLUID REDO: CPT | Performed by: NURSE PRACTITIONER

## 2023-11-09 PROCEDURE — 81001 URINALYSIS AUTO W/SCOPE: CPT | Performed by: PHYSICIAN ASSISTANT

## 2023-11-09 PROCEDURE — 87210 SMEAR WET MOUNT SALINE/INK: CPT | Performed by: PHYSICIAN ASSISTANT

## 2023-11-09 RX ORDER — BISACODYL 5 MG/1
5 TABLET, DELAYED RELEASE ORAL DAILY PRN
Qty: 30 TABLET | Refills: 0 | Status: SHIPPED | OUTPATIENT
Start: 2023-11-09 | End: 2024-01-04

## 2023-11-09 RX ORDER — FLUCONAZOLE 150 MG/1
150 TABLET ORAL ONCE
Qty: 1 TABLET | Refills: 0 | Status: SHIPPED | OUTPATIENT
Start: 2023-11-09 | End: 2023-11-09

## 2023-11-09 ASSESSMENT — ENCOUNTER SYMPTOMS
DYSURIA: 1
VOMITING: 0
APPETITE CHANGE: 1
FEVER: 0
BLOOD IN STOOL: 1
HEMATURIA: 1
CONSTIPATION: 1
NAUSEA: 1
ABDOMINAL PAIN: 1

## 2023-11-09 NOTE — PROGRESS NOTES
SUBJECTIVE:   Graciela Castorena is a 58 year old female presenting with a chief complaint of   Chief Complaint   Patient presents with    Dysuria     Pt states has had painful urination for a year    Gastrointestinal Problem     Pt states constipation and bright red blood   Stated that she has tried miralax with no relief, appetite has decreased        She is an established patient of [facility].  Patient presents with (1) whole body pain x 2 months; (2) hematuria x 3 years intermittently.  Patient has seen urology in past.  Forgot her appointment and has not yet made another.  (3) constipation with blood x 3-4 months.  Last BM was yesterday.  Takes pepcid daily.  No gastrologist or colonoscopy.  It has been referred to but has not made appointment.  Patient came in today because she could not get in to see her PCP until February.      Treatment:  Occasional miralax - last time 3 weeks ago.      Review of Systems   Constitutional:  Positive for appetite change. Negative for fever.   Gastrointestinal:  Positive for abdominal pain, blood in stool, constipation and nausea. Negative for vomiting.   Genitourinary:  Positive for dysuria, hematuria and vaginal discharge.   All other systems reviewed and are negative.      Past Medical History:   Diagnosis Date    Abnormal Pap smear of cervix 03/30/2015    03/30/15, 10/20/21    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 breast 2018    s/p lumpectomy and radiation; on Tamoxifen    Palpitations     Tuberculosis      Family History   Problem Relation Age of Onset    Diabetes Type 2  Brother     Diabetes Type 2  Sister     Myocardial Infarction No family hx of     Cerebrovascular Disease No family hx of     Coronary Artery Disease Early Onset No family hx of     Colon Cancer No family hx of     Breast Cancer No family hx of     Ovarian Cancer No family hx of      Current Outpatient Medications   Medication Sig Dispense Refill    bisacodyl (DULCOLAX) 5 MG EC tablet Take 1 tablet (5 mg)  by mouth daily as needed for constipation 30 tablet 0    famotidine (PEPCID) 20 MG tablet Take 1 tablet (20 mg) by mouth 2 times daily as needed (heart burn; dyspepsia) 60 tablet 1    fluconazole (DIFLUCAN) 150 MG tablet Take 1 tablet (150 mg) by mouth once for 1 dose 1 tablet 0    methocarbamol (ROBAXIN) 500 MG tablet Take 1 tablet (500 mg) by mouth 3 times daily as needed for muscle spasms 30 tablet 0    omeprazole (PRILOSEC) 20 MG DR capsule TAKE 1 CAPSULE BY MOUTH EVERY DAY 90 capsule 1    polyethylene glycol (MIRALAX) 17 GM/Dose powder Take 17 g (1 capful) by mouth daily 507 g 0    diclofenac (VOLTAREN) 1 % topical gel Apply 1 g topically 4 times daily (Patient not taking: Reported on 11/9/2023) 50 g 0    diclofenac (VOLTAREN) 1 % topical gel Apply 4 g topically 4 times daily (Patient not taking: Reported on 11/9/2023) 350 g 1    estradiol (ESTRACE) 0.1 MG/GM vaginal cream Place 1 g vaginally three times a week (Patient not taking: Reported on 11/9/2023) 42.5 g 3    pantoprazole (PROTONIX) 40 MG EC tablet TAKE 1 TABLET BY MOUTH DAILY 30 MINUTES BEFORE BREAKFAST (Patient not taking: Reported on 11/9/2023)       Social History     Tobacco Use    Smoking status: Never    Smokeless tobacco: Never   Substance Use Topics    Alcohol use: No     Alcohol/week: 0.0 standard drinks of alcohol       OBJECTIVE  BP (!) 162/93   Pulse 72   Temp 97.1  F (36.2  C) (Oral)   Wt 74.8 kg (165 lb)   SpO2 98%   BMI 29.00 kg/m      Physical Exam  Vitals and nursing note reviewed.   Constitutional:       General: She is not in acute distress.     Appearance: Normal appearance. She is obese. She is not ill-appearing, toxic-appearing or diaphoretic.   Eyes:      Extraocular Movements: Extraocular movements intact.      Conjunctiva/sclera: Conjunctivae normal.   Cardiovascular:      Rate and Rhythm: Normal rate and regular rhythm.      Pulses: Normal pulses.      Heart sounds: Normal heart sounds.   Pulmonary:      Effort: Pulmonary  effort is normal.      Breath sounds: Normal breath sounds.   Abdominal:      General: Abdomen is flat. Bowel sounds are normal.      Palpations: Abdomen is soft.      Tenderness: There is abdominal tenderness. There is no right CVA tenderness or left CVA tenderness.      Comments: Right upper quadrant pain with deep palpation   Skin:     General: Skin is warm and dry.   Neurological:      Mental Status: She is alert.   Psychiatric:         Mood and Affect: Mood normal.         Labs:  Results for orders placed or performed in visit on 11/09/23 (from the past 24 hour(s))   UA Macroscopic with reflex to Microscopic and Culture - Clinic Collect    Specimen: Urine, Clean Catch   Result Value Ref Range    Color Urine Yellow Colorless, Straw, Light Yellow, Yellow    Appearance Urine Clear Clear    Glucose Urine Negative Negative mg/dL    Bilirubin Urine Negative Negative    Ketones Urine Negative Negative mg/dL    Specific Gravity Urine 1.015 1.003 - 1.035    Blood Urine Moderate (A) Negative    pH Urine 7.5 (H) 5.0 - 7.0    Protein Albumin Urine Negative Negative mg/dL    Urobilinogen Urine 0.2 0.2, 1.0 E.U./dL    Nitrite Urine Negative Negative    Leukocyte Esterase Urine Negative Negative   UA Microscopic with Reflex to Culture   Result Value Ref Range    Bacteria Urine Few (A) None Seen /HPF    RBC Urine 5-10 (A) 0-2 /HPF /HPF    WBC Urine 0-5 0-5 /HPF /HPF    Amorphous Crystals Urine Few (A) None Seen /HPF    Narrative    Urine Culture not indicated   Wet prep - Clinic Collect    Specimen: Vagina; Swab   Result Value Ref Range    Trichomonas Absent Absent    Yeast Present (A) Absent    Clue Cells Absent Absent    WBCs/high power field 1+ (A) None   CBC with platelets and differential    Narrative    The following orders were created for panel order CBC with platelets and differential.  Procedure                               Abnormality         Status                     ---------                                -----------         ------                     CBC with platelets and d...[[id number]]                      Final result                 Please view results for these tests on the individual orders.   CBC with platelets and differential   Result Value Ref Range    WBC Count 7.9 4.0 - 11.0 10e3/uL    RBC Count 4.93 3.80 - 5.20 10e6/uL    Hemoglobin 14.7 11.7 - 15.7 g/dL    Hematocrit 44.4 35.0 - 47.0 %    MCV 90 78 - 100 fL    MCH 29.8 26.5 - 33.0 pg    MCHC 33.1 31.5 - 36.5 g/dL    RDW 12.8 10.0 - 15.0 %    Platelet Count 233 150 - 450 10e3/uL    % Neutrophils 52 %    % Lymphocytes 36 %    % Monocytes 8 %    % Eosinophils 4 %    % Basophils 1 %    % Immature Granulocytes 0 %    Absolute Neutrophils 4.1 1.6 - 8.3 10e3/uL    Absolute Lymphocytes 2.8 0.8 - 5.3 10e3/uL    Absolute Monocytes 0.6 0.0 - 1.3 10e3/uL    Absolute Eosinophils 0.3 0.0 - 0.7 10e3/uL    Absolute Basophils 0.1 0.0 - 0.2 10e3/uL    Absolute Immature Granulocytes 0.0 <=0.4 10e3/uL       X-Ray was done, my findings are: moderate stool burden    ASSESSMENT:      ICD-10-CM    1. Elevated blood pressure reading  R03.0       2. Candidiasis of vagina  B37.31 fluconazole (DIFLUCAN) 150 MG tablet      3. Abdominal pain, generalized  R10.84 CBC with platelets and differential     Comprehensive metabolic panel (BMP + Alb, Alk Phos, ALT, AST, Total. Bili, TP)     XR Abdomen 2 Views     Adult GI  Referral - Consult Only     CBC with platelets and differential     Comprehensive metabolic panel (BMP + Alb, Alk Phos, ALT, AST, Total. Bili, TP)      4. Constipation, unspecified constipation type  K59.00 Adult GI  Referral - Consult Only     bisacodyl (DULCOLAX) 5 MG EC tablet      5. Hematuria, unspecified type  R31.9       6. Hematochezia  K92.1 Adult GI  Referral - Consult Only           Medical Decision Making:    Differential Diagnosis:  Abdominal Pain: Constipation and Non Specific  Gastro: colitis, CA    Serious Comorbid  Conditions:  Adult:   reviewed    PLAN:    Referral to gastroenterologist.  Rx for dulcolax, drink plenty of water.  CMP pending.  Will call with abnormal results.    Rx for diflucan.  Patient to follow up with urologist.  Discussed reasons to seek immediate medical attention.  Additionally if no improvement or worsening in one week, may follow up with PCP and/or UC.      Follow up with your PCP.        Followup:    If not improving or if condition worsens, follow up with your Primary Care Provider, If not improving or if conditions worsens over the next 12-24 hours, go to the Emergency Department    There are no Patient Instructions on file for this visit.

## 2023-11-09 NOTE — PROGRESS NOTES
SUBJECTIVE:                                                   Graciela Castorena is a 58 year old female who presents to clinic today for the following health issue(s):  Patient presents with:  Vaginal Problem: Pap only      Additional information: pap only    HPI:  Patient is just wanting a pap today.  She has a  history of ASCUS and colpo in  with Dr. Hernandez.  Last pap was NIL, but states with her history of State 0 breast cancer her daughter wanted her to come in for a pap.  She is postmenopausal, denies any bleeding.      No LMP recorded. Patient is postmenopausal..     Patient is sexually active, .  Using menopause for contraception.    reports that she has never smoked. She has never used smokeless tobacco.  STD testing offered?  Declined    Health maintenance updated:  yes    Today's PHQ-2 Score:       2023     3:02 PM   PHQ-2 (  [employer])   Q1: Little interest or pleasure in doing things 0   Q2: Feeling down, depressed or hopeless 0   PHQ-2 Score 0   Q1: Little interest or pleasure in doing things Not at all   Q2: Feeling down, depressed or hopeless Not at all   PHQ-2 Score 0     Today's PHQ-9 Score:        No data to display              Today's TOMMY-7 Score:        No data to display                Problem list and histories reviewed & adjusted, as indicated.  Additional history: as documented.    Patient Active Problem List   Diagnosis    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 breast    ASCUS of cervix with negative high risk HPV    Latent tuberculosis    Environmental allergies    Cholelithiasis    Hepatic steatosis    Prediabetes    CHF (congestive heart failure) (H)     Past Surgical History:   Procedure Laterality Date    COLONOSCOPY N/A 2018    Procedure: COLONOSCOPY;  colonoscopy;  Surgeon: Sabas Villegas MD;  Location:  GI    LUMPECTOMY BREAST WITH SEED LOCALIZATION Left 2018    Procedure: LUMPECTOMY BREAST WITH SEED LOCALIZATION;  SEED LOCALIZED LEFT BREAST  "LUMPECTOMY ;  Surgeon: Meli Rodriguez MD;  Location: [facility]      Social History     Tobacco Use    Smoking status: Never    Smokeless tobacco: Never   Substance Use Topics    Alcohol use: No     Alcohol/week: 0.0 standard drinks of alcohol      Problem (# of Occurrences) Relation (Name,Age of Onset)    Diabetes Type 2  (2) Brother, Sister           Negative family history of: Myocardial Infarction, Cerebrovascular Disease, Coronary Artery Disease Early Onset, Colon Cancer, Breast Cancer, Ovarian Cancer              Current Outpatient Medications   Medication Sig    diclofenac (VOLTAREN) 1 % topical gel Apply 1 g topically 4 times daily    diclofenac (VOLTAREN) 1 % topical gel Apply 4 g topically 4 times daily    estradiol (ESTRACE) 0.1 MG/GM vaginal cream Place 1 g vaginally three times a week    famotidine (PEPCID) 20 MG tablet Take 1 tablet (20 mg) by mouth 2 times daily as needed (heart burn; dyspepsia)    methocarbamol (ROBAXIN) 500 MG tablet Take 1 tablet (500 mg) by mouth 3 times daily as needed for muscle spasms    omeprazole (PRILOSEC) 20 MG DR capsule TAKE 1 CAPSULE BY MOUTH EVERY DAY    pantoprazole (PROTONIX) 40 MG EC tablet TAKE 1 TABLET BY MOUTH DAILY 30 MINUTES BEFORE BREAKFAST    polyethylene glycol (MIRALAX) 17 GM/Dose powder Take 17 g (1 capful) by mouth daily     No current facility-administered medications for this visit.     Allergies   Allergen Reactions    Ciprofloxacin Itching       ROS:  12 point review of systems negative other than symptoms noted below or in the HPI.  No urinary frequency or dysuria, bladder or kidney problems      OBJECTIVE:     BP (!) 140/84   Ht 1.607 m (5' 3.25\")   Wt 75.1 kg (165 lb 9.6 oz)   BMI 29.10 kg/m    Body mass index is 29.1 kg/m .    Exam:  Constitutional:  Appearance: Well nourished, well developed alert, in no acute distress  Neurologic:  Mental Status:  Oriented X3.  Normal strength and tone, sensory exam grossly normal, mentation intact and speech " normal.    Psychiatric:  Mentation appears normal and affect normal/bright.  Pelvic Exam:  External Genitalia:     Normal appearance for age, no discharge present, no tenderness present, no inflammatory lesions present, color normal  Vagina:     Normal vaginal vault without central or paravaginal defects, no discharge present, no inflammatory lesions present, no masses present  Bladder:     Nontender to palpation  Urethra:   Urethral Body:  Urethra palpation normal, urethra structural support normal   Urethral Meatus:  No erythema or lesions present  Cervix:     Appearance healthy, no lesions present, nontender to palpation, no bleeding present  pap done    Perineum:     Perineum within normal limits, no evidence of trauma, no rashes or skin lesions present  Anus:     Anus within normal limits, no hemorrhoids present  Inguinal Lymph Nodes:     No lymphadenopathy present  Pubic Hair:     Normal pubic hair distribution for age  Genitalia and Groin:     No rashes present, no lesions present, no areas of discoloration, no masses present   Patient declined the pelvic exam portion    In-Clinic Test Results:  No results found for this or any previous visit (from the past 24 hour(s)).    ASSESSMENT/PLAN:                                                        ICD-10-CM    1. Screening for cervical cancer  Z12.4 Pap screen with HPV - recommended age 30 - 65 years            Pap Done.  Return prn or 1 year for annual.    CHRIS Crouch CNP  [facility]

## 2023-11-30 ENCOUNTER — OFFICE VISIT (OUTPATIENT)
Dept: URGENT CARE | Facility: URGENT CARE | Age: 58
End: 2023-11-30
Payer: COMMERCIAL

## 2023-11-30 VITALS
TEMPERATURE: 98.3 F | OXYGEN SATURATION: 100 % | HEART RATE: 91 BPM | DIASTOLIC BLOOD PRESSURE: 89 MMHG | WEIGHT: 165 LBS | BODY MASS INDEX: 29 KG/M2 | SYSTOLIC BLOOD PRESSURE: 144 MMHG

## 2023-11-30 DIAGNOSIS — R06.02 SOB (SHORTNESS OF BREATH): ICD-10-CM

## 2023-11-30 DIAGNOSIS — Z86.79 HISTORY OF HEART FAILURE: ICD-10-CM

## 2023-11-30 DIAGNOSIS — I10 BENIGN ESSENTIAL HYPERTENSION: ICD-10-CM

## 2023-11-30 DIAGNOSIS — R07.9 CHEST PAIN, UNSPECIFIED TYPE: Primary | ICD-10-CM

## 2023-11-30 PROCEDURE — 93000 ELECTROCARDIOGRAM COMPLETE: CPT | Performed by: FAMILY MEDICINE

## 2023-11-30 PROCEDURE — 99214 OFFICE O/P EST MOD 30 MIN: CPT | Mod: 25 | Performed by: FAMILY MEDICINE

## 2023-11-30 NOTE — PROGRESS NOTES
SUBJECTIVE: Graciela Castorena is a 58 year old female presenting with a chief complaint of CP/SOB.  Onset of symptoms was 2 week(s) ago.  Course of illness is waxing and waning.    Severity moderate  Current and Associated symptoms: none  Predisposing factors include see health hx.    Past Medical History:   Diagnosis Date    Abnormal Pap smear of cervix 03/30/2015    03/30/15, 10/20/21    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 breast 2018    s/p lumpectomy and radiation; on Tamoxifen    Palpitations     Tuberculosis      Allergies   Allergen Reactions    Ciprofloxacin Itching     Social History     Tobacco Use    Smoking status: Never     Passive exposure: Current (incense)    Smokeless tobacco: Never   Substance Use Topics    Alcohol use: No     Alcohol/week: 0.0 standard drinks of alcohol       ROS:  SKIN: no rash  GI: no vomiting    OBJECTIVE:  BP (!) 144/89 (BP Location: Left arm, Patient Position: Sitting, Cuff Size: Adult Large)   Pulse 91   Temp 98.3  F (36.8  C) (Oral)   Wt 74.8 kg (165 lb)   SpO2 100%   Breastfeeding No   BMI 29.00 kg/m  GENERAL APPEARANCE: healthy, alert and no distress  EYES: EOMI,  PERRL, conjunctiva clear  HENT: ear canals and TM's normal.  Nose and mouth without ulcers, erythema or lesions  RESP: lungs clear to auscultation - no rales, rhonchi or wheezes  CV: regular rates and rhythm, normal S1 S2, no murmur noted  SKIN: no suspicious lesions or rashes    EKG NSR without acute st changes      ICD-10-CM    1. Chest pain, unspecified type  R07.9 EKG 12-lead complete w/read - Clinics      2. SOB (shortness of breath)  R06.02       3. Benign essential hypertension  I10 EKG 12-lead complete w/read - Clinics      4. History of heart failure  Z86.79         Pt will go through ED for CP/SOB w/u

## 2023-12-08 ENCOUNTER — OFFICE VISIT (OUTPATIENT)
Dept: URGENT CARE | Facility: URGENT CARE | Age: 58
End: 2023-12-08
Payer: COMMERCIAL

## 2023-12-08 VITALS
OXYGEN SATURATION: 99 % | DIASTOLIC BLOOD PRESSURE: 80 MMHG | BODY MASS INDEX: 29 KG/M2 | WEIGHT: 165 LBS | TEMPERATURE: 97.4 F | SYSTOLIC BLOOD PRESSURE: 142 MMHG | HEART RATE: 93 BPM | RESPIRATION RATE: 16 BRPM

## 2023-12-08 DIAGNOSIS — H66.002 ACUTE SUPPURATIVE OTITIS MEDIA OF LEFT EAR WITHOUT SPONTANEOUS RUPTURE OF TYMPANIC MEMBRANE, RECURRENCE NOT SPECIFIED: Primary | ICD-10-CM

## 2023-12-08 PROCEDURE — 99213 OFFICE O/P EST LOW 20 MIN: CPT | Performed by: NURSE PRACTITIONER

## 2023-12-08 RX ORDER — AMOXICILLIN 500 MG/1
500 CAPSULE ORAL 2 TIMES DAILY
Qty: 14 CAPSULE | Refills: 0 | Status: SHIPPED | OUTPATIENT
Start: 2023-12-08 | End: 2023-12-15

## 2023-12-08 NOTE — PROGRESS NOTES
Assessment & Plan     Acute suppurative otitis media of left ear without spontaneous rupture of tympanic membrane, recurrence not specified  - amoxicillin (AMOXIL) 500 MG capsule  Dispense: 14 capsule; Refill: 0     Patient Instructions     Amox for otitis.     Push fluids  Lots of handwashing.   Ibuprofen as needed for fever or pain  Delsymor dayquil/nyquil for cough as needed     Rest as able.   F/u in the clinic if symptoms persist or worsen.        Return in about 1 week (around 12/15/2023) for with regular provider if symptoms persist.    Malinda Tijerina, CHRIS CNP  [facility] FERMIN Owens is a 58 year old female who presents to clinic today for the following health issues:  Chief Complaint   Patient presents with    Otalgia     L ear pain and bump on top of the head that is bothering her      HPI    URI Adult    Onset of symptoms was 1 week(s) ago.  Course of illness is same.    Severity moderate  Current and Associated symptoms: runny nose and ear pain left  Treatment measures tried include Tylenol/Ibuprofen.  Predisposing factors include None.      Review of Systems  Constitutional, HEENT, cardiovascular, pulmonary, GI, , musculoskeletal, neuro, skin, endocrine and psych systems are negative, except as otherwise noted.      Objective    BP (!) 142/80   Pulse 93   Temp 97.4  F (36.3  C) (Tympanic)   Resp 16   Wt 74.8 kg (165 lb)   SpO2 99%   BMI 29.00 kg/m    Physical Exam   GENERAL: healthy, alert and no distress  EYES: Eyes grossly normal to inspection, PERRL and conjunctivae and sclerae normal  HENT: normal cephalic/atraumatic, right ear: normal: no effusions, no erythema, normal landmarks, left ear: TM immobile on insufflation, bulging membrane, and mucopurulent effusion, nose and mouth without ulcers or lesions, oropharynx clear, and oral mucous membranes moist  NECK: no adenopathy, no asymmetry, masses, or scars and thyroid normal to palpation  RESP: lungs  clear to auscultation - no rales, rhonchi or wheezes  CV: regular rate and rhythm, normal S1 S2, no S3 or S4, no murmur, click or rub, no peripheral edema and peripheral pulses strong  MS: no gross musculoskeletal defects noted, no edema

## 2023-12-08 NOTE — PATIENT INSTRUCTIONS
Amox for otitis.     Push fluids  Lots of handwashing.   Ibuprofen as needed for fever or pain  Delsymor dayquil/nyquil for cough as needed     Rest as able.   F/u in the clinic if symptoms persist or worsen.

## 2023-12-10 ENCOUNTER — HEALTH MAINTENANCE LETTER (OUTPATIENT)
Age: 58
End: 2023-12-10

## 2023-12-21 DIAGNOSIS — R10.13 DYSPEPSIA: ICD-10-CM

## 2024-01-04 ENCOUNTER — ONCOLOGY VISIT (OUTPATIENT)
Dept: ONCOLOGY | Facility: CLINIC | Age: 59
End: 2024-01-04
Attending: INTERNAL MEDICINE
Payer: COMMERCIAL

## 2024-01-04 VITALS
OXYGEN SATURATION: 99 % | BODY MASS INDEX: 29.14 KG/M2 | TEMPERATURE: 97.9 F | HEART RATE: 79 BPM | DIASTOLIC BLOOD PRESSURE: 85 MMHG | WEIGHT: 165.8 LBS | SYSTOLIC BLOOD PRESSURE: 139 MMHG | RESPIRATION RATE: 14 BRPM

## 2024-01-04 DIAGNOSIS — K21.9 GASTROESOPHAGEAL REFLUX DISEASE WITHOUT ESOPHAGITIS: ICD-10-CM

## 2024-01-04 DIAGNOSIS — K59.00 CONSTIPATION, UNSPECIFIED CONSTIPATION TYPE: ICD-10-CM

## 2024-01-04 DIAGNOSIS — K64.4 EXTERNAL HEMORRHOIDS: ICD-10-CM

## 2024-01-04 DIAGNOSIS — Z12.31 ENCOUNTER FOR SCREENING MAMMOGRAM FOR MALIGNANT NEOPLASM OF BREAST: ICD-10-CM

## 2024-01-04 DIAGNOSIS — D05.12 DUCTAL CARCINOMA IN SITU (DCIS) OF LEFT BREAST: Primary | ICD-10-CM

## 2024-01-04 PROCEDURE — 99214 OFFICE O/P EST MOD 30 MIN: CPT | Performed by: INTERNAL MEDICINE

## 2024-01-04 PROCEDURE — G0463 HOSPITAL OUTPT CLINIC VISIT: HCPCS | Performed by: INTERNAL MEDICINE

## 2024-01-04 RX ORDER — HYDROCORTISONE 25 MG/G
CREAM TOPICAL 2 TIMES DAILY PRN
Qty: 30 G | Refills: 3 | Status: SHIPPED | OUTPATIENT
Start: 2024-01-04

## 2024-01-04 RX ORDER — PANTOPRAZOLE SODIUM 40 MG/1
40 TABLET, DELAYED RELEASE ORAL DAILY
Qty: 90 TABLET | Refills: 3 | Status: SHIPPED | OUTPATIENT
Start: 2024-01-04

## 2024-01-04 RX ORDER — POLYETHYLENE GLYCOL 3350 17 G/17G
17 POWDER, FOR SOLUTION ORAL DAILY
Qty: 507 G | Refills: 3 | Status: SHIPPED | OUTPATIENT
Start: 2024-01-04 | End: 2024-03-18

## 2024-01-04 RX ORDER — BISACODYL 5 MG/1
5 TABLET, DELAYED RELEASE ORAL DAILY PRN
Qty: 30 TABLET | Refills: 4 | Status: SHIPPED | OUTPATIENT
Start: 2024-01-04 | End: 2024-03-18

## 2024-01-04 NOTE — PROGRESS NOTES
"Oncology Rooming Note    January 4, 2024 9:18 AM   Graciela Castorena is a 59 year old female who presents for:    Chief Complaint   Patient presents with    Oncology Clinic Visit     Initial Vitals: /85   Pulse 79   Temp 97.9  F (36.6  C) (Oral)   Resp 14   Wt 75.2 kg (165 lb 12.8 oz)   SpO2 99%   BMI 29.14 kg/m   Estimated body mass index is 29.14 kg/m  as calculated from the following:    Height as of 11/9/23: 1.607 m (5' 3.25\").    Weight as of this encounter: 75.2 kg (165 lb 12.8 oz). Body surface area is 1.83 meters squared.  Data Unavailable Comment: Data Unavailable   No LMP recorded. Patient is postmenopausal.  Allergies reviewed: Yes  Medications reviewed: Yes    Medications: MEDICATION REFILLS NEEDED TODAY. Provider was notified.  Pharmacy name entered into [facility]: [id number] [place], [place] - [address]    Frailty Screening:   Is the patient here for a new oncology consult visit in cancer care? 2. No      Clinical concerns: Lump on left hand  Dr. Barahona  was notified.      Shari J. Schoenberger, [facility]            "

## 2024-01-04 NOTE — PROGRESS NOTES
ONCOLOGY HISTORY: Mrs. 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      1.  Screening mammogram on 01/16/2018 revealed a new calcification cluster behind the left nipple.   -Left diagnostic mammogram on 03/12/2018 revealed an indeterminate cluster of microcalcification in retroareolar portion of left breast at 11:00.      2. Stereotactic left breast biopsy on 03/13/2018 reveals intermediate grade DCIS with microcalcification and comedonecrosis.  % positive and  NH 25% positive.      3.  Patient underwent left breast lumpectomy on 04/18/2018.  Pathology reveals grade 2 DCIS measuring 3 mm.  Margins are negative.     4. Radiation to left breast. 5056 cGy between 05/31/2018 and 06/27/2018.      5. Tamoxifen started in May, 2018. Stopped 0n 12/29/2020 due to vaginal bleeding.  -Anastrazole started on 01/12/2021.  -Anastrazole stopped 07/14/2021 due to aches and pain.     SUBJECTIVE:  Mrs. Castorena is a 59-year-old female with left breast DCIS s/p lumpectomy and radiation in 2018. She was on tamoxifen and anastrozole. It was discontinued because of side effects.  Patient has been doing well without any evidence of recurrence.  Mammogram in August 2023 is benign.    Patient has multiple different symptoms.  She has generalized weakness.  She also gets generalized intermittent aches and pain.  Over-the-counter pain medication helps.  Patient gets reflux symptoms.  She gets heartburn.  She gets intermittent headache.  She gets intermittent dizziness.  No chest pain.  No shortness of breath at rest.  Some nausea.  No recurrent vomiting.  She gets intermittent abdominal pain.  No urinary complaint.  She gets constipation.  She has been having hemorrhoidal bleed on and off.  No fever or night sweats.  She wants me to check out lump on her left hand.  Denies any trauma to the hand.     PHYSICAL EXAMINATION:   GENERAL:  Alert and oriented x 3.   VITAL SIGNS:  Reviewed.     EYES:  No icterus.   NECK:  Supple. No  lymphadenopathy. No thyromegaly.   AXILLAE:  No lymphadenopathy.   LUNGS:  Good air entry bilaterally.  No crackles or wheezing.   HEART:  Regular.  No murmur.   GI:  Abdomen is soft.  Nontender. No mass.   EXTREMITIES:  No pedal edema.  No calf swelling or tenderness.   -Left hand reveals small ganglion cyst.  SKIN:  No rash.   BREASTS:  Examined in the presence of ALANA Jiang.   -Right breast exam is normal.  No mass. No axillary lymphadenopathy.   -Left breast exam reveals post-surgical changes.  No mass. No axillary lymphadenopathy.     LABS: CBC and CMP on 11/09/2023 is normal.     ASSESSMENT:    1.  A 59-year-old female with left breast ductal carcinoma in situ diagnosed in 2018.  No evidence of recurrence.  2.  Aches and pain.  3.  GERD.  4.  Hemorrhoidal bleed.  5.  Left hand ganglion cyst.     PLAN:  Continue protonix.  See PCP.  Follow-up in 1 year (she can just follow-up with her PCP).  Mammogram in August or September 2024.  Avoid constipation.  Take MiraLAX as needed.  Use Anusol cream as needed  See orthopedics if ganglion cyst causes problem.    DISCUSSION:  1.  Discussed regarding DCIS.  Explained to her there is no evidence of recurrence.  Breast exam is normal.  Last mammogram is benign.    DCIS was diagnosed more than 5 years ago.  Risk of recurrence is very low.    Explained to the patient that she can just follow-up with her PCP.  She needs yearly physical with breast exam and yearly mammogram.  Patient is worried about breast cancer coming back.  She wants to continue to follow-up in oncology clinic.    2.  Patient has constipation.  She gets hemorrhoidal bleed.  Advised her to take MiraLAX and Dulcolax as needed.  Anusol cream prescribed.    3.  Patient has reflux symptoms.  Prescription of Protonix sent.  Dietary advice given.    4.  She has left hand ganglion cyst.  Explained to the patient that if it bothers her, she can see orthopedics.    5.  She has few other symptoms.  Advised her  to discuss all this with her PCP.  She has upcoming appointment next month with her PCP.    6.  She had few questions which were all answered.  She will follow-up in 1 year.  Alternatively, she can just follow-up with her PCP.     Total visit time of 30 minutes.  Time is spent in today's visit, review of chart/investigations today and documentation today.

## 2024-01-04 NOTE — PATIENT INSTRUCTIONS
Continue protonix.  See PCP.  Follow-up in 1 year (she can just follow-up with her PCP).  Mammogram in August or September 2024.

## 2024-01-05 ENCOUNTER — HOSPITAL ENCOUNTER (EMERGENCY)
Facility: CLINIC | Age: 59
Discharge: HOME | End: 2024-01-06
Attending: EMERGENCY MEDICINE | Admitting: EMERGENCY MEDICINE
Payer: COMMERCIAL

## 2024-01-05 DIAGNOSIS — R07.9 CHEST PAIN, UNSPECIFIED TYPE: ICD-10-CM

## 2024-01-05 DIAGNOSIS — I50.9 CHF (CONGESTIVE HEART FAILURE) (H): ICD-10-CM

## 2024-01-05 DIAGNOSIS — I10 HYPERTENSION, UNSPECIFIED TYPE: ICD-10-CM

## 2024-01-05 LAB
ANION GAP SERPL CALCULATED.3IONS-SCNC: 8 MMOL/L (ref 7–15)
BASOPHILS # BLD AUTO: 0.1 10E3/UL (ref 0–0.2)
BASOPHILS NFR BLD AUTO: 1 %
BUN SERPL-MCNC: 14.6 MG/DL (ref 6–20)
CALCIUM SERPL-MCNC: 9.8 MG/DL (ref 8.6–10)
CHLORIDE SERPL-SCNC: 104 MMOL/L (ref 98–107)
CREAT SERPL-MCNC: 0.68 MG/DL (ref 0.51–0.95)
DEPRECATED HCO3 PLAS-SCNC: 28 MMOL/L (ref 22–29)
EGFRCR SERPLBLD CKD-EPI 2021: >90 ML/MIN/1.73M2
EOSINOPHIL # BLD AUTO: 0.4 10E3/UL (ref 0–0.7)
EOSINOPHIL NFR BLD AUTO: 4 %
ERYTHROCYTE [DISTWIDTH] IN BLOOD BY AUTOMATED COUNT: 12.7 % (ref 10–15)
GLUCOSE SERPL-MCNC: 112 MG/DL (ref 70–99)
HCT VFR BLD AUTO: 42.9 % (ref 35–47)
HGB BLD-MCNC: 14.3 G/DL (ref 11.7–15.7)
IMM GRANULOCYTES # BLD: 0 10E3/UL
IMM GRANULOCYTES NFR BLD: 0 %
LYMPHOCYTES # BLD AUTO: 3 10E3/UL (ref 0.8–5.3)
LYMPHOCYTES NFR BLD AUTO: 35 %
MCH RBC QN AUTO: 29.5 PG (ref 26.5–33)
MCHC RBC AUTO-ENTMCNC: 33.3 G/DL (ref 31.5–36.5)
MCV RBC AUTO: 89 FL (ref 78–100)
MONOCYTES # BLD AUTO: 0.6 10E3/UL (ref 0–1.3)
MONOCYTES NFR BLD AUTO: 7 %
NEUTROPHILS # BLD AUTO: 4.4 10E3/UL (ref 1.6–8.3)
NEUTROPHILS NFR BLD AUTO: 53 %
NRBC # BLD AUTO: 0 10E3/UL
NRBC BLD AUTO-RTO: 0 /100
PLATELET # BLD AUTO: 243 10E3/UL (ref 150–450)
POTASSIUM SERPL-SCNC: 5.3 MMOL/L (ref 3.4–5.3)
RBC # BLD AUTO: 4.84 10E6/UL (ref 3.8–5.2)
SODIUM SERPL-SCNC: 140 MMOL/L (ref 135–145)
WBC # BLD AUTO: 8.4 10E3/UL (ref 4–11)

## 2024-01-05 PROCEDURE — 80048 BASIC METABOLIC PNL TOTAL CA: CPT | Performed by: EMERGENCY MEDICINE

## 2024-01-05 PROCEDURE — 36415 COLL VENOUS BLD VENIPUNCTURE: CPT | Performed by: EMERGENCY MEDICINE

## 2024-01-05 PROCEDURE — 93005 ELECTROCARDIOGRAM TRACING: CPT

## 2024-01-05 PROCEDURE — 85025 COMPLETE CBC W/AUTO DIFF WBC: CPT | Performed by: EMERGENCY MEDICINE

## 2024-01-05 PROCEDURE — 99285 EMERGENCY DEPT VISIT HI MDM: CPT | Mod: 25

## 2024-01-06 ENCOUNTER — APPOINTMENT (OUTPATIENT)
Dept: GENERAL RADIOLOGY | Facility: CLINIC | Age: 59
End: 2024-01-06
Attending: EMERGENCY MEDICINE
Payer: COMMERCIAL

## 2024-01-06 VITALS
DIASTOLIC BLOOD PRESSURE: 77 MMHG | OXYGEN SATURATION: 100 % | RESPIRATION RATE: 18 BRPM | HEART RATE: 68 BPM | SYSTOLIC BLOOD PRESSURE: 135 MMHG | TEMPERATURE: 98.2 F

## 2024-01-06 LAB
ATRIAL RATE - MUSE: 65 BPM
DIASTOLIC BLOOD PRESSURE - MUSE: NORMAL MMHG
INTERPRETATION ECG - MUSE: NORMAL
P AXIS - MUSE: 58 DEGREES
PR INTERVAL - MUSE: 184 MS
QRS DURATION - MUSE: 102 MS
QT - MUSE: 432 MS
QTC - MUSE: 449 MS
R AXIS - MUSE: -24 DEGREES
SYSTOLIC BLOOD PRESSURE - MUSE: NORMAL MMHG
T AXIS - MUSE: 44 DEGREES
TROPONIN T SERPL HS-MCNC: <6 NG/L
VENTRICULAR RATE- MUSE: 65 BPM

## 2024-01-06 PROCEDURE — 84484 ASSAY OF TROPONIN QUANT: CPT | Performed by: EMERGENCY MEDICINE

## 2024-01-06 PROCEDURE — 250N000013 HC RX MED GY IP 250 OP 250 PS 637: Performed by: EMERGENCY MEDICINE

## 2024-01-06 PROCEDURE — 71046 X-RAY EXAM CHEST 2 VIEWS: CPT

## 2024-01-06 PROCEDURE — 36415 COLL VENOUS BLD VENIPUNCTURE: CPT | Performed by: EMERGENCY MEDICINE

## 2024-01-06 PROCEDURE — 80061 LIPID PANEL: CPT | Performed by: INTERNAL MEDICINE

## 2024-01-06 RX ORDER — ACETAMINOPHEN 500 MG
1000 TABLET ORAL ONCE
Status: COMPLETED | OUTPATIENT
Start: 2024-01-06 | End: 2024-01-06

## 2024-01-06 RX ADMIN — ACETAMINOPHEN 1000 MG: 500 TABLET, FILM COATED ORAL at 01:14

## 2024-01-06 ASSESSMENT — ACTIVITIES OF DAILY LIVING (ADL)
ADLS_ACUITY_SCORE: 33
ADLS_ACUITY_SCORE: 35

## 2024-01-06 NOTE — ED PROVIDER NOTES
History     Chief Complaint:  Hypertension       HPI   Graciela Castorena is a 59 year old female who presents to the emergency department complaining of high blood pressure.  She noted that she had a headache which made her check her blood pressure.  It was quite high at home and her daughter is taking pictures of several of the readings which I reviewed on her phone.  In the emergency department they are significantly lower and her hypertension has resolved and she no longer has any symptoms.  Her daughter reports that even though her blood pressure was getting better they brought her to the emergency department and chose to wait here in hopes not of starting a medication but rather to get a referral to be seen by their doctor as she understands that antihypertensives are often not initiated in the emergency department due to lack of follow-up.  Her mother currently has no complaints and but for her headache earlier she would not have known that her blood pressure was high.      Independent Historian:    Daughter - They report much of hte history above    Review of External Notes:  Reviewed most recent oncology notes    Medications:    bisacodyl (DULCOLAX) 5 MG EC tablet  estradiol (ESTRACE) 0.1 MG/GM vaginal cream  hydrocortisone, Perianal, (HYDROCORTISONE) 2.5 % cream  pantoprazole (PROTONIX) 40 MG EC tablet  polyethylene glycol (MIRALAX) 17 GM/Dose powder        Past Medical History:    Past Medical History:   Diagnosis Date    Abnormal Pap smear of cervix 03/30/2015    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 breast 2018    Palpitations     Tuberculosis        Past Surgical History:    Past Surgical History:   Procedure Laterality Date    COLONOSCOPY N/A 2/12/2018    Procedure: COLONOSCOPY;  colonoscopy;  Surgeon: Sabas Villegas MD;  Location:  GI    LUMPECTOMY BREAST WITH SEED LOCALIZATION Left 4/18/2018    Procedure: LUMPECTOMY BREAST WITH SEED LOCALIZATION;  SEED LOCALIZED LEFT BREAST LUMPECTOMY ;   Surgeon: Meli Rodriguez MD;  Location[facility]          Physical Exam   Patient Vitals for the past 24 hrs:   BP Temp Temp src Pulse Resp SpO2   01/06/24 0403 135/77 -- -- 68 -- 100 %   01/06/24 0300 135/77 -- -- 68 -- 100 %   01/06/24 0230 139/84 -- -- 79 -- 100 %   01/06/24 0200 138/85 -- -- 69 -- 99 %   01/06/24 0135 (!) 171/94 -- -- -- -- 99 %   01/06/24 0100 (!) 151/111 -- -- 61 -- --   01/05/24 2222 (!) 155/75 98.2  F (36.8  C) Oral 72 18 99 %        Physical Exam  General: Resting on the gurney, appears comfortable  Head:  The scalp, face, and head appear normal  Mouth/Throat: Mucus membranes are moist  CV:  Regular rate    Normal S1 and S2  No pathological murmur   No LE edema  Resp:  Breath sounds clear and equal bilaterally    Non-labored, no retractions or accessory muscle use    No coarseness    No wheezing   GI:  Abdomen is soft, no rigidity    No tenderness to palpation  MS:  Normal motor assessment of all extremities.    Good capillary refill noted.  Skin:  No rash or lesions noted.  Neuro:   Speech is normal and fluent. No apparent deficit.  Psych: Awake. Alert.  Normal affect.      Appropriate interactions.      Emergency Department Course   ECG  NSR, non specific t wave changes  Rate 65 bpm. TN interval 184 ms. QRS duration 102 ms. QT/QTc 432/449 ms.     Imaging:  XR Chest 2 Views   Final Result   IMPRESSION: No focal pulmonary consolidation or pleural effusion. Upper limits normal heart size without pulmonary vascular congestion. No pneumothorax. Degenerative changes of the thoracic spine. No acute osseous abnormality.        Report per radiology    Laboratory:  Labs Ordered and Resulted from Time of ED Arrival to Time of ED Departure   BASIC METABOLIC PANEL - Abnormal       Result Value    Sodium 140      Potassium 5.3      Chloride 104      Carbon Dioxide (CO2) 28      Anion Gap 8      Urea Nitrogen 14.6      Creatinine 0.68      GFR Estimate >90      Calcium 9.8      Glucose 112 (*)     TROPONIN T, HIGH SENSITIVITY - Normal    Troponin T, High Sensitivity <6     CBC WITH PLATELETS AND DIFFERENTIAL    WBC Count 8.4      RBC Count 4.84      Hemoglobin 14.3      Hematocrit 42.9      MCV 89      MCH 29.5      MCHC 33.3      RDW 12.7      Platelet Count 243      % Neutrophils 53      % Lymphocytes 35      % Monocytes 7      % Eosinophils 4      % Basophils 1      % Immature Granulocytes 0      NRBCs per 100 WBC 0      Absolute Neutrophils 4.4      Absolute Lymphocytes 3.0      Absolute Monocytes 0.6      Absolute Eosinophils 0.4      Absolute Basophils 0.1      Absolute Immature Granulocytes 0.0      Absolute NRBCs 0.0          Procedures       Emergency Department Course & Assessments:  Interventions:  Medications   acetaminophen (TYLENOL) tablet 1,000 mg (1,000 mg Oral $Given 1/6/24 0117)        Social Determinants of Health affecting care:  Healthcare Access/Compliance     Disposition:  The patient was discharged to home.     Impression & Plan        Medical Decision Making:  Graciela Castorena is a 59 year old female who presents for evaluation of elevated blood pressure.  There is a history of hypertension in the past.  The workup here is negative and the patient does not have any clinical, laboratory, ecg or historical signs of end-organ dysfunction.  There is no signs of hypertensive emergency or urgency.  Supportive outpatient management is therefore indicated with close follow-up of primary care physician.  The patient and her daughter presented today because they are concerned that she has not been able to get an appointment with her primary care doctor for over a month.  They present primarily hoping that now that her blood pressure is lowered on its own that they will be able to see her primary care doctor more promptly in follow-up which seems quite reasonable.  Given data obtained here in ED, will not start therapy at this time and encouraged serial blood pressure monitoring at home to aid  primary in decision making regarding hypertension.        Diagnosis:    ICD-10-CM    1. Hypertension, unspecified type  I10 Follow-Up with Cardiology [facility]      2. Chest pain, unspecified type  R07.9 Follow-Up with Cardiology [facility]                      Marimar Read MD  01/06/24 0950

## 2024-01-06 NOTE — ED TRIAGE NOTES
Patient BIBA ambulance with chest pain, headache, and hypertension.  She took her blood pressure at home and systolic pressures were around 200.  EMS gave 324 mg Aspirin.     Triage Assessment (Adult)       Row Name 01/05/24 2222          Triage Assessment    Airway WDL WDL        Respiratory WDL    Respiratory WDL WDL        Skin Circulation/Temperature WDL    Skin Circulation/Temperature WDL WDL        Cardiac WDL    Cardiac WDL X;chest pain     Cardiac Rhythm NSR        Peripheral/Neurovascular WDL    Peripheral Neurovascular WDL WDL        Cognitive/Neuro/Behavioral WDL    Cognitive/Neuro/Behavioral WDL WDL

## 2024-01-06 NOTE — ED TRIAGE NOTES
Patient arrived via ems. She is  here  with elevated blood pressure of 160/80. She refuses ASA, nitroglycerine and an IV . Patient was also c/o chest pain

## 2024-01-08 ENCOUNTER — OFFICE VISIT (OUTPATIENT)
Dept: FAMILY MEDICINE | Facility: CLINIC | Age: 59
End: 2024-01-08
Payer: COMMERCIAL

## 2024-01-08 VITALS
TEMPERATURE: 98 F | WEIGHT: 164 LBS | HEART RATE: 81 BPM | RESPIRATION RATE: 18 BRPM | HEIGHT: 63 IN | SYSTOLIC BLOOD PRESSURE: 126 MMHG | BODY MASS INDEX: 29.06 KG/M2 | OXYGEN SATURATION: 96 % | DIASTOLIC BLOOD PRESSURE: 82 MMHG

## 2024-01-08 DIAGNOSIS — Z23 HIGH PRIORITY FOR 2019-NCOV VACCINE: ICD-10-CM

## 2024-01-08 DIAGNOSIS — I10 HYPERTENSION, UNSPECIFIED TYPE: Primary | ICD-10-CM

## 2024-01-08 PROCEDURE — 90480 ADMN SARSCOV2 VAC 1/ONLY CMP: CPT | Performed by: INTERNAL MEDICINE

## 2024-01-08 PROCEDURE — 91320 SARSCV2 VAC 30MCG TRS-SUC IM: CPT | Performed by: INTERNAL MEDICINE

## 2024-01-08 PROCEDURE — 99214 OFFICE O/P EST MOD 30 MIN: CPT | Mod: 25 | Performed by: INTERNAL MEDICINE

## 2024-01-08 RX ORDER — LOSARTAN POTASSIUM 25 MG/1
25 TABLET ORAL DAILY
Qty: 90 TABLET | Refills: 3 | Status: SHIPPED | OUTPATIENT
Start: 2024-01-08

## 2024-01-08 ASSESSMENT — PAIN SCALES - GENERAL: PAINLEVEL: EXTREME PAIN (8)

## 2024-01-08 NOTE — PROGRESS NOTES
Assessment & Plan     Hypertension, unspecified type  I think she has hypertension.  It does seem that her blood pressure readings are labile.  She has had blood pressure readings over 200 systolic in the outpatient setting prior to her ER visit.  The majority of her home blood pressure readings are above goal since emergency department discharge.  That being said, her blood pressure in the clinic is actually fairly good today although this could represent mast hypertension.  I recommended a low-dose of an angiotensin receptor blocker to prevent blood pressure spikes and to control blood pressure and hopefully to prevent headaches.  I also recommended a workup to evaluate for potential secondary causes of high blood pressure including renal artery stenosis, hypothyroidism and pheochromocytoma.  Obtain renal ultrasound when convenient and check labs at follow-up visit since she will need labs after initiating ARB anyway.  Patient's daughters had many excellent questions and I did my best to try to answer them.  - losartan (COZAAR) 25 MG tablet; Take 1 tablet (25 mg) by mouth daily  - US Renal Complete w Arterial Duplex; Future  - Basic metabolic panel  (Ca, Cl, CO2, Creat, Gluc, K, Na, BUN); Future  - TSH; Future  - Metanephrines Plasma Free; Future    She declined my recommendation for a flu shot today but was agreeable to a COVID shot.      No LOS data to display   Time spent by me doing chart review, history and exam, documentation and further activities per the note     MED REC REQUIRED  Post Medication Reconciliation Status: discharge medications reconciled and changed, per note/orders      FUTURE APPOINTMENTS:       -Medical assistant blood pressure check and labs in 1 to 3 weeks (goal blood pressure less than 130/80)    Emiliano Beavers MD  [facility] MURPHY Owens is a 59 year old, presenting for the following health issues:  ER F/U (Bp up to 200 systolic 180 Diastolic , pt has  "been increasing garlic intact with water (concern?), daughters have been tracking bp TID), Headache (8 out 10 , before ER, 2 WKS, comes and goes, with touch, only tylenol  helps somewhat), and Referral (Head scan request,)        1/8/2024     9:50 AM   Additional Questions   Roomed by Wendyboo   Accompanied by 2 daughters       HPI       ED/UC Followup:    Facility:  [facility] Emergency Dept  Date of visit: 1/5/2024 - 1/6/2024 (4 hours)  Reason for visit: high blood pressure.   Current Status: feeling better, tracking bp at home per daughter    Pleasant 59-year-old female presented to the emergency department after a home blood pressure reading was markedly elevated in the setting of a headache.  Please refer to the emergency department notes.  Labs and EKG were normal there.  Blood pressure improved with time in the emergency department.  However, the patient's daughter who is a nursing assistant has been checking her blood pressure regularly since the emergency department discharge and most home blood pressure readings are greater than 140/90.    Review of Systems         Objective    /82 (BP Location: Right arm, Patient Position: Sitting, Cuff Size: Adult Large)   Pulse 81   Temp 98  F (36.7  C) (Oral)   Resp 18   Ht 1.607 m (5' 3.25\")   Wt 74.4 kg (164 lb)   SpO2 96%   BMI 28.82 kg/m    Body mass index is 28.82 kg/m .  Physical Exam   General: This is a well-appearing female in no acute distress.  She is accompanied by 2 daughters.                      " Patent

## 2024-01-09 ENCOUNTER — TELEPHONE (OUTPATIENT)
Dept: FAMILY MEDICINE | Facility: CLINIC | Age: 59
End: 2024-01-09
Payer: COMMERCIAL

## 2024-01-09 LAB
CHOLEST SERPL-MCNC: 180 MG/DL
HDLC SERPL-MCNC: 47 MG/DL
LDLC SERPL CALC-MCNC: 104 MG/DL
NONHDLC SERPL-MCNC: 133 MG/DL
TRIGL SERPL-MCNC: 144 MG/DL

## 2024-01-09 NOTE — TELEPHONE ENCOUNTER
----- Message from Tavares Velasco MD sent at 1/9/2024  8:02 AM CST -----  Patric Owens, reviewed your labs, cholesterol panel is stable from 1 year ago including LDL bad cholesterol, HDL good cholesterol, triglyceride.  Please continue with lifestyle changes healthy diet exercise activities as tolerated and weight loss strongly recommended, recommend low low-fat low-cholesterol diet,  Recommend brisk walking 30 minutes 5 times a week,     I calculated your 10-year atherosclerotic cardiovascular disease risk score as below and is at 5.5% you may elect to start on low-dose cholesterol medication called statin; atorvastatin 20 mg 1 tablet once daily at bedtime if you approve we can send a prescription to pharmacy on record.     Repeat your cholesterol panel fasting in the next 3 to 6 months.     Any further questions please let me know,  Regards,  Dr Velasco     The 10-year ASCVD risk score (Dillon WILD, et al., 2019) is: 5.5%    Values used to calculate the score:      Age: 59 years      Sex: Female      Is Non- : Yes      Diabetic: No      Tobacco smoker: No      Systolic Blood Pressure: 126 mmHg      Is BP treated: Yes      HDL Cholesterol: 47 mg/dL      Total Cholesterol: 180 mg/dL  Written by Tavares Velasco MD on 1/9/2024  8:02 AM CST

## 2024-01-09 NOTE — TELEPHONE ENCOUNTER
Patient Contact    Attempt # 1    Was call answered?  No.  Left message on voicemail with information to call triage back.    Imani Carrasco RN  [facility]

## 2024-01-09 NOTE — RESULT ENCOUNTER NOTE
Patric Owens, reviewed your labs, cholesterol panel is stable from 1 year ago including LDL bad cholesterol, HDL good cholesterol, triglyceride.  Please continue with lifestyle changes healthy diet exercise activities as tolerated and weight loss strongly recommended, recommend low low-fat low-cholesterol diet,  Recommend brisk walking 30 minutes 5 times a week,    I calculated your 10-year atherosclerotic cardiovascular disease risk score as below and is at 5.5% you may elect to start on low-dose cholesterol medication called statin; atorvastatin 20 mg 1 tablet once daily at bedtime if you approve we can send a prescription to pharmacy on record.    Repeat your cholesterol panel fasting in the next 3 to 6 months.    Any further questions please let me know,  Regards,  Dr Velasco    The 10-year ASCVD risk score (Dillon DK, et al., 2019) is: 5.5%    Values used to calculate the score:      Age: 59 years      Sex: Female      Is Non- : Yes      Diabetic: No      Tobacco smoker: No      Systolic Blood Pressure: 126 mmHg      Is BP treated: Yes      HDL Cholesterol: 47 mg/dL      Total Cholesterol: 180 mg/dL  Written by Tavares Velasco MD on 1/9/2024  8:02 AM CST

## 2024-01-10 NOTE — TELEPHONE ENCOUNTER
Relayed providers message to patient. Patient handed the phone to daughter to discuss lab results with. Related message to daughter, daughter verbalized understanding and agreement, has no further questions at this time. Daughter will work on improving patient's diet and will encourage more exercise.

## 2024-01-11 ENCOUNTER — HOSPITAL ENCOUNTER (OUTPATIENT)
Dept: ULTRASOUND IMAGING | Facility: CLINIC | Age: 59
Discharge: HOME | End: 2024-01-11
Attending: INTERNAL MEDICINE | Admitting: INTERNAL MEDICINE
Payer: COMMERCIAL

## 2024-01-11 DIAGNOSIS — I10 HYPERTENSION, UNSPECIFIED TYPE: ICD-10-CM

## 2024-01-11 PROCEDURE — 93975 VASCULAR STUDY: CPT

## 2024-01-11 NOTE — RESULT ENCOUNTER NOTE
The following letter pertains to your most recent diagnostic tests:    Good news! The kidney ultrasound does NOT show any kidney problems to explain the high blood pressure.      Sincerely,    Dr. Beavers

## 2024-01-15 ENCOUNTER — ALLIED HEALTH/NURSE VISIT (OUTPATIENT)
Dept: FAMILY MEDICINE | Facility: CLINIC | Age: 59
End: 2024-01-15
Payer: COMMERCIAL

## 2024-01-15 ENCOUNTER — LAB (OUTPATIENT)
Dept: LAB | Facility: CLINIC | Age: 59
End: 2024-01-15
Payer: COMMERCIAL

## 2024-01-15 VITALS
TEMPERATURE: 96.9 F | DIASTOLIC BLOOD PRESSURE: 79 MMHG | HEART RATE: 68 BPM | HEIGHT: 63 IN | RESPIRATION RATE: 18 BRPM | SYSTOLIC BLOOD PRESSURE: 119 MMHG | BODY MASS INDEX: 29.06 KG/M2 | WEIGHT: 164 LBS | OXYGEN SATURATION: 100 %

## 2024-01-15 DIAGNOSIS — Z01.30 BP CHECK: Primary | ICD-10-CM

## 2024-01-15 DIAGNOSIS — I50.9 CHF (CONGESTIVE HEART FAILURE) (H): Primary | ICD-10-CM

## 2024-01-15 DIAGNOSIS — I10 HYPERTENSION, UNSPECIFIED TYPE: ICD-10-CM

## 2024-01-15 LAB
ANION GAP SERPL CALCULATED.3IONS-SCNC: 10 MMOL/L (ref 7–15)
BUN SERPL-MCNC: 10.3 MG/DL (ref 6–20)
CALCIUM SERPL-MCNC: 9.2 MG/DL (ref 8.6–10)
CHLORIDE SERPL-SCNC: 106 MMOL/L (ref 98–107)
CREAT SERPL-MCNC: 0.79 MG/DL (ref 0.51–0.95)
DEPRECATED HCO3 PLAS-SCNC: 25 MMOL/L (ref 22–29)
EGFRCR SERPLBLD CKD-EPI 2021: 87 ML/MIN/1.73M2
GLUCOSE SERPL-MCNC: 102 MG/DL (ref 70–99)
POTASSIUM SERPL-SCNC: 4.4 MMOL/L (ref 3.4–5.3)
SODIUM SERPL-SCNC: 141 MMOL/L (ref 135–145)
TSH SERPL DL<=0.005 MIU/L-ACNC: 3.15 UIU/ML (ref 0.3–4.2)

## 2024-01-15 PROCEDURE — 80048 BASIC METABOLIC PNL TOTAL CA: CPT

## 2024-01-15 PROCEDURE — 36415 COLL VENOUS BLD VENIPUNCTURE: CPT

## 2024-01-15 PROCEDURE — 83835 ASSAY OF METANEPHRINES: CPT | Mod: 90

## 2024-01-15 PROCEDURE — 99207 PR NO CHARGE NURSE ONLY: CPT

## 2024-01-15 PROCEDURE — 99000 SPECIMEN HANDLING OFFICE-LAB: CPT

## 2024-01-15 PROCEDURE — 84443 ASSAY THYROID STIM HORMONE: CPT

## 2024-01-15 ASSESSMENT — PAIN SCALES - GENERAL: PAINLEVEL: NO PAIN (0)

## 2024-01-15 NOTE — RESULT ENCOUNTER NOTE
The following letter pertains to your most recent diagnostic tests:    The basic metabolic panel blood test suggest that you are tolerating the losartan without any concerns.  Please keep taking that medication as you are.  The metabolic panel also shows normal kidney function and electrolyte levels.    TSH (thyroid stimulating hormone) level is normal which indicates normal circulating thyroid hormone levels.  This indicates that thyroid hormone dysfunction is not the cause for your elevated blood pressure.      Sincerely,    Dr. Beavers

## 2024-01-15 NOTE — PROGRESS NOTES
"Graciela Castorena is a 59 year old patient who comes in today for a Blood Pressure check.  Initial BP:  /79 (BP Location: Left arm, Patient Position: Sitting, Cuff Size: Adult Regular)   Pulse 68   Temp 96.9  F (36.1  C) (Temporal)   Resp 18   Ht 1.607 m (5' 3.25\")   Wt 74.4 kg (164 lb)   SpO2 100%   BMI 28.82 kg/m       68  Disposition: provider notified while patient in the clinic   "

## 2024-01-19 LAB
ANNOTATION COMMENT IMP: NORMAL
METANEPHS SERPL-SCNC: 0.17 NMOL/L
NORMETANEPHRINE SERPL-SCNC: 0.64 NMOL/L

## 2024-01-19 NOTE — RESULT ENCOUNTER NOTE
The following letter pertains to your most recent diagnostic tests:    Good news! The screening test for pheochromocytoma returned negative.        Sincerely,    Dr. Beavers

## 2024-01-23 ENCOUNTER — OFFICE VISIT (OUTPATIENT)
Dept: UROLOGY | Facility: CLINIC | Age: 59
End: 2024-01-23
Payer: COMMERCIAL

## 2024-01-23 VITALS
HEART RATE: 60 BPM | HEIGHT: 63 IN | WEIGHT: 164 LBS | BODY MASS INDEX: 29.06 KG/M2 | SYSTOLIC BLOOD PRESSURE: 138 MMHG | DIASTOLIC BLOOD PRESSURE: 70 MMHG

## 2024-01-23 DIAGNOSIS — N39.46 MIXED INCONTINENCE: ICD-10-CM

## 2024-01-23 DIAGNOSIS — N95.2 VAGINAL ATROPHY: ICD-10-CM

## 2024-01-23 DIAGNOSIS — N81.4 UTEROVAGINAL PROLAPSE: ICD-10-CM

## 2024-01-23 DIAGNOSIS — K59.00 CONSTIPATION, UNSPECIFIED CONSTIPATION TYPE: ICD-10-CM

## 2024-01-23 DIAGNOSIS — R31.0 GROSS HEMATURIA: Primary | ICD-10-CM

## 2024-01-23 LAB
ALBUMIN UR-MCNC: NEGATIVE MG/DL
APPEARANCE UR: CLEAR
BILIRUB UR QL STRIP: NEGATIVE
COLOR UR AUTO: YELLOW
GLUCOSE UR STRIP-MCNC: NEGATIVE MG/DL
HGB UR QL STRIP: ABNORMAL
KETONES UR STRIP-MCNC: NEGATIVE MG/DL
LEUKOCYTE ESTERASE UR QL STRIP: NEGATIVE
NITRATE UR QL: NEGATIVE
PH UR STRIP: 6 [PH] (ref 5–7)
RESIDUAL VOLUME (RV) (EXTERNAL): 3
SP GR UR STRIP: 1.02 (ref 1–1.03)
UROBILINOGEN UR STRIP-ACNC: 0.2 E.U./DL

## 2024-01-23 PROCEDURE — 81003 URINALYSIS AUTO W/O SCOPE: CPT | Mod: QW | Performed by: PHYSICIAN ASSISTANT

## 2024-01-23 PROCEDURE — 88112 CYTOPATH CELL ENHANCE TECH: CPT

## 2024-01-23 PROCEDURE — 51798 US URINE CAPACITY MEASURE: CPT | Performed by: PHYSICIAN ASSISTANT

## 2024-01-23 PROCEDURE — 99204 OFFICE O/P NEW MOD 45 MIN: CPT | Mod: 25 | Performed by: PHYSICIAN ASSISTANT

## 2024-01-23 ASSESSMENT — PAIN SCALES - GENERAL: PAINLEVEL: NO PAIN (0)

## 2024-01-23 NOTE — PROGRESS NOTES
"Urology Clinic      Name: Graciela Castorena    MRN: [id number]   YOB: 1965  Accompanied at today's visit by:daughter over the phone                 Chief Complaint:   Urinary incontinence and hematuria          History of Present Illness:   2024    HISTORY:   Graciela is a 59 year old  (all vaginal deliveries) female. Patient did have daughter over the phone when discussing plan; of note, her daughter is currently in medical school. Had a microhematuria evaluation  with Dr. Cummins which was unremarkable. She then followed with Dr Richter for gross hematuria and renal cyst in  with cysto on 10/11/21 that was unremarkable. CT urogram showed small right renal cyst appearing to be a complex cyst. Last seen by Dr. Richter on 22 and denied any further gross hematuria at that time and discussed with patient that there was no concerning right renal cyst on renal US on 22. Was started on estrace cream at that time for itching with urination. Was advised to followup in 1 year for UA and PVR. Has not been seen since that time. Here today to discuss incontinence. Voiding 2x/day and 0x/night. Reports RJ; UUI most bothersome especially when it is cold outside. Per EMR, has not had recent UTI, though has continued to have microscopic hematuria noted on UA/micros since . Patient admits to having asymptomatic gross hematuria \"on and off\" since her last hematuria workup in . States she has had multiple episodes of gross hematuria over the past year. Denies Jalyn or s/s of UTI today. Denies vaginal bleeding or vaginal bulge/pressure that is bothersome to her. Does report rectal bleeding secondary to hemorrhoids and constipation. Had renal US on 24 that was unremarkable. Of note, patient following oncology currently for DCIS of left breast and s/p lumpectomy in 2018 with radiation. Stopped Tamoxifen in  secondary to vaginal bleeding and switched to anastrozole. Stopped " "anastrozole in 2021 secondary to aches/pain. Last seen by oncology on 1/4/24; with no evidence of recurrence. PMH includes HTN, CHF, hepatic steatosis, prediabetes, latent TB. Patient voices no other concerns at this time.            Allergies:     Allergies   Allergen Reactions    Ciprofloxacin Itching            Medications:     Current Outpatient Medications   Medication Sig    bisacodyl (DULCOLAX) 5 MG EC tablet Take 1 tablet (5 mg) by mouth daily as needed for constipation    estradiol (ESTRACE) 0.1 MG/GM vaginal cream Place 1 g vaginally three times a week (Patient taking differently: Place vaginally three times a week)    hydrocortisone, Perianal, (HYDROCORTISONE) 2.5 % cream Place rectally 2 times daily as needed for hemorrhoids    losartan (COZAAR) 25 MG tablet Take 1 tablet (25 mg) by mouth daily    pantoprazole (PROTONIX) 40 MG EC tablet Take 1 tablet (40 mg) by mouth daily    polyethylene glycol (MIRALAX) 17 GM/Dose powder Take 17 g by mouth daily     No current facility-administered medications for this visit.            Past  Surgical History:     Past Surgical History:   Procedure Laterality Date    COLONOSCOPY N/A 2/12/2018    Procedure: COLONOSCOPY;  colonoscopy;  Surgeon: Sabas Villegas MD;  Location:  GI    LUMPECTOMY BREAST WITH SEED LOCALIZATION Left 4/18/2018    Procedure: LUMPECTOMY BREAST WITH SEED LOCALIZATION;  SEED LOCALIZED LEFT BREAST LUMPECTOMY ;  Surgeon: Meli Rodriguez MD;  Location: [facility]             Physical Exam:     Vitals:    01/23/24 1421   BP: 138/70   Pulse: 60   Weight: 74.4 kg (164 lb)   Height: 1.607 m (5' 3.25\")     PSYCH: NAD  EYES: EOMI  NEURO: AAO x3  : Vulva is normal in appearance. Urethra normal.. Negative for SUMANTH with reduction of speculum when instructed to cough. Vaginal mucosa atrophic. No pain to palpation on internal or external exam. Cystocele grade 2. Rectocele grade 3. Uterovaginal prolapse grade 2 (cervix at DIP joint from introitus when " baring down). Strength 1/5. No obvious masses, lesions, ulcers, bleeding noted on internal or external exam. Nonbleeding external hemorrhoid noted.     LABS:     PVR WNL.    UA RESULTS:  Recent Labs   Lab Test 01/23/24  1418 11/09/23  1059   COLOR Yellow Yellow   APPEARANCE Clear Clear   URINEGLC Negative Negative   URINEBILI Negative Negative   URINEKETONE Negative Negative   SG 1.020 1.015   UBLD Moderate* Moderate*   URINEPH 6.0 7.5*   PROTEIN Negative Negative   UROBILINOGEN 0.2 0.2   NITRITE Negative Negative   LEUKEST Negative Negative   RBCU  --  5-10*   WBCU  --  0-5         Creatinine   Date Value Ref Range Status   01/15/2024 0.79 0.51 - 0.95 mg/dL Final   04/01/2021 0.76 0.52 - 1.04 mg/dL Final     Renal US 1/11/24  FINDINGS: The right kidney is 9.2 x 5.6 x 4.1 cm with AP cortical  thickness of 1 cm. The left kidney is 10.9 x 5 x 4.9 cm with AP  cortical thickness of 1.1 cm. No hydronephrosis. The bladder is  partially distended and unremarkable.     The right renal artery is patent with velocities at the hilum of  130/38 cm/second, mid segment 144/52 cm/sec and an origin 169/54  cm/second. Resistive indices are normal measuring up to 0.68. Main  renal vein is patent.     The left renal artery is patent with velocities at the hilum of 187/53  cm/second, mid segment 133/54 cm/second, and at the origin 53/25  cm/second. Waveform analysis demonstrates sharp systolic upstroke.  Main renal vein is patent. Resistive indices are normal measuring up  to 0.69.                                                                   IMPRESSION:  1. No ultrasound suggestion of renal arterial stenosis.  2. Normal appearance of both kidneys.      Renal US 5/12/22  FINDINGS:     RIGHT KIDNEY: 8.6 x 4.1 x 4.7 cm. Normal without hydronephrosis or  masses.      LEFT KIDNEY: 10.9 x 4.2 x 4.5 cm. Normal without hydronephrosis or  masses.      BLADDER: Distended without focal abnormality on survey views.                                                                    IMPRESSION:  1.  Normal kidney ultrasound.      CT urogram 9/21/21  FINDINGS:   LOWER CHEST: Normal.     HEPATOBILIARY: Multiple calcified gallstones. Gallbladder is nondistended. No focal liver lesion.     PANCREAS: Normal.     SPLEEN: Normal.     ADRENAL GLANDS: Normal.     RIGHT KIDNEY/URETER: No hydronephrosis. Noncontrast images demonstrate no intrarenal calculus. No ureteral calculus. Following the administration of IV contrast, there is a hypodensity in the ventral right renal midpole measuring 7 mm on image 115 of   series 10 which is too small for definitive characterization. The delayed images demonstrate no evidence of collecting system or ureteral filling defect, though portions of the distal right ureter are not opacified on both the delayed series.     LEFT KIDNEY/URETER: No hydronephrosis. Noncontrast images demonstrate no intrarenal calculus. No ureteral calculus. Following the administration of IV contrast, there is no evidence of cystic or solid lesion in the left kidney. The delayed images   demonstrate no evidence of collecting system or ureteral filling defect.     BLADDER: Normal. No bladder mass.     BOWEL: Normal caliber large and small bowel. Normal appendix.     LYMPH NODES: Normal.     VASCULATURE: Unremarkable.     PELVIC ORGANS: Normal.     MUSCULOSKELETAL: Normal.                                                                      IMPRESSION:  1.  No hydronephrosis or calcified, obstructing urinary tract stone on either side.     2.  There is a 7 mm cystic lesion in the right kidney which is too small for definitive characterization. Consider ultrasound surveillance.     3.  No evidence of solid renal mass or collecting system or bladder mass.     4.  Cholelithiasis         Assessment and Plan:   59 year old is a pleasant female who has continued to have episodes of gross hematuria with negative workup in 2022, RJ, constipation, vaginal atrophy,  asymptomatic pelvic organ prolapse.     Plan:  -  PVR WNL.  - UA shows moderate hematuria.  - Educated patient/daughter about possible etiologies of gross hematuria. Discussed AUA guidelines and repeating hematuria workup given she has multiple episodes of gross hematuria since last encounter in 2022. Educated patient about hematuria workup. Patient agrees to urine cytology and cystoscopy with female urologist. Discussed importance of CT urogram, however patient/daughter declines CT urogram at this time. Advised to contact clinic if changes her mind.   - urine cytology pending.   - if hematuria workup negative, consider referral to nephrology and possibly OB/GYN to rule out GYN source.   - referral to PFPT for RJ.   - not interested in medication for UUI at this time.  - continue miralax for constipation.   - After exam, patient reported vaginal burning. Think vaginal symptoms likely secondary to vaginal atrophy. Can try hyalogyn OTC. Discussed clearing estrogen cream with her oncologist first.   - After discussing the assessment and plan with patient, patient verbalizes understanding and agrees to the above plan. All questions answered.       Other orders as below:  Orders Placed This Encounter   Procedures    MEASURE POST-VOID RESIDUAL URINE/BLADDER CAPACITY, US NON-IMAGING (78540)    UA without Microscopic [[id number]]    Physical Therapy Referral    Cytology non gyn [[id number]]     44 minutes spent on the date of the encounter doing chart review, review of previous urology notes/imaging/cystos, review of test results, exam, ordering urine cytology, ordering PFPT, interpretation of tests, patient visit and documentation.      Ro Valencia PA-C  Urology  January 23, 2024      Patient Care Team:  Tavares Velasco MD as PCP - General (Internal Medicine)  Shirley San PA-C as Physician Assistant (Physician Assistant)  Caro Cox MD as MD (Urology)  Shilpi Elder, JAVI as Specialty Care  Coordinator (Urology)  Samreen Barahona MD as Assigned Cancer Care Provider  Carine Gaston APRN CNP as Assigned OBGYN Provider  Ro Valencia PA-C as Physician Assistant  Eduardo Ruff MD as Assigned PCP  Rashad Fischer MD as Assigned Heart and Vascular Provider  EDUARDO RUFF

## 2024-01-23 NOTE — NURSING NOTE
Chief Complaint   Patient presents with    Urinary Incontinence      PVR-3ml    Sadaf Oakley LPN

## 2024-01-23 NOTE — PATIENT INSTRUCTIONS
- referral placed for pelvic floor physical therapy to help you with your leakage of urine. They will call you to schedule this appointment.    - contact clinic if you change your mind about the CT scan.     - Please follow-up for cystoscopy with next available urologist. Please make sure you have your urine cytology and CT scan done prior to you cystoscopy appointment.    CYSTOSCOPY    What is a Cystoscopy?  This is a procedure done to check for problems inside the bladder.  Problems may include polyps (growths), tumors, inflammation (swelling and redness) and other concerns.    The doctor inserts a thin tube (called a cystoscope) into the bladder.  The tube is about the size of a pencil.  We will give you numbing medicine to reduce the pain or discomfort you may feel.    The tube allows the doctor to:  The doctor will be able to see inside the bladder by filling the bladder with water.  The water makes it easier to see any problems that may be present.    If needed, the doctor may use the tube to:  The doctor is able to take tissue samples (biopsies).  Samples are sent to the lab for testing.  The doctor can also burn off any small growths or tumors that are found.  This is call fulguration.    What happens after the exam?  You may go back to your normal diet and activity as you feel ready, unless your doctor tells you not to.    For the next two days, you may notice:  Some blood in your urine.  Some burning when you urinate (use the toilet).  An urge to urinate more often.  Bladder spasms.    These are normal after the procedure. They should go away on their own after a day or two.      You can help to relieve the above listed symptoms by:  Drinking 6 to 8 large glasses of water each day (includes drinks at meals).  This will help clear the urine.  Take warm baths to relieve pain and bladder spasms.  Do not add anything to the bath water.  Your doctor may prescribe pain medicine.  You may also take Tylenol  (acetaminophen) for pain.    When should I call my doctor?  A fever over 100.0 F (38 C) for more than a day.  (Before you call the doctor, check your temperature under your tongue.)  Chills.  Failure to urinate: No urine comes out when you try to use the toilet.  (Try soaking in a bathtub full of warm water.  If still no urine, call your doctor.)  A lot of blood in the urine or blood clots larger than a nickel.  Pain in the back or abdomen (belly / stomach area).  Pain or spasms that are not relieved by warm tub baths and pain medicine.  Severe pain, burning or other problems while passing urine.  Pain that gets worse after two days.

## 2024-01-23 NOTE — LETTER
"2024       RE: Graciela Castorena  [address]  [place] [facility] [address]     Dear Colleague,    Thank you for referring your patient, Graciela Castorena, to the [facility]. Please see a copy of my visit note below.    Urology Clinic      Name: Graciela Castorena    MRN: [id number]   YOB: 1965  Accompanied at today's visit by:daughter over the phone                 Chief Complaint:   Urinary incontinence and hematuria          History of Present Illness:   2024    HISTORY:   Graciela is a 59 year old  (all vaginal deliveries) female. Patient did have daughter over the phone when discussing plan; of note, her daughter is currently in medical school. Had a microhematuria evaluation  with Dr. Cummins which was unremarkable. She then followed with Dr Richter for gross hematuria and renal cyst in  with cysto on 10/11/21 that was unremarkable. CT urogram showed small right renal cyst appearing to be a complex cyst. Last seen by Dr. Richter on 22 and denied any further gross hematuria at that time and discussed with patient that there was no concerning right renal cyst on renal US on 22. Was started on estrace cream at that time for itching with urination. Was advised to followup in 1 year for UA and PVR. Has not been seen since that time. Here today to discuss incontinence. Voiding 2x/day and 0x/night. Reports RJ; UUI most bothersome especially when it is cold outside. Per EMR, has not had recent UTI, though has continued to have microscopic hematuria noted on UA/micros since . Patient admits to having asymptomatic gross hematuria \"on and off\" since her last hematuria workup in . States she has had multiple episodes of gross hematuria over the past year. Denies Jalyn or s/s of UTI today. Denies vaginal bleeding or vaginal bulge/pressure that is bothersome to her. Does report " "rectal bleeding secondary to hemorrhoids and constipation. Had renal US on 1/11/24 that was unremarkable. Of note, patient following oncology currently for DCIS of left breast and s/p lumpectomy in 2018 with radiation. Stopped Tamoxifen in 2020 secondary to vaginal bleeding and switched to anastrozole. Stopped anastrozole in 2021 secondary to aches/pain. Last seen by oncology on 1/4/24; with no evidence of recurrence. PMH includes HTN, CHF, hepatic steatosis, prediabetes, latent TB. Patient voices no other concerns at this time.            Allergies:     Allergies   Allergen Reactions    Ciprofloxacin Itching            Medications:     Current Outpatient Medications   Medication Sig    bisacodyl (DULCOLAX) 5 MG EC tablet Take 1 tablet (5 mg) by mouth daily as needed for constipation    estradiol (ESTRACE) 0.1 MG/GM vaginal cream Place 1 g vaginally three times a week (Patient taking differently: Place vaginally three times a week)    hydrocortisone, Perianal, (HYDROCORTISONE) 2.5 % cream Place rectally 2 times daily as needed for hemorrhoids    losartan (COZAAR) 25 MG tablet Take 1 tablet (25 mg) by mouth daily    pantoprazole (PROTONIX) 40 MG EC tablet Take 1 tablet (40 mg) by mouth daily    polyethylene glycol (MIRALAX) 17 GM/Dose powder Take 17 g by mouth daily     No current facility-administered medications for this visit.            Past  Surgical History:     Past Surgical History:   Procedure Laterality Date    COLONOSCOPY N/A 2/12/2018    Procedure: COLONOSCOPY;  colonoscopy;  Surgeon: Sabas Villegas MD;  Location:  GI    LUMPECTOMY BREAST WITH SEED LOCALIZATION Left 4/18/2018    Procedure: LUMPECTOMY BREAST WITH SEED LOCALIZATION;  SEED LOCALIZED LEFT BREAST LUMPECTOMY ;  Surgeon: Meli Rodriguez MD;  Location:  [facility]             Physical Exam:     Vitals:    01/23/24 1421   BP: 138/70   Pulse: 60   Weight: 74.4 kg (164 lb)   Height: 1.607 m (5' 3.25\")     PSYCH: NAD  EYES: EOMI  NEURO: AAO " x3  : Vulva is normal in appearance. Urethra normal.. Negative for SUMANTH with reduction of speculum when instructed to cough. Vaginal mucosa atrophic. No pain to palpation on internal or external exam. Cystocele grade 2. Rectocele grade 3. Uterovaginal prolapse grade 2 (cervix at DIP joint from introitus when baring down). Strength 1/5. No obvious masses, lesions, ulcers, bleeding noted on internal or external exam. Nonbleeding external hemorrhoid noted.     LABS:     PVR WNL.    UA RESULTS:  Recent Labs   Lab Test 01/23/24  1418 11/09/23  1059   COLOR Yellow Yellow   APPEARANCE Clear Clear   URINEGLC Negative Negative   URINEBILI Negative Negative   URINEKETONE Negative Negative   SG 1.020 1.015   UBLD Moderate* Moderate*   URINEPH 6.0 7.5*   PROTEIN Negative Negative   UROBILINOGEN 0.2 0.2   NITRITE Negative Negative   LEUKEST Negative Negative   RBCU  --  5-10*   WBCU  --  0-5         Creatinine   Date Value Ref Range Status   01/15/2024 0.79 0.51 - 0.95 mg/dL Final   04/01/2021 0.76 0.52 - 1.04 mg/dL Final     Renal US 1/11/24  FINDINGS: The right kidney is 9.2 x 5.6 x 4.1 cm with AP cortical  thickness of 1 cm. The left kidney is 10.9 x 5 x 4.9 cm with AP  cortical thickness of 1.1 cm. No hydronephrosis. The bladder is  partially distended and unremarkable.     The right renal artery is patent with velocities at the hilum of  130/38 cm/second, mid segment 144/52 cm/sec and an origin 169/54  cm/second. Resistive indices are normal measuring up to 0.68. Main  renal vein is patent.     The left renal artery is patent with velocities at the hilum of 187/53  cm/second, mid segment 133/54 cm/second, and at the origin 53/25  cm/second. Waveform analysis demonstrates sharp systolic upstroke.  Main renal vein is patent. Resistive indices are normal measuring up  to 0.69.                                                                   IMPRESSION:  1. No ultrasound suggestion of renal arterial stenosis.  2. Normal  appearance of both kidneys.      Renal US 5/12/22  FINDINGS:     RIGHT KIDNEY: 8.6 x 4.1 x 4.7 cm. Normal without hydronephrosis or  masses.      LEFT KIDNEY: 10.9 x 4.2 x 4.5 cm. Normal without hydronephrosis or  masses.      BLADDER: Distended without focal abnormality on survey views.                                                                   IMPRESSION:  1.  Normal kidney ultrasound.      CT urogram 9/21/21  FINDINGS:   LOWER CHEST: Normal.     HEPATOBILIARY: Multiple calcified gallstones. Gallbladder is nondistended. No focal liver lesion.     PANCREAS: Normal.     SPLEEN: Normal.     ADRENAL GLANDS: Normal.     RIGHT KIDNEY/URETER: No hydronephrosis. Noncontrast images demonstrate no intrarenal calculus. No ureteral calculus. Following the administration of IV contrast, there is a hypodensity in the ventral right renal midpole measuring 7 mm on image 115 of   series 10 which is too small for definitive characterization. The delayed images demonstrate no evidence of collecting system or ureteral filling defect, though portions of the distal right ureter are not opacified on both the delayed series.     LEFT KIDNEY/URETER: No hydronephrosis. Noncontrast images demonstrate no intrarenal calculus. No ureteral calculus. Following the administration of IV contrast, there is no evidence of cystic or solid lesion in the left kidney. The delayed images   demonstrate no evidence of collecting system or ureteral filling defect.     BLADDER: Normal. No bladder mass.     BOWEL: Normal caliber large and small bowel. Normal appendix.     LYMPH NODES: Normal.     VASCULATURE: Unremarkable.     PELVIC ORGANS: Normal.     MUSCULOSKELETAL: Normal.                                                                      IMPRESSION:  1.  No hydronephrosis or calcified, obstructing urinary tract stone on either side.     2.  There is a 7 mm cystic lesion in the right kidney which is too small for definitive characterization.  Consider ultrasound surveillance.     3.  No evidence of solid renal mass or collecting system or bladder mass.     4.  Cholelithiasis         Assessment and Plan:   59 year old is a pleasant female who has continued to have episodes of gross hematuria with negative workup in 2022, RJ, constipation, vaginal atrophy, asymptomatic pelvic organ prolapse.     Plan:  -  PVR WNL.  - UA shows moderate hematuria.  - Educated patient/daughter about possible etiologies of gross hematuria. Discussed AUA guidelines and repeating hematuria workup given she has multiple episodes of gross hematuria since last encounter in 2022. Educated patient about hematuria workup. Patient agrees to urine cytology and cystoscopy with female urologist. Discussed importance of CT urogram, however patient/daughter declines CT urogram at this time. Advised to contact clinic if changes her mind.   - urine cytology pending.   - if hematuria workup negative, consider referral to nephrology and possibly OB/GYN to rule out GYN source.   - referral to PFPT for RJ.   - not interested in medication for UUI at this time.  - continue miralax for constipation.   - After exam, patient reported vaginal burning. Think vaginal symptoms likely secondary to vaginal atrophy. Can try hyalogyn OTC. Discussed clearing estrogen cream with her oncologist first.   - After discussing the assessment and plan with patient, patient verbalizes understanding and agrees to the above plan. All questions answered.       Other orders as below:  Orders Placed This Encounter   Procedures    MEASURE POST-VOID RESIDUAL URINE/BLADDER CAPACITY, US NON-IMAGING (38488)    UA without Microscopic [[id number]]    Physical Therapy Referral    Cytology non gyn [[id number]]     44 minutes spent on the date of the encounter doing chart review, review of previous urology notes/imaging/cystos, review of test results, exam, ordering urine cytology, ordering PFPT, interpretation of tests, patient visit  and documentation.      Ro Valencia PA-C  Urology  January 23, 2024      Patient Care Team:  Eduardo Ruff MD as PCP - General (Internal Medicine)  Shirley San PA-C as Physician Assistant (Physician Assistant)  Caro Cox MD as MD (Urology)  Shilpi Elder, RN as Specialty Care Coordinator (Urology)  Samreen Barahona MD as Assigned Cancer Care Provider  Carine Gaston APRN CNP as Assigned OBGYN Provider  Ro Valencia PA-C as Physician Assistant  Eduardo Ruff MD as Assigned PCP  Rashad Fischer MD as Assigned Heart and Vascular Provider  EDUARDO RUFF

## 2024-01-30 ENCOUNTER — OFFICE VISIT (OUTPATIENT)
Dept: FAMILY MEDICINE | Facility: CLINIC | Age: 59
End: 2024-01-30
Payer: COMMERCIAL

## 2024-01-30 VITALS
HEIGHT: 63 IN | TEMPERATURE: 96.9 F | DIASTOLIC BLOOD PRESSURE: 71 MMHG | RESPIRATION RATE: 16 BRPM | WEIGHT: 165.6 LBS | SYSTOLIC BLOOD PRESSURE: 104 MMHG | HEART RATE: 94 BPM | BODY MASS INDEX: 29.34 KG/M2 | OXYGEN SATURATION: 99 %

## 2024-01-30 DIAGNOSIS — R30.0 DYSURIA: ICD-10-CM

## 2024-01-30 DIAGNOSIS — R31.29 MICROSCOPIC HEMATURIA: Primary | ICD-10-CM

## 2024-01-30 PROCEDURE — 99213 OFFICE O/P EST LOW 20 MIN: CPT | Performed by: PHYSICIAN ASSISTANT

## 2024-01-30 RX ORDER — ESTRADIOL 0.1 MG/G
1 CREAM VAGINAL
Qty: 42.5 G | Refills: 3 | Status: SHIPPED | OUTPATIENT
Start: 2024-02-01

## 2024-01-30 ASSESSMENT — PAIN SCALES - GENERAL: PAINLEVEL: NO PAIN (0)

## 2024-01-30 NOTE — PROGRESS NOTES
HPI: Graciela is a 60 yo female here with her dtr for gross hematuria  She did see urology PA last week for this  on 1/24/24 and that note is reviewed  Urine cytology done and normal  Renal ultrasound done and normal  Pt was scheduled for cystoscopy with Dr. Cox in March    She is confused as to why she is here  I did reach out to the urology PA that last saw her but she is also not sure why here and notes she did not say she needed PCP follow up.    She does not recall the last night she has personally seen blood in her urine  She reports she is told blood is seen under the microscope but that the urine looks clear to her  Denies any urinary freq, urgency, flank pain or fever  Denies vaginal discharge or bleeding  She has some dryness  Does not recall using vaginal estrogen cream although that is on her list  Urol notes she has uterovaginal prolapse, cystocele and rectocele    Past Medical History:   Diagnosis Date    Abnormal Pap smear of cervix 03/30/2015    03/30/15, 10/20/21    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 breast 2018    s/p lumpectomy and radiation; on Tamoxifen    Palpitations     Tuberculosis      Past Surgical History:   Procedure Laterality Date    COLONOSCOPY N/A 2/12/2018    Procedure: COLONOSCOPY;  colonoscopy;  Surgeon: Sabas Villegas MD;  Location:  GI    LUMPECTOMY BREAST WITH SEED LOCALIZATION Left 4/18/2018    Procedure: LUMPECTOMY BREAST WITH SEED LOCALIZATION;  SEED LOCALIZED LEFT BREAST LUMPECTOMY ;  Surgeon: Meli Rodriguez MD;  Location: [facility]     Social History     Tobacco Use    Smoking status: Never     Passive exposure: Current (incense)    Smokeless tobacco: Never   Substance Use Topics    Alcohol use: No     Alcohol/week: 0.0 standard drinks of alcohol     Current Outpatient Medications   Medication Sig Dispense Refill    bisacodyl (DULCOLAX) 5 MG EC tablet Take 1 tablet (5 mg) by mouth daily as needed for constipation 30 tablet 4    hydrocortisone, Perianal,  "(HYDROCORTISONE) 2.5 % cream Place rectally 2 times daily as needed for hemorrhoids 30 g 3    losartan (COZAAR) 25 MG tablet Take 1 tablet (25 mg) by mouth daily 90 tablet 3    pantoprazole (PROTONIX) 40 MG EC tablet Take 1 tablet (40 mg) by mouth daily 90 tablet 3    polyethylene glycol (MIRALAX) 17 GM/Dose powder Take 17 g by mouth daily 507 g 3    estradiol (ESTRACE) 0.1 MG/GM vaginal cream Place 1 g vaginally three times a week (Patient not taking: Reported on 1/30/2024) 42.5 g 3     Allergies   Allergen Reactions    Ciprofloxacin Itching       PHYSICAL EXAM:    /71 (BP Location: Left arm, Patient Position: Sitting, Cuff Size: Adult Large)   Pulse 94   Temp 96.9  F (36.1  C) (Temporal)   Resp 16   Ht 1.607 m (5' 3.25\")   Wt 75.1 kg (165 lb 9.6 oz)   SpO2 99%   BMI 29.10 kg/m      Patient appears non toxic    Assessment and Plan:     (R31.29) Microscopic hematuria  (primary encounter diagnosis)  Comment:   Plan: pt not sure why she is here today. I did reach out the urology PA who evaluated her last week who also was not sure. Pt does have an appointment in March for cystoscopy with Dr. Cox. Denies gross hematuria.    (R30.0) Dysuria  Comment: pt has some dryness and doesn't recall trying vaginal estrogen cream  Plan: estradiol (ESTRACE) 0.1 MG/GM vaginal cream        Apply externally twice per week.  I feel this is safe for her even with her hx of breast ca in 2018 although she should certainly ask her oncologist about this as well.      Tammi WILSONC      "

## 2024-02-19 ENCOUNTER — OFFICE VISIT (OUTPATIENT)
Dept: FAMILY MEDICINE | Facility: CLINIC | Age: 59
End: 2024-02-19
Payer: COMMERCIAL

## 2024-02-19 VITALS
TEMPERATURE: 97.7 F | HEIGHT: 63 IN | DIASTOLIC BLOOD PRESSURE: 74 MMHG | RESPIRATION RATE: 16 BRPM | OXYGEN SATURATION: 100 % | WEIGHT: 165 LBS | BODY MASS INDEX: 29.23 KG/M2 | HEART RATE: 78 BPM | SYSTOLIC BLOOD PRESSURE: 116 MMHG

## 2024-02-19 DIAGNOSIS — Z00.00 ROUTINE HISTORY AND PHYSICAL EXAMINATION OF ADULT: Primary | ICD-10-CM

## 2024-02-19 DIAGNOSIS — Z13.21 ENCOUNTER FOR VITAMIN DEFICIENCY SCREENING: ICD-10-CM

## 2024-02-19 DIAGNOSIS — E78.5 DYSLIPIDEMIA: ICD-10-CM

## 2024-02-19 DIAGNOSIS — H91.92 DECREASED HEARING OF LEFT EAR: ICD-10-CM

## 2024-02-19 DIAGNOSIS — K21.00 GASTROESOPHAGEAL REFLUX DISEASE WITH ESOPHAGITIS WITHOUT HEMORRHAGE: ICD-10-CM

## 2024-02-19 DIAGNOSIS — E66.3 OVERWEIGHT (BMI 25.0-29.9): ICD-10-CM

## 2024-02-19 DIAGNOSIS — R31.29 MICROSCOPIC HEMATURIA: ICD-10-CM

## 2024-02-19 DIAGNOSIS — R73.03 PREDIABETES: Chronic | ICD-10-CM

## 2024-02-19 DIAGNOSIS — R00.2 PALPITATIONS: ICD-10-CM

## 2024-02-19 DIAGNOSIS — R06.83 SNORING: ICD-10-CM

## 2024-02-19 DIAGNOSIS — Z86.79 HISTORY OF PAROXYSMAL SUPRAVENTRICULAR TACHYCARDIA: ICD-10-CM

## 2024-02-19 DIAGNOSIS — I10 HYPERTENSION, UNSPECIFIED TYPE: ICD-10-CM

## 2024-02-19 DIAGNOSIS — K76.0 HEPATIC STEATOSIS: ICD-10-CM

## 2024-02-19 LAB
ALBUMIN SERPL BCG-MCNC: 4.5 G/DL (ref 3.5–5.2)
ALP SERPL-CCNC: 77 U/L (ref 40–150)
ALT SERPL W P-5'-P-CCNC: 7 U/L (ref 0–50)
ANION GAP SERPL CALCULATED.3IONS-SCNC: 10 MMOL/L (ref 7–15)
AST SERPL W P-5'-P-CCNC: 19 U/L (ref 0–45)
BILIRUB SERPL-MCNC: 0.3 MG/DL
BUN SERPL-MCNC: 12.2 MG/DL (ref 6–20)
CALCIUM SERPL-MCNC: 9.7 MG/DL (ref 8.6–10)
CHLORIDE SERPL-SCNC: 106 MMOL/L (ref 98–107)
CHOLEST SERPL-MCNC: 173 MG/DL
CREAT SERPL-MCNC: 0.82 MG/DL (ref 0.51–0.95)
CREAT UR-MCNC: 115 MG/DL
DEPRECATED HCO3 PLAS-SCNC: 25 MMOL/L (ref 22–29)
EGFRCR SERPLBLD CKD-EPI 2021: 83 ML/MIN/1.73M2
ERYTHROCYTE [DISTWIDTH] IN BLOOD BY AUTOMATED COUNT: 12.8 % (ref 10–15)
FASTING STATUS PATIENT QL REPORTED: NO
GLUCOSE SERPL-MCNC: 82 MG/DL (ref 70–99)
HBA1C MFR BLD: 5.9 % (ref 0–5.6)
HCT VFR BLD AUTO: 42.5 % (ref 35–47)
HDLC SERPL-MCNC: 46 MG/DL
HGB BLD-MCNC: 14 G/DL (ref 11.7–15.7)
LDLC SERPL CALC-MCNC: 104 MG/DL
MCH RBC QN AUTO: 29.3 PG (ref 26.5–33)
MCHC RBC AUTO-ENTMCNC: 32.9 G/DL (ref 31.5–36.5)
MCV RBC AUTO: 89 FL (ref 78–100)
MICROALBUMIN UR-MCNC: <12 MG/L
MICROALBUMIN/CREAT UR: NORMAL MG/G{CREAT}
NONHDLC SERPL-MCNC: 127 MG/DL
PLATELET # BLD AUTO: 234 10E3/UL (ref 150–450)
POTASSIUM SERPL-SCNC: 4.6 MMOL/L (ref 3.4–5.3)
PROT SERPL-MCNC: 7.2 G/DL (ref 6.4–8.3)
RBC # BLD AUTO: 4.78 10E6/UL (ref 3.8–5.2)
SODIUM SERPL-SCNC: 141 MMOL/L (ref 135–145)
TRIGL SERPL-MCNC: 114 MG/DL
VIT D+METAB SERPL-MCNC: 22 NG/ML (ref 20–50)
WBC # BLD AUTO: 6.5 10E3/UL (ref 4–11)

## 2024-02-19 PROCEDURE — 99396 PREV VISIT EST AGE 40-64: CPT | Performed by: INTERNAL MEDICINE

## 2024-02-19 PROCEDURE — 80053 COMPREHEN METABOLIC PANEL: CPT | Performed by: INTERNAL MEDICINE

## 2024-02-19 PROCEDURE — 85027 COMPLETE CBC AUTOMATED: CPT | Performed by: INTERNAL MEDICINE

## 2024-02-19 PROCEDURE — 99214 OFFICE O/P EST MOD 30 MIN: CPT | Mod: 25 | Performed by: INTERNAL MEDICINE

## 2024-02-19 PROCEDURE — 36415 COLL VENOUS BLD VENIPUNCTURE: CPT | Performed by: INTERNAL MEDICINE

## 2024-02-19 PROCEDURE — 82570 ASSAY OF URINE CREATININE: CPT | Performed by: INTERNAL MEDICINE

## 2024-02-19 PROCEDURE — 80061 LIPID PANEL: CPT | Performed by: INTERNAL MEDICINE

## 2024-02-19 PROCEDURE — 83036 HEMOGLOBIN GLYCOSYLATED A1C: CPT | Performed by: INTERNAL MEDICINE

## 2024-02-19 PROCEDURE — 82043 UR ALBUMIN QUANTITATIVE: CPT | Performed by: INTERNAL MEDICINE

## 2024-02-19 PROCEDURE — 82306 VITAMIN D 25 HYDROXY: CPT | Performed by: INTERNAL MEDICINE

## 2024-02-19 SDOH — HEALTH STABILITY: PHYSICAL HEALTH: ON AVERAGE, HOW MANY MINUTES DO YOU ENGAGE IN EXERCISE AT THIS LEVEL?: 0 MIN

## 2024-02-19 SDOH — HEALTH STABILITY: PHYSICAL HEALTH: ON AVERAGE, HOW MANY DAYS PER WEEK DO YOU ENGAGE IN MODERATE TO STRENUOUS EXERCISE (LIKE A BRISK WALK)?: 0 DAYS

## 2024-02-19 ASSESSMENT — PAIN SCALES - GENERAL
PAINLEVEL: NO PAIN (0)
PAINLEVEL: NO PAIN (0)

## 2024-02-19 ASSESSMENT — SOCIAL DETERMINANTS OF HEALTH (SDOH): HOW OFTEN DO YOU GET TOGETHER WITH FRIENDS OR RELATIVES?: THREE TIMES A WEEK

## 2024-02-19 NOTE — COMMUNITY RESOURCES LIST (ENGLISH)
02/19/2024   [facility] Outpatient Clinics  N/A  For additional resource needs, please contact your health insurance member services or your primary care team.  Phone: [phone]   Email: N/A   Address: [address] [place] [id number]   Hours: N/A        Exercise and Recreation       Gym or workout facility  1  [employer] Distance: 0.45 miles      In-Person   [address] [place] [address]  Language: English  Hours: Mon - Sun Open 24 Hours  Fees: Insurance, Self Pay, Sliding Fee   Phone: [phone] Website: [email or web address]     2  [facility] Distance: 2.43 miles      In-Person   [address] [place] [address]  Language: English  Hours: Mon - Fri 5:00 AM - 9:00 PM , Sat - Sun 7:00 AM - 5:00 PM  Fees: Free, Insurance, Self Pay, Sliding Fee   Phone: [phone] [phone] Email: customerservice@[employer].org Website: [email or web address]          Important Numbers & Websites       [address]org  Poison Control   (319) 516-8067 [place]  Suicide and Crisis Lifeline   988 988[facility] Patrick[facility].org  Childhelp National Child Abuse Hotline   776.754.6074 Childhelphotline.org  National Sexual Assault Hotline   (174) 140-9872 (HOPE) Rainn.org  National Runaway Safeline   (215) 965-9355 (RUNAWAY) [facility] Hospital[facility]away[facility]  Pregnancy & Postpartum Support [place]   Call/text 835-481-1161 Ppsupport[place].org  Substance Abuse National Helpline ([facility]   842-909-HELP (0445) Findtreatment.gov  Emergency Services   911

## 2024-02-19 NOTE — PROGRESS NOTES
Preventive Care Visit  [facility]  Tavares Velasco MD, Internal Medicine  Feb 19, 2024    Assessment & Plan   Problem List Items Addressed This Visit       Hepatic steatosis    Prediabetes (Chronic)    Relevant Orders    Hemoglobin A1c (Completed)     Other Visit Diagnoses       Routine history and physical examination of adult    -  Primary    Hypertension, unspecified type        Relevant Orders    Comprehensive metabolic panel (BMP + Alb, Alk Phos, ALT, AST, Total. Bili, TP)    Albumin Random Urine Quantitative with Creat Ratio    CBC with platelets (Completed)    Microscopic hematuria        Relevant Orders    UA with Microscopic reflex to Culture - lab collect    Dyslipidemia        Relevant Orders    Lipid panel reflex to direct LDL Fasting    Decreased hearing of left ear        Relevant Orders    Adult Audiology  Referral    Encounter for vitamin deficiency screening        Relevant Orders    Vitamin D Deficiency    Snoring        Relevant Orders    Adult Sleep Eval & Management  Referral    Gastroesophageal reflux disease with esophagitis without hemorrhage        Relevant Orders    Adult GI  Referral - Procedure Only    History of paroxysmal supraventricular tachycardia        Relevant Orders    Adult Cardiology Eval  Referral    Palpitations        Relevant Orders    Adult Cardiology Eval  Referral    Overweight (BMI 25.0-29.9)        Relevant Orders    Med Therapy Management Referral           Discussed multiple health conditions.  She had history of microscopic materia workup has been reviewed nonrevealing.  Repeat UA.  She had decreased hearing left ear referral for hearing evaluation.  She has history of snoring unsure if she has apnea referral for sleep evaluation.  Chronic GERD denies any dysphagia per se she is on chronic PPI discussed side effects of chronic PPI.  She request EGD referral placed to Dr. Villegas  Blood pressure seems  "well-controlled on low-dose of losartan.  She has prediabetes hepatic steatosis counseled on exercise and weight loss referral to medical therapy management for consideration for GLP-1 agonist injection as BMI above 27 was chronic comorbidities/cardiovascular comorbidities.  Mammogram up-to-date follow-up with oncology history of breast carcinoma in situ.  Colonoscopy is up-to-date,   History of SVT and palpitations at times feels these palpitations as chest discomfort probably not related to the GERD symptoms.  May benefit from small dose of beta-blocker.  Follow-up with EP.  Low-salt low saturated fat low carbs diet.  Repeat A1c.  All questions answered follow-up in 2 to 3 months and as needed.       BMI  Estimated body mass index is 29 kg/m  as calculated from the following:    Height as of this encounter: 1.607 m (5' 3.25\").    Weight as of this encounter: 74.8 kg (165 lb).   Weight management plan: Discussed healthy diet and exercise guidelines    Counseling  Appropriate preventive services were discussed with this patient, including applicable screening as appropriate for fall prevention, nutrition, physical activity, Tobacco-use cessation, weight loss and cognition.  Checklist reviewing preventive services available has been given to the patient.  Reviewed patient's diet, addressing concerns and/or questions.     Patient has been advised of split billing requirements and indicates understanding: Yes  Work on weight loss  Regular exercise  See Patient Instructions      Va Owens is a 59 year old, presenting for the following:  Physical        1/8/2024     9:50 AM   Additional Questions   Roomed by Christian   Accompanied by 2 daughters        Health Care Directive  Patient does not have a Health Care Directive or Living Will: Discussed advance care planning with patient; information given to patient to review.    HPI        2/19/2024   General Health   How would you rate your overall physical health? " Excellent   Feel stress (tense, anxious, or unable to sleep) Not at all         2/19/2024   Nutrition   Three or more servings of calcium each day? Yes   Diet: Regular (no restrictions)   How many servings of fruit and vegetables per day? 4 or more   How many sweetened beverages each day? (!) 4+         2/19/2024   Exercise   Days per week of moderate/strenous exercise 0 days   Average minutes spent exercising at this level 0 min   (!) EXERCISE CONCERN      2/19/2024   Social Factors   Frequency of gathering with friends or relatives Three times a week   Worry food won't last until get money to buy more No   Food not last or not have enough money for food? No   Do you have housing?  Yes   Are you worried about losing your housing? No   Lack of transportation? No   Unable to get utilities (heat,electricity)? No         2/19/2024   Fall Risk   Fallen 2 or more times in the past year? No   Trouble with walking or balance? No          2/19/2024   Dental   Dentist two times every year? Yes          No data to display                    Today's PHQ-2 Score:       2/19/2024     1:59 PM   PHQ-2 ( 1999 [employer])   Q1: Little interest or pleasure in doing things 0   Q2: Feeling down, depressed or hopeless 0   PHQ-2 Score 0         2/19/2024   Substance Use   Alcohol more than 3/day or more than 7/wk No   Do you use any other substances recreationally? No     Social History     Tobacco Use    Smoking status: Never     Passive exposure: Current (incense)    Smokeless tobacco: Never   Vaping Use    Vaping Use: Never used   Substance Use Topics    Alcohol use: No     Alcohol/week: 0.0 standard drinks of alcohol    Drug use: No            No data to display                 Mammogram Screening - Annual screen due to history of breast cancer, carcinoma in situ, or hyperplasia        2/19/2024   STI Screening   New sexual partner(s) since last STI/HIV test? No     History of abnormal Pap smear: NO - age 30-65 PAP every 5 years with  negative HPV co-testing recommended        Latest Ref Rng & Units 2023    10:33 AM 10/26/2022    10:44 AM 10/20/2021     3:23 PM   PAP / HPV   PAP  Negative for Intraepithelial Lesion or Malignancy (NILM)  Negative for Intraepithelial Lesion or Malignancy (NILM)  Atypical squamous cells of undetermined significance (ASC-US)    HPV 16 DNA Negative Negative  Negative  Negative    HPV 18 DNA Negative Negative  Negative  Negative    Other HR HPV Negative Negative  Negative  Negative      The 10-year ASCVD risk score (Dillon WILD, et al., 2019) is: 4.2%    Values used to calculate the score:      Age: 59 years      Sex: Female      Is Non- : Yes      Diabetic: No      Tobacco smoker: No      Systolic Blood Pressure: 116 mmHg      Is BP treated: Yes      HDL Cholesterol: 47 mg/dL      Total Cholesterol: 180 mg/dL    Fracture Risk Assessment Tool  Link to Frax Calculator  Use the information below to complete the Frax calculator  : 1965  Sex: female  Weight (kg): 74.8 kg (actual weight)  Height (cm): 160.7 cm  Previous Fragility Fracture:  No  History of parent with fractured hip:  No  Current Smoking:  No  Patient has been on glucocorticoids for more than 3 months (5mg/day or more): No  Rheumatoid Arthritis on Problem List:  No  Secondary Osteoporosis on Problem List:  No  Consumes 3 or more units of alcohol per day: No  Femoral Neck BMD (g/cm2)           Reviewed and updated as needed this visit by Provider                    Past Medical History:   Diagnosis Date    Abnormal Pap smear of cervix 2015    03/30/15, 10/20/21    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 breast 2018    s/p lumpectomy and radiation; on Tamoxifen    Palpitations     Tuberculosis      Past Surgical History:   Procedure Laterality Date    COLONOSCOPY N/A 2018    Procedure: COLONOSCOPY;  colonoscopy;  Surgeon: Sabas Villegas MD;  Location: [facility]    LUMPECTOMY BREAST WITH SEED LOCALIZATION Left  2018    Procedure: LUMPECTOMY BREAST WITH SEED LOCALIZATION;  SEED LOCALIZED LEFT BREAST LUMPECTOMY ;  Surgeon: Meli Rodriguez MD;  Location: [facility]     OB History    Para Term  AB Living   10 8 8 0 2 8   SAB IAB Ectopic Multiple Live Births   2 0 0 0 0      # Outcome Date GA Lbr Dequan/2nd Weight Sex Delivery Anes PTL Lv   10 SAB            9 SAB            8 Term            7 Term            6 Term            5 Term            4 Term            3 Term            2 Term            1 Term               Obstetric Comments   NSVDs     Lab work is in process  Labs reviewed in EPIC  BP Readings from Last 3 Encounters:   24 116/74   24 104/71   24 138/70    Wt Readings from Last 3 Encounters:   24 74.8 kg (165 lb)   24 75.1 kg (165 lb 9.6 oz)   24 74.4 kg (164 lb)                  Patient Active Problem List   Diagnosis    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 breast    ASCUS of cervix with negative high risk HPV    Latent tuberculosis    Environmental allergies    Cholelithiasis    Hepatic steatosis    Prediabetes    CHF (congestive heart failure) (H)     Past Surgical History:   Procedure Laterality Date    COLONOSCOPY N/A 2018    Procedure: COLONOSCOPY;  colonoscopy;  Surgeon: Sabas Villegas MD;  Location:  GI    LUMPECTOMY BREAST WITH SEED LOCALIZATION Left 2018    Procedure: LUMPECTOMY BREAST WITH SEED LOCALIZATION;  SEED LOCALIZED LEFT BREAST LUMPECTOMY ;  Surgeon: Meli Rodriguez MD;  Location: [facility]       Social History     Tobacco Use    Smoking status: Never     Passive exposure: Current (incense)    Smokeless tobacco: Never   Substance Use Topics    Alcohol use: No     Alcohol/week: 0.0 standard drinks of alcohol     Family History   Problem Relation Age of Onset    Diabetes Type 2  Brother     Diabetes Type 2  Sister     Myocardial Infarction No family hx of     Cerebrovascular Disease No family hx of     Coronary Artery Disease  Early Onset No family hx of     Colon Cancer No family hx of     Breast Cancer No family hx of     Ovarian Cancer No family hx of          Current Outpatient Medications   Medication Sig Dispense Refill    bisacodyl (DULCOLAX) 5 MG EC tablet Take 1 tablet (5 mg) by mouth daily as needed for constipation 30 tablet 4    estradiol (ESTRACE) 0.1 MG/GM vaginal cream Place 1 g vaginally twice a week 42.5 g 3    hydrocortisone, Perianal, (HYDROCORTISONE) 2.5 % cream Place rectally 2 times daily as needed for hemorrhoids 30 g 3    losartan (COZAAR) 25 MG tablet Take 1 tablet (25 mg) by mouth daily 90 tablet 3    pantoprazole (PROTONIX) 40 MG EC tablet Take 1 tablet (40 mg) by mouth daily 90 tablet 3    polyethylene glycol (MIRALAX) 17 GM/Dose powder Take 17 g by mouth daily 507 g 3     Allergies   Allergen Reactions    Ciprofloxacin Itching     Recent Labs   Lab Test 02/19/24  1446 01/15/24  0953 01/06/24  0049 01/05/24  2227 11/09/23  1222 05/28/23  0814 04/24/23  1647 10/26/22  1053 09/09/22  1125 09/09/22  1125 08/29/21  2232 08/17/21  0940 04/14/21  1156 04/01/21  1354 11/08/20  0808   A1C 5.9*  --   --   --   --   --   --   --   --   --   --  5.7*  --  6.2*  --    LDL  --   --  104*  --   --   --   --  101*  --  114*   < >  --    < >  --   --    HDL  --   --  47*  --   --   --   --  52  --  46*   < >  --    < >  --   --    TRIG  --   --  144  --   --   --   --  101  --  122   < >  --    < >  --   --    ALT  --   --   --   --  13 15 17  --   --   --    < > 47  --  38 24   CR  --  0.79  --  0.68 0.68 0.84 0.72  --   --  0.69   < > 0.68  --  0.76 0.66   GFRESTIMATED  --  87  --  >90 >90 81 >90  --   --  >90   < > >90  --  89 >90   GFRESTBLACK  --   --   --   --   --   --   --   --   --   --   --   --   --  >90 >90   POTASSIUM  --  4.4  --  5.3 4.3 3.7 4.0  --    < > 4.0   < > 3.8  --  3.8 3.5   TSH  --  3.15  --   --   --   --   --   --   --  1.92   < >  --    < >  --   --     < > = values in this interval not displayed.  "         Review of Systems  Constitutional, HEENT, cardiovascular, pulmonary, GI, , musculoskeletal, neuro, skin, endocrine and psych systems are negative, except as otherwise noted.     Objective    Exam  /74   Pulse 78   Temp 97.7  F (36.5  C) (Temporal)   Resp 16   Ht 1.607 m (5' 3.25\")   Wt 74.8 kg (165 lb)   SpO2 100%   BMI 29.00 kg/m     Estimated body mass index is 29 kg/m  as calculated from the following:    Height as of this encounter: 1.607 m (5' 3.25\").    Weight as of this encounter: 74.8 kg (165 lb).    Physical Exam  GENERAL: alert and no distress  EYES: Eyes grossly normal to inspection, PERRL and conjunctivae and sclerae normal  HENT: ear canals and TM's normal, nose and mouth without ulcers or lesions  NECK: no adenopathy, no asymmetry, masses, or scars  RESP: lungs clear to auscultation - no rales, rhonchi or wheezes  CV: regular rate and rhythm, normal S1 S2, no S3 or S4, no murmur, click or rub, no peripheral edema  ABDOMEN: soft, nontender, no hepatosplenomegaly, no masses and bowel sounds normal  MS: no gross musculoskeletal defects noted, no edema  SKIN: no suspicious lesions or rashes  NEURO: Normal strength and tone, mentation intact and speech normal  PSYCH: mentation appears normal, affect normal/bright      Signed Electronically by: Tavares Velasco MD    "

## 2024-02-20 ENCOUNTER — TELEPHONE (OUTPATIENT)
Dept: FAMILY MEDICINE | Facility: CLINIC | Age: 59
End: 2024-02-20
Payer: COMMERCIAL

## 2024-02-20 NOTE — RESULT ENCOUNTER NOTE
Patric Owens, reviewed your labs,,  Lipid panel shows numbers are stable from 1 months ago, please continue with healthy diet lifestyle changes  Your 10-year atherosclerotic cardiovascular disease risk score is low at 4.2% which is reassuring.  You do not need to be on cholesterol medication given the low 10-year risk score.    Test for diabetes is in the prediabetic range at 5.9.  Please continue with low carbs diet, exercise in the form brisk walking 30 minutes 5 times a week at least, and weight loss strongly recommended, did place a referral to medical therapy management our pharmacy team for consideration of a medication called GLP-1 agonist that will help with weight loss.  Can discuss further with Dr Miley Gonzáles from medical therapy management team.    Urine protein is negative which is reassuring,    Chemistry shows normal electrolytes, normal kidney function test, normal liver enzymes,    Vitamin D level is low normal.  Please take vitamin D3 at least 2000 to 4000 units once daily over-the-counter.    CBC shows normal blood counts ,no anemia which is reassuring; please be reassured your lab results ,    Regards,  Dr. Velasco    The 10-year ASCVD risk score (Dillon WILD, et al., 2019) is: 4.2%    Values used to calculate the score:      Age: 59 years      Sex: Female      Is Non- : Yes      Diabetic: No      Tobacco smoker: No      Systolic Blood Pressure: 116 mmHg      Is BP treated: Yes      HDL Cholesterol: 46 mg/dL      Total Cholesterol: 173 mg/dL

## 2024-02-20 NOTE — TELEPHONE ENCOUNTER
Patient Contact    Attempt # 1    Was call answered?  No.  Left message on voicemail with information to call back.    Agustina MOURA, Triage RN  [facility]       Tavares Velasco MD  P  Triage Im  Hi Graciela, reviewed your labs,,  Lipid panel shows numbers are stable from 1 months ago, please continue with healthy diet lifestyle changes  Your 10-year atherosclerotic cardiovascular disease risk score is low at 4.2% which is reassuring.  You do not need to be on cholesterol medication given the low 10-year risk score.    Test for diabetes is in the prediabetic range at 5.9.  Please continue with low carbs diet, exercise in the form brisk walking 30 minutes 5 times a week at least, and weight loss strongly recommended, did place a referral to medical therapy management our pharmacy team for consideration of a medication called GLP-1 agonist that will help with weight loss.  Can discuss further with Dr Miley Gonzáles from medical therapy management team.    Urine protein is negative which is reassuring,    Chemistry shows normal electrolytes, normal kidney function test, normal liver enzymes,    Vitamin D level is low normal.  Please take vitamin D3 at least 2000 to 4000 units once daily over-the-counter.    CBC shows normal blood counts ,no anemia which is reassuring; please be reassured your lab results ,    Regards,  Dr. Velasco    The 10-year ASCVD risk score (Dillon WILD, et al., 2019) is: 4.2%    Values used to calculate the score:      Age: 59 years      Sex: Female      Is Non- : Yes      Diabetic: No      Tobacco smoker: No      Systolic Blood Pressure: 116 mmHg      Is BP treated: Yes      HDL Cholesterol: 46 mg/dL      Total Cholesterol: 173 mg/dL

## 2024-02-21 NOTE — TELEPHONE ENCOUNTER
Patient Contact     Spoke with pt and relayed providers message. Pt stated understanding and will go  vitamin D3 from the pharmacy. Pt states she has an appt scheduled with Miley in March. Pt had no further questions at this time.    Rocío Villarreal RN

## 2024-02-21 NOTE — TELEPHONE ENCOUNTER
PDMP reviewed; no aberrant behavior identified, prescription authorized.  MME: 0   Patient Contact    Attempt # 1    Was call answered?  No.  Left message on voicemail with information to call triage back.

## 2024-02-26 ENCOUNTER — TELEPHONE (OUTPATIENT)
Dept: GASTROENTEROLOGY | Facility: CLINIC | Age: 59
End: 2024-02-26
Payer: COMMERCIAL

## 2024-02-26 NOTE — TELEPHONE ENCOUNTER
"Endoscopy Scheduling Screen    Have you had a positive Covid test in the last 14 days?  No    Are you active on MyChart?   No    What insurance is in the chart?  Other:  [facility]    Ordering/Referring Provider: Krista   (If ordering provider performs procedure, schedule with ordering provider unless otherwise instructed. )    BMI: Estimated body mass index is 29 kg/m  as calculated from the following:    Height as of 2/19/24: 1.607 m (5' 3.25\").    Weight as of 2/19/24: 74.8 kg (165 lb).     Sedation Ordered  moderate sedation.   If patient BMI > 50 do not schedule in [facility].    If patient BMI > 45 do not schedule at [facility].    Are you taking methadone or Suboxone?  No    Have you had difficulties, pain, or discomfort during past endoscopy procedures?  No    Are you taking any prescription medications for pain 3 or more times per week?   NO - No RN review required.    Do you have a history of malignant hyperthermia or adverse reaction to anesthesia?  No    (Females) Are you currently pregnant?   No     Have you been diagnosed or told you have pulmonary hypertension?   No    Do you have an LVAD?  No    Have you been told you have moderate to severe sleep apnea?  No    Have you been told you have COPD, asthma, or any other lung disease?  No    Do you have any heart conditions?  No     Have you ever had an organ transplant?   No    Have you ever had or are you awaiting a heart or lung transplant?   No    Have you had a stroke or transient ischemic attack (TIA aka \"mini stroke\" in the last 6 months?   No    Have you been diagnosed with or been told you have cirrhosis of the liver?   No    Are you currently on dialysis?   No    Do you need assistance transferring?   No    BMI: Estimated body mass index is 29 kg/m  as calculated from the following:    Height as of 2/19/24: 1.607 m (5' 3.25\").    Weight as of 2/19/24: 74.8 kg (165 lb).     Is patients BMI > 40 and scheduling location [facility]?  No    Do you take an injectable " medication for weight loss or diabetes (excluding insulin)?  No    Do you take the medication Naltrexone?  No    Do you take blood thinners?  No       Prep   Are you currently on dialysis or do you have chronic kidney disease?  No    Do you have a diagnosis of diabetes?  No    Do you have a diagnosis of cystic fibrosis (CF)?  No    On a regular basis do you go 3 -5 days between bowel movements?  No    BMI > 40?  No    Preferred Pharmacy:    [facility]/pharmacy #[id number] [facility] [address] [address]  Phone: [phone] Fax: [phone]      Final Scheduling Details   Colonoscopy prep sent?  N/A    Procedure scheduled  Upper endoscopy (EGD)    Surgeon:  Nelly- pt request    Date of procedure:  05/23/2024     Pre-OP / PAC:   No - Not required for this site.    Location  [facility] - Patient preference.    Sedation   Moderate Sedation - Per order.      Patient Reminders:   You will receive a call from a Nurse to review instructions and health history.  This assessment must be completed prior to your procedure.  Failure to complete the Nurse assessment may result in the procedure being cancelled.      On the day of your procedure, please designate an adult(s) who can drive you home stay with you for the next 24 hours. The medicines used in the exam will make you sleepy. You will not be able to drive.      You cannot take public transportation, ride share services, or non-medical taxi service without a responsible caregiver.  Medical transport services are allowed with the requirement that a responsible caregiver will receive you at your destination.  We require that drivers and caregivers are confirmed prior to your procedure.

## 2024-03-18 ENCOUNTER — OFFICE VISIT (OUTPATIENT)
Dept: PHARMACY | Facility: CLINIC | Age: 59
End: 2024-03-18
Attending: INTERNAL MEDICINE

## 2024-03-18 VITALS — BODY MASS INDEX: 29.35 KG/M2 | DIASTOLIC BLOOD PRESSURE: 74 MMHG | WEIGHT: 167 LBS | SYSTOLIC BLOOD PRESSURE: 128 MMHG

## 2024-03-18 DIAGNOSIS — R30.0 DYSURIA: ICD-10-CM

## 2024-03-18 DIAGNOSIS — K21.00 GASTROESOPHAGEAL REFLUX DISEASE WITH ESOPHAGITIS, UNSPECIFIED WHETHER HEMORRHAGE: ICD-10-CM

## 2024-03-18 DIAGNOSIS — E55.9 VITAMIN D DEFICIENCY: ICD-10-CM

## 2024-03-18 DIAGNOSIS — R73.03 PREDIABETES: Chronic | ICD-10-CM

## 2024-03-18 DIAGNOSIS — E66.3 OVERWEIGHT: Primary | ICD-10-CM

## 2024-03-18 DIAGNOSIS — I50.22 HEART FAILURE WITH MILDLY REDUCED EJECTION FRACTION (HFMREF) (H): ICD-10-CM

## 2024-03-18 PROCEDURE — 99607 MTMS BY PHARM ADDL 15 MIN: CPT | Performed by: PHARMACIST

## 2024-03-18 PROCEDURE — 99605 MTMS BY PHARM NP 15 MIN: CPT | Performed by: PHARMACIST

## 2024-03-18 NOTE — PROGRESS NOTES
Medication Therapy Management (MTM) Encounter    ASSESSMENT:                            Medication Adherence/Access: No issues identified    Overweight (BMI 27-29.9):   Pharmacotherapy may be considered for those with a BMI >30 kg/m2, or a BMI of 27 to 29.9 kg/m2 with weight-related comorbidities, who have not met weight loss goals (loss of at least 5 percent of total body weight at three to six months) with a comprehensive lifestyle intervention, she is a candidate for treatment given BMI and hypertension.  She's not interested in medication support at this time but does plan to focus on exercise.    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  Blood pressure at goal <130/80mmHg.    GERD:   Plan in place.     Dysuria:  Stable.     Vitamin D Deficiency:  Would benefit from starting Vitamin D supplement- NOW brand is bovine gelatin based.    PLAN:                            Recommended NOW brand Vitamin D supplement, she will order online.    Follow-up: Return if needed.    SUBJECTIVE/OBJECTIVE:                          Graciela Castorena is a 59 year old female coming in for an initial visit. She was referred to me from Dr. Velasco.      Reason for visit: Weight management, consider of GLP1RA.    Allergies/ADRs: Reviewed in chart  Past Medical History: Reviewed in chart  Tobacco: She reports that she has never smoked. She has been exposed to tobacco smoke. She has never used smokeless tobacco.  Alcohol: none  Other Substance Use: none  Caffeine: 2 cups of tea per day    Medication Adherence/Access: no issues reported    Obesity   Pre-diabetes/Overweight (BMI 27-29.9):   No current medications - was referred by primary care physician to discuss but she's not interested in medication to aid with weight loss    Exercise: Acknowledges she could do more here, plans to focus on this     Wt Readings from Last 4 Encounters:   03/18/24 167 lb (75.8 kg)   02/19/24 165 lb (74.8 kg)   01/30/24 165 lb 9.6 oz (75.1 kg)   01/23/24 164 lb (74.4 kg)  "    Estimated body mass index is 29.35 kg/m  as calculated from the following:    Height as of 2/19/24: 5' 3.25\" (1.607 m).    Weight as of this encounter: 167 lb (75.8 kg).    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     Patient reports these HF symptoms: none.    Patient is not measuring daily weights .   Patient does not self-monitor blood pressure regularly, although daughters (in nursing school) sometimes do.  Per cardiology May 2023, plan was to re-check ECHO in 6 months - no additional treatment recommended at that time  BP Readings from Last 3 Encounters:   03/18/24 128/74   02/19/24 116/74   01/30/24 104/71      Pulse Readings from Last 3 Encounters:   02/19/24 78   01/30/24 94   01/23/24 60     GERD    Pantoprazole 40 mg once daily   Patient reports no current symptoms.   Is scheduled for EGD in May.        Dysuria:  Estradiol vaginal cream - has available but not currently using  She reports symptoms are stable.    Vitamin D Deficiency:  It was recommended she start Vitamin D, she is looking for a gelatin (pork) free brand    Vitamin D Deficiency Screening Results:  Lab Results   Component Value Date    VITDT 22 02/19/2024    VITDT 22 09/09/2022    VITDT 31 06/27/2020    VITDT 33 03/29/2019    VITDT 35 07/10/2018      Today's Vitals: /74   Wt 167 lb (75.8 kg)   BMI 29.35 kg/m    ----------------    I spent 25 minutes with this patient today. A copy of the visit note was provided to the patient's provider(s).    A summary of these recommendations was given to the patient.    Miley Gonzáles, PharmD, [facility]  Medication Therapy Management Provider  [phone]      Medication Therapy Recommendations  Vitamin D deficiency    Rationale: Untreated condition - Needs additional medication therapy - Indication   Recommendation: Start " Medication - Vitamin D-3 125 MCG (5000 UT) Tabs   Status: Accepted - no CPA Needed

## 2024-03-18 NOTE — PATIENT INSTRUCTIONS
"Recommendations from today's MTM visit:                                                    MTM (medication therapy management) is a service provided by a clinical pharmacist designed to help you get the most of out of your medicines.   Today we reviewed what your medicines are for, how to know if they are working, that your medicines are safe and how to make your medicine regimen as easy as possible.      I will ask [facility] to refill hydrocortisone cream and losartan.    Follow-up: Return if needed.    It was great speaking with you today.  I value your experience and would be very thankful for your time in providing feedback in our clinic survey. In the next few days, you may receive an email or text message from [facility] with a link to a survey related to your  clinical pharmacist.\"     To schedule another MTM appointment, please call the clinic directly or you may call the MTM scheduling line at [phone] or toll-free at [phone].     My Clinical Pharmacist's contact information:                                                      Please feel free to contact me with any questions or concerns you have.      Miley Gonzáles, Jayesh, [facility]  Medication Therapy Management Provider  [phone]    "

## 2024-03-18 NOTE — Clinical Note
FRANI - she wasn't interested in medication for weight loss. Miley Gonzáles, PharmD, [facility] Medication Therapy Management Provider [phone]

## 2024-04-29 ENCOUNTER — OFFICE VISIT (OUTPATIENT)
Dept: URGENT CARE | Facility: URGENT CARE | Age: 59
End: 2024-04-29
Payer: MEDICAID

## 2024-04-29 VITALS
TEMPERATURE: 98.6 F | DIASTOLIC BLOOD PRESSURE: 68 MMHG | OXYGEN SATURATION: 98 % | SYSTOLIC BLOOD PRESSURE: 95 MMHG | HEART RATE: 95 BPM

## 2024-04-29 DIAGNOSIS — R11.2 NAUSEA AND VOMITING, UNSPECIFIED VOMITING TYPE: Primary | ICD-10-CM

## 2024-04-29 DIAGNOSIS — A08.4 VIRAL GASTROENTERITIS: ICD-10-CM

## 2024-04-29 DIAGNOSIS — R19.7 DIARRHEA, UNSPECIFIED TYPE: ICD-10-CM

## 2024-04-29 LAB
ALBUMIN SERPL-MCNC: 3.8 G/DL (ref 3.4–5)
ALP SERPL-CCNC: 67 U/L (ref 40–150)
ALT SERPL W P-5'-P-CCNC: 9 U/L (ref 0–50)
ANION GAP SERPL CALCULATED.3IONS-SCNC: 1 MMOL/L (ref 3–14)
AST SERPL W P-5'-P-CCNC: 16 U/L (ref 0–45)
BILIRUB SERPL-MCNC: 0.8 MG/DL (ref 0.2–1.3)
BUN SERPL-MCNC: 10 MG/DL (ref 7–30)
CALCIUM SERPL-MCNC: 9.7 MG/DL (ref 8.5–10.1)
CHLORIDE BLD-SCNC: 109 MMOL/L (ref 94–109)
CO2 SERPL-SCNC: 28 MMOL/L (ref 20–32)
CREAT SERPL-MCNC: 0.7 MG/DL (ref 0.52–1.04)
EGFRCR SERPLBLD CKD-EPI 2021: >90 ML/MIN/1.73M2
ERYTHROCYTE [DISTWIDTH] IN BLOOD BY AUTOMATED COUNT: 13 % (ref 10–15)
GLUCOSE BLD-MCNC: 90 MG/DL (ref 70–99)
HCT VFR BLD AUTO: 46.4 % (ref 35–47)
HGB BLD-MCNC: 15.5 G/DL (ref 11.7–15.7)
MCH RBC QN AUTO: 29.7 PG (ref 26.5–33)
MCHC RBC AUTO-ENTMCNC: 33.4 G/DL (ref 31.5–36.5)
MCV RBC AUTO: 89 FL (ref 78–100)
PLATELET # BLD AUTO: 131 10E3/UL (ref 150–450)
POTASSIUM BLD-SCNC: 4 MMOL/L (ref 3.4–5.3)
PROT SERPL-MCNC: 7.9 G/DL (ref 6.8–8.8)
RBC # BLD AUTO: 5.22 10E6/UL (ref 3.8–5.2)
SODIUM SERPL-SCNC: 138 MMOL/L (ref 135–145)
WBC # BLD AUTO: 8.7 10E3/UL (ref 4–11)

## 2024-04-29 PROCEDURE — 36415 COLL VENOUS BLD VENIPUNCTURE: CPT | Performed by: PHYSICIAN ASSISTANT

## 2024-04-29 PROCEDURE — 99214 OFFICE O/P EST MOD 30 MIN: CPT | Performed by: PHYSICIAN ASSISTANT

## 2024-04-29 PROCEDURE — 85027 COMPLETE CBC AUTOMATED: CPT | Performed by: PHYSICIAN ASSISTANT

## 2024-04-29 PROCEDURE — 80053 COMPREHEN METABOLIC PANEL: CPT | Performed by: PHYSICIAN ASSISTANT

## 2024-04-29 RX ORDER — LOPERAMIDE HYDROCHLORIDE 2 MG/1
2 TABLET ORAL 3 TIMES DAILY PRN
Qty: 30 TABLET | Refills: 0 | Status: SHIPPED | OUTPATIENT
Start: 2024-04-29

## 2024-04-29 RX ORDER — ONDANSETRON 4 MG/1
4 TABLET, ORALLY DISINTEGRATING ORAL EVERY 8 HOURS PRN
Qty: 15 TABLET | Refills: 0 | Status: SHIPPED | OUTPATIENT
Start: 2024-04-29

## 2024-04-30 LAB

## 2024-04-30 PROCEDURE — 87177 OVA AND PARASITES SMEARS: CPT | Performed by: PHYSICIAN ASSISTANT

## 2024-04-30 PROCEDURE — 87209 SMEAR COMPLEX STAIN: CPT | Performed by: PHYSICIAN ASSISTANT

## 2024-04-30 PROCEDURE — 87507 IADNA-DNA/RNA PROBE TQ 12-25: CPT | Performed by: PHYSICIAN ASSISTANT

## 2024-04-30 NOTE — PROGRESS NOTES
Assessment & Plan     Nausea and vomiting, unspecified vomiting type    CMP normal  CBC normal    The 'BRAT' diet is suggested, then progress to diet as tolerated as symptoms dimitrios. Call if bloody stools, persistent diarrhea, vomiting, fever or abdominal pain.    Zofran prn nausea and vomiting    - ondansetron (ZOFRAN ODT) 4 MG ODT tab; Take 1 tablet (4 mg) by mouth every 8 hours as needed for nausea    Diarrhea, unspecified type    Diarrhea is loose, watery stools (bowel movements). The exact cause is often hard to find. Sometimes diarrhea is your body's way of getting rid of what caused an upset stomach. Viruses, food poisoning, and many medicines can cause diarrhea. Some people get diarrhea in response to emotional stress, anxiety, or certain foods.  Almost everyone has diarrhea now and then. It usually isn't serious, and your stools will return to normal soon. The important thing to do is replace the fluids you have lost, so you can prevent dehydration.    May use lomotil  Stool culture pending  Ova pending    - loperamide (IMODIUM A-D) 2 MG tablet; Take 1 tablet (2 mg) by mouth 3 times daily as needed for diarrhea    Viral gastroenteritis    Gastroenteritis is an illness that may cause nausea, vomiting, and diarrhea. It can be caused by bacteria or a virus.  You will probably begin to feel better in 1 to 2 days. In the meantime, get plenty of rest and make sure you do not become dehydrated. Dehydration occurs when your body loses too much fluid.    Review of external notes as documented elsewhere in note      At today's visit with Graciela Castorena , we discussed results, diagnosis, medications and formulated a plan.  We also discussed red flags for immediate return to clinic/ER, as well as indications for follow up with PCP if not improved in 3 days. Patient understood and agreed to plan. Graciela Castorena was discharged with stable vitals and has no further questions.       No follow-ups on file.    Subjective    Graciela is a 59 year old, presenting for the following health issues:  Urgent Care (Diarrhea x 2 days could be food )    HPI     Review of Systems  Constitutional, HEENT, cardiovascular, pulmonary, GI, , musculoskeletal, neuro, skin, endocrine and psych systems are negative, except as otherwise noted.      Objective    BP 95/68 (BP Location: Left arm, Patient Position: Sitting, Cuff Size: Adult Regular)   Pulse 95   Temp 98.6  F (37  C) (Tympanic)   SpO2 98%   Breastfeeding No   There is no height or weight on file to calculate BMI.  Physical Exam   GENERAL: alert and no distress  EYES: Eyes grossly normal to inspection, PERRL and conjunctivae and sclerae normal  HENT: ear canals and TM's normal, nose and mouth without ulcers or lesions  NECK: no adenopathy, no asymmetry, masses, or scars  RESP: lungs clear to auscultation - no rales, rhonchi or wheezes  CV: regular rate and rhythm, normal S1 S2, no S3 or S4, no murmur, click or rub, no peripheral edema  ABDOMEN: soft, nontender, no hepatosplenomegaly, no masses and bowel sounds normal  MS: no gross musculoskeletal defects noted, no edema  SKIN: no suspicious lesions or rashes  NEURO: Normal strength and tone, mentation intact and speech normal  PSYCH: mentation appears normal, affect normal/bright    Results for orders placed or performed in visit on 04/29/24   Comprehensive metabolic panel (BMP + Alb, Alk Phos, ALT, AST, Total. Bili, TP)     Status: Abnormal   Result Value Ref Range    Sodium 138 135 - 145 mmol/L    Potassium 4.0 3.4 - 5.3 mmol/L    Chloride 109 94 - 109 mmol/L    Carbon Dioxide (CO2) 28 20 - 32 mmol/L    Anion Gap 1 (L) 3 - 14 mmol/L    Urea Nitrogen 10 7 - 30 mg/dL    Creatinine 0.70 0.52 - 1.04 mg/dL    GFR Estimate >90 >60 mL/min/1.73m2    Calcium 9.7 8.5 - 10.1 mg/dL    Glucose 90 70 - 99 mg/dL    Alkaline Phosphatase 67 40 - 150 U/L    AST 16 0 - 45 U/L    ALT 9 0 - 50 U/L    Protein Total 7.9 6.8 - 8.8 g/dL    Albumin 3.8 3.4 - 5.0  g/dL    Bilirubin Total 0.8 0.2 - 1.3 mg/dL   CBC with platelets     Status: Abnormal   Result Value Ref Range    WBC Count 8.7 4.0 - 11.0 10e3/uL    RBC Count 5.22 (H) 3.80 - 5.20 10e6/uL    Hemoglobin 15.5 11.7 - 15.7 g/dL    Hematocrit 46.4 35.0 - 47.0 %    MCV 89 78 - 100 fL    MCH 29.7 26.5 - 33.0 pg    MCHC 33.4 31.5 - 36.5 g/dL    RDW 13.0 10.0 - 15.0 %    Platelet Count 131 (L) 150 - 450 10e3/uL             Signed Electronically by: Steven Cody, ARCADIO, PAAME

## 2024-05-01 LAB — O+P STL MICRO: NEGATIVE

## 2024-05-13 ENCOUNTER — TELEPHONE (OUTPATIENT)
Dept: GASTROENTEROLOGY | Facility: CLINIC | Age: 59
End: 2024-05-13
Payer: MEDICAID

## 2024-05-13 NOTE — TELEPHONE ENCOUNTER
Attempted to contact patient in order to complete pre assessment questions.     No answer. Left message to return call to [phone] option 4    Callback required communication sent via 1EQ.      Arlyn Hurt RN  Endoscopy Procedure Pre Assessment RN

## 2024-05-13 NOTE — TELEPHONE ENCOUNTER
Pre visit planning completed.      Procedure details:    Patient scheduled for Upper endoscopy (EGD) on 5/23/24.     Arrival time: 0945. Procedure time 1030    Facility location: [facility]; [address] [place] [address] [place] [address]. Check in location: [facility].     Sedation type: Conscious sedation     Pre op exam needed? N/A    Indication for procedure: chronic GERD      Chart review:     Electronic implanted devices? No    Recent diagnosis of diverticulitis within the last 6 weeks? No    Diabetic? Prediabetic.       Medication review:    Anticoagulants? No    NSAIDS? No NSAID medications per patient's medication list.  RN will verify with pre-assessment call.    Other medication HOLDING recommendations:  N/A      Prep for procedure:     Bowel prep recommendation: N/A    Prep instructions sent via letter (2/26/24)        Arlyn Hurt RN  Endoscopy Procedure Pre Assessment RN  [phone] option 4

## 2024-05-14 NOTE — TELEPHONE ENCOUNTER
Pre assessment completed for upcoming procedure.   (Please see previous telephone encounter notes for complete details)    Patient  returned call.       Procedure details:    Arrival time and facility location reviewed.    Pre op exam needed? N/A    Designated  policy reviewed. Instructed to have someone stay 6  hours post procedure.       Medication review:    Medications reviewed. Please see supporting documentation below. Holding recommendations discussed (if applicable).       Prep for procedure:     Procedure prep instructions reviewed.        Any additional information needed:  N/A      Patient  verbalized understanding and had no questions or concerns at this time.      Manuela Zepeda RN  Endoscopy Procedure Pre Assessment RN  [phone] option 4

## 2024-05-17 ENCOUNTER — HOSPITAL ENCOUNTER (OUTPATIENT)
Dept: CARDIOLOGY | Facility: CLINIC | Age: 59
Discharge: HOME | End: 2024-05-17
Attending: INTERNAL MEDICINE | Admitting: INTERNAL MEDICINE
Payer: MEDICAID

## 2024-05-17 DIAGNOSIS — I42.8 NON-ISCHEMIC CARDIOMYOPATHY (H): ICD-10-CM

## 2024-05-17 LAB — LVEF ECHO: NORMAL

## 2024-05-17 PROCEDURE — 93306 TTE W/DOPPLER COMPLETE: CPT | Mod: 26 | Performed by: INTERNAL MEDICINE

## 2024-05-17 PROCEDURE — C8929 TTE W OR WO FOL WCON,DOPPLER: HCPCS

## 2024-05-17 PROCEDURE — 255N000002 HC RX 255 OP 636: Performed by: INTERNAL MEDICINE

## 2024-05-17 RX ADMIN — HUMAN ALBUMIN MICROSPHERES AND PERFLUTREN 9 ML: 10; .22 INJECTION, SOLUTION INTRAVENOUS at 10:48

## 2024-05-23 ENCOUNTER — HOSPITAL ENCOUNTER (OUTPATIENT)
Facility: CLINIC | Age: 59
Discharge: HOME | End: 2024-05-23
Attending: INTERNAL MEDICINE | Admitting: INTERNAL MEDICINE
Payer: MEDICAID

## 2024-05-23 VITALS
SYSTOLIC BLOOD PRESSURE: 134 MMHG | BODY MASS INDEX: 29.59 KG/M2 | RESPIRATION RATE: 10 BRPM | HEART RATE: 75 BPM | WEIGHT: 167 LBS | HEIGHT: 63 IN | DIASTOLIC BLOOD PRESSURE: 87 MMHG | OXYGEN SATURATION: 100 %

## 2024-05-23 LAB — UPPER GI ENDOSCOPY: NORMAL

## 2024-05-23 PROCEDURE — 250N000011 HC RX IP 250 OP 636: Performed by: INTERNAL MEDICINE

## 2024-05-23 PROCEDURE — 88305 TISSUE EXAM BY PATHOLOGIST: CPT | Mod: 26 | Performed by: PATHOLOGY

## 2024-05-23 PROCEDURE — 999N000099 HC STATISTIC MODERATE SEDATION < 10 MIN: Performed by: INTERNAL MEDICINE

## 2024-05-23 PROCEDURE — 250N000009 HC RX 250: Performed by: INTERNAL MEDICINE

## 2024-05-23 PROCEDURE — 88305 TISSUE EXAM BY PATHOLOGIST: CPT | Mod: TC | Performed by: INTERNAL MEDICINE

## 2024-05-23 PROCEDURE — 43239 EGD BIOPSY SINGLE/MULTIPLE: CPT | Performed by: INTERNAL MEDICINE

## 2024-05-23 RX ORDER — ONDANSETRON 2 MG/ML
INJECTION INTRAMUSCULAR; INTRAVENOUS PRN
Status: DISCONTINUED | OUTPATIENT
Start: 2024-05-23 | End: 2024-05-23 | Stop reason: HOSPADM

## 2024-05-23 RX ORDER — FENTANYL CITRATE 50 UG/ML
INJECTION, SOLUTION INTRAMUSCULAR; INTRAVENOUS PRN
Status: DISCONTINUED | OUTPATIENT
Start: 2024-05-23 | End: 2024-05-23 | Stop reason: HOSPADM

## 2024-05-23 ASSESSMENT — ACTIVITIES OF DAILY LIVING (ADL)
ADLS_ACUITY_SCORE: 35

## 2024-05-24 LAB
PATH REPORT.COMMENTS IMP SPEC: NORMAL
PATH REPORT.COMMENTS IMP SPEC: NORMAL
PATH REPORT.FINAL DX SPEC: NORMAL
PATH REPORT.GROSS SPEC: NORMAL
PATH REPORT.MICROSCOPIC SPEC OTHER STN: NORMAL
PATH REPORT.RELEVANT HX SPEC: NORMAL
PHOTO IMAGE: NORMAL

## 2024-05-31 ENCOUNTER — TELEPHONE (OUTPATIENT)
Dept: CARDIOLOGY | Facility: CLINIC | Age: 59
End: 2024-05-31
Payer: MEDICAID

## 2024-05-31 NOTE — TELEPHONE ENCOUNTER
----- Message from Rashad Fischer MD sent at 5/24/2024 11:51 PM CDT -----  Start 10 jardiance. Have her talk to PCP about GLP1. Is she getting CT coronary angio. I had requested that at the time of her clinic appt.  ----- Message -----  From: Sin Beltrna RN  Sent: 5/21/2024  10:29 AM CDT  To: MD Dr. Amado Medeiros:    With her low EF any med changes and follow up? Thanks, Tavo

## 2024-06-05 ENCOUNTER — OFFICE VISIT (OUTPATIENT)
Dept: CARDIOLOGY | Facility: CLINIC | Age: 59
End: 2024-06-05
Attending: INTERNAL MEDICINE
Payer: COMMERCIAL

## 2024-06-05 VITALS
SYSTOLIC BLOOD PRESSURE: 118 MMHG | DIASTOLIC BLOOD PRESSURE: 80 MMHG | OXYGEN SATURATION: 95 % | HEART RATE: 85 BPM | HEIGHT: 63 IN | BODY MASS INDEX: 29.79 KG/M2 | WEIGHT: 168.1 LBS

## 2024-06-05 DIAGNOSIS — Z86.79 HISTORY OF PAROXYSMAL SUPRAVENTRICULAR TACHYCARDIA: ICD-10-CM

## 2024-06-05 DIAGNOSIS — R00.2 PALPITATIONS: ICD-10-CM

## 2024-06-05 DIAGNOSIS — I42.8 NON-ISCHEMIC CARDIOMYOPATHY (H): Primary | ICD-10-CM

## 2024-06-05 PROCEDURE — 99214 OFFICE O/P EST MOD 30 MIN: CPT | Performed by: INTERNAL MEDICINE

## 2024-06-05 RX ORDER — METOPROLOL SUCCINATE 25 MG/1
25 TABLET, EXTENDED RELEASE ORAL DAILY
Qty: 90 TABLET | Refills: 3 | Status: SHIPPED | OUTPATIENT
Start: 2024-06-05

## 2024-06-05 NOTE — LETTER
6/5/2024    Tavares Velasco MD  [address]  [place] [address]    RE: Graciela Castorena       Dear Colleague,     I had the pleasure of seeing Graciela Castorena in the [facility].  CARDIOLOGY CLINIC CONSULTATION    PRIMARY CARE PHYSICIAN:  Tavares Velasco    Tests reviewed/interpreted independently in clinic today:   EKG: Normal sinus rhythm, no ST changes  Echocardiogram: Normal BiV size and function  Blood work:   Renal ultrasound-normal kidneys  Zio patch- 8 brief episodes of SVT.  Patient was asymptomatic.     The level of medical decision making during this visit was of moderate complexity.    HISTORY OF PRESENT ILLNESS:  Today, I had the pleasure of connecting with Graciela Castorena.  She is a very pleasant 59-year-old lady with past medical history of atypical chest pain and normal coronary arteries on CT angiogram DCIS, GERD and paroxysmal SVT who presents to the clinic  for a follow-up visit.  Her daughter is also in medicine.  At previously seen her for atypical chest pain and ordered a CT coronary angiogram which was negative for obstructive disease.  The pain has since resolved and has not recurred.  She however was noted to have episodes of SVT on Zio patch.  She was previously asymptomatic but now tells me that she occasionally feels or runs of fast heartbeat.  She also has hypertension and her blood pressure is controlled.  She denies orthopnea, PND, lower extremity edema, presyncope or syncope.    PAST MEDICAL HISTORY:  Past Medical History:   Diagnosis Date    Abnormal Pap smear of cervix 03/30/2015    03/30/15, 10/20/21    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 breast 2018    s/p lumpectomy and radiation; on Tamoxifen    Hypertension     Palpitations     Tuberculosis        MEDICATIONS:  Current Outpatient Medications   Medication Sig Dispense Refill    cholecalciferol (VITAMIN D3) 125 mcg (5000 units) capsule Take 250 mcg by mouth daily      hydrocortisone,  Perianal, (HYDROCORTISONE) 2.5 % cream Place rectally 2 times daily as needed for hemorrhoids 30 g 3    loperamide (IMODIUM A-D) 2 MG tablet Take 1 tablet (2 mg) by mouth 3 times daily as needed for diarrhea 30 tablet 0    losartan (COZAAR) 25 MG tablet Take 1 tablet (25 mg) by mouth daily 90 tablet 3    metoprolol succinate ER (TOPROL XL) 25 MG 24 hr tablet Take 1 tablet (25 mg) by mouth daily 90 tablet 3    ondansetron (ZOFRAN ODT) 4 MG ODT tab Take 1 tablet (4 mg) by mouth every 8 hours as needed for nausea 15 tablet 0    pantoprazole (PROTONIX) 40 MG EC tablet Take 1 tablet (40 mg) by mouth daily 90 tablet 3    estradiol (ESTRACE) 0.1 MG/GM vaginal cream Place 1 g vaginally twice a week (Patient not taking: Reported on 3/18/2024) 42.5 g 3     No current facility-administered medications for this visit.       ALLERGIES:  Allergies   Allergen Reactions    Ciprofloxacin Itching       SOCIAL HISTORY:  I have reviewed this patient's social history and updated it with pertinent information if needed. Graciela Castorena  reports that she has never smoked. She has been exposed to tobacco smoke. She has never used smokeless tobacco. She reports that she does not drink alcohol and does not use drugs.    FAMILY HISTORY:  I have reviewed this patient's family history and updated it with pertinent information if needed.   Family History   Problem Relation Age of Onset    Diabetes Type 2  Brother     Diabetes Type 2  Sister     Myocardial Infarction No family hx of     Cerebrovascular Disease No family hx of     Coronary Artery Disease Early Onset No family hx of     Colon Cancer No family hx of     Breast Cancer No family hx of     Ovarian Cancer No family hx of        REVIEW OF SYSTEMS:  Skin:  not assessed     Eyes:  not assessed    ENT:  not assessed    Respiratory:  Positive for dyspnea on exertion;dyspnea at rest  Cardiovascular:    Positive for;palpitations;chest pain;fatigue;lightheadedness  Gastroenterology: not  assessed    Genitourinary:  not assessed    Musculoskeletal:  not assessed    Neurologic:  not assessed    Psychiatric:  not assessed    Heme/Lymph/Imm:  not assessed    Endocrine:  not assessed        PHYSICAL EXAM:                     Vital Signs with Ranges     168 lbs 1.6 oz    Constitutional: alert, no distress  Respiratory: Good bilateral air entry  Cardiovascular: Normal S1-S2, no murmurs  GI: nondistended  Neuropsychiatric: appropriate affact    ASSESSMENT: Pertinent issues addressed/ reviewed during this cardiology visit    Atypical chest pain-resolved  Paroxysmal SVT  Obesity  Elevated blood pressure without diagnosis of hypertension    RECOMMENDATIONS:  Symptoms had seen her for earlier have resolved.  Moreover CT coronary angiogram had shown no coronary disease.  Transthoracic echocardiogram is also normal.  She has history of paroxysmal SVT which was noted on the rhythm monitor in the past.  She did report episodes of fast heartbeat.  So I think it is a good idea to start her on beta-blocker.  I am going to start her on very low-dose of metoprolol and discussed the side effects of the medication.  This will also help with her blood pressure which has been elevated in the past.    Orders Placed This Encounter   Procedures    Follow-Up with Cardiology BOWEN       It was a pleasure seeing this patient in clinic today. Please do not hesitate to contact me with any future questions.     Rashad MULLER, FACC, [facility]  Cardiology - [facility]  May 2, 2023    This note was completed in part using dictation via the Dragon voice recognition software. Some word and grammatical errors may occur and must be interpreted in the appropriate clinical context.  If there are any questions pertaining to this issue, please contact me for further clarification.      Thank you for allowing me to participate in the care of your patient.      Sincerely,     Rashad Fischer MD     [facility]  [facility]  cc:   Tavares Velasco MD  [address] [address]

## 2024-06-07 NOTE — PROGRESS NOTES
CARDIOLOGY CLINIC CONSULTATION    PRIMARY CARE PHYSICIAN:  Tavares Velasco    Tests reviewed/interpreted independently in clinic today:   EKG: Normal sinus rhythm, no ST changes  Echocardiogram: Normal BiV size and function  Blood work:   Renal ultrasound-normal kidneys  Zio patch- 8 brief episodes of SVT.  Patient was asymptomatic.     The level of medical decision making during this visit was of moderate complexity.    HISTORY OF PRESENT ILLNESS:  Today, I had the pleasure of connecting with Graciela Castorena.  She is a very pleasant 59-year-old lady with past medical history of atypical chest pain and normal coronary arteries on CT angiogram DCIS, GERD and paroxysmal SVT who presents to the clinic  for a follow-up visit.  Her daughter is also in medicine.  At previously seen her for atypical chest pain and ordered a CT coronary angiogram which was negative for obstructive disease.  The pain has since resolved and has not recurred.  She however was noted to have episodes of SVT on Zio patch.  She was previously asymptomatic but now tells me that she occasionally feels or runs of fast heartbeat.  She also has hypertension and her blood pressure is controlled.  She denies orthopnea, PND, lower extremity edema, presyncope or syncope.    PAST MEDICAL HISTORY:  Past Medical History:   Diagnosis Date    Abnormal Pap smear of cervix 03/30/2015    03/30/15, 10/20/21    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 breast 2018    s/p lumpectomy and radiation; on Tamoxifen    Hypertension     Palpitations     Tuberculosis        MEDICATIONS:  Current Outpatient Medications   Medication Sig Dispense Refill    cholecalciferol (VITAMIN D3) 125 mcg (5000 units) capsule Take 250 mcg by mouth daily      hydrocortisone, Perianal, (HYDROCORTISONE) 2.5 % cream Place rectally 2 times daily as needed for hemorrhoids 30 g 3    loperamide (IMODIUM A-D) 2 MG tablet Take 1 tablet (2 mg) by mouth 3 times daily as needed for diarrhea 30  tablet 0    losartan (COZAAR) 25 MG tablet Take 1 tablet (25 mg) by mouth daily 90 tablet 3    metoprolol succinate ER (TOPROL XL) 25 MG 24 hr tablet Take 1 tablet (25 mg) by mouth daily 90 tablet 3    ondansetron (ZOFRAN ODT) 4 MG ODT tab Take 1 tablet (4 mg) by mouth every 8 hours as needed for nausea 15 tablet 0    pantoprazole (PROTONIX) 40 MG EC tablet Take 1 tablet (40 mg) by mouth daily 90 tablet 3    estradiol (ESTRACE) 0.1 MG/GM vaginal cream Place 1 g vaginally twice a week (Patient not taking: Reported on 3/18/2024) 42.5 g 3     No current facility-administered medications for this visit.       ALLERGIES:  Allergies   Allergen Reactions    Ciprofloxacin Itching       SOCIAL HISTORY:  I have reviewed this patient's social history and updated it with pertinent information if needed. Graciela Castorena  reports that she has never smoked. She has been exposed to tobacco smoke. She has never used smokeless tobacco. She reports that she does not drink alcohol and does not use drugs.    FAMILY HISTORY:  I have reviewed this patient's family history and updated it with pertinent information if needed.   Family History   Problem Relation Age of Onset    Diabetes Type 2  Brother     Diabetes Type 2  Sister     Myocardial Infarction No family hx of     Cerebrovascular Disease No family hx of     Coronary Artery Disease Early Onset No family hx of     Colon Cancer No family hx of     Breast Cancer No family hx of     Ovarian Cancer No family hx of        REVIEW OF SYSTEMS:  Skin:  not assessed     Eyes:  not assessed    ENT:  not assessed    Respiratory:  Positive for dyspnea on exertion;dyspnea at rest  Cardiovascular:    Positive for;palpitations;chest pain;fatigue;lightheadedness  Gastroenterology: not assessed    Genitourinary:  not assessed    Musculoskeletal:  not assessed    Neurologic:  not assessed    Psychiatric:  not assessed    Heme/Lymph/Imm:  not assessed    Endocrine:  not assessed        PHYSICAL EXAM:                      Vital Signs with Ranges     168 lbs 1.6 oz    Constitutional: alert, no distress  Respiratory: Good bilateral air entry  Cardiovascular: Normal S1-S2, no murmurs  GI: nondistended  Neuropsychiatric: appropriate affact    ASSESSMENT: Pertinent issues addressed/ reviewed during this cardiology visit    Atypical chest pain-resolved  Paroxysmal SVT  Obesity  Elevated blood pressure without diagnosis of hypertension    RECOMMENDATIONS:  Symptoms had seen her for earlier have resolved.  Moreover CT coronary angiogram had shown no coronary disease.  Transthoracic echocardiogram is also normal.  She has history of paroxysmal SVT which was noted on the rhythm monitor in the past.  She did report episodes of fast heartbeat.  So I think it is a good idea to start her on beta-blocker.  I am going to start her on very low-dose of metoprolol and discussed the side effects of the medication.  This will also help with her blood pressure which has been elevated in the past.    Orders Placed This Encounter   Procedures    Follow-Up with Cardiology BOWEN       It was a pleasure seeing this patient in clinic today. Please do not hesitate to contact me with any future questions.     Rashad MULLER, FACC, DUKEE  Cardiology - [facility]  May 2, 2023    This note was completed in part using dictation via the Dragon voice recognition software. Some word and grammatical errors may occur and must be interpreted in the appropriate clinical context.  If there are any questions pertaining to this issue, please contact me for further clarification.

## 2024-07-08 ENCOUNTER — PATIENT OUTREACH (OUTPATIENT)
Dept: CARE COORDINATION | Facility: CLINIC | Age: 59
End: 2024-07-08
Payer: COMMERCIAL

## 2024-07-29 ENCOUNTER — OFFICE VISIT (OUTPATIENT)
Dept: URGENT CARE | Facility: URGENT CARE | Age: 59
End: 2024-07-29
Payer: COMMERCIAL

## 2024-07-29 VITALS
BODY MASS INDEX: 29.86 KG/M2 | SYSTOLIC BLOOD PRESSURE: 123 MMHG | DIASTOLIC BLOOD PRESSURE: 81 MMHG | TEMPERATURE: 97.3 F | WEIGHT: 170 LBS | HEART RATE: 85 BPM | OXYGEN SATURATION: 98 %

## 2024-07-29 DIAGNOSIS — R07.0 THROAT PAIN: Primary | ICD-10-CM

## 2024-07-29 DIAGNOSIS — R10.9 LEFT FLANK PAIN: ICD-10-CM

## 2024-07-29 DIAGNOSIS — Z13.220 SCREENING CHOLESTEROL LEVEL: ICD-10-CM

## 2024-07-29 DIAGNOSIS — R73.03 PREDIABETES: Chronic | ICD-10-CM

## 2024-07-29 DIAGNOSIS — N10 PYELONEPHRITIS, ACUTE: ICD-10-CM

## 2024-07-29 DIAGNOSIS — R31.9 HEMATURIA OF UNKNOWN ETIOLOGY: ICD-10-CM

## 2024-07-29 LAB
ALBUMIN UR-MCNC: NEGATIVE MG/DL
APPEARANCE UR: CLEAR
BACTERIA #/AREA URNS HPF: ABNORMAL /HPF
BILIRUB UR QL STRIP: NEGATIVE
COLOR UR AUTO: YELLOW
DEPRECATED S PYO AG THROAT QL EIA: NEGATIVE
FLUAV AG SPEC QL IA: NEGATIVE
FLUBV AG SPEC QL IA: NEGATIVE
GLUCOSE UR STRIP-MCNC: NEGATIVE MG/DL
HGB UR QL STRIP: ABNORMAL
KETONES UR STRIP-MCNC: NEGATIVE MG/DL
LEUKOCYTE ESTERASE UR QL STRIP: NEGATIVE
NITRATE UR QL: NEGATIVE
PH UR STRIP: 6 [PH] (ref 5–7)
RBC #/AREA URNS AUTO: ABNORMAL /HPF
SP GR UR STRIP: 1.01 (ref 1–1.03)
SQUAMOUS #/AREA URNS AUTO: ABNORMAL /LPF
UROBILINOGEN UR STRIP-ACNC: 0.2 E.U./DL
WBC #/AREA URNS AUTO: ABNORMAL /HPF

## 2024-07-29 PROCEDURE — 87635 SARS-COV-2 COVID-19 AMP PRB: CPT | Performed by: PHYSICIAN ASSISTANT

## 2024-07-29 PROCEDURE — 99214 OFFICE O/P EST MOD 30 MIN: CPT | Performed by: PHYSICIAN ASSISTANT

## 2024-07-29 PROCEDURE — 87804 INFLUENZA ASSAY W/OPTIC: CPT | Performed by: PHYSICIAN ASSISTANT

## 2024-07-29 PROCEDURE — 87651 STREP A DNA AMP PROBE: CPT | Performed by: PHYSICIAN ASSISTANT

## 2024-07-29 PROCEDURE — 81001 URINALYSIS AUTO W/SCOPE: CPT | Performed by: PHYSICIAN ASSISTANT

## 2024-07-29 RX ORDER — CEFDINIR 300 MG/1
300 CAPSULE ORAL 2 TIMES DAILY
Qty: 20 CAPSULE | Refills: 0 | Status: SHIPPED | OUTPATIENT
Start: 2024-07-29

## 2024-07-29 RX ORDER — IBUPROFEN 600 MG/1
600 TABLET, FILM COATED ORAL EVERY 6 HOURS PRN
Qty: 30 TABLET | Refills: 0 | Status: SHIPPED | OUTPATIENT
Start: 2024-07-29 | End: 2025-07-29

## 2024-07-30 LAB
GROUP A STREP BY PCR: NOT DETECTED
SARS-COV-2 RNA RESP QL NAA+PROBE: NEGATIVE

## 2024-07-30 NOTE — PROGRESS NOTES
Assessment & Plan     Throat pain    Strep neg, culture pending  Covid pending  Influenza neg  Motrin for throat pain  Phenol for throat pain    You had a strep test done today that was neg.  We will perform a strep culture and if any positive results come back we will call you and call in antibiotics.  At this time, this appears to be a viral infection and requires symptomatic treatment.  Most viral sore throats will resolve with in a few days.  If your throat pain worsens then please be  rechecked in the UC or by your PCP.    - Streptococcus A Rapid Screen w/Reflex to PCR - Clinic Collect  - Symptomatic COVID-19 Virus (Coronavirus) by PCR Nose  - Influenza A & B Antigen - Clinic Collect  - Group A Streptococcus PCR Throat Swab  - ibuprofen (ADVIL/MOTRIN) 600 MG tablet  Dispense: 30 tablet; Refill: 0  - phenol (CHLORASEPTIC) 1.4 % spray  Dispense: 177 mL; Refill: 0    Left flank pain    CBC pending  BMP pending  Motrin for left side flank tenderness  Will cover for 10 days with omnicef for possible early kidney infection    - Basic metabolic panel  (Ca, Cl, CO2, Creat, Gluc, K, Na, BUN)  - CBC with platelets and differential  - ibuprofen (ADVIL/MOTRIN) 600 MG tablet  Dispense: 30 tablet; Refill: 0  - cefdinir (OMNICEF) 300 MG capsule  Dispense: 20 capsule; Refill: 0    Prediabetes    BMP pending  Patient was not fasting today    Screening cholesterol level    Patient requesting cholesterol screening  - Lipid panel reflex to direct LDL Fasting    Hematuria of unknown etiology    This is chronic  Follow up with PCP    Pyelonephritis, acute    You have been diagnosed with a UTI.  This is one of the most common infections in women because women have a shorter urethra than men. Bacteria have a shorter distance to travel to reach the bladder.. Women who have gone through menopause also lose the protection from estrogen that lowers the chance of getting a UTI. And some women are at higher risk because of their genes. The  most common cause of bladder infections is bacteria from the bowels. The bacteria get onto the skin around the opening of the urethra. From there, they can get into the urine. Then they travel up to the bladder, causing inflammation and infection.  Make sure you urinate after sex, drink plenty of fluids and do not hold in your urine.  We have started you on antibiotics for infection and we are awaiting urine culture results, if there is antibiotic resistance on the culture we will call you and switch antibiotics.     - cefdinir (OMNICEF) 300 MG capsule  Dispense: 20 capsule; Refill: 0         At today's visit with Graciela Castorena , we discussed results, diagnosis, medications and formulated a plan.  We also discussed red flags for immediate return to clinic/ER, as well as indications for follow up with PCP if not improved in 3 days. Patient understood and agreed to plan. Graciela Castorena was discharged with stable vitals and has no further questions.       No follow-ups on file.    Steven Cody, [facility], PA-C  [facility] URGENT [facility]    Va Owens is a 59 year old female who presents to clinic today for the following health issues:  Chief Complaint   Patient presents with    Urgent Care     Pt states she has a sore throat 2-3 days and left side flank pain       HPI  Review of Systems  Constitutional, HEENT, cardiovascular, pulmonary, GI, , musculoskeletal, neuro, skin, endocrine and psych systems are negative, except as otherwise noted.      Objective    /81   Pulse 85   Temp 97.3  F (36.3  C) (Tympanic)   Wt 77.1 kg (170 lb)   SpO2 98%   BMI 29.86 kg/m    Physical Exam   GENERAL: alert and no distress  EYES: Eyes grossly normal to inspection, PERRL and conjunctivae and sclerae normal  HENT: normal cephalic/atraumatic, ear canals and TM's normal, nose and mouth without ulcers or lesions, and tonsillar hypertrophy  NECK: no adenopathy, no asymmetry, masses, or scars  RESP: lungs  clear to auscultation - no rales, rhonchi or wheezes  CV: regular rate and rhythm, normal S1 S2, no S3 or S4, no murmur, click or rub, no peripheral edema  ABDOMEN: soft, nontender, no hepatosplenomegaly, no masses and bowel sounds normal  MS: pos for left side tenderness  SKIN: no suspicious lesions or rashes  NEURO: Normal strength and tone, mentation intact and speech normal  PSYCH: mentation appears normal, affect normal/bright      Results for orders placed or performed in visit on 07/29/24   UA Macroscopic with reflex to Microscopic and Culture - Clinic Collect     Status: Abnormal    Specimen: Urine, Clean Catch   Result Value Ref Range    Color Urine Yellow Colorless, Straw, Light Yellow, Yellow    Appearance Urine Clear Clear    Glucose Urine Negative Negative mg/dL    Bilirubin Urine Negative Negative    Ketones Urine Negative Negative mg/dL    Specific Gravity Urine 1.010 1.003 - 1.035    Blood Urine Moderate (A) Negative    pH Urine 6.0 5.0 - 7.0    Protein Albumin Urine Negative Negative mg/dL    Urobilinogen Urine 0.2 0.2, 1.0 E.U./dL    Nitrite Urine Negative Negative    Leukocyte Esterase Urine Negative Negative   UA Microscopic with Reflex to Culture     Status: Abnormal   Result Value Ref Range    Bacteria Urine Few (A) None Seen /HPF    RBC Urine 5-10 (A) 0-2 /HPF /HPF    WBC Urine 5-10 (A) 0-5 /HPF /HPF    Squamous Epithelials Urine Moderate (A) None Seen /LPF    Narrative    Urine Culture not indicated   Streptococcus A Rapid Screen w/Reflex to PCR - Clinic Collect     Status: Normal    Specimen: Throat; Swab   Result Value Ref Range    Group A Strep antigen Negative Negative

## 2024-08-05 ENCOUNTER — PATIENT OUTREACH (OUTPATIENT)
Dept: CARE COORDINATION | Facility: CLINIC | Age: 59
End: 2024-08-05
Payer: COMMERCIAL

## 2024-08-07 ENCOUNTER — TELEPHONE (OUTPATIENT)
Dept: URGENT CARE | Facility: URGENT CARE | Age: 59
End: 2024-08-07
Payer: COMMERCIAL

## 2024-08-08 NOTE — TELEPHONE ENCOUNTER
Per call to [employer], they have already received a paid claim for this medication (Chloraseptic 1.4% liquid).  Pharmacy provided pt's are ID#[id number] (PA through [facility]).  Unable to submit a PA due to pt's coverage has termed. Called [employer] and confirmed that insurance coverage has termed.

## 2024-08-22 ENCOUNTER — HOSPITAL ENCOUNTER (EMERGENCY)
Facility: CLINIC | Age: 59
Discharge: HOME | End: 2024-08-23
Attending: EMERGENCY MEDICINE | Admitting: EMERGENCY MEDICINE

## 2024-08-22 DIAGNOSIS — R10.9 LEFT FLANK PAIN: ICD-10-CM

## 2024-08-22 DIAGNOSIS — T14.8XXA MUSCLE STRAIN: ICD-10-CM

## 2024-08-22 LAB
ALBUMIN UR-MCNC: NEGATIVE MG/DL
APPEARANCE UR: CLEAR
BILIRUB UR QL STRIP: NEGATIVE
COLOR UR AUTO: ABNORMAL
GLUCOSE UR STRIP-MCNC: NEGATIVE MG/DL
HGB UR QL STRIP: ABNORMAL
KETONES UR STRIP-MCNC: NEGATIVE MG/DL
LEUKOCYTE ESTERASE UR QL STRIP: NEGATIVE
NITRATE UR QL: NEGATIVE
PH UR STRIP: 6.5 [PH] (ref 5–7)
RBC URINE: 1 /HPF
SP GR UR STRIP: 1.01 (ref 1–1.03)
SQUAMOUS EPITHELIAL: 1 /HPF
UROBILINOGEN UR STRIP-MCNC: NORMAL MG/DL
WBC URINE: 0 /HPF

## 2024-08-22 PROCEDURE — 36415 COLL VENOUS BLD VENIPUNCTURE: CPT | Performed by: EMERGENCY MEDICINE

## 2024-08-22 PROCEDURE — 99285 EMERGENCY DEPT VISIT HI MDM: CPT | Mod: 25

## 2024-08-22 PROCEDURE — 80053 COMPREHEN METABOLIC PANEL: CPT | Performed by: EMERGENCY MEDICINE

## 2024-08-22 PROCEDURE — 85025 COMPLETE CBC W/AUTO DIFF WBC: CPT | Performed by: EMERGENCY MEDICINE

## 2024-08-22 PROCEDURE — 85379 FIBRIN DEGRADATION QUANT: CPT | Performed by: EMERGENCY MEDICINE

## 2024-08-22 PROCEDURE — 84484 ASSAY OF TROPONIN QUANT: CPT | Performed by: EMERGENCY MEDICINE

## 2024-08-22 PROCEDURE — 81001 URINALYSIS AUTO W/SCOPE: CPT | Performed by: EMERGENCY MEDICINE

## 2024-08-22 PROCEDURE — 96374 THER/PROPH/DIAG INJ IV PUSH: CPT

## 2024-08-22 ASSESSMENT — COLUMBIA-SUICIDE SEVERITY RATING SCALE - C-SSRS
1. IN THE PAST MONTH, HAVE YOU WISHED YOU WERE DEAD OR WISHED YOU COULD GO TO SLEEP AND NOT WAKE UP?: NO
6. HAVE YOU EVER DONE ANYTHING, STARTED TO DO ANYTHING, OR PREPARED TO DO ANYTHING TO END YOUR LIFE?: NO
2. HAVE YOU ACTUALLY HAD ANY THOUGHTS OF KILLING YOURSELF IN THE PAST MONTH?: NO

## 2024-08-23 ENCOUNTER — APPOINTMENT (OUTPATIENT)
Dept: GENERAL RADIOLOGY | Facility: CLINIC | Age: 59
End: 2024-08-23
Attending: EMERGENCY MEDICINE

## 2024-08-23 ENCOUNTER — APPOINTMENT (OUTPATIENT)
Dept: CT IMAGING | Facility: CLINIC | Age: 59
End: 2024-08-23
Attending: EMERGENCY MEDICINE

## 2024-08-23 VITALS
RESPIRATION RATE: 18 BRPM | DIASTOLIC BLOOD PRESSURE: 78 MMHG | HEART RATE: 64 BPM | WEIGHT: 160 LBS | SYSTOLIC BLOOD PRESSURE: 121 MMHG | HEIGHT: 63 IN | BODY MASS INDEX: 28.35 KG/M2 | TEMPERATURE: 98.2 F | OXYGEN SATURATION: 100 %

## 2024-08-23 LAB
ALBUMIN SERPL BCG-MCNC: 4.3 G/DL (ref 3.5–5.2)
ALP SERPL-CCNC: 71 U/L (ref 40–150)
ALT SERPL W P-5'-P-CCNC: 15 U/L (ref 0–50)
ANION GAP SERPL CALCULATED.3IONS-SCNC: 10 MMOL/L (ref 7–15)
AST SERPL W P-5'-P-CCNC: 15 U/L (ref 0–45)
BASOPHILS # BLD AUTO: 0.1 10E3/UL (ref 0–0.2)
BASOPHILS NFR BLD AUTO: 1 %
BILIRUB SERPL-MCNC: 0.2 MG/DL
BUN SERPL-MCNC: 12.5 MG/DL (ref 6–20)
CALCIUM SERPL-MCNC: 9.8 MG/DL (ref 8.8–10.4)
CHLORIDE SERPL-SCNC: 104 MMOL/L (ref 98–107)
CREAT SERPL-MCNC: 0.81 MG/DL (ref 0.51–0.95)
D DIMER PPP FEU-MCNC: <0.27 UG/ML FEU (ref 0–0.5)
EGFRCR SERPLBLD CKD-EPI 2021: 84 ML/MIN/1.73M2
EOSINOPHIL # BLD AUTO: 0.3 10E3/UL (ref 0–0.7)
EOSINOPHIL NFR BLD AUTO: 4 %
ERYTHROCYTE [DISTWIDTH] IN BLOOD BY AUTOMATED COUNT: 12.7 % (ref 10–15)
GLUCOSE SERPL-MCNC: 110 MG/DL (ref 70–99)
HCO3 SERPL-SCNC: 26 MMOL/L (ref 22–29)
HCT VFR BLD AUTO: 40.5 % (ref 35–47)
HGB BLD-MCNC: 13.6 G/DL (ref 11.7–15.7)
HOLD SPECIMEN: NORMAL
IMM GRANULOCYTES # BLD: 0 10E3/UL
IMM GRANULOCYTES NFR BLD: 0 %
LYMPHOCYTES # BLD AUTO: 2.8 10E3/UL (ref 0.8–5.3)
LYMPHOCYTES NFR BLD AUTO: 36 %
MCH RBC QN AUTO: 30 PG (ref 26.5–33)
MCHC RBC AUTO-ENTMCNC: 33.6 G/DL (ref 31.5–36.5)
MCV RBC AUTO: 89 FL (ref 78–100)
MONOCYTES # BLD AUTO: 0.6 10E3/UL (ref 0–1.3)
MONOCYTES NFR BLD AUTO: 7 %
NEUTROPHILS # BLD AUTO: 4.1 10E3/UL (ref 1.6–8.3)
NEUTROPHILS NFR BLD AUTO: 52 %
NRBC # BLD AUTO: 0 10E3/UL
NRBC BLD AUTO-RTO: 0 /100
PLATELET # BLD AUTO: 246 10E3/UL (ref 150–450)
POTASSIUM SERPL-SCNC: 4.1 MMOL/L (ref 3.4–5.3)
PROT SERPL-MCNC: 7.2 G/DL (ref 6.4–8.3)
RBC # BLD AUTO: 4.54 10E6/UL (ref 3.8–5.2)
SODIUM SERPL-SCNC: 140 MMOL/L (ref 135–145)
TROPONIN T SERPL HS-MCNC: <6 NG/L
WBC # BLD AUTO: 7.9 10E3/UL (ref 4–11)

## 2024-08-23 PROCEDURE — 250N000011 HC RX IP 250 OP 636: Performed by: EMERGENCY MEDICINE

## 2024-08-23 PROCEDURE — 250N000013 HC RX MED GY IP 250 OP 250 PS 637: Performed by: EMERGENCY MEDICINE

## 2024-08-23 PROCEDURE — 71046 X-RAY EXAM CHEST 2 VIEWS: CPT

## 2024-08-23 PROCEDURE — 74176 CT ABD & PELVIS W/O CONTRAST: CPT

## 2024-08-23 RX ORDER — CYCLOBENZAPRINE HCL 10 MG
10 TABLET ORAL ONCE
Status: COMPLETED | OUTPATIENT
Start: 2024-08-23 | End: 2024-08-23

## 2024-08-23 RX ORDER — KETOROLAC TROMETHAMINE 15 MG/ML
15 INJECTION, SOLUTION INTRAMUSCULAR; INTRAVENOUS ONCE
Status: COMPLETED | OUTPATIENT
Start: 2024-08-23 | End: 2024-08-23

## 2024-08-23 RX ORDER — CYCLOBENZAPRINE HCL 10 MG
10 TABLET ORAL 3 TIMES DAILY PRN
Qty: 20 TABLET | Refills: 0 | Status: SHIPPED | OUTPATIENT
Start: 2024-08-23 | End: 2024-08-30

## 2024-08-23 RX ORDER — CYCLOBENZAPRINE HCL 5 MG
5 TABLET ORAL ONCE
Status: DISCONTINUED | OUTPATIENT
Start: 2024-08-23 | End: 2024-08-23

## 2024-08-23 RX ADMIN — KETOROLAC TROMETHAMINE 15 MG: 15 INJECTION, SOLUTION INTRAMUSCULAR; INTRAVENOUS at 01:49

## 2024-08-23 RX ADMIN — CYCLOBENZAPRINE HYDROCHLORIDE 10 MG: 5 TABLET, FILM COATED ORAL at 01:50

## 2024-08-23 ASSESSMENT — ACTIVITIES OF DAILY LIVING (ADL)
ADLS_ACUITY_SCORE: 35

## 2024-08-23 NOTE — ED PROVIDER NOTES
"  Emergency Department Note      History of Present Illness     Chief Complaint   Flank Pain      HPI   Of note, the patient's daughter is acting as a [place] .  rGaciela Castorena is a 59 year old female with a history of hypertension, palpitations, and tuberculosis presenting with left flank pain.  This started about 5 days ago after she apparently lifted up her son.  The pain is worse when she moves and with some deep breathing.  However she does not feel short of breath.  There is no radiation into her lower extremities, and she denies any numbness, tingling, or weakness to her lower extremities.  She has not had a cough, fever, nausea, vomiting, chest pain, abdominal pain, or any urinary symptoms.  She has not tried any medication for this yet.  Her daughter states that she has not had a rash.    Independent Historian   The patient's daughter corroborates the above and translated for the patient.     Review of External Notes   None    Past Medical History     Medical History and Problem List   Abnormal Pap smear of cervix  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 breast  Hypertension  Palpitations  Tuberculosis  Tb lung, latent   Environmental allergies   Gall bladder disease   Hemoptysis     Medications   Losartan   Metoprolol succinate   Pantoprazole   Cetorozome     Surgical History   Colonoscopy   EGD  Lumpectomy breast with seed localization, left     Physical Exam     Patient Vitals for the past 24 hrs:   BP Temp Temp src Pulse Resp SpO2 Height Weight   08/23/24 0259 121/78 -- -- 64 -- 100 % -- --   08/22/24 2330 134/75 98.2  F (36.8  C) Oral 73 18 97 % 1.6 m (5' 3\") 72.6 kg (160 lb)     Physical Exam  Nursing note and vitals reviewed.  Constitutional:  Oriented to person, place, and time. Cooperative.  Appears uncomfortable.  HENT:   Nose:    Nose normal.   Mouth/Throat:   Mucous membranes are normal.   Eyes:    Conjunctivae normal and EOM are normal.      Pupils are equal, round, and " reactive to light.   Neck:    Trachea normal.   Cardiovascular:  Normal rate, regular rhythm, normal heart sounds and normal pulses. No murmur heard.  Pulmonary/Chest:  Effort normal and breath sounds normal.   Abdominal:   Soft. Normal appearance and bowel sounds are normal.      There is no tenderness.      There is no rebound and no CVA tenderness.   Musculoskeletal:  Tenderness to palpation to the left flank region.  Extremities atraumatic x 4.   Lymphadenopathy:  No cervical adenopathy.   Neurological:   Alert and oriented to person, place, and time. Normal strength.      No cranial nerve deficit or sensory deficit. GCS eye subscore is 4. GCS verbal subscore is 5. GCS motor subscore is 6.   Skin:    Skin is intact. No rash noted.   Psychiatric:   Normal mood and affect.      Diagnostics     Lab Results   Labs Ordered and Resulted from Time of ED Arrival to Time of ED Departure   COMPREHENSIVE METABOLIC PANEL - Abnormal       Result Value    Sodium 140      Potassium 4.1      Carbon Dioxide (CO2) 26      Anion Gap 10      Urea Nitrogen 12.5      Creatinine 0.81      GFR Estimate 84      Calcium 9.8      Chloride 104      Glucose 110 (*)     Alkaline Phosphatase 71      AST 15      ALT 15      Protein Total 7.2      Albumin 4.3      Bilirubin Total 0.2     ROUTINE UA WITH MICROSCOPIC REFLEX TO CULTURE - Abnormal    Color Urine Straw      Appearance Urine Clear      Glucose Urine Negative      Bilirubin Urine Negative      Ketones Urine Negative      Specific Gravity Urine 1.007      Blood Urine Small (*)     pH Urine 6.5      Protein Albumin Urine Negative      Urobilinogen Urine Normal      Nitrite Urine Negative      Leukocyte Esterase Urine Negative      RBC Urine 1      WBC Urine 0      Squamous Epithelials Urine 1     D DIMER QUANTITATIVE - Normal    D-Dimer Quantitative <0.27     TROPONIN T, HIGH SENSITIVITY - Normal    Troponin T, High Sensitivity <6     CBC WITH PLATELETS AND DIFFERENTIAL    WBC Count 7.9       RBC Count 4.54      Hemoglobin 13.6      Hematocrit 40.5      MCV 89      MCH 30.0      MCHC 33.6      RDW 12.7      Platelet Count 246      % Neutrophils 52      % Lymphocytes 36      % Monocytes 7      % Eosinophils 4      % Basophils 1      % Immature Granulocytes 0      NRBCs per 100 WBC 0      Absolute Neutrophils 4.1      Absolute Lymphocytes 2.8      Absolute Monocytes 0.6      Absolute Eosinophils 0.3      Absolute Basophils 0.1      Absolute Immature Granulocytes 0.0      Absolute NRBCs 0.0         Imaging   CT Abdomen Pelvis w/o Contrast   Final Result   IMPRESSION:    1. No cause of acute pain identified in the abdomen or pelvis.   2. No renal or ureteral calculi or evidence of urinary obstruction.   3. Cholelithiasis. No evidence of acute cholecystitis.       XR Chest 2 Views   Final Result   IMPRESSION: No evidence of active cardiopulmonary disease.           EKG   ECG taken at 2327, ECG read at 0140  Normal sinus rhythm   Normal ECG   Rate 78 bpm. UT interval 170 ms. QRS duration 94 ms. QT/QTc 392/446 ms. P-R-T axes 66 -16 64.    Independent Interpretation   I independently interpreted the patient's chest x-ray and agree with the negative reading for infiltrate.    ED Course      Medications Administered   Medications   ketorolac (TORADOL) injection 15 mg (15 mg Intravenous $Given 8/23/24 0149)   cyclobenzaprine (FLEXERIL) tablet 10 mg (10 mg Oral $Given 8/23/24 0150)       Procedures   Procedures     Discussion of Management   None    ED Course   ED Course as of 08/23/24 0649   Fri Aug 23, 2024   0136 I obtained the history and examined the patient as above.        Additional Documentation  None    Medical Decision Making / Diagnosis     CMS Diagnoses: None    MIPS       None    MDM   Graciela Castorena is a 59 year old female who came in for further evaluation of left flank pain.  Based on the history and her exam, I am suspicious that her symptoms are from a muscle strain.  However I still want to  rule out more serious causes such as pulmonary embolism, pneumothorax, kidney stone, kidney infection, or cardiac ischemia.  She had the above workup obtained, and everything came back unremarkable.  With a negative D-dimer, I feel that pulmonary embolism has been sufficiently ruled out.  She had a chest x-ray obtained as well, which was unremarkable.  I then also obtained a CT scan to rule out a kidney stone, and that was unremarkable.  She does feel better here after the above interventions as well.  I do not feel that any additional testing or workup is necessary such as an MRI of her spine.  I will send her home with a prescription for Flexeril, and I recommended she continue taking ibuprofen and Tylenol.  I also recommended ice or heat.  She should follow-up with primary care and certainly return with any concerns or worsening symptoms.    Disposition   The patient was discharged.     Diagnosis     ICD-10-CM    1. Left flank pain  R10.9       2. Muscle strain  T14.8XXA            Discharge Medications   Discharge Medication List as of 8/23/2024  3:26 AM        START taking these medications    Details   cyclobenzaprine (FLEXERIL) 10 MG tablet Take 1 tablet (10 mg) by mouth 3 times daily as needed., Disp-20 tablet, R-0, Local Print               Scribe Disclosure:  I, Phoenix Peterson, am serving as a scribe at 2:29 AM on 8/23/2024 to document services personally performed by Phil Lundy MD based on my observations and the provider's statements to me.        Phil Lundy MD  08/23/24 0650       Phil Lundy MD  08/23/24 0691

## 2024-08-23 NOTE — ED TRIAGE NOTES
Left sided pain (arm, chest, flank) since Monday. No meds taken PTA, denies falling/injury.     Triage Assessment (Adult)       Row Name 08/22/24 5898          Triage Assessment    Airway WDL WDL        Respiratory WDL    Respiratory WDL WDL        Skin Circulation/Temperature WDL    Skin Circulation/Temperature WDL WDL        Cardiac WDL    Cardiac WDL WDL        Peripheral/Neurovascular WDL    Peripheral Neurovascular WDL WDL        Cognitive/Neuro/Behavioral WDL    Cognitive/Neuro/Behavioral WDL WDL

## 2024-08-28 ENCOUNTER — TELEPHONE (OUTPATIENT)
Dept: FAMILY MEDICINE | Facility: CLINIC | Age: 59
End: 2024-08-28

## 2024-08-28 NOTE — TELEPHONE ENCOUNTER
Transitions of Care Outreach  Chief Complaint   Patient presents with    Hospital F/U     Most Recent Admission Date: 8/22/2024   Most Recent Admission Diagnosis:      Most Recent Discharge Date: 8/23/2024   Most Recent Discharge Diagnosis: Left flank pain - R10.9  Muscle strain - T14.8XXA     Transitions of Care Assessment    Discharge Assessment  How are you doing now that you are home?: Feeling better  How are your symptoms? (Red Flag symptoms escalate to triage hotline per guidelines): Improved  Do you know how to contact your clinic care team if you have future questions or changes to your health status? : Yes  Does the patient have their discharge instructions? : Yes  Does the patient have questions regarding their discharge instructions? : No  Were you started on any new medications or were there changes to any of your previous medications? : No  Does the patient have all of their medications?: Yes  Do you have questions regarding any of your medications? : No  Do you have all of your needed medical supplies or equipment (DME)?  (i.e. oxygen tank, CPAP, cane, etc.): Yes    Follow up Plan     Discharge Follow-Up  Discharge follow up appointment scheduled in alignment with recommended follow up timeframe or Transitions of Risk Category? (Low = within 30 days; Moderate= within 14 days; High= within 7 days): Yes  Discharge Follow Up Appointment Date: 09/11/24  Discharge Follow Up Appointment Scheduled with?: Primary Care Provider    Future Appointments   Date Time Provider Department Center   9/11/2024  9:00 AM Tavares Velasco MD [facility]    12/23/2024 10:30 AM [id number]   1/2/2025 10:40 AM Samreen Barahona MD [facility]     Outpatient Plan as outlined on AVS reviewed with patient.    For any urgent concerns, please contact our 24 hour nurse triage line: [phone] ([phone])       Carol CALDWELL  [facility]

## 2024-09-11 ENCOUNTER — OFFICE VISIT (OUTPATIENT)
Dept: FAMILY MEDICINE | Facility: CLINIC | Age: 59
End: 2024-09-11

## 2024-09-11 VITALS
TEMPERATURE: 98.9 F | BODY MASS INDEX: 29.77 KG/M2 | OXYGEN SATURATION: 98 % | DIASTOLIC BLOOD PRESSURE: 76 MMHG | HEIGHT: 63 IN | HEART RATE: 72 BPM | SYSTOLIC BLOOD PRESSURE: 107 MMHG | WEIGHT: 168 LBS | RESPIRATION RATE: 16 BRPM

## 2024-09-11 DIAGNOSIS — K80.20 CALCULUS OF GALLBLADDER WITHOUT CHOLECYSTITIS WITHOUT OBSTRUCTION: ICD-10-CM

## 2024-09-11 DIAGNOSIS — R94.30 LOW LEFT VENTRICULAR EJECTION FRACTION: ICD-10-CM

## 2024-09-11 DIAGNOSIS — R41.3 MEMORY CHANGES: ICD-10-CM

## 2024-09-11 DIAGNOSIS — I10 HYPERTENSION, UNSPECIFIED TYPE: ICD-10-CM

## 2024-09-11 DIAGNOSIS — M67.432 GANGLION CYST OF WRIST, LEFT: ICD-10-CM

## 2024-09-11 DIAGNOSIS — Z86.79 HISTORY OF CARDIOMYOPATHY: Primary | ICD-10-CM

## 2024-09-11 DIAGNOSIS — R73.03 PREDIABETES: ICD-10-CM

## 2024-09-11 LAB
CREAT UR-MCNC: 153 MG/DL
HBA1C MFR BLD: 5.8 % (ref 0–5.6)
MICROALBUMIN UR-MCNC: <12 MG/L
MICROALBUMIN/CREAT UR: NORMAL MG/G{CREAT}

## 2024-09-11 PROCEDURE — 83036 HEMOGLOBIN GLYCOSYLATED A1C: CPT | Performed by: INTERNAL MEDICINE

## 2024-09-11 PROCEDURE — 82043 UR ALBUMIN QUANTITATIVE: CPT | Performed by: INTERNAL MEDICINE

## 2024-09-11 PROCEDURE — 99214 OFFICE O/P EST MOD 30 MIN: CPT | Performed by: INTERNAL MEDICINE

## 2024-09-11 PROCEDURE — 93000 ELECTROCARDIOGRAM COMPLETE: CPT | Performed by: INTERNAL MEDICINE

## 2024-09-11 PROCEDURE — 36415 COLL VENOUS BLD VENIPUNCTURE: CPT | Performed by: INTERNAL MEDICINE

## 2024-09-11 PROCEDURE — 82570 ASSAY OF URINE CREATININE: CPT | Performed by: INTERNAL MEDICINE

## 2024-09-11 ASSESSMENT — PAIN SCALES - GENERAL: PAINLEVEL: NO PAIN (1)

## 2024-09-11 NOTE — PROGRESS NOTES
"  Assessment & Plan   Problem List Items Addressed This Visit       Cholelithiasis    Relevant Orders    Adult Gen Surg  Referral    Prediabetes (Chronic)    Relevant Orders    Hemoglobin A1c    Albumin Random Urine Quantitative with Creat Ratio     Other Visit Diagnoses       History of cardiomyopathy    -  Primary    Relevant Orders    EKG 12-lead complete w/read - Clinics (Completed)    Adult Cardiology Eval  Referral    Echocardiogram Complete    Ganglion cyst of wrist, left        Relevant Orders    Orthopedic  Referral    Hypertension, unspecified type        Memory changes        Relevant Orders    Memory Clinic Referral    Low left ventricular ejection fraction        Relevant Orders    Echocardiogram Complete           Has some tenderness on right upper quadrant to palpation refer to general surgery.  Gallbladder stones.  History of cardiomyopathy ejection fraction was mid 40s.  She is not taking metoprolol but takes losartan 25 mg repeat blood pressure was normal.  Denies any shortness of breath or chest pain.  She is having some memory difficulties refer to memory clinic.  Her 10-year atherosclerotic cardiovascular disease rescore was low at 3.5%.  She sees cardiology once a year.  Referral to hand surgery for left wrist ganglion cyst.  She has prediabetes A1c in the low 6.  Repeat today urine microalbumin.  All questions answered follow-up for a wellness visit.     MED REC REQUIRED  Post Medication Reconciliation Status: discharge medications reconciled, continue medications without change  BMI  Estimated body mass index is 29.76 kg/m  as calculated from the following:    Height as of this encounter: 1.6 m (5' 3\").    Weight as of this encounter: 76.2 kg (168 lb).   Weight management plan: Discussed healthy diet and exercise guidelines    Work on weight loss  Regular exercise  See Patient Instructions      Va Owens is a 59 year old, presenting for the following health " "issues:  ER F/U    HPI     ED/UC Followup:    Facility:  [facility]  Date of visit: 08/22-08/23  Reason for visit: FLANK PAIN  Current Status: BETTER    The 10-year ASCVD risk score (Dillon WILD, et al., 2019) is: 3.2%    Values used to calculate the score:      Age: 59 years      Sex: Female      Is Non- : Yes      Diabetic: No      Tobacco smoker: No      Systolic Blood Pressure: 107 mmHg      Is BP treated: Yes      HDL Cholesterol: 49 mg/dL      Total Cholesterol: 180 mg/dL    Patient presents for follow-up.  She was seen in the ER for, she had right upper quadrant abdominal symptoms ultrasound showed gallbladder stones she feels like a lump in the right upper quadrant also she has a ganglion cyst on the dorsum of her left wrist.  No chest pain per se.  She had seen cardiology in the past ejection fraction was 45% she is not taking metoprolol as prescribed but is taking losartan 25 mg once daily repeat blood pressure today using a large cuff was 107/76.  Patient denies any chest symptoms per se.  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  Reviewed CT imaging unrevealing except for theGallbladder stones she had a renal ultrasound done that showed no stenosis in the past.    Review of Systems  Constitutional, HEENT, cardiovascular, pulmonary, gi and gu systems are negative, except as otherwise noted.      Objective    /76   Pulse 72   Temp 98.9  F (37.2  C)   Resp 16   Ht 1.6 m (5' 3\")   Wt 76.2 kg (168 lb)   SpO2 98%   BMI 29.76 kg/m    Body mass index is 29.76 kg/m .  Physical Exam   GENERAL: alert and no distress  EYES: Eyes grossly normal to inspection, PERRL and conjunctivae and sclerae normal  HENT: ear canals and TM's normal, nose and mouth without ulcers or lesions  NECK: no adenopathy, no asymmetry, masses, or scars  RESP: lungs clear to auscultation - no rales, rhonchi or wheezes  CV: regular rate and rhythm, normal S1 S2, no S3 or S4, no " murmur, click or rub, no peripheral edema  ABDOMEN: soft, nontender, no hepatosplenomegaly, no masses and bowel sounds normal  MS: no gross musculoskeletal defects noted, no edema  SKIN: no suspicious lesions or rashes  NEURO: Normal strength and tone, mentation intact and speech normal  PSYCH: mentation appears normal, affect normal/bright    Admission on 08/22/2024, Discharged on 08/23/2024   Component Date Value Ref Range Status    Sodium 08/22/2024 140  135 - 145 mmol/L Final    Potassium 08/22/2024 4.1  3.4 - 5.3 mmol/L Final    Carbon Dioxide (CO2) 08/22/2024 26  22 - 29 mmol/L Final    Anion Gap 08/22/2024 10  7 - 15 mmol/L Final    Urea Nitrogen 08/22/2024 12.5  6.0 - 20.0 mg/dL Final    Creatinine 08/22/2024 0.81  0.51 - 0.95 mg/dL Final    GFR Estimate 08/22/2024 84  >60 mL/min/1.73m2 Final    eGFR calculated using 2021 CKD-EPI equation.    Calcium 08/22/2024 9.8  8.8 - 10.4 mg/dL Final    Reference intervals for this test were updated on 7/16/2024 to reflect our healthy population more accurately. There may be differences in the flagging of prior results with similar values performed with this method. Those prior results can be interpreted in the context of the updated reference intervals.    Chloride 08/22/2024 104  98 - 107 mmol/L Final    Glucose 08/22/2024 110 (H)  70 - 99 mg/dL Final    Alkaline Phosphatase 08/22/2024 71  40 - 150 U/L Final    AST 08/22/2024 15  0 - 45 U/L Final    ALT 08/22/2024 15  0 - 50 U/L Final    Protein Total 08/22/2024 7.2  6.4 - 8.3 g/dL Final    Albumin 08/22/2024 4.3  3.5 - 5.2 g/dL Final    Bilirubin Total 08/22/2024 0.2  <=1.2 mg/dL Final    Color Urine 08/22/2024 Straw  Colorless, Straw, Light Yellow, Yellow Final    Appearance Urine 08/22/2024 Clear  Clear Final    Glucose Urine 08/22/2024 Negative  Negative mg/dL Final    Bilirubin Urine 08/22/2024 Negative  Negative Final    Ketones Urine 08/22/2024 Negative  Negative mg/dL Final    Specific Gravity Urine 08/22/2024  1.007  1.003 - 1.035 Final    Blood Urine 08/22/2024 Small (A)  Negative Final    pH Urine 08/22/2024 6.5  5.0 - 7.0 Final    Protein Albumin Urine 08/22/2024 Negative  Negative mg/dL Final    Urobilinogen Urine 08/22/2024 Normal  Normal, 2.0 mg/dL Final    Nitrite Urine 08/22/2024 Negative  Negative Final    Leukocyte Esterase Urine 08/22/2024 Negative  Negative Final    RBC Urine 08/22/2024 1  <=2 /HPF Final    WBC Urine 08/22/2024 0  <=5 /HPF Final    Squamous Epithelials Urine 08/22/2024 1  <=1 /HPF Final    WBC Count 08/22/2024 7.9  4.0 - 11.0 10e3/uL Final    RBC Count 08/22/2024 4.54  3.80 - 5.20 10e6/uL Final    Hemoglobin 08/22/2024 13.6  11.7 - 15.7 g/dL Final    Hematocrit 08/22/2024 40.5  35.0 - 47.0 % Final    MCV 08/22/2024 89  78 - 100 fL Final    MCH 08/22/2024 30.0  26.5 - 33.0 pg Final    MCHC 08/22/2024 33.6  31.5 - 36.5 g/dL Final    RDW 08/22/2024 12.7  10.0 - 15.0 % Final    Platelet Count 08/22/2024 246  150 - 450 10e3/uL Final    % Neutrophils 08/22/2024 52  % Final    % Lymphocytes 08/22/2024 36  % Final    % Monocytes 08/22/2024 7  % Final    % Eosinophils 08/22/2024 4  % Final    % Basophils 08/22/2024 1  % Final    % Immature Granulocytes 08/22/2024 0  % Final    NRBCs per 100 WBC 08/22/2024 0  <1 /100 Final    Absolute Neutrophils 08/22/2024 4.1  1.6 - 8.3 10e3/uL Final    Absolute Lymphocytes 08/22/2024 2.8  0.8 - 5.3 10e3/uL Final    Absolute Monocytes 08/22/2024 0.6  0.0 - 1.3 10e3/uL Final    Absolute Eosinophils 08/22/2024 0.3  0.0 - 0.7 10e3/uL Final    Absolute Basophils 08/22/2024 0.1  0.0 - 0.2 10e3/uL Final    Absolute Immature Granulocytes 08/22/2024 0.0  <=0.4 10e3/uL Final    Absolute NRBCs 08/22/2024 0.0  10e3/uL Final    Hold Specimen 08/22/2024 [facility]   Final    D-Dimer Quantitative 08/22/2024 <0.27  0.00 - 0.50 ug/mL FEU Final    Troponin T, High Sensitivity 08/22/2024 <6  <=14 ng/L Final    Either a High Sensitivity Troponin T baseline (0 hours) value = 100 ng/L, or an  increase in High Sensitivity Troponin T = 7 ng/L at 2 hours compared to 0 hours (2-0 hours), suggests myocardial injury, and urgent clinical attention is required.    If the 2-0 hours increase is <7 ng/L, a High Sensitivity Troponin T result above gender-specific reference ranges warrants further evaluation.   Recommendations for further evaluation include correlation with clinical decision-making tool (e.g., HEART), a 3rd High Sensitivity Troponin T test 2 hours after the 2nd (a 20% change from baseline would represent concern), admission for observation, close PCC/cardiology follow-up, or urgent outpatient provocative testing.           Signed Electronically by: Tavares Velasco MD

## 2024-09-16 ENCOUNTER — PATIENT OUTREACH (OUTPATIENT)
Dept: CARE COORDINATION | Facility: CLINIC | Age: 59
End: 2024-09-16

## 2024-09-17 ENCOUNTER — TELEPHONE (OUTPATIENT)
Dept: FAMILY MEDICINE | Facility: CLINIC | Age: 59
End: 2024-09-17

## 2024-09-17 NOTE — TELEPHONE ENCOUNTER
Spoke with pt about the provider's note on results and recommendations. Pt verbalized understanding and has no questions or concerns.     Nikhil Claudio RN  [facility]

## 2024-09-17 NOTE — TELEPHONE ENCOUNTER
Tavares Velasco MD  [facility] Nurse [place] - Primary Care  Patric Owens, reviewed some of your labs ,  Test for diabetes HbA1c is stable at 5.8, indicative of prediabetes.  Please continue watch your diet; low carbs diet, exercise and weight loss strongly recommended.  Urine protein is negative-reassuring ,  Regards,  Dr Velasco  Written by Tavares Velasco MD on 9/11/2024  8:55 PM CDT

## 2024-10-17 DIAGNOSIS — I10 HYPERTENSION, UNSPECIFIED TYPE: ICD-10-CM

## 2024-10-17 RX ORDER — LOSARTAN POTASSIUM 25 MG/1
25 TABLET ORAL DAILY
Qty: 90 TABLET | Refills: 0 | Status: SHIPPED | OUTPATIENT
Start: 2024-10-17

## 2024-10-17 RX ORDER — LOSARTAN POTASSIUM 25 MG/1
25 TABLET ORAL DAILY
Qty: 90 TABLET | Refills: 3 | OUTPATIENT
Start: 2024-10-17

## 2024-10-17 NOTE — TELEPHONE ENCOUNTER
Pt called to check on this pt states she is at the pharmacy now - OUT Of medication     Hilary Burnham RN  [facility]

## 2024-12-06 ENCOUNTER — HOSPITAL ENCOUNTER (EMERGENCY)
Facility: CLINIC | Age: 59
Discharge: HOME | End: 2024-12-06
Attending: EMERGENCY MEDICINE | Admitting: EMERGENCY MEDICINE
Payer: COMMERCIAL

## 2024-12-06 VITALS
SYSTOLIC BLOOD PRESSURE: 142 MMHG | RESPIRATION RATE: 16 BRPM | DIASTOLIC BLOOD PRESSURE: 81 MMHG | HEART RATE: 70 BPM | OXYGEN SATURATION: 99 % | TEMPERATURE: 97 F

## 2024-12-06 DIAGNOSIS — R07.89 CHEST DISCOMFORT: ICD-10-CM

## 2024-12-06 DIAGNOSIS — R03.0 ELEVATED BLOOD PRESSURE READING: ICD-10-CM

## 2024-12-06 DIAGNOSIS — J02.9 SORE THROAT: ICD-10-CM

## 2024-12-06 LAB
ALBUMIN SERPL BCG-MCNC: 4.3 G/DL (ref 3.5–5.2)
ALP SERPL-CCNC: 71 U/L (ref 40–150)
ALT SERPL W P-5'-P-CCNC: 14 U/L (ref 0–50)
ANION GAP SERPL CALCULATED.3IONS-SCNC: 11 MMOL/L (ref 7–15)
AST SERPL W P-5'-P-CCNC: 18 U/L (ref 0–45)
ATRIAL RATE - MUSE: 78 BPM
BASOPHILS # BLD AUTO: 0.1 10E3/UL (ref 0–0.2)
BASOPHILS NFR BLD AUTO: 1 %
BILIRUB DIRECT SERPL-MCNC: <0.2 MG/DL (ref 0–0.3)
BILIRUB SERPL-MCNC: 0.4 MG/DL
BUN SERPL-MCNC: 13.2 MG/DL (ref 6–20)
CALCIUM SERPL-MCNC: 9.5 MG/DL (ref 8.8–10.4)
CHLORIDE SERPL-SCNC: 105 MMOL/L (ref 98–107)
CREAT SERPL-MCNC: 0.75 MG/DL (ref 0.51–0.95)
DIASTOLIC BLOOD PRESSURE - MUSE: NORMAL MMHG
EGFRCR SERPLBLD CKD-EPI 2021: >90 ML/MIN/1.73M2
EOSINOPHIL # BLD AUTO: 0.3 10E3/UL (ref 0–0.7)
EOSINOPHIL NFR BLD AUTO: 5 %
ERYTHROCYTE [DISTWIDTH] IN BLOOD BY AUTOMATED COUNT: 13.2 % (ref 10–15)
GLUCOSE SERPL-MCNC: 96 MG/DL (ref 70–99)
GROUP A STREP BY PCR: NOT DETECTED
HCO3 SERPL-SCNC: 25 MMOL/L (ref 22–29)
HCT VFR BLD AUTO: 42.9 % (ref 35–47)
HGB BLD-MCNC: 14.4 G/DL (ref 11.7–15.7)
HOLD SPECIMEN: NORMAL
HOLD SPECIMEN: NORMAL
IMM GRANULOCYTES # BLD: 0 10E3/UL
IMM GRANULOCYTES NFR BLD: 0 %
INTERPRETATION ECG - MUSE: NORMAL
LIPASE SERPL-CCNC: 30 U/L (ref 13–60)
LYMPHOCYTES # BLD AUTO: 2.2 10E3/UL (ref 0.8–5.3)
LYMPHOCYTES NFR BLD AUTO: 33 %
MCH RBC QN AUTO: 29.4 PG (ref 26.5–33)
MCHC RBC AUTO-ENTMCNC: 33.6 G/DL (ref 31.5–36.5)
MCV RBC AUTO: 88 FL (ref 78–100)
MONOCYTES # BLD AUTO: 0.5 10E3/UL (ref 0–1.3)
MONOCYTES NFR BLD AUTO: 8 %
NEUTROPHILS # BLD AUTO: 3.5 10E3/UL (ref 1.6–8.3)
NEUTROPHILS NFR BLD AUTO: 53 %
NRBC # BLD AUTO: 0 10E3/UL
NRBC BLD AUTO-RTO: 0 /100
P AXIS - MUSE: 41 DEGREES
PLATELET # BLD AUTO: 242 10E3/UL (ref 150–450)
POTASSIUM SERPL-SCNC: 3.6 MMOL/L (ref 3.4–5.3)
PR INTERVAL - MUSE: 162 MS
PROT SERPL-MCNC: 7.4 G/DL (ref 6.4–8.3)
QRS DURATION - MUSE: 96 MS
QT - MUSE: 394 MS
QTC - MUSE: 449 MS
R AXIS - MUSE: -32 DEGREES
RBC # BLD AUTO: 4.9 10E6/UL (ref 3.8–5.2)
SODIUM SERPL-SCNC: 141 MMOL/L (ref 135–145)
SYSTOLIC BLOOD PRESSURE - MUSE: NORMAL MMHG
T AXIS - MUSE: 38 DEGREES
TROPONIN T SERPL HS-MCNC: <6 NG/L
VENTRICULAR RATE- MUSE: 78 BPM
WBC # BLD AUTO: 6.6 10E3/UL (ref 4–11)

## 2024-12-06 PROCEDURE — 80048 BASIC METABOLIC PNL TOTAL CA: CPT | Performed by: EMERGENCY MEDICINE

## 2024-12-06 PROCEDURE — 82565 ASSAY OF CREATININE: CPT | Performed by: EMERGENCY MEDICINE

## 2024-12-06 PROCEDURE — 80051 ELECTROLYTE PANEL: CPT | Performed by: EMERGENCY MEDICINE

## 2024-12-06 PROCEDURE — 99285 EMERGENCY DEPT VISIT HI MDM: CPT

## 2024-12-06 PROCEDURE — 36415 COLL VENOUS BLD VENIPUNCTURE: CPT | Performed by: EMERGENCY MEDICINE

## 2024-12-06 PROCEDURE — 84484 ASSAY OF TROPONIN QUANT: CPT | Performed by: EMERGENCY MEDICINE

## 2024-12-06 PROCEDURE — 84450 TRANSFERASE (AST) (SGOT): CPT | Performed by: EMERGENCY MEDICINE

## 2024-12-06 PROCEDURE — 85004 AUTOMATED DIFF WBC COUNT: CPT | Performed by: EMERGENCY MEDICINE

## 2024-12-06 PROCEDURE — 93005 ELECTROCARDIOGRAM TRACING: CPT

## 2024-12-06 PROCEDURE — 83690 ASSAY OF LIPASE: CPT | Performed by: EMERGENCY MEDICINE

## 2024-12-06 PROCEDURE — 250N000013 HC RX MED GY IP 250 OP 250 PS 637: Performed by: EMERGENCY MEDICINE

## 2024-12-06 PROCEDURE — 84155 ASSAY OF PROTEIN SERUM: CPT | Performed by: EMERGENCY MEDICINE

## 2024-12-06 PROCEDURE — 87651 STREP A DNA AMP PROBE: CPT | Performed by: EMERGENCY MEDICINE

## 2024-12-06 RX ORDER — ASPIRIN 81 MG/1
324 TABLET, CHEWABLE ORAL ONCE
Status: DISCONTINUED | OUTPATIENT
Start: 2024-12-06 | End: 2024-12-06

## 2024-12-06 RX ORDER — ACETAMINOPHEN 500 MG
1000 TABLET ORAL ONCE
Status: COMPLETED | OUTPATIENT
Start: 2024-12-06 | End: 2024-12-06

## 2024-12-06 RX ADMIN — ACETAMINOPHEN 1000 MG: 500 TABLET, FILM COATED ORAL at 09:54

## 2024-12-06 ASSESSMENT — COLUMBIA-SUICIDE SEVERITY RATING SCALE - C-SSRS
2. HAVE YOU ACTUALLY HAD ANY THOUGHTS OF KILLING YOURSELF IN THE PAST MONTH?: NO
6. HAVE YOU EVER DONE ANYTHING, STARTED TO DO ANYTHING, OR PREPARED TO DO ANYTHING TO END YOUR LIFE?: NO
1. IN THE PAST MONTH, HAVE YOU WISHED YOU WERE DEAD OR WISHED YOU COULD GO TO SLEEP AND NOT WAKE UP?: NO

## 2024-12-06 ASSESSMENT — ACTIVITIES OF DAILY LIVING (ADL)
ADLS_ACUITY_SCORE: 41

## 2024-12-06 NOTE — ED TRIAGE NOTES
Pt reports L anterior chest pain and HTN since last night. Pt reports that she checked it at home and it was about 180/100. Pt also reports sore throat x1 week.      Triage Assessment (Adult)       Row Name 12/06/24 0841          Triage Assessment    Airway WDL WDL        Respiratory WDL    Respiratory WDL WDL        Skin Circulation/Temperature WDL    Skin Circulation/Temperature WDL WDL        Cardiac WDL    Cardiac WDL X  HTN, L anterior chest pain        Peripheral/Neurovascular WDL    Peripheral Neurovascular WDL WDL        Cognitive/Neuro/Behavioral WDL    Cognitive/Neuro/Behavioral WDL WDL

## 2024-12-06 NOTE — ED PROVIDER NOTES
Emergency Department Note      History of Present Illness     Chief Complaint:  Sore throat    HPI   Graciela Castorena is a 59 year old female who presents with multiple concerns including primarily 1 week of a sore throat for which she has not taken any medication, also measured her blood pressure last night and it was high, she does not normally check her blood pressure at home but does have a history of hypertension and states she is taking losartan, not currently on metoprolol.  No fevers or cough.  She noticed some slight discomfort in her chest yesterday, not currently having chest pain.  No abdominal pain or vomiting.    Review of External Notes: I personally reviewed prior records including echocardiogram from May showing an ejection fraction of 45 to 50% with no major valvular disease.  Coronary CT in May also showed no calcified coronary artery disease.    Past Medical History     Medical History and Problem List   Past Medical History:   Diagnosis Date    Abnormal Pap smear of cervix 03/30/2015    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 breast 2018    Hypertension     Palpitations     Tuberculosis      Medications   cholecalciferol (VITAMIN D3) 125 mcg (5000 units) capsule  estradiol (ESTRACE) 0.1 MG/GM vaginal cream  hydrocortisone, Perianal, (HYDROCORTISONE) 2.5 % cream  ibuprofen (ADVIL/MOTRIN) 600 MG tablet  loperamide (IMODIUM A-D) 2 MG tablet  losartan (COZAAR) 25 MG tablet  ondansetron (ZOFRAN ODT) 4 MG ODT tab  pantoprazole (PROTONIX) 40 MG EC tablet  phenol (CHLORASEPTIC) 1.4 % spray    Surgical History   Past Surgical History:   Procedure Laterality Date    COLONOSCOPY N/A 2/12/2018    Procedure: COLONOSCOPY;  colonoscopy;  Surgeon: Sabas Villegas MD;  Location:  GI    ESOPHAGOSCOPY, GASTROSCOPY, DUODENOSCOPY (EGD), COMBINED N/A 5/23/2024    Procedure: ESOPHAGOGASTRODUODENOSCOPY, WITH BIOPSY;  Surgeon: Sabas Villegas MD;  Location:  GI    LUMPECTOMY BREAST WITH SEED  LOCALIZATION Left 4/18/2018    Procedure: LUMPECTOMY BREAST WITH SEED LOCALIZATION;  SEED LOCALIZED LEFT BREAST LUMPECTOMY ;  Surgeon: Meli Rodriguez MD;  Location: [facility]     Physical Exam     Patient Vitals for the past 24 hrs:   BP Temp Temp src Pulse Resp SpO2   12/06/24 1148 (!) 142/81 -- -- 70 -- 99 %   12/06/24 0954 (!) 147/81 -- -- 68 16 99 %   12/06/24 0840 (!) 166/85 97  F (36.1  C) Temporal 70 18 98 %     Physical Exam  General: Nontoxic-appearing woman sitting upright  HENT: mucous membranes moist, OP clear without visible swelling asymmetry or exudate  CV: rate as above, regular rhythm, no lower extremity edema, no JVD, palpable symmetric radial pulses  Resp: normal effort, speaks in full phrases, no stridor, no cough observed  GI: abdomen soft and nontender, no guarding, negative Grover's sign  MSK: no bony tenderness to chest  Skin: appropriately warm and dry, no erythema or vesicles to chest wall  Neuro: alert, clear speech, oriented  Psych: cooperative    Diagnostics   Electrocardiogram  ECG taken at 0857, ECG interpreted at 1014 by UMER Abdi MD  Sinus rhythm, no ST elevation or depression  Rate 78 bpm. DC interval 162. QRS duration 96. QTc 449    Lab Results   Labs Ordered and Resulted from Time of ED Arrival to Time of ED Departure   BASIC METABOLIC PANEL - Normal       Result Value    Sodium 141      Potassium 3.6      Chloride 105      Carbon Dioxide (CO2) 25      Anion Gap 11      Urea Nitrogen 13.2      Creatinine 0.75      GFR Estimate >90      Calcium 9.5      Glucose 96     TROPONIN T, HIGH SENSITIVITY - Normal    Troponin T, High Sensitivity <6     HEPATIC FUNCTION PANEL - Normal    Protein Total 7.4      Albumin 4.3      Bilirubin Total 0.4      Alkaline Phosphatase 71      AST 18      ALT 14      Bilirubin Direct <0.20     LIPASE - Normal    Lipase 30     GROUP A STREPTOCOCCUS PCR THROAT SWAB - Normal    Group A strep by PCR Not Detected     CBC WITH PLATELETS AND DIFFERENTIAL     WBC Count 6.6      RBC Count 4.90      Hemoglobin 14.4      Hematocrit 42.9      MCV 88      MCH 29.4      MCHC 33.6      RDW 13.2      Platelet Count 242      % Neutrophils 53      % Lymphocytes 33      % Monocytes 8      % Eosinophils 5      % Basophils 1      % Immature Granulocytes 0      NRBCs per 100 WBC 0      Absolute Neutrophils 3.5      Absolute Lymphocytes 2.2      Absolute Monocytes 0.5      Absolute Eosinophils 0.3      Absolute Basophils 0.1      Absolute Immature Granulocytes 0.0      Absolute NRBCs 0.0       ED Course      Medications Administered   Medications   acetaminophen (TYLENOL) tablet 1,000 mg (1,000 mg Oral $Given 12/6/24 0940)     ED Course and Discussion of Management   ED Course as of 12/06/24 1412   Fri Dec 06, 2024   1005 I called dtr Charmaine Reno, no answer so left msg requesting callback.   1006 I called dtr Maurice Reno, no answer so left msg requesting callback.   1118 I attempted to call back to dtdoreen Richards, again no answer so left msg.   1134 I called dtdoreen Richards, discussed findings and plan, she is in agreement.   1136 I rechecked patient       Additional Documentation  None    Medical Decision Making / Diagnosis   Medical Decision Making:  Patient presents with multiple symptoms of unclear etiology.  Regarding her sore throat, no evidence of strep throat, no imminent threat is apparent to breathing or swallowing, no difficulty controlling secretions, no signs of peritonsillar abscess and I highly doubt epiglottitis, tracheitis or other more sinister process.  No sign of Joseph's.  Family was concerned that her blood pressure was elevated last night, she has a history of hypertension and is only taking one of the 2 medications prescribed to her for this, and does not typically check her blood pressure at home so the chronicity of her elevated readings is unclear.  Furthermore, her blood pressures here are not dramatically elevated, and improved to the 140s over 80s without specific  antihypertensive medication, such that I explained to the patient and family I did not feel that adjusting her outpatient antihypertensive medication is indicated at this time though I advised her to start checking her blood pressure daily, recording this in a log, and being in close contact with primary care to discuss close outpatient follow-up and reassessment.  Regarding her chest pain, her troponin is undetectable, labs are good, heart and lung exam is very reassuring.  She wished to defer chest x-ray which I felt was reasonable.  Diagnostic uncertainty as well as reassurances of today's evaluation were reviewed with her.  I spoke with 2 of her daughters on separate phone calls, they are in agreement with this as well.  Return for sudden worsening at any hour.    Disposition   Discharged    Diagnosis     ICD-10-CM    1. Sore throat  J02.9       2. Elevated blood pressure reading  R03.0       3. Chest discomfort  R07.89          12/6/2024   MD Jin Valentin Jeffrey Alan, MD  12/06/24 1415

## 2024-12-10 ENCOUNTER — PATIENT OUTREACH (OUTPATIENT)
Dept: FAMILY MEDICINE | Facility: CLINIC | Age: 59
End: 2024-12-10
Payer: COMMERCIAL

## 2024-12-10 NOTE — TELEPHONE ENCOUNTER
Patient Contact     Attempt # 1     Was call answered?  No.  Left message on voicemail with information to call triage back.     On callback please complete hospital F/U screening.      Nikhil Claudio RN  [employer]

## 2024-12-11 DIAGNOSIS — I10 HYPERTENSION, UNSPECIFIED TYPE: ICD-10-CM

## 2024-12-11 RX ORDER — LOSARTAN POTASSIUM 25 MG/1
25 TABLET ORAL DAILY
Qty: 90 TABLET | Refills: 0 | OUTPATIENT
Start: 2024-12-11

## 2024-12-18 ENCOUNTER — PATIENT OUTREACH (OUTPATIENT)
Dept: CARE COORDINATION | Facility: CLINIC | Age: 59
End: 2024-12-18
Payer: COMMERCIAL

## 2025-01-02 ENCOUNTER — ONCOLOGY VISIT (OUTPATIENT)
Dept: ONCOLOGY | Facility: CLINIC | Age: 60
End: 2025-01-02
Attending: INTERNAL MEDICINE
Payer: COMMERCIAL

## 2025-01-02 VITALS
DIASTOLIC BLOOD PRESSURE: 72 MMHG | SYSTOLIC BLOOD PRESSURE: 102 MMHG | BODY MASS INDEX: 30.79 KG/M2 | WEIGHT: 173.8 LBS | HEART RATE: 103 BPM | RESPIRATION RATE: 16 BRPM | OXYGEN SATURATION: 98 %

## 2025-01-02 DIAGNOSIS — M67.432 GANGLION CYST OF WRIST, LEFT: ICD-10-CM

## 2025-01-02 DIAGNOSIS — K21.9 GASTROESOPHAGEAL REFLUX DISEASE WITHOUT ESOPHAGITIS: Primary | ICD-10-CM

## 2025-01-02 DIAGNOSIS — K64.4 EXTERNAL HEMORRHOIDS: ICD-10-CM

## 2025-01-02 PROCEDURE — 99214 OFFICE O/P EST MOD 30 MIN: CPT | Performed by: INTERNAL MEDICINE

## 2025-01-02 PROCEDURE — G2211 COMPLEX E/M VISIT ADD ON: HCPCS | Performed by: INTERNAL MEDICINE

## 2025-01-02 PROCEDURE — G0463 HOSPITAL OUTPT CLINIC VISIT: HCPCS | Performed by: INTERNAL MEDICINE

## 2025-01-02 RX ORDER — FAMOTIDINE 40 MG/1
40 TABLET, FILM COATED ORAL DAILY
Qty: 90 TABLET | Refills: 3 | Status: SHIPPED | OUTPATIENT
Start: 2025-01-02

## 2025-01-02 ASSESSMENT — PAIN SCALES - GENERAL: PAINLEVEL_OUTOF10: NO PAIN (0)

## 2025-01-02 NOTE — PROGRESS NOTES
ONCOLOGY HISTORY: Mrs. 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      1.  Screening mammogram on 01/16/2018 revealed a new calcification cluster behind the left nipple.   -Left diagnostic mammogram on 03/12/2018 revealed an indeterminate cluster of microcalcification in retroareolar portion of left breast at 11:00.      2. Stereotactic left breast biopsy on 03/13/2018 reveals intermediate grade DCIS with microcalcification and comedonecrosis.  % positive and  NH 25% positive.      3.  Patient underwent left breast lumpectomy on 04/18/2018.  Pathology reveals grade 2 DCIS measuring 3 mm.  Margins are negative.     4. Radiation to left breast. 5056 cGy between 05/31/2018 and 06/27/2018.      5. Tamoxifen started in May, 2018. Stopped 0n 12/29/2020 due to vaginal bleeding.  -Anastrazole started on 01/12/2021.  -Anastrazole stopped 07/14/2021 due to aches and pain.     SUBJECTIVE:    Mrs. Castorena is a 60-year-old female with left breast DCIS s/p lumpectomy and radiation in 2018. She was on tamoxifen and anastrozole. It was discontinued because of side effects.  Patient has been doing well without any evidence of recurrence.  Mammogram is scheduled for tomorrow.     Patient has multiple different symptoms.  She gets intermittent headache. No dizziness. She gets heartburn. No chest pain.  No shortness of breath at rest. She gets intermittent abdominal pain. Occasional nausea.  No vomiting. No urinary complaint.  She gets constipation.  She has been having hemorrhoidal bleed on and off.  No fever or night sweats.       PHYSICAL EXAMINATION:   GENERAL:  Alert and oriented x 3.   VITAL SIGNS:  Reviewed.     EYES:  No icterus.   NECK:  Supple. No lymphadenopathy. No thyromegaly.   AXILLAE:  No lymphadenopathy.   LUNGS:  Good air entry bilaterally.  No crackles or wheezing.   HEART:  Regular.  No murmur.   GI:  Abdomen is soft.  Nontender. No mass.   EXTREMITIES:  No pedal edema.  No calf swelling or tenderness.   -Left  hand reveals ganglion cyst.  SKIN:  No rash.   BREASTS:  Examined in the presence of ALANA Jiang.   -Right breast exam is normal.  No mass. No axillary lymphadenopathy.   -Left breast exam reveals post-surgical changes.  No mass. No axillary lymphadenopathy.      LABS: CBC and CMP on 12/06/2024 is normal.     ASSESSMENT:    1.  A 60-year-old female with left breast ductal carcinoma in situ diagnosed in 2018.  No evidence of recurrence.  2.  GERD.  3.  Hemorrhoidal bleed.  4.  Left hand ganglion cyst.     PLAN:  Continue famotidine.  Colorectal appointment.  Use Anusol cream as needed.  Orthopedic appointment.  See PCP.  Follow-up in 1 year (she can just follow-up with her PCP).  Mammogram in 1 year.     DISCUSSION:  1.  Discussed with her regarding DCIS.  No evidence of recurrence.  Explained to her that risk of recurrence is very low as it is more than 6 years since her lumpectomy.    Discussed regarding follow-up of DCIS.  She needs yearly exam and yearly mammogram.    I told the patient that she can follow-up with PCP.  Patient wants to be followed in oncology clinic once a year.    2.  Patient has reflux.  She will continue on famotidine.  It helps her.    3.  Patient has been getting hemorrhoidal bleed.  It is bothering her.  Will set her up to see colorectal surgery.    4.  Patient has ganglion cyst in left hand.  It intermittently bothers her.  Will schedule her to see orthopedics.    5.  She had few questions which were all answered.  I will see her in 1 year time.  Advised her to call us with any questions or concerns.     Total visit time of 30 minutes.  Time spent in today's visit, review of chart/investigations today and documentation.

## 2025-01-02 NOTE — PATIENT INSTRUCTIONS
Continue famotidine.  Colorectal appointment.  Use Anusol cream as needed.  Orthopedic appointment.  See PCP.  Follow-up in 1 year (she can just follow-up with her PCP).  Mammogram in 1 year.

## 2025-01-02 NOTE — LETTER
1/2/2025      Graciela Castorena  [address]  [facility] [address]      Dear Colleague,    Thank you for referring your patient, Graciela Castorena, to the [facility]. Please see a copy of my visit note below.    ONCOLOGY HISTORY: Mrs. 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      1.  Screening mammogram on 01/16/2018 revealed a new calcification cluster behind the left nipple.   -Left diagnostic mammogram on 03/12/2018 revealed an indeterminate cluster of microcalcification in retroareolar portion of left breast at 11:00.      2. Stereotactic left breast biopsy on 03/13/2018 reveals intermediate grade DCIS with microcalcification and comedonecrosis.  % positive and  DC 25% positive.      3.  Patient underwent left breast lumpectomy on 04/18/2018.  Pathology reveals grade 2 DCIS measuring 3 mm.  Margins are negative.     4. Radiation to left breast. 5056 cGy between 05/31/2018 and 06/27/2018.      5. Tamoxifen started in May, 2018. Stopped 0n 12/29/2020 due to vaginal bleeding.  -Anastrazole started on 01/12/2021.  -Anastrazole stopped 07/14/2021 due to aches and pain.     SUBJECTIVE:    Mrs. Castorena is a 60-year-old female with left breast DCIS s/p lumpectomy and radiation in 2018. She was on tamoxifen and anastrozole. It was discontinued because of side effects.  Patient has been doing well without any evidence of recurrence.  Mammogram is scheduled for tomorrow.     Patient has multiple different symptoms.  She gets intermittent headache. No dizziness. She gets heartburn. No chest pain.  No shortness of breath at rest. She gets intermittent abdominal pain. Occasional nausea.  No vomiting. No urinary complaint.  She gets constipation.  She has been having hemorrhoidal bleed on and off.  No fever or night sweats.       PHYSICAL EXAMINATION:   GENERAL:  Alert and oriented x 3.   VITAL SIGNS:  Reviewed.     EYES:  No icterus.   NECK:  Supple. No lymphadenopathy. No  thyromegaly.   AXILLAE:  No lymphadenopathy.   LUNGS:  Good air entry bilaterally.  No crackles or wheezing.   HEART:  Regular.  No murmur.   GI:  Abdomen is soft.  Nontender. No mass.   EXTREMITIES:  No pedal edema.  No calf swelling or tenderness.   -Left hand reveals ganglion cyst.  SKIN:  No rash.   BREASTS:  Examined in the presence of ALANA Jiang.   -Right breast exam is normal.  No mass. No axillary lymphadenopathy.   -Left breast exam reveals post-surgical changes.  No mass. No axillary lymphadenopathy.      LABS: CBC and CMP on 12/06/2024 is normal.     ASSESSMENT:    1.  A 60-year-old female with left breast ductal carcinoma in situ diagnosed in 2018.  No evidence of recurrence.  2.  GERD.  3.  Hemorrhoidal bleed.  4.  Left hand ganglion cyst.     PLAN:  Continue famotidine.  Colorectal appointment.  Use Anusol cream as needed.  Orthopedic appointment.  See PCP.  Follow-up in 1 year (she can just follow-up with her PCP).  Mammogram in 1 year.     DISCUSSION:  1.  Discussed with her regarding DCIS.  No evidence of recurrence.  Explained to her that risk of recurrence is very low as it is more than 6 years since her lumpectomy.    Discussed regarding follow-up of DCIS.  She needs yearly exam and yearly mammogram.    I told the patient that she can follow-up with PCP.  Patient wants to be followed in oncology clinic once a year.    2.  Patient has reflux.  She will continue on famotidine.  It helps her.    3.  Patient has been getting hemorrhoidal bleed.  It is bothering her.  Will set her up to see colorectal surgery.    4.  Patient has ganglion cyst in left hand.  It intermittently bothers her.  Will schedule her to see orthopedics.    5.  She had few questions which were all answered.  I will see her in 1 year time.  Advised her to call us with any questions or concerns.     Total visit time of 30 minutes.  Time spent in today's visit, review of chart/investigations today and documentation.      Again,  thank you for allowing me to participate in the care of your patient.        Sincerely,        Samreen Barahona MD    Electronically signed

## 2025-01-03 ENCOUNTER — HOSPITAL ENCOUNTER (OUTPATIENT)
Dept: MAMMOGRAPHY | Facility: CLINIC | Age: 60
Discharge: HOME | End: 2025-01-03
Attending: INTERNAL MEDICINE | Admitting: INTERNAL MEDICINE
Payer: COMMERCIAL

## 2025-01-03 DIAGNOSIS — Z12.31 ENCOUNTER FOR SCREENING MAMMOGRAM FOR MALIGNANT NEOPLASM OF BREAST: ICD-10-CM

## 2025-01-03 PROCEDURE — 77063 BREAST TOMOSYNTHESIS BI: CPT

## 2025-01-06 ENCOUNTER — PATIENT OUTREACH (OUTPATIENT)
Dept: CARE COORDINATION | Facility: CLINIC | Age: 60
End: 2025-01-06
Payer: COMMERCIAL

## 2025-01-07 DIAGNOSIS — I10 HYPERTENSION, UNSPECIFIED TYPE: ICD-10-CM

## 2025-01-07 RX ORDER — LOSARTAN POTASSIUM 25 MG/1
25 TABLET ORAL DAILY
Qty: 90 TABLET | Refills: 0 | Status: SHIPPED | OUTPATIENT
Start: 2025-01-07

## 2025-02-22 DIAGNOSIS — E55.9 VITAMIN D DEFICIENCY: ICD-10-CM

## 2025-02-22 DIAGNOSIS — E78.5 DYSLIPIDEMIA, GOAL LDL BELOW 100: Primary | ICD-10-CM

## 2025-02-22 RX ORDER — ATORVASTATIN CALCIUM 20 MG/1
20 TABLET, FILM COATED ORAL DAILY
Qty: 90 TABLET | Refills: 1 | Status: SHIPPED | OUTPATIENT
Start: 2025-02-22

## 2025-02-24 ENCOUNTER — PATIENT OUTREACH (OUTPATIENT)
Dept: CARE COORDINATION | Facility: CLINIC | Age: 60
End: 2025-02-24
Payer: MEDICAID

## 2025-02-26 ENCOUNTER — PATIENT OUTREACH (OUTPATIENT)
Dept: CARE COORDINATION | Facility: CLINIC | Age: 60
End: 2025-02-26
Payer: MEDICAID

## 2025-03-28 ENCOUNTER — APPOINTMENT (OUTPATIENT)
Dept: GENERAL RADIOLOGY | Facility: CLINIC | Age: 60
End: 2025-03-28
Attending: EMERGENCY MEDICINE
Payer: COMMERCIAL

## 2025-03-28 ENCOUNTER — HOSPITAL ENCOUNTER (EMERGENCY)
Facility: CLINIC | Age: 60
Discharge: HOME | End: 2025-03-28
Attending: EMERGENCY MEDICINE | Admitting: EMERGENCY MEDICINE
Payer: COMMERCIAL

## 2025-03-28 VITALS
WEIGHT: 169 LBS | HEART RATE: 91 BPM | HEIGHT: 63 IN | DIASTOLIC BLOOD PRESSURE: 81 MMHG | TEMPERATURE: 97.2 F | SYSTOLIC BLOOD PRESSURE: 147 MMHG | BODY MASS INDEX: 29.95 KG/M2 | RESPIRATION RATE: 19 BRPM | OXYGEN SATURATION: 99 %

## 2025-03-28 DIAGNOSIS — N64.4 BREAST PAIN, LEFT: ICD-10-CM

## 2025-03-28 LAB
ANION GAP SERPL CALCULATED.3IONS-SCNC: 10 MMOL/L (ref 7–15)
BUN SERPL-MCNC: 11.6 MG/DL (ref 6–20)
CALCIUM SERPL-MCNC: 8.9 MG/DL (ref 8.8–10.4)
CHLORIDE SERPL-SCNC: 104 MMOL/L (ref 98–107)
CREAT SERPL-MCNC: 0.83 MG/DL (ref 0.51–0.95)
EGFRCR SERPLBLD CKD-EPI 2021: 81 ML/MIN/1.73M2
GLUCOSE SERPL-MCNC: 178 MG/DL (ref 70–99)
HCO3 SERPL-SCNC: 25 MMOL/L (ref 22–29)
POTASSIUM SERPL-SCNC: 4 MMOL/L (ref 3.4–5.3)
SODIUM SERPL-SCNC: 139 MMOL/L (ref 135–145)

## 2025-03-28 PROCEDURE — 82565 ASSAY OF CREATININE: CPT | Performed by: EMERGENCY MEDICINE

## 2025-03-28 PROCEDURE — 36415 COLL VENOUS BLD VENIPUNCTURE: CPT | Performed by: EMERGENCY MEDICINE

## 2025-03-28 PROCEDURE — 99284 EMERGENCY DEPT VISIT MOD MDM: CPT | Mod: 25

## 2025-03-28 PROCEDURE — 71046 X-RAY EXAM CHEST 2 VIEWS: CPT

## 2025-03-28 PROCEDURE — 80048 BASIC METABOLIC PNL TOTAL CA: CPT | Performed by: EMERGENCY MEDICINE

## 2025-03-28 PROCEDURE — 82310 ASSAY OF CALCIUM: CPT | Performed by: EMERGENCY MEDICINE

## 2025-03-28 ASSESSMENT — ACTIVITIES OF DAILY LIVING (ADL)
ADLS_ACUITY_SCORE: 41
ADLS_ACUITY_SCORE: 41

## 2025-03-28 ASSESSMENT — COLUMBIA-SUICIDE SEVERITY RATING SCALE - C-SSRS
6. HAVE YOU EVER DONE ANYTHING, STARTED TO DO ANYTHING, OR PREPARED TO DO ANYTHING TO END YOUR LIFE?: NO
1. IN THE PAST MONTH, HAVE YOU WISHED YOU WERE DEAD OR WISHED YOU COULD GO TO SLEEP AND NOT WAKE UP?: NO
2. HAVE YOU ACTUALLY HAD ANY THOUGHTS OF KILLING YOURSELF IN THE PAST MONTH?: NO

## 2025-03-28 NOTE — ED PROVIDER NOTES
Emergency Department Note      History of Present Illness     Chief Complaint   Breast Pain      HPI   Graciela Castorena is a 60 year old female with a history of prediabetes, CHF, DCIS, latent TB who presents to the ED for breast pain. The patient reports 2-3 days of left breast pain, which is worst at the top of her breast. She also has some swelling in the left breast. No symptoms in her right breast. She denies any recent injury. Patient was diagnosed with ductal carcinoma of the left breast in 2018 and had a lumpectomy. Her last mammogram was 2 months ago and was negative. She follows with Dr. Barahona of oncology for this issue. Her PCP is Dr. Velasco. Patient further reports that she had a cough and felt feverish last week. These symptoms have slowly been improving over the past week. Also has right ear pain.    Independent Historian   None    Review of External Notes   Note from 1/2/2025 Dr. Barahona.  She was diagnosed with DCIS March 2018.  Status post lumpectomy 2018.  Radiation to the left breast subsequently.  Was on tamoxifen and Nina straws all but both were stopped due to side effects.  Her last mammogram was 1/3/2025.  There was scattered areas of fibroglandular density.  No evidence of malignancy.    Past Medical History     Medical History and Problem List   Past Medical History:   Diagnosis Date    Abnormal Pap smear of cervix 03/30/2015    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 breast 2018    Hypertension     Palpitations     Tuberculosis        Medications   atorvastatin (LIPITOR) 20 MG tablet  cefdinir (OMNICEF) 300 MG capsule  famotidine (PEPCID) 40 MG tablet  hydrocortisone, Perianal, (HYDROCORTISONE) 2.5 % cream  ibuprofen (ADVIL/MOTRIN) 600 MG tablet  loperamide (IMODIUM A-D) 2 MG tablet  losartan (COZAAR) 25 MG tablet  ondansetron (ZOFRAN ODT) 4 MG ODT tab  vitamin D3 (CHOLECALCIFEROL) 1.25 MG (08259 UT) capsule        Surgical History   Past Surgical History:   Procedure Laterality Date     "COLONOSCOPY N/A 2/12/2018    Procedure: COLONOSCOPY;  colonoscopy;  Surgeon: Sabas Villegas MD;  Location:  GI    ESOPHAGOSCOPY, GASTROSCOPY, DUODENOSCOPY (EGD), COMBINED N/A 5/23/2024    Procedure: ESOPHAGOGASTRODUODENOSCOPY, WITH BIOPSY;  Surgeon: Sabas Villegas MD;  Location:  GI    LUMPECTOMY BREAST WITH SEED LOCALIZATION Left 4/18/2018    Procedure: LUMPECTOMY BREAST WITH SEED LOCALIZATION;  SEED LOCALIZED LEFT BREAST LUMPECTOMY ;  Surgeon: Meli Rodriguez MD;  Location:  SD       Physical Exam     Patient Vitals for the past 24 hrs:   BP Temp Temp src Pulse Resp SpO2 Height Weight   03/28/25 0639 (!) 147/81 97.2  F (36.2  C) Temporal 91 19 99 % 1.6 m (5' 3\") 76.7 kg (169 lb)     Physical Exam    Physical Exam   Constitutional:  Patient is oriented to person, place, and time. They appear well-developed and well-nourished. Mild distress secondary to breast tenderness.   HENT: Clear fluid behind the left tympanic membrane.  Mouth/Throat:   Oropharynx is clear and moist.   Eyes:    Conjunctivae normal and EOM are normal. Pupils are equal, round, and reactive to light.   Neck:    Normal range of motion.   Cardiovascular: Normal rate, regular rhythm and normal heart sounds.  Exam reveals no gallop and no friction rub.  No murmur heard.  Pulmonary/Chest:  Effort normal and breath sounds normal. Patient has no wheezes. Patient has no rales.   Musculoskeletal:  Normal range of motion. Patient exhibits no edema.   Neurological:   Patient is alert and oriented to person, place, and time. Patient has normal strength. No cranial nerve deficit or sensory deficit. GCS 15  Skin:   No evidence of erythema on the left breast. No masses. Mild tenderness above the nipple. No axillary nipple palpated.   Psychiatric:   Patient has a normal mood and affect. Patient's behavior is normal. Judgment and thought content normal.        Diagnostics     Lab Results   Labs Ordered and Resulted from Time of ED Arrival " to Time of ED Departure   BASIC METABOLIC PANEL - Abnormal       Result Value    Sodium 139      Potassium 4.0      Chloride 104      Carbon Dioxide (CO2) 25      Anion Gap 10      Urea Nitrogen 11.6      Creatinine 0.83      GFR Estimate 81      Calcium 8.9      Glucose 178 (*)        Imaging   XR Chest 2 Views   Final Result   IMPRESSION: Negative chest.          Independent Interpretation   CXR: No pneumothorax.    ED Course      Medications Administered   Medications - No data to display    Procedures   Procedures     Discussion of Management   None    ED Course   ED Course as of 03/28/25 0800   Fri Mar 28, 2025   0702 I obtained history and examined the patient as noted above.     0746 I rechecked the patient and discussed lab findings.       Additional Documentation  None    Medical Decision Making / Diagnosis     CMS Diagnoses: None    MIPS       None    MDM   Graciela Castorena is a 60 year old female who presents with left breast pain just above the left nipple.  There is no masses no erythema no fluctuance.  She has had a cough for couple weeks so we discussed doing an x-ray she requested 1 so this was performed.  I reviewed this and see no evidence of pleural effusion, pneumothorax, mass, infiltrate, abnormal mediastinum.  Consider other etiologies for her pain such as ACS, infection, PE, dissection however given her physical exam history and vital signs suspect that this is more benign breast pain.  I was able to review her mammogram done 2 months ago with reassuring findings of no recurrence.  I will have her follow-up closely with her oncologist Dr. Barahona.  If her breast pain is ongoing they may want to do further imaging.    Disposition   The patient was discharged.     Diagnosis     ICD-10-CM    1. Breast pain, left  N64.4            Discharge Medications   New Prescriptions    No medications on file         MD Martha Figueroa, Ember Carrera MD  03/28/25 0800

## 2025-03-28 NOTE — ED TRIAGE NOTES
Patient here  with left breast pain which ongoing for few days. She stated the breast is red and swollen. Hx of breast cancer      Triage Assessment (Adult)       Row Name 03/28/25 0639          Triage Assessment    Airway WDL WDL        Respiratory WDL    Respiratory WDL WDL        Skin Circulation/Temperature WDL    Skin Circulation/Temperature WDL WDL        Cardiac WDL    Cardiac WDL WDL        Peripheral/Neurovascular WDL    Peripheral Neurovascular WDL WDL        Cognitive/Neuro/Behavioral WDL    Cognitive/Neuro/Behavioral WDL WDL

## 2025-03-31 ENCOUNTER — PATIENT OUTREACH (OUTPATIENT)
Dept: FAMILY MEDICINE | Facility: CLINIC | Age: 60
End: 2025-03-31
Payer: COMMERCIAL

## 2025-04-01 ENCOUNTER — ONCOLOGY VISIT (OUTPATIENT)
Dept: ONCOLOGY | Facility: CLINIC | Age: 60
End: 2025-04-01
Attending: INTERNAL MEDICINE
Payer: COMMERCIAL

## 2025-04-01 VITALS
BODY MASS INDEX: 31.42 KG/M2 | RESPIRATION RATE: 16 BRPM | HEART RATE: 116 BPM | WEIGHT: 177.4 LBS | OXYGEN SATURATION: 100 % | SYSTOLIC BLOOD PRESSURE: 119 MMHG | TEMPERATURE: 98.3 F | DIASTOLIC BLOOD PRESSURE: 69 MMHG

## 2025-04-01 DIAGNOSIS — H65.192 OTHER NON-RECURRENT ACUTE NONSUPPURATIVE OTITIS MEDIA OF LEFT EAR: Primary | ICD-10-CM

## 2025-04-01 DIAGNOSIS — I10 HYPERTENSION, UNSPECIFIED TYPE: ICD-10-CM

## 2025-04-01 PROCEDURE — G0463 HOSPITAL OUTPT CLINIC VISIT: HCPCS | Performed by: NURSE PRACTITIONER

## 2025-04-01 PROCEDURE — 99213 OFFICE O/P EST LOW 20 MIN: CPT | Performed by: NURSE PRACTITIONER

## 2025-04-01 RX ORDER — LOSARTAN POTASSIUM 25 MG/1
25 TABLET ORAL DAILY
Qty: 90 TABLET | Refills: 0 | Status: SHIPPED | OUTPATIENT
Start: 2025-04-01

## 2025-04-01 RX ORDER — OMEGA-3 FATTY ACIDS/FISH OIL 300-1000MG
400 CAPSULE ORAL 3 TIMES DAILY
Qty: 18 CAPSULE | Refills: 0 | Status: SHIPPED | OUTPATIENT
Start: 2025-04-01 | End: 2025-04-04

## 2025-04-01 ASSESSMENT — PAIN SCALES - GENERAL: PAINLEVEL_OUTOF10: MODERATE PAIN (6)

## 2025-04-01 NOTE — LETTER
"4/1/2025      Graciela Castorena  [address]  [facility] [address]      Dear Colleague,    Thank you for referring your patient, Graciela Castorena, to the [facility]. Please see a copy of my visit note below.    Oncology Rooming Note    April 1, 2025 9:29 AM   Graciela Castorena is a 60 year old female who presents for:    Chief Complaint   Patient presents with     Oncology Clinic Visit     Initial Vitals: There were no vitals taken for this visit. Estimated body mass index is 29.94 kg/m  as calculated from the following:    Height as of 3/28/25: 1.6 m (5' 3\").    Weight as of 3/28/25: 76.7 kg (169 lb). There is no height or weight on file to calculate BSA.  Data Unavailable Comment: Data Unavailable   No LMP recorded. Patient is postmenopausal.  Allergies reviewed: Yes  Medications reviewed: Yes    Medications: Medication refills not needed today.  Pharmacy name entered into [email or web address]: [id number] [address]    Frailty Screening:   Is the patient here for a new oncology consult visit in cancer care? 2. No    PHQ9:  Did this patient require a PHQ9?: No      Clinical concerns:   np was notified.      Jolie Knight, [facility]              Oncology/Hematology Visit Note  Apr 1, 2025    Reason for Visit: follow up of left breast DCIS diagnosed in 2018  Follows with Dr Barahona   Status post lumpectomy and radiation with left breast in 2018  Status post tamoxifen from May 2018 until December 2020-continue due to vaginal bleeding  Status post anastrozole January 2021 to July 2021-continued due to severe body aches    Interval History:  Patient reports she has been having left breast pain for about a month denies injury denies lumps or bumps denies fever chills sweats cough shortness of breath  Denies discharge from the nipple  Reports pain in the left ear    Review of Systems:  14 point ROS of systems including Constitutional, Eyes, Respiratory, Cardiovascular, " Gastroenterology, Genitourinary, Integumentary, Muscularskeletal, Psychiatric were all negative except for pertinent positives noted in my HPI.    Physical Examination:  General: The patient is a pleasant female in no acute distress.  /69   Pulse 116   Temp 98.3  F (36.8  C) (Oral)   Resp 16   Wt 80.5 kg (177 lb 6.4 oz)   SpO2 100%   BMI 31.42 kg/m    HEENT: EOMI, PERRL. Sclerae are anicteric. Oral mucosa is pink and moist with no lesions or thrush.   Lymph: Neck is supple with no lymphadenopathy in the cervical or supraclavicular areas.   Heart: Regular rate and rhythm.   Lungs: Clear to auscultation bilaterally.   GI: Bowel sounds present, soft, nontender with no palpable hepatosplenomegaly or masses.   Extremities: No lower extremity edema noted bilaterally.   Skin: No rashes, petechiae, or bruising noted on exposed skin.  Breast exam done by Dr. Barahona per patient request      Assessment and Plan:      Left Breast pain  01/03/2025-close treated mammogram revealed  no radiographic evidence of malignancy.    breast exam by Dr. Barahona per patient request/  Per Dr. Barahona looks like inflammatory response  Start Augmentin p.o. twice daily for 7 days-reviewed.  Advised patient to call with any side effect  Start Motrin 3 times daily for 7 days-side effects reviewed  Dr. Barahona recommendation if no improvement in symptoms then we will proceed with diagnostic mammogram ultrasound  Schedule telephone visit with me on Monday    Otitis media- left  As above -start Augmentin      left breast DCIS diagnosed in 2018  Follows with Dr Barahona   Status post lumpectomy and radiation with left breast in 2018  Status post tamoxifen from May 2018 until December 2020-continue due to vaginal bleeding  Status post anastrozole January 2021 to July 2021-continued due to severe body aches     Please call clinic with any changes in health condition or questions      CHRIS Lebron [facility]      Chart documentation with Dragon Voice recognition Software. Although reviewed after completion, some words and grammatical errors may remain.          Again, thank you for allowing me to participate in the care of your patient.        Sincerely,        CHRIS Lebron CNP    Electronically signed

## 2025-04-01 NOTE — PROGRESS NOTES
"Oncology Rooming Note    April 1, 2025 9:29 AM   Graciela Castorena is a 60 year old female who presents for:    Chief Complaint   Patient presents with    Oncology Clinic Visit     Initial Vitals: There were no vitals taken for this visit. Estimated body mass index is 29.94 kg/m  as calculated from the following:    Height as of 3/28/25: 1.6 m (5' 3\").    Weight as of 3/28/25: 76.7 kg (169 lb). There is no height or weight on file to calculate BSA.  Data Unavailable Comment: Data Unavailable   No LMP recorded. Patient is postmenopausal.  Allergies reviewed: Yes  Medications reviewed: Yes    Medications: Medication refills not needed today.  Pharmacy name entered into [email or web address]: [id number] [place], [place] - [address]    Frailty Screening:   Is the patient here for a new oncology consult visit in cancer care? 2. No    PHQ9:  Did this patient require a PHQ9?: No      Clinical concerns:   np was notified.      Jolie Knight CMA            "

## 2025-04-01 NOTE — PROGRESS NOTES
Oncology/Hematology Visit Note  Apr 1, 2025    Reason for Visit: follow up of left breast DCIS diagnosed in 2018  Follows with Dr Barahona   Status post lumpectomy and radiation with left breast in 2018  Status post tamoxifen from May 2018 until December 2020-continue due to vaginal bleeding  Status post anastrozole January 2021 to July 2021-continued due to severe body aches    Interval History:  Patient reports she has been having left breast pain for about a month denies injury denies lumps or bumps denies fever chills sweats cough shortness of breath  Denies discharge from the nipple  Reports pain in the left ear    Review of Systems:  14 point ROS of systems including Constitutional, Eyes, Respiratory, Cardiovascular, Gastroenterology, Genitourinary, Integumentary, Muscularskeletal, Psychiatric were all negative except for pertinent positives noted in my HPI.    Physical Examination:  General: The patient is a pleasant female in no acute distress.  /69   Pulse 116   Temp 98.3  F (36.8  C) (Oral)   Resp 16   Wt 80.5 kg (177 lb 6.4 oz)   SpO2 100%   BMI 31.42 kg/m    HEENT: EOMI, PERRL. Sclerae are anicteric. Oral mucosa is pink and moist with no lesions or thrush.   Lymph: Neck is supple with no lymphadenopathy in the cervical or supraclavicular areas.   Heart: Regular rate and rhythm.   Lungs: Clear to auscultation bilaterally.   GI: Bowel sounds present, soft, nontender with no palpable hepatosplenomegaly or masses.   Extremities: No lower extremity edema noted bilaterally.   Skin: No rashes, petechiae, or bruising noted on exposed skin.  Breast exam done by Dr. Barahona per patient request      Assessment and Plan:      Left Breast pain  01/03/2025-close treated mammogram revealed  no radiographic evidence of malignancy.    breast exam by Dr. Barahona per patient request/  Per Dr. Barahona looks like inflammatory response  Start Augmentin p.o. twice daily for 7 days-reviewed.  Advised patient to call with any  side effect  Start Motrin 3 times daily for 7 days-side effects reviewed  Dr. Barahona recommendation if no improvement in symptoms then we will proceed with diagnostic mammogram ultrasound  Schedule telephone visit with me on Monday    Otitis media- left  As above -start Augmentin      left breast DCIS diagnosed in 2018  Follows with Dr Barahona   Status post lumpectomy and radiation with left breast in 2018  Status post tamoxifen from May 2018 until December 2020-continue due to vaginal bleeding  Status post anastrozole January 2021 to July 2021-continued due to severe body aches     Please call clinic with any changes in health condition or questions      CHRIS Lebron [facility]     Chart documentation with Dragon Voice recognition Software. Although reviewed after completion, some words and grammatical errors may remain.

## 2025-04-07 ENCOUNTER — VIRTUAL VISIT (OUTPATIENT)
Dept: ONCOLOGY | Facility: CLINIC | Age: 60
End: 2025-04-07
Attending: NURSE PRACTITIONER
Payer: COMMERCIAL

## 2025-04-07 DIAGNOSIS — K21.9 GASTROESOPHAGEAL REFLUX DISEASE WITHOUT ESOPHAGITIS: ICD-10-CM

## 2025-04-07 DIAGNOSIS — D05.12 DUCTAL CARCINOMA IN SITU (DCIS) OF LEFT BREAST: Primary | ICD-10-CM

## 2025-04-07 PROCEDURE — 98012 SYNCH AUDIO-ONLY EST SF 10: CPT | Performed by: NURSE PRACTITIONER

## 2025-04-07 PROCEDURE — 1126F AMNT PAIN NOTED NONE PRSNT: CPT | Performed by: NURSE PRACTITIONER

## 2025-04-07 RX ORDER — FAMOTIDINE 40 MG/1
40 TABLET, FILM COATED ORAL DAILY
Qty: 90 TABLET | Refills: 3 | Status: SHIPPED | OUTPATIENT
Start: 2025-04-07

## 2025-04-07 NOTE — NURSING NOTE
Current patient location: [address] [place] [address] [address]   [facility] [address]    Is the patient currently in the state of [place]? YES    Visit mode: TELEPHONE    If the visit is dropped, the patient can be reconnected by:TELEPHONE VISIT: Phone number:   Telephone Information:   Mobile [phone]       Will anyone else be joining the visit? NO  (If patient encounters technical issues they should call [phone])    Are changes needed to the allergy or medication list? Pt stated no changes to allergies and Pt stated no med changes    Are refills needed on medications prescribed by this physician? NO    Rooming Documentation:  Questionnaire(s) completed    Reason for visit: RECHECK    Lashanda RUBALCAVA

## 2025-04-07 NOTE — LETTER
4/7/2025      Graciela Castorena  [address]  [facility] [address]      Dear Colleague,    Thank you for referring your patient, Graciela Castorena, to the [facility]. Please see a copy of my visit note below.    Telephone visit 11 min       Oncology/Hematology Visit Note  Apr 7, 2025    Reason for Visit: follow up of left breast DCIS diagnosed in 2018  Follows with Dr Barahona   Status post lumpectomy and radiation with left breast in 2018  Status post tamoxifen from May 2018 until December 2020-continue due to vaginal bleeding  Status post anastrozole January 2021 to July 2021-continued due to severe body aches    Interval History:  Patient seen last week for left breast pain.  She was given antibiotic Augmentin.  Patient reports pain has resolved, does not patient denies any swelling of the breast denies discharge.  Denies fever chills sweats cough shortness of breath chest    Review of Systems:  14 point ROS of systems including Constitutional, Eyes, Respiratory, Cardiovascular, Gastroenterology, Genitourinary, Integumentary, Muscularskeletal, Psychiatric were all negative except for pertinent positives noted in my HPI.    Physical Examination:  Visit no audible wheezing or cough.  Patient answers all questions appropriately      Assessment and Plan:      Hx of Left Breast pain  01/03/2025-close treated mammogram revealed  no radiographic evidence of malignancy.   On April 1 2025-I saw the patient for left breast pain- breast exam by Dr. Barahona per patient request/Per Dr. Barahona breast exam looked  like inflammatory response.  An antibiotic was recommended.    04/01/25- Started Augmentin p.o. twice daily for 7 days-reviewed.  Dr Barahona recommended that I call patient today to check on symptoms. If improvement in symptoms no imaging needed  - Patient reports left breast pain has resolved.  Denies any swelling or discharge  -Advised patient to call our clinic if pain returns or with  any swelling or discharge or lymphadenopathy or any changes in health condition      left breast DCIS diagnosed in 2018  Follows with Dr Barahona   Status post lumpectomy and radiation with left breast in 2018  Status post tamoxifen from May 2018 until December 2020-continue due to vaginal bleeding  Status post anastrozole January 2021 to July 2021-continued due to severe body aches  Schedule with Dr. Barahona in January 2026 with mammogram  Please call clinic with any changes in health condition or questions      CHRIS Lebron CNP  [facility]     Chart documentation with Dragon Voice recognition Software. Although reviewed after completion, some words and grammatical errors may remain.          Again, thank you for allowing me to participate in the care of your patient.        Sincerely,        CHRIS Lebron CNP    Electronically signed

## 2025-04-07 NOTE — PROGRESS NOTES
Telephone visit 11 min       Oncology/Hematology Visit Note  Apr 7, 2025    Reason for Visit: follow up of left breast DCIS diagnosed in 2018  Follows with Dr Barahona   Status post lumpectomy and radiation with left breast in 2018  Status post tamoxifen from May 2018 until December 2020-continue due to vaginal bleeding  Status post anastrozole January 2021 to July 2021-continued due to severe body aches    Interval History:  Patient seen last week for left breast pain.  She was given antibiotic Augmentin.  Patient reports pain has resolved, does not patient denies any swelling of the breast denies discharge.  Denies fever chills sweats cough shortness of breath chest    Review of Systems:  14 point ROS of systems including Constitutional, Eyes, Respiratory, Cardiovascular, Gastroenterology, Genitourinary, Integumentary, Muscularskeletal, Psychiatric were all negative except for pertinent positives noted in my HPI.    Physical Examination:  Visit no audible wheezing or cough.  Patient answers all questions appropriately      Assessment and Plan:      Hx of Left Breast pain  01/03/2025-close treated mammogram revealed  no radiographic evidence of malignancy.   On April 1 2025-I saw the patient for left breast pain- breast exam by Dr. Barahona per patient request/Per Dr. Barahona breast exam looked  like inflammatory response.  An antibiotic was recommended.    04/01/25- Started Augmentin p.o. twice daily for 7 days-reviewed.  Dr Barahona recommended that I call patient today to check on symptoms. If improvement in symptoms no imaging needed  - Patient reports left breast pain has resolved.  Denies any swelling or discharge  -Advised patient to call our clinic if pain returns or with any swelling or discharge or lymphadenopathy or any changes in health condition      left breast DCIS diagnosed in 2018  Follows with Dr Barahona   Status post lumpectomy and radiation with left breast in 2018  Status post tamoxifen from May 2018 until  December 2020-continue due to vaginal bleeding  Status post anastrozole January 2021 to July 2021-continued due to severe body aches  Schedule with Dr. Barahona in January 2026 with mammogram  Please call clinic with any changes in health condition or questions      CHRIS Lebron [facility]     Chart documentation with Dragon Voice recognition Software. Although reviewed after completion, some words and grammatical errors may remain.

## 2025-04-07 NOTE — LETTER
4/7/2025      Graciela Castorena  [address]  [place] [address]      Dear Colleague,    Thank you for referring your patient, Graciela Castorena, to the [facility]. Please see a copy of my visit note below.    Telephone visit 11 min       Oncology/Hematology Visit Note  Apr 7, 2025    Reason for Visit: follow up of left breast DCIS diagnosed in 2018  Follows with Dr Barahona   Status post lumpectomy and radiation with left breast in 2018  Status post tamoxifen from May 2018 until December 2020-continue due to vaginal bleeding  Status post anastrozole January 2021 to July 2021-continued due to severe body aches    Interval History:  Patient seen last week for left breast pain.  She was given antibiotic Augmentin.  Patient reports pain has resolved, does not patient denies any swelling of the breast denies discharge.  Denies fever chills sweats cough shortness of breath chest    Review of Systems:  14 point ROS of systems including Constitutional, Eyes, Respiratory, Cardiovascular, Gastroenterology, Genitourinary, Integumentary, Muscularskeletal, Psychiatric were all negative except for pertinent positives noted in my HPI.    Physical Examination:  Visit no audible wheezing or cough.  Patient answers all questions appropriately      Assessment and Plan:      Hx of Left Breast pain  01/03/2025-close treated mammogram revealed  no radiographic evidence of malignancy.   On April 1 2025-I saw the patient for left breast pain- breast exam by Dr. Barahona per patient request/Per Dr. Barahona breast exam looked  like inflammatory response.  An antibiotic was recommended.    04/01/25- Started Augmentin p.o. twice daily for 7 days-reviewed.  Dr Barahona recommended that I call patient today to check on symptoms. If improvement in symptoms no imaging needed  - Patient reports left breast pain has resolved.  Denies any swelling or discharge  -Advised patient to call our clinic if pain returns or with  any swelling or discharge or lymphadenopathy or any changes in health condition      left breast DCIS diagnosed in 2018  Follows with Dr Barahona   Status post lumpectomy and radiation with left breast in 2018  Status post tamoxifen from May 2018 until December 2020-continue due to vaginal bleeding  Status post anastrozole January 2021 to July 2021-continued due to severe body aches  Schedule with Dr. Barahona in January 2026 with mammogram  Please call clinic with any changes in health condition or questions      CHRIS Lebron CNP  [facility]     Chart documentation with Dragon Voice recognition Software. Although reviewed after completion, some words and grammatical errors may remain.          Again, thank you for allowing me to participate in the care of your patient.        Sincerely,        CHRIS Lebron CNP    Electronically signed

## 2025-04-08 ENCOUNTER — PATIENT OUTREACH (OUTPATIENT)
Dept: CARE COORDINATION | Facility: CLINIC | Age: 60
End: 2025-04-08
Payer: COMMERCIAL

## 2025-04-08 NOTE — PROGRESS NOTES
Social Work - Distress Screen Intervention  [facility]    Identified Concern and Score from Distress Screenin. How concerned are you about your ability to eat? 0     2. How concerned are you about unintended weight loss or your current weight? 0     3. How concerned are you about feeling depressed or very sad?  0     4. How concerned are you about feeling anxious or very scared?  0     5. Do you struggle with the loss of meaning and jacqueline in your life?  (!) A great deal     6. How concerned are you about work and home life issues that may be affected by your cancer?  0     7. How concerned are you about knowing what resources are available to help you?  (!) 10     8. Do you currently have what you would describe as Jain or spiritual struggles? Not at all     9. If you want to be contacted by one of our professionals, I can send a message to them right now.  No data recorded     Date of Distress Screen: 25  Data: At time of last visit, patient scored positive on distress screening.  outreached to patient today to follow up on elevated distress and introduce psychosocial services and support.  Intervention/Education provided:  contacted patient by phone to discuss distress screening results.     Oriented Graciela to role of oncology social worker. Graciela endorsed that she has difficulty with affording food. SW assisted Graciela in completing application for [employer]. SW also shared closest location for shopping for this food. SW discussed benefit of applying for SNAP through [employer] page, and provided resources for [employer] and [employer] for Graciela to explore on own.     Follow-up Required:  will remain available to patient for support as needed    NELLIE Daugherty, [facility]  Clinical - Adult Oncology  Phone: [phone]  She/Her/Hers  [facility]: [place] VICTOR  8am-4:30pm  [facility]:  TRINA Winter F 8am-4:30pm   Support Groups at [facility]: Social Work Services for Cancer Patients ([email or web address])

## 2025-06-25 ENCOUNTER — ONCOLOGY VISIT (OUTPATIENT)
Dept: ONCOLOGY | Facility: CLINIC | Age: 60
End: 2025-06-25
Attending: INTERNAL MEDICINE

## 2025-06-25 VITALS
DIASTOLIC BLOOD PRESSURE: 76 MMHG | OXYGEN SATURATION: 99 % | HEIGHT: 63 IN | HEART RATE: 83 BPM | SYSTOLIC BLOOD PRESSURE: 118 MMHG | RESPIRATION RATE: 16 BRPM | WEIGHT: 175 LBS | BODY MASS INDEX: 31.01 KG/M2

## 2025-06-25 DIAGNOSIS — N63.20 MASS OF LEFT BREAST, UNSPECIFIED QUADRANT: Primary | ICD-10-CM

## 2025-06-25 PROCEDURE — 99214 OFFICE O/P EST MOD 30 MIN: CPT | Performed by: INTERNAL MEDICINE

## 2025-06-25 PROCEDURE — 99213 OFFICE O/P EST LOW 20 MIN: CPT | Performed by: INTERNAL MEDICINE

## 2025-06-25 ASSESSMENT — PAIN SCALES - GENERAL: PAINLEVEL_OUTOF10: MODERATE PAIN (5)

## 2025-06-25 NOTE — PROGRESS NOTES
ONCOLOGY HISTORY: Mrs. 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      1.  Screening mammogram on 01/16/2018 revealed a new calcification cluster behind the left nipple.   -Left diagnostic mammogram on 03/12/2018 revealed an indeterminate cluster of microcalcification in retroareolar portion of left breast at 11:00.      2. Stereotactic left breast biopsy on 03/13/2018 reveals intermediate grade DCIS with microcalcification and comedonecrosis.  % positive and  W[place] 25% positive.      3.  Patient underwent left breast lumpectomy on 04/18/2018.  Pathology reveals grade 2 DCIS measuring 3 mm.  Margins are negative.     4. Radiation to left breast. 5056 cGy between 05/31/2018 and 06/27/2018.      5. Tamoxifen started in May, 2018. Stopped 0n 12/29/2020 due to vaginal bleeding.  -Anastrazole started on 01/12/2021.  -Anastrazole stopped 07/14/2021 due to aches and pain.     SUBJECTIVE:    Mrs. Castorena is a 60-year-old female with left breast DCIS s/p lumpectomy and radiation in 2018. She was on tamoxifen and anastrozole. It was discontinued because of side effects.  Patient has been doing well without any evidence of recurrence.  Mammogram is scheduled for tomorrow.     Patient has multiple different symptoms.  She gets intermittent headache. No dizziness. She gets heartburn. No chest pain.  No shortness of breath at rest. She gets intermittent abdominal pain. Occasional nausea.  No vomiting. No urinary complaint.  She gets constipation.  She has been having hemorrhoidal bleed on and off.  No fever or night sweats.       PHYSICAL EXAMINATION:   GENERAL:  Alert and oriented x 3.   VITAL SIGNS:  Reviewed.     EYES:  No icterus.   NECK:  Supple. No lymphadenopathy. No thyromegaly.   AXILLAE:  No lymphadenopathy.   LUNGS:  Good air entry bilaterally.  No crackles or wheezing.   HEART:  Regular.  No murmur.   GI:  Abdomen is soft.  Nontender. No mass.   EXTREMITIES:  No pedal edema.  No calf swelling or tenderness.   -Left  hand reveals ganglion cyst.  SKIN:  No rash.   BREASTS:  Examined in the presence of ALANA Jiang.   -Right breast exam is normal.  No mass. No axillary lymphadenopathy.   -Left breast exam reveals post-surgical changes.  No mass. No axillary lymphadenopathy.      LABS: CBC and CMP on 12/06/2024 is normal.     ASSESSMENT:    1.  A 60-year-old female with left breast ductal carcinoma in situ diagnosed in 2018.  No evidence of recurrence.  2.  GERD.  3.  Hemorrhoidal bleed.  4.  Left hand ganglion cyst.     PLAN:  Continue famotidine.  Colorectal appointment.  Use Anusol cream as needed.  Orthopedic appointment.  See PCP.  Follow-up in 1 year (she can just follow-up with her PCP).  Mammogram in 1 year.     DISCUSSION:  1.  Discussed with her regarding DCIS.  No evidence of recurrence.  Explained to her that risk of recurrence is very low as it is more than 6 years since her lumpectomy.     Discussed regarding follow-up of DCIS.  She needs yearly exam and yearly mammogram.     I told the patient that she can follow-up with PCP.  Patient wants to be followed in oncology clinic once a year.     2.  Patient has reflux.  She will continue on famotidine.  It helps her.     3.  Patient has been getting hemorrhoidal bleed.  It is bothering her.  Will set her up to see colorectal surgery.     4.  Patient has ganglion cyst in left hand.  It intermittently bothers her.  Will schedule her to see orthopedics.     5.  She had few questions which were all answered.  I will see her in 1 year time.  Advised her to call us with any questions or concerns.     Total visit time of 30 minutes.  Time spent in today's visit, review of chart/investigations today and documentation.

## 2025-06-25 NOTE — PATIENT INSTRUCTIONS
-Left breast mammogram and ultrasound soon.  -Take motrin as needed for pain.  -Follow-up after ultrasound.  
no

## 2025-06-25 NOTE — Clinical Note
"6/25/2025      Graciela Castorena  [address]  [place] [address]      Dear Colleague,    Thank you for referring your patient, Graciela Castorena, to the [facility]. Please see a copy of my visit note below.    Oncology Rooming Note    June 25, 2025 11:02 AM   Graciela Castorena is a 60 year old female who presents for:    Chief Complaint   Patient presents with    Oncology Clinic Visit     Initial Vitals: /76   Pulse 83   Resp 16   Ht 1.6 m (5' 3\")   Wt 79.4 kg (175 lb)   SpO2 99%   BMI 31.00 kg/m   Estimated body mass index is 31 kg/m  as calculated from the following:    Height as of this encounter: 1.6 m (5' 3\").    Weight as of this encounter: 79.4 kg (175 lb). Body surface area is 1.88 meters squared.  Moderate Pain (5) Comment: Data Unavailable   No LMP recorded. Patient is postmenopausal.  Allergies reviewed: Yes  Medications reviewed: Yes    Medications: Medication refills not needed today.  Pharmacy name entered into boo-box: [id number] [place] [phone] [facility]    Frailty Screening:   Is the patient here for a new oncology consult visit in cancer care? 2. No    PHQ9:  Did this patient require a PHQ9?: No            Sary Read MA              ONCOLOGY HISTORY: Mrs. 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      1.  Screening mammogram on 01/16/2018 revealed a new calcification cluster behind the left nipple.   -Left diagnostic mammogram on 03/12/2018 revealed an indeterminate cluster of microcalcification in retroareolar portion of left breast at 11:00.      2. Stereotactic left breast biopsy on 03/13/2018 reveals intermediate grade DCIS with microcalcification and comedonecrosis.  % positive and  ME 25% positive.      3.  Patient underwent left breast lumpectomy on 04/18/2018.  Pathology reveals grade 2 DCIS measuring 3 mm.  Margins are negative.     4. Radiation to left breast. 5056 cGy between 05/31/2018 and 06/27/2018. "      5. Tamoxifen started in May, 2018. Stopped 0n 12/29/2020 due to vaginal bleeding.  -Anastrazole started on 01/12/2021.  -Anastrazole stopped 07/14/2021 due to aches and pain.     SUBJECTIVE:    Mrs. Castorena is a 60-year-old female with left breast DCIS s/p lumpectomy and radiation in 2018. She was on tamoxifen and anastrozole. It was discontinued because of side effects.  Patient has been doing well without any evidence of recurrence.  Mammogram is scheduled for tomorrow.     Patient has multiple different symptoms.  She gets intermittent headache. No dizziness. She gets heartburn. No chest pain.  No shortness of breath at rest. She gets intermittent abdominal pain. Occasional nausea.  No vomiting. No urinary complaint.  She gets constipation.  She has been having hemorrhoidal bleed on and off.  No fever or night sweats.       PHYSICAL EXAMINATION:   GENERAL:  Alert and oriented x 3.   VITAL SIGNS:  Reviewed.     EYES:  No icterus.   NECK:  Supple. No lymphadenopathy. No thyromegaly.   AXILLAE:  No lymphadenopathy.   LUNGS:  Good air entry bilaterally.  No crackles or wheezing.   HEART:  Regular.  No murmur.   GI:  Abdomen is soft.  Nontender. No mass.   EXTREMITIES:  No pedal edema.  No calf swelling or tenderness.   -Left hand reveals ganglion cyst.  SKIN:  No rash.   BREASTS:  Examined in the presence of LAANA Jiang.   -Right breast exam is normal.  No mass. No axillary lymphadenopathy.   -Left breast exam reveals post-surgical changes.  No mass. No axillary lymphadenopathy.      LABS: CBC and CMP on 12/06/2024 is normal.     ASSESSMENT:    1.  A 60-year-old female with left breast ductal carcinoma in situ diagnosed in 2018.  No evidence of recurrence.  2.  GERD.  3.  Hemorrhoidal bleed.  4.  Left hand ganglion cyst.     PLAN:  Continue famotidine.  Colorectal appointment.  Use Anusol cream as needed.  Orthopedic appointment.  See PCP.  Follow-up in 1 year (she can just follow-up with her  PCP).  Mammogram in 1 year.     DISCUSSION:  1.  Discussed with her regarding DCIS.  No evidence of recurrence.  Explained to her that risk of recurrence is very low as it is more than 6 years since her lumpectomy.     Discussed regarding follow-up of DCIS.  She needs yearly exam and yearly mammogram.     I told the patient that she can follow-up with PCP.  Patient wants to be followed in oncology clinic once a year.     2.  Patient has reflux.  She will continue on famotidine.  It helps her.     3.  Patient has been getting hemorrhoidal bleed.  It is bothering her.  Will set her up to see colorectal surgery.     4.  Patient has ganglion cyst in left hand.  It intermittently bothers her.  Will schedule her to see orthopedics.     5.  She had few questions which were all answered.  I will see her in 1 year time.  Advised her to call us with any questions or concerns.     Total visit time of 30 minutes.  Time spent in today's visit, review of chart/investigations today and documentation.      Again, thank you for allowing me to participate in the care of your patient.        Sincerely,        Samreen Barahona MD    Electronically signed

## 2025-06-25 NOTE — LETTER
2025      Re: Graciela Castorena  [address]  [place] [address]         To whom it may concern,       I am writing on behalf patient, Graciela Castorena, : 1965 who I see for her oncology care.   She was seen today and has pain to her left side. Pressure or carrying weight on that side increases her pain.     I am advising that patient have accomodation while working to limit the pressure or weight .     If you have any questions or concerns, please call my office at [phone].    Sincerely,         Samreen Barahona MD                        
"  Problem: Thought Process Alteration  Goal: Optimal Thought Clarity  1/31/2023 2219 by Reuben Wetzel, RN  Outcome: Progressing  1/31/2023 2218 by Reuben Wetzel, RN  Outcome: Progressing     Problem: Adult Behavioral Health Plan of Care  Goal: Plan of Care Review  Outcome: Progressing  Flowsheets (Taken 1/31/2023 1700)  Patient Agreement with Plan of Care: agrees     Problem: Psychotic Signs/Symptoms  Goal: Improved Behavioral Control (Psychotic Signs/Symptoms)  Outcome: Progressing   Goal Outcome Evaluation:    Plan of Care Reviewed With: patient        Pt endorses SI with plans to \"shoot myself in the head\". He also endorsed auditory hallucination in the context of \"voices whispering to me to shoot myself on the head\" and visual hallucination in the context of seeing \"two ducks, black and white floating in his room as well as seeing two crows\". Pt denied SIB/HI, and contracts for safety. He rated both anxiety and depression as \"high\". Atarax administered with some relief. Writer advised pt not to focus more on those symptoms mentioned above for although they may appear real to him, they are not real. He should rather focus on getting out of his room to attend group activities, to socialize with peers and to participate in other unit activities geared towards his recovery. Pt is pleasant on approached. Affect is flat and blunted. He seem to be very paranoid. Judgment and insight not appropriate to situation. Mood is calm and cooperative. He remained isolative and withdrawn to room all shift, but continued to be medication compliant. No behavioral outburst noted. No abnormal psych symptoms noted. Will continue to monitor and treat as ordered.          "
normal...

## 2025-06-25 NOTE — PROGRESS NOTES
"Oncology Rooming Note    June 25, 2025 11:02 AM   Graciela Castorena is a 60 year old female who presents for:    Chief Complaint   Patient presents with    Oncology Clinic Visit     Initial Vitals: /76   Pulse 83   Resp 16   Ht 1.6 m (5' 3\")   Wt 79.4 kg (175 lb)   SpO2 99%   BMI 31.00 kg/m   Estimated body mass index is 31 kg/m  as calculated from the following:    Height as of this encounter: 1.6 m (5' 3\").    Weight as of this encounter: 79.4 kg (175 lb). Body surface area is 1.88 meters squared.  Moderate Pain (5) Comment: Data Unavailable   No LMP recorded. Patient is postmenopausal.  Allergies reviewed: Yes  Medications reviewed: Yes    Medications: Medication refills not needed today.  Pharmacy name entered into [employer]: [id number] [place], [place] - [address] [facility]    Frailty Screening:   Is the patient here for a new oncology consult visit in cancer care? 2. No    PHQ9:  Did this patient require a PHQ9?: No            Sary Read MA            "

## 2025-07-01 ENCOUNTER — HOSPITAL ENCOUNTER (OUTPATIENT)
Dept: MAMMOGRAPHY | Facility: CLINIC | Age: 60
Discharge: HOME | End: 2025-07-01
Attending: INTERNAL MEDICINE

## 2025-07-01 DIAGNOSIS — N63.20 MASS OF LEFT BREAST, UNSPECIFIED QUADRANT: ICD-10-CM

## 2025-07-01 PROCEDURE — 77065 DX MAMMO INCL CAD UNI: CPT | Mod: LT

## 2025-07-01 PROCEDURE — 76642 ULTRASOUND BREAST LIMITED: CPT | Mod: LT

## 2025-07-07 ENCOUNTER — ONCOLOGY VISIT (OUTPATIENT)
Dept: ONCOLOGY | Facility: CLINIC | Age: 60
End: 2025-07-07
Attending: NURSE PRACTITIONER
Payer: COMMERCIAL

## 2025-07-07 VITALS
BODY MASS INDEX: 31 KG/M2 | OXYGEN SATURATION: 99 % | WEIGHT: 175 LBS | HEART RATE: 70 BPM | SYSTOLIC BLOOD PRESSURE: 105 MMHG | RESPIRATION RATE: 16 BRPM | DIASTOLIC BLOOD PRESSURE: 66 MMHG

## 2025-07-07 DIAGNOSIS — D05.12 DUCTAL CARCINOMA IN SITU (DCIS) OF LEFT BREAST: Primary | ICD-10-CM

## 2025-07-07 PROCEDURE — G0463 HOSPITAL OUTPT CLINIC VISIT: HCPCS | Performed by: INTERNAL MEDICINE

## 2025-07-07 PROCEDURE — 99214 OFFICE O/P EST MOD 30 MIN: CPT | Performed by: INTERNAL MEDICINE

## 2025-07-07 ASSESSMENT — PAIN SCALES - GENERAL: PAINLEVEL_OUTOF10: NO PAIN (0)

## 2025-07-07 NOTE — LETTER
"7/7/2025      Graciela Castorena  [address]  [facility] [address]      Dear Colleague,    Thank you for referring your patient, Graciela Castorena, to the [facility]. Please see a copy of my visit note below.    Oncology Rooming Note    July 7, 2025 2:17 PM   Graciela Castorena is a 60 year old female who presents for:    Chief Complaint   Patient presents with     Oncology Clinic Visit     Initial Vitals: There were no vitals taken for this visit. Estimated body mass index is 31 kg/m  as calculated from the following:    Height as of 6/25/25: 1.6 m (5' 3\").    Weight as of 6/25/25: 79.4 kg (175 lb). There is no height or weight on file to calculate BSA.  Data Unavailable Comment: Data Unavailable   No LMP recorded. Patient is postmenopausal.  Allergies reviewed: Yes  Medications reviewed: Yes    Medications: Medication refills not needed today.  Pharmacy name entered into [employer]: [id number] [address] [facility]    Frailty Screening:   Is the patient here for a new oncology consult visit in cancer care? 2. No    PHQ9:  Did this patient require a PHQ9?: No      Clinical concerns:  doctor was notified.      Jolie Knight, [facility]              ONCOLOGY HISTORY: Mrs. 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      1.  Screening mammogram on 01/16/2018 revealed a new calcification cluster behind the left nipple.   -Left diagnostic mammogram on 03/12/2018 revealed an indeterminate cluster of microcalcification in retroareolar portion of left breast at 11:00.      2. Stereotactic left breast biopsy on 03/13/2018 reveals intermediate grade DCIS with microcalcification and comedonecrosis.  % positive and  AZ 25% positive.      3.  Patient underwent left breast lumpectomy on 04/18/2018.  Pathology reveals grade 2 DCIS measuring 3 mm.  Margins are negative.     4. Radiation to left breast. 5056 cGy between 05/31/2018 and 06/27/2018.      5. Tamoxifen started in " May, 2018. Stopped 0n 12/29/2020 due to vaginal bleeding.  -Anastrazole started on 01/12/2021.  -Anastrazole stopped 07/14/2021 due to aches and pain.     SUBJECTIVE:    Mrs. Castorena is a 60-year-old female with history of  left breast DCIS s/p lumpectomy and radiation in 2018. She was on tamoxifen and anastrozole. It was discontinued because of side effects.    Patient recently started to have some discomfort in the left breast along with the skin thickness.  Left breast diagnostic mammogram and ultrasound done in 07/01/2025:  IMPRESSION: BI-RADS CATEGORY: 4 - Suspicious. Indeterminate microcalcifications in the lumpectomy bed. Stereotactic core biopsy is recommended to exclude ductal carcinoma in situ.    Patient presents to the clinic with her daughter to discuss regarding further treatment.  Patient does not want biopsy.  She wants to proceed directly to surgery.    She has mild generalized weakness.  She is able to do all her activities.  No headache. No dizziness. No chest pain.  No shortness of breath at rest. She gets intermittent abdominal pain.  She gets some heartburn.  No nausea or vomiting. No urinary complaint.  She has constipation.  She gets hemorrhoidal bleed on and off.  No fever or night sweats.     She has ganglion cyst in left hand.  Does not bother her.    PHYSICAL EXAMINATION:   GENERAL:  Alert and oriented x 3.   VITAL SIGNS:  Reviewed.     Rest of the system not examined.     ASSESSMENT:    1.  A 60-year-old female with left breast ductal carcinoma in situ diagnosed in 2018 status postlumpectomy and radiation.  She could not tolerate tamoxifen or anastrozole.  Mammogram and ultrasound is suspicious for recurrence.  2.  Intermittent hemorrhoidal bleed.  3.  Left hand ganglion cyst.  4.  GERD.     PLAN:  -See surgeon.  -See me 2 months.  -Continue Pepcid.     DISCUSSION:  1.  Mammogram and ultrasound was reviewed.  It is suspicious.  Could be recurrent DCIS or could be new invasive  carcinoma.    Discussed regarding further treatment.  Discussed regarding getting a biopsy.  Explained to her that biopsy will help us know whether it is DCIS or invasive carcinoma.  Also we can get receptor studies.    Patient does not want to biopsy.  She wants to proceed directly to surgery.    Will schedule her to see the surgeon.  Explained to the patient that she will likely require mastectomy as she previously had lumpectomy and radiation.    2.  I will see her after the surgery.  Depending on the pathology we will discuss regarding adjuvant treatment.  She and her daughter had few questions which were all answered.    Total visit time of 30 minutes.  Time spent in today's visit, review of chart/investigations today and documentation.    Again, thank you for allowing me to participate in the care of your patient.        Sincerely,        Samreen Barahona MD    Electronically signed

## 2025-07-07 NOTE — PROGRESS NOTES
"Oncology Rooming Note    July 7, 2025 2:17 PM   Graciela Castorena is a 60 year old female who presents for:    Chief Complaint   Patient presents with    Oncology Clinic Visit     Initial Vitals: There were no vitals taken for this visit. Estimated body mass index is 31 kg/m  as calculated from the following:    Height as of 6/25/25: 1.6 m (5' 3\").    Weight as of 6/25/25: 79.4 kg (175 lb). There is no height or weight on file to calculate BSA.  Data Unavailable Comment: Data Unavailable   No LMP recorded. Patient is postmenopausal.  Allergies reviewed: Yes  Medications reviewed: Yes    Medications: Medication refills not needed today.  Pharmacy name entered into [employer] [id number] [place], [place] - [address]    Frailty Screening:   Is the patient here for a new oncology consult visit in cancer care? 2. No    PHQ9:  Did this patient require a PHQ9?: No      Clinical concerns:  doctor was notified.      Jolie Knight CMA            "

## 2025-07-13 NOTE — PROGRESS NOTES
ONCOLOGY HISTORY: Mrs. 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      1.  Screening mammogram on 01/16/2018 revealed a new calcification cluster behind the left nipple.   -Left diagnostic mammogram on 03/12/2018 revealed an indeterminate cluster of microcalcification in retroareolar portion of left breast at 11:00.      2. Stereotactic left breast biopsy on 03/13/2018 reveals intermediate grade DCIS with microcalcification and comedonecrosis.  % positive and  CT 25% positive.      3.  Patient underwent left breast lumpectomy on 04/18/2018.  Pathology reveals grade 2 DCIS measuring 3 mm.  Margins are negative.     4. Radiation to left breast. 5056 cGy between 05/31/2018 and 06/27/2018.      5. Tamoxifen started in May, 2018. Stopped 0n 12/29/2020 due to vaginal bleeding.  -Anastrazole started on 01/12/2021.  -Anastrazole stopped 07/14/2021 due to aches and pain.     SUBJECTIVE:    Mrs. Castorena is a 60-year-old female with history of  left breast DCIS s/p lumpectomy and radiation in 2018. She was on tamoxifen and anastrozole. It was discontinued because of side effects.    Patient recently started to have some discomfort in the left breast along with the skin thickness.  Left breast diagnostic mammogram and ultrasound done in 07/01/2025:  IMPRESSION: BI-RADS CATEGORY: 4 - Suspicious. Indeterminate microcalcifications in the lumpectomy bed. Stereotactic core biopsy is recommended to exclude ductal carcinoma in situ.    Patient presents to the clinic with her daughter to discuss regarding further treatment.  Patient does not want biopsy.  She wants to proceed directly to surgery.    She has mild generalized weakness.  She is able to do all her activities.  No headache. No dizziness. No chest pain.  No shortness of breath at rest. She gets intermittent abdominal pain.  She gets some heartburn.  No nausea or vomiting. No urinary complaint.  She has constipation.  She gets hemorrhoidal bleed on and off.  No fever or  night sweats.     She has ganglion cyst in left hand.  Does not bother her.    PHYSICAL EXAMINATION:   GENERAL:  Alert and oriented x 3.   VITAL SIGNS:  Reviewed.     Rest of the system not examined.     ASSESSMENT:    1.  A 60-year-old female with left breast ductal carcinoma in situ diagnosed in 2018 status postlumpectomy and radiation.  She could not tolerate tamoxifen or anastrozole.  Mammogram and ultrasound is suspicious for recurrence.  2.  Intermittent hemorrhoidal bleed.  3.  Left hand ganglion cyst.  4.  GERD.     PLAN:  -See surgeon.  -See me 2 months.  -Continue Pepcid.     DISCUSSION:  1.  Mammogram and ultrasound was reviewed.  It is suspicious.  Could be recurrent DCIS or could be new invasive carcinoma.    Discussed regarding further treatment.  Discussed regarding getting a biopsy.  Explained to her that biopsy will help us know whether it is DCIS or invasive carcinoma.  Also we can get receptor studies.    Patient does not want to biopsy.  She wants to proceed directly to surgery.    Will schedule her to see the surgeon.  Explained to the patient that she will likely require mastectomy as she previously had lumpectomy and radiation.    2.  I will see her after the surgery.  Depending on the pathology we will discuss regarding adjuvant treatment.  She and her daughter had few questions which were all answered.    Total visit time of 30 minutes.  Time spent in today's visit, review of chart/investigations today and documentation.

## 2025-07-25 ENCOUNTER — TELEPHONE (OUTPATIENT)
Dept: SURGERY | Facility: CLINIC | Age: 60
End: 2025-07-25
Payer: COMMERCIAL

## 2025-07-25 NOTE — TELEPHONE ENCOUNTER
Name of caller: Patient    Reason for Call:      Patient missed appointment on 7/22/25 with Dr. Rodriguez.   New Ductal carcinoma in situ of Left Breast, Dr. Jacinta Barahona referring, imaging in epic dll  Patient says pain is worse and wants to be seen ASAP.    Surgeon:  Meli Rodriguez MD    Recent Surgery:  No    If yes, when & what type:          Best phone number to reach pt at is:   [phone]    Ok to leave a message with medical info? No    Pharmacy preferred (if calling for a refill):

## 2025-07-30 ENCOUNTER — OFFICE VISIT (OUTPATIENT)
Dept: FAMILY MEDICINE | Facility: CLINIC | Age: 60
End: 2025-07-30
Payer: COMMERCIAL

## 2025-07-30 VITALS
BODY MASS INDEX: 30.88 KG/M2 | HEART RATE: 79 BPM | HEIGHT: 63 IN | TEMPERATURE: 97.5 F | RESPIRATION RATE: 16 BRPM | SYSTOLIC BLOOD PRESSURE: 114 MMHG | DIASTOLIC BLOOD PRESSURE: 77 MMHG | WEIGHT: 174.3 LBS | OXYGEN SATURATION: 99 %

## 2025-07-30 DIAGNOSIS — R73.03 PREDIABETES: Primary | Chronic | ICD-10-CM

## 2025-07-30 DIAGNOSIS — I10 HYPERTENSION, UNSPECIFIED TYPE: ICD-10-CM

## 2025-07-30 DIAGNOSIS — M67.432 GANGLION CYST OF WRIST, LEFT: ICD-10-CM

## 2025-07-30 DIAGNOSIS — E55.9 VITAMIN D DEFICIENCY: ICD-10-CM

## 2025-07-30 LAB
EST. AVERAGE GLUCOSE BLD GHB EST-MCNC: 123 MG/DL
HBA1C MFR BLD: 5.9 % (ref 0–5.6)

## 2025-07-30 PROCEDURE — 36415 COLL VENOUS BLD VENIPUNCTURE: CPT

## 2025-07-30 PROCEDURE — 3078F DIAST BP <80 MM HG: CPT

## 2025-07-30 PROCEDURE — 3074F SYST BP LT 130 MM HG: CPT

## 2025-07-30 PROCEDURE — 1126F AMNT PAIN NOTED NONE PRSNT: CPT

## 2025-07-30 PROCEDURE — 83036 HEMOGLOBIN GLYCOSYLATED A1C: CPT

## 2025-07-30 PROCEDURE — 3044F HG A1C LEVEL LT 7.0%: CPT

## 2025-07-30 PROCEDURE — 99213 OFFICE O/P EST LOW 20 MIN: CPT

## 2025-07-30 RX ORDER — LOSARTAN POTASSIUM 25 MG/1
25 TABLET ORAL DAILY
Qty: 90 TABLET | Refills: 1 | Status: SHIPPED | OUTPATIENT
Start: 2025-07-30

## 2025-07-30 ASSESSMENT — PAIN SCALES - GENERAL: PAINLEVEL_OUTOF10: NO PAIN (0)

## 2025-07-30 NOTE — PROGRESS NOTES
"  {PROVIDER CHARTING PREFERENCE:931926}    Va Owens is a 60 year old, presenting for the following health issues:  Breast Problem and LAB REQUEST        7/30/2025     8:45 AM   Additional Questions   Roomed by Norma MEADOWS MA     History of Present Illness       Back Pain:  She presents for follow up of back pain. Patient's back pain is a new problem.    Original cause of back pain: not sure  First noticed back pain: in the last week  Patient feels back pain: 2 to 4 times per dayLocation of back pain:  Right lower back and other  Description of back pain: stabbing  Back pain spreads: nowhere    Since patient first noticed back pain, pain is: no longer a problem  Does back pain interfere with her job:  No  On a scale of 1-10 (10 being the worst), patient describes pain as:  6  What makes back pain worse: lying down   Acupuncture: not tried  Acetaminophen: helpful  Activity or exercise: not tried  Chiropractor:  Not tried  Cold: not tried  Heat: not tried  Massage: not tried  Muscle relaxants: not tried  NSAIDS: not tried  Opioids: not tried  Physical Therapy: not tried  Rest: helpful  Steroid Injection: not tried  Stretching: not tried  Surgery: not tried  TENS unit: not tried  Topical pain relievers: not tried  Other healthcare providers patient is seeing for back pain: None    Hypertension: She presents for follow up of hypertension.  She does not check blood pressure  regularly outside of the clinic. Outside blood pressures have been over 140/90. She does not follow a low salt diet.     Headaches:   Since the patient's last clinic visit, headaches are: worsened  The patient is getting headaches:  Everyday during night  She is able to do normal daily activities when she has a migraine.  The patient is taking the following rescue/relief medications:  Tylenol   Patient states \"I get total relief\" from the rescue/relief medications.   The patient is taking the following medications to prevent migraines:  No " "medications to prevent migraines  In the past 4 weeks, the patient has gone to an Urgent Care or Emergency Room 0 times times due to headaches.    Reason for visit:  I pain on my breast that coming in night time    She eats 0-1 servings of fruits and vegetables daily.She consumes 0 sweetened beverage(s) daily.She exercises with enough effort to increase her heart rate 9 or less minutes per day.  She exercises with enough effort to increase her heart rate 3 or less days per week.   She is taking medications regularly.                  Review of Systems  Constitutional, HEENT, cardiovascular, pulmonary, gi and gu systems are negative, except as otherwise noted.      Objective    /77 (BP Location: Right arm, Patient Position: Sitting, Cuff Size: Adult Regular)   Pulse 79   Temp 97.5  F (36.4  C) (Temporal)   Resp 16   Ht 1.6 m (5' 3\")   Wt 79.1 kg (174 lb 4.8 oz)   SpO2 99%   BMI 30.88 kg/m    Body mass index is 30.88 kg/m .  Physical Exam  Vitals reviewed.   Constitutional:       Appearance: Normal appearance.   HENT:      Head: Normocephalic.   Eyes:      Pupils: Pupils are equal, round, and reactive to light.   Cardiovascular:      Rate and Rhythm: Normal rate.   Pulmonary:      Effort: Pulmonary effort is normal. No respiratory distress.   Musculoskeletal:         General: Normal range of motion.   Skin:     General: Skin is warm.      Comments: Ganglion cyst to left wrist   Neurological:      Mental Status: She is alert and oriented to person, place, and time.   Psychiatric:         Mood and Affect: Mood normal.         Behavior: Behavior normal.         Thought Content: Thought content normal.         Judgment: Judgment normal.            Results for orders placed or performed in visit on 07/30/25   Hemoglobin A1c     Status: Abnormal   Result Value Ref Range    Estimated Average Glucose 123 (H) <117 mg/dL    Hemoglobin A1C 5.9 (H) 0.0 - 5.6 %           Signed Electronically by: Felecia Mcclure DNP, " APRN, CNP

## 2025-08-06 ENCOUNTER — HOSPITAL ENCOUNTER (OUTPATIENT)
Dept: MRI IMAGING | Facility: HOSPITAL | Age: 60
Discharge: HOME | End: 2025-08-06
Attending: SURGERY
Payer: COMMERCIAL

## 2025-08-06 DIAGNOSIS — D05.12 DUCTAL CARCINOMA IN SITU (DCIS) OF LEFT BREAST: ICD-10-CM

## 2025-08-06 PROCEDURE — A9585 GADOBUTROL INJECTION: HCPCS | Performed by: SURGERY

## 2025-08-06 PROCEDURE — 255N000002 HC RX 255 OP 636: Performed by: SURGERY

## 2025-08-06 PROCEDURE — 77049 MRI BREAST C-+ W/CAD BI: CPT

## 2025-08-06 RX ORDER — GADOBUTROL 604.72 MG/ML
0.1 INJECTION INTRAVENOUS ONCE
Status: COMPLETED | OUTPATIENT
Start: 2025-08-06 | End: 2025-08-06

## 2025-08-06 RX ADMIN — GADOBUTROL 8 ML: 604.72 INJECTION INTRAVENOUS at 17:29

## 2025-08-19 ENCOUNTER — ANESTHESIA EVENT (OUTPATIENT)
Dept: SURGERY | Facility: CLINIC | Age: 60
End: 2025-08-19
Payer: COMMERCIAL

## 2025-08-19 ENCOUNTER — TELEPHONE (OUTPATIENT)
Dept: SURGERY | Facility: CLINIC | Age: 60
End: 2025-08-19
Payer: COMMERCIAL

## 2025-08-19 ASSESSMENT — ENCOUNTER SYMPTOMS: DYSRHYTHMIAS: 1

## 2025-08-20 ENCOUNTER — HOSPITAL ENCOUNTER (OUTPATIENT)
Facility: CLINIC | Age: 60
Discharge: HOME | End: 2025-08-21
Attending: SURGERY | Admitting: SURGERY
Payer: COMMERCIAL

## 2025-08-20 ENCOUNTER — ANESTHESIA (OUTPATIENT)
Dept: SURGERY | Facility: CLINIC | Age: 60
End: 2025-08-20
Payer: COMMERCIAL

## 2025-08-20 ENCOUNTER — HOSPITAL ENCOUNTER (OUTPATIENT)
Dept: NUCLEAR MEDICINE | Facility: CLINIC | Age: 60
Discharge: HOME | End: 2025-08-20
Attending: SURGERY | Admitting: SURGERY
Payer: COMMERCIAL

## 2025-08-20 DIAGNOSIS — D05.12 DUCTAL CARCINOMA IN SITU (DCIS) OF LEFT BREAST: ICD-10-CM

## 2025-08-20 DIAGNOSIS — G89.18 POSTOPERATIVE PAIN: Primary | ICD-10-CM

## 2025-08-20 PROBLEM — C50.912 INVASIVE DUCTAL CARCINOMA OF LEFT BREAST IN FEMALE (H): Status: ACTIVE | Noted: 2025-08-20

## 2025-08-20 PROCEDURE — 710N000009 HC RECOVERY PHASE 1, LEVEL 1, PER MIN: Performed by: SURGERY

## 2025-08-20 PROCEDURE — 88341 IMHCHEM/IMCYTCHM EA ADD ANTB: CPT | Mod: 26 | Performed by: PATHOLOGY

## 2025-08-20 PROCEDURE — 250N000009 HC RX 250: Performed by: ANESTHESIOLOGY

## 2025-08-20 PROCEDURE — 88307 TISSUE EXAM BY PATHOLOGIST: CPT | Mod: 26 | Performed by: PATHOLOGY

## 2025-08-20 PROCEDURE — 250N000011 HC RX IP 250 OP 636: Performed by: ANESTHESIOLOGY

## 2025-08-20 PROCEDURE — 38792 RA TRACER ID OF SENTINL NODE: CPT

## 2025-08-20 PROCEDURE — 250N000011 HC RX IP 250 OP 636: Performed by: SURGERY

## 2025-08-20 PROCEDURE — 38525 BIOPSY/REMOVAL LYMPH NODES: CPT | Mod: 51 | Performed by: SURGERY

## 2025-08-20 PROCEDURE — 250N000011 HC RX IP 250 OP 636: Performed by: NURSE ANESTHETIST, CERTIFIED REGISTERED

## 2025-08-20 PROCEDURE — 88342 IMHCHEM/IMCYTCHM 1ST ANTB: CPT | Mod: 26 | Performed by: PATHOLOGY

## 2025-08-20 PROCEDURE — 258N000003 HC RX IP 258 OP 636: Performed by: NURSE ANESTHETIST, CERTIFIED REGISTERED

## 2025-08-20 PROCEDURE — 250N000009 HC RX 250: Performed by: NURSE ANESTHETIST, CERTIFIED REGISTERED

## 2025-08-20 PROCEDURE — 88341 IMHCHEM/IMCYTCHM EA ADD ANTB: CPT | Mod: TC | Performed by: SURGERY

## 2025-08-20 PROCEDURE — 343N000001 HC RX 343 MED OP 636: Performed by: SURGERY

## 2025-08-20 PROCEDURE — 250N000013 HC RX MED GY IP 250 OP 250 PS 637: Performed by: PHYSICIAN ASSISTANT

## 2025-08-20 PROCEDURE — 272N000001 HC OR GENERAL SUPPLY STERILE: Performed by: SURGERY

## 2025-08-20 PROCEDURE — 88305 TISSUE EXAM BY PATHOLOGIST: CPT | Mod: 26 | Performed by: PATHOLOGY

## 2025-08-20 PROCEDURE — 19303 MAST SIMPLE COMPLETE: CPT | Mod: LT | Performed by: SURGERY

## 2025-08-20 PROCEDURE — 360N000076 HC SURGERY LEVEL 3, PER MIN: Performed by: SURGERY

## 2025-08-20 PROCEDURE — 999N000141 HC STATISTIC PRE-PROCEDURE NURSING ASSESSMENT: Performed by: SURGERY

## 2025-08-20 PROCEDURE — 250N000025 HC SEVOFLURANE, PER MIN: Performed by: SURGERY

## 2025-08-20 PROCEDURE — A9520 TC99 TILMANOCEPT DIAG 0.5MCI: HCPCS | Performed by: SURGERY

## 2025-08-20 PROCEDURE — 258N000003 HC RX IP 258 OP 636: Performed by: PHYSICIAN ASSISTANT

## 2025-08-20 PROCEDURE — 19303 MAST SIMPLE COMPLETE: CPT | Mod: AS | Performed by: PHYSICIAN ASSISTANT

## 2025-08-20 PROCEDURE — 370N000017 HC ANESTHESIA TECHNICAL FEE, PER MIN: Performed by: SURGERY

## 2025-08-20 RX ORDER — LIDOCAINE 40 MG/G
CREAM TOPICAL
Status: DISCONTINUED | OUTPATIENT
Start: 2025-08-20 | End: 2025-08-21 | Stop reason: HOSPADM

## 2025-08-20 RX ORDER — FAMOTIDINE 20 MG/1
40 TABLET, FILM COATED ORAL DAILY
Status: DISCONTINUED | OUTPATIENT
Start: 2025-08-21 | End: 2025-08-21 | Stop reason: HOSPADM

## 2025-08-20 RX ORDER — ACETAMINOPHEN 325 MG/1
650 TABLET ORAL 4 TIMES DAILY
Status: DISCONTINUED | OUTPATIENT
Start: 2025-08-20 | End: 2025-08-21 | Stop reason: HOSPADM

## 2025-08-20 RX ORDER — OXYCODONE HYDROCHLORIDE 5 MG/1
10 TABLET ORAL EVERY 4 HOURS PRN
Status: DISCONTINUED | OUTPATIENT
Start: 2025-08-20 | End: 2025-08-21 | Stop reason: HOSPADM

## 2025-08-20 RX ORDER — FENTANYL CITRATE 50 UG/ML
INJECTION, SOLUTION INTRAMUSCULAR; INTRAVENOUS PRN
Status: DISCONTINUED | OUTPATIENT
Start: 2025-08-20 | End: 2025-08-20

## 2025-08-20 RX ORDER — ONDANSETRON 2 MG/ML
INJECTION INTRAMUSCULAR; INTRAVENOUS PRN
Status: DISCONTINUED | OUTPATIENT
Start: 2025-08-20 | End: 2025-08-20

## 2025-08-20 RX ORDER — CEFAZOLIN SODIUM/WATER 2 G/20 ML
2 SYRINGE (ML) INTRAVENOUS
Status: COMPLETED | OUTPATIENT
Start: 2025-08-20 | End: 2025-08-20

## 2025-08-20 RX ORDER — POLYETHYLENE GLYCOL 3350 17 G/17G
17 POWDER, FOR SOLUTION ORAL DAILY
Status: DISCONTINUED | OUTPATIENT
Start: 2025-08-21 | End: 2025-08-21 | Stop reason: HOSPADM

## 2025-08-20 RX ORDER — DEXAMETHASONE SODIUM PHOSPHATE 4 MG/ML
INJECTION, SOLUTION INTRA-ARTICULAR; INTRALESIONAL; INTRAMUSCULAR; INTRAVENOUS; SOFT TISSUE PRN
Status: DISCONTINUED | OUTPATIENT
Start: 2025-08-20 | End: 2025-08-20

## 2025-08-20 RX ORDER — OXYCODONE HYDROCHLORIDE 5 MG/1
5 TABLET ORAL EVERY 4 HOURS PRN
Status: DISCONTINUED | OUTPATIENT
Start: 2025-08-20 | End: 2025-08-21 | Stop reason: HOSPADM

## 2025-08-20 RX ORDER — LIDOCAINE HYDROCHLORIDE 20 MG/ML
INJECTION, SOLUTION INFILTRATION; PERINEURAL PRN
Status: DISCONTINUED | OUTPATIENT
Start: 2025-08-20 | End: 2025-08-20

## 2025-08-20 RX ORDER — SENNA AND DOCUSATE SODIUM 50; 8.6 MG/1; MG/1
1-2 TABLET, FILM COATED ORAL 2 TIMES DAILY PRN
Qty: 15 TABLET | Refills: 0 | Status: SHIPPED | OUTPATIENT
Start: 2025-08-20

## 2025-08-20 RX ORDER — PROPOFOL 10 MG/ML
INJECTION, EMULSION INTRAVENOUS PRN
Status: DISCONTINUED | OUTPATIENT
Start: 2025-08-20 | End: 2025-08-20

## 2025-08-20 RX ORDER — SODIUM CHLORIDE, SODIUM LACTATE, POTASSIUM CHLORIDE, CALCIUM CHLORIDE 600; 310; 30; 20 MG/100ML; MG/100ML; MG/100ML; MG/100ML
INJECTION, SOLUTION INTRAVENOUS CONTINUOUS PRN
Status: DISCONTINUED | OUTPATIENT
Start: 2025-08-20 | End: 2025-08-20

## 2025-08-20 RX ORDER — ONDANSETRON 2 MG/ML
4 INJECTION INTRAMUSCULAR; INTRAVENOUS EVERY 30 MIN PRN
Status: DISCONTINUED | OUTPATIENT
Start: 2025-08-20 | End: 2025-08-20 | Stop reason: HOSPADM

## 2025-08-20 RX ORDER — AMOXICILLIN 250 MG
1-2 CAPSULE ORAL 2 TIMES DAILY
Status: DISCONTINUED | OUTPATIENT
Start: 2025-08-20 | End: 2025-08-21 | Stop reason: HOSPADM

## 2025-08-20 RX ORDER — MEPERIDINE HYDROCHLORIDE 25 MG/ML
12.5 INJECTION INTRAMUSCULAR; INTRAVENOUS; SUBCUTANEOUS EVERY 5 MIN PRN
Status: DISCONTINUED | OUTPATIENT
Start: 2025-08-20 | End: 2025-08-20 | Stop reason: HOSPADM

## 2025-08-20 RX ORDER — ONDANSETRON 2 MG/ML
4 INJECTION INTRAMUSCULAR; INTRAVENOUS EVERY 30 MIN PRN
Status: CANCELLED | OUTPATIENT
Start: 2025-08-20

## 2025-08-20 RX ORDER — OXYCODONE HYDROCHLORIDE 5 MG/1
5 TABLET ORAL
Refills: 0 | Status: CANCELLED | OUTPATIENT
Start: 2025-08-20

## 2025-08-20 RX ORDER — NALOXONE HYDROCHLORIDE 0.4 MG/ML
0.2 INJECTION, SOLUTION INTRAMUSCULAR; INTRAVENOUS; SUBCUTANEOUS
Status: DISCONTINUED | OUTPATIENT
Start: 2025-08-20 | End: 2025-08-21 | Stop reason: HOSPADM

## 2025-08-20 RX ORDER — ONDANSETRON 4 MG/1
4 TABLET, ORALLY DISINTEGRATING ORAL EVERY 30 MIN PRN
Status: CANCELLED | OUTPATIENT
Start: 2025-08-20

## 2025-08-20 RX ORDER — HYDROMORPHONE HCL IN WATER/PF 6 MG/30 ML
0.4 PATIENT CONTROLLED ANALGESIA SYRINGE INTRAVENOUS
Status: DISCONTINUED | OUTPATIENT
Start: 2025-08-20 | End: 2025-08-21 | Stop reason: HOSPADM

## 2025-08-20 RX ORDER — FENTANYL CITRATE 0.05 MG/ML
25 INJECTION, SOLUTION INTRAMUSCULAR; INTRAVENOUS EVERY 5 MIN PRN
Status: DISCONTINUED | OUTPATIENT
Start: 2025-08-20 | End: 2025-08-20 | Stop reason: HOSPADM

## 2025-08-20 RX ORDER — FENTANYL CITRATE 0.05 MG/ML
50 INJECTION, SOLUTION INTRAMUSCULAR; INTRAVENOUS EVERY 5 MIN PRN
Status: DISCONTINUED | OUTPATIENT
Start: 2025-08-20 | End: 2025-08-20 | Stop reason: HOSPADM

## 2025-08-20 RX ORDER — OXYCODONE HYDROCHLORIDE 5 MG/1
5-10 TABLET ORAL EVERY 6 HOURS PRN
Qty: 12 TABLET | Refills: 0 | Status: SHIPPED | OUTPATIENT
Start: 2025-08-20 | End: 2025-08-23

## 2025-08-20 RX ORDER — METHOCARBAMOL 500 MG/1
500 TABLET, FILM COATED ORAL 3 TIMES DAILY
Status: DISCONTINUED | OUTPATIENT
Start: 2025-08-20 | End: 2025-08-21 | Stop reason: HOSPADM

## 2025-08-20 RX ORDER — PROCHLORPERAZINE MALEATE 10 MG
10 TABLET ORAL EVERY 6 HOURS PRN
Status: DISCONTINUED | OUTPATIENT
Start: 2025-08-20 | End: 2025-08-21 | Stop reason: HOSPADM

## 2025-08-20 RX ORDER — HYDROMORPHONE HCL IN WATER/PF 6 MG/30 ML
0.4 PATIENT CONTROLLED ANALGESIA SYRINGE INTRAVENOUS EVERY 5 MIN PRN
Status: DISCONTINUED | OUTPATIENT
Start: 2025-08-20 | End: 2025-08-20 | Stop reason: HOSPADM

## 2025-08-20 RX ORDER — HYDROCORTISONE 25 MG/G
CREAM TOPICAL 2 TIMES DAILY PRN
Status: DISCONTINUED | OUTPATIENT
Start: 2025-08-20 | End: 2025-08-21 | Stop reason: HOSPADM

## 2025-08-20 RX ORDER — CALCIUM CARBONATE 500 MG/1
500 TABLET, CHEWABLE ORAL 4 TIMES DAILY PRN
Status: DISCONTINUED | OUTPATIENT
Start: 2025-08-20 | End: 2025-08-21 | Stop reason: HOSPADM

## 2025-08-20 RX ORDER — SODIUM CHLORIDE, SODIUM LACTATE, POTASSIUM CHLORIDE, CALCIUM CHLORIDE 600; 310; 30; 20 MG/100ML; MG/100ML; MG/100ML; MG/100ML
INJECTION, SOLUTION INTRAVENOUS CONTINUOUS
Status: DISCONTINUED | OUTPATIENT
Start: 2025-08-20 | End: 2025-08-20 | Stop reason: HOSPADM

## 2025-08-20 RX ORDER — FENTANYL CITRATE 0.05 MG/ML
25 INJECTION, SOLUTION INTRAMUSCULAR; INTRAVENOUS
Refills: 0 | Status: CANCELLED | OUTPATIENT
Start: 2025-08-20

## 2025-08-20 RX ORDER — NALOXONE HYDROCHLORIDE 0.4 MG/ML
0.1 INJECTION, SOLUTION INTRAMUSCULAR; INTRAVENOUS; SUBCUTANEOUS
Status: DISCONTINUED | OUTPATIENT
Start: 2025-08-20 | End: 2025-08-20 | Stop reason: HOSPADM

## 2025-08-20 RX ORDER — NALOXONE HYDROCHLORIDE 0.4 MG/ML
0.4 INJECTION, SOLUTION INTRAMUSCULAR; INTRAVENOUS; SUBCUTANEOUS
Status: DISCONTINUED | OUTPATIENT
Start: 2025-08-20 | End: 2025-08-21 | Stop reason: HOSPADM

## 2025-08-20 RX ORDER — HYDROMORPHONE HCL IN WATER/PF 6 MG/30 ML
0.2 PATIENT CONTROLLED ANALGESIA SYRINGE INTRAVENOUS EVERY 5 MIN PRN
Status: DISCONTINUED | OUTPATIENT
Start: 2025-08-20 | End: 2025-08-20 | Stop reason: HOSPADM

## 2025-08-20 RX ORDER — HYDROMORPHONE HCL IN WATER/PF 6 MG/30 ML
0.2 PATIENT CONTROLLED ANALGESIA SYRINGE INTRAVENOUS
Status: DISCONTINUED | OUTPATIENT
Start: 2025-08-20 | End: 2025-08-21 | Stop reason: HOSPADM

## 2025-08-20 RX ORDER — ONDANSETRON 4 MG/1
4 TABLET, ORALLY DISINTEGRATING ORAL EVERY 6 HOURS PRN
Status: DISCONTINUED | OUTPATIENT
Start: 2025-08-20 | End: 2025-08-21 | Stop reason: HOSPADM

## 2025-08-20 RX ORDER — DEXAMETHASONE SODIUM PHOSPHATE 4 MG/ML
4 INJECTION, SOLUTION INTRA-ARTICULAR; INTRALESIONAL; INTRAMUSCULAR; INTRAVENOUS; SOFT TISSUE
Status: CANCELLED | OUTPATIENT
Start: 2025-08-20

## 2025-08-20 RX ORDER — SODIUM CHLORIDE, SODIUM LACTATE, POTASSIUM CHLORIDE, CALCIUM CHLORIDE 600; 310; 30; 20 MG/100ML; MG/100ML; MG/100ML; MG/100ML
INJECTION, SOLUTION INTRAVENOUS CONTINUOUS
Status: DISCONTINUED | OUTPATIENT
Start: 2025-08-20 | End: 2025-08-21

## 2025-08-20 RX ORDER — LOSARTAN POTASSIUM 25 MG/1
25 TABLET ORAL DAILY
Status: DISCONTINUED | OUTPATIENT
Start: 2025-08-20 | End: 2025-08-21 | Stop reason: HOSPADM

## 2025-08-20 RX ORDER — NALOXONE HYDROCHLORIDE 0.4 MG/ML
0.1 INJECTION, SOLUTION INTRAMUSCULAR; INTRAVENOUS; SUBCUTANEOUS
Status: CANCELLED | OUTPATIENT
Start: 2025-08-20

## 2025-08-20 RX ORDER — ONDANSETRON 2 MG/ML
4 INJECTION INTRAMUSCULAR; INTRAVENOUS EVERY 6 HOURS PRN
Status: DISCONTINUED | OUTPATIENT
Start: 2025-08-20 | End: 2025-08-21 | Stop reason: HOSPADM

## 2025-08-20 RX ORDER — ONDANSETRON 4 MG/1
4 TABLET, ORALLY DISINTEGRATING ORAL EVERY 30 MIN PRN
Status: DISCONTINUED | OUTPATIENT
Start: 2025-08-20 | End: 2025-08-20 | Stop reason: HOSPADM

## 2025-08-20 RX ORDER — CEFAZOLIN SODIUM/WATER 2 G/20 ML
2 SYRINGE (ML) INTRAVENOUS SEE ADMIN INSTRUCTIONS
Status: DISCONTINUED | OUTPATIENT
Start: 2025-08-20 | End: 2025-08-20 | Stop reason: HOSPADM

## 2025-08-20 RX ORDER — DEXAMETHASONE SODIUM PHOSPHATE 4 MG/ML
4 INJECTION, SOLUTION INTRA-ARTICULAR; INTRALESIONAL; INTRAMUSCULAR; INTRAVENOUS; SOFT TISSUE
Status: DISCONTINUED | OUTPATIENT
Start: 2025-08-20 | End: 2025-08-20 | Stop reason: HOSPADM

## 2025-08-20 RX ORDER — METHOCARBAMOL 500 MG/1
500 TABLET, FILM COATED ORAL 3 TIMES DAILY PRN
Qty: 15 TABLET | Refills: 0 | Status: SHIPPED | OUTPATIENT
Start: 2025-08-20

## 2025-08-20 RX ADMIN — MIDAZOLAM 2 MG: 1 INJECTION INTRAMUSCULAR; INTRAVENOUS at 14:31

## 2025-08-20 RX ADMIN — SODIUM CHLORIDE, SODIUM LACTATE, POTASSIUM CHLORIDE, AND CALCIUM CHLORIDE: .6; .31; .03; .02 INJECTION, SOLUTION INTRAVENOUS at 14:27

## 2025-08-20 RX ADMIN — FENTANYL CITRATE 50 MCG: 50 INJECTION, SOLUTION INTRAMUSCULAR; INTRAVENOUS at 17:35

## 2025-08-20 RX ADMIN — PHENYLEPHRINE HYDROCHLORIDE 50 MCG: 10 INJECTION INTRAVENOUS at 15:36

## 2025-08-20 RX ADMIN — PROPOFOL 150 MCG/KG/MIN: 10 INJECTION, EMULSION INTRAVENOUS at 14:39

## 2025-08-20 RX ADMIN — SENNOSIDES AND DOCUSATE SODIUM 1 TABLET: 50; 8.6 TABLET ORAL at 19:56

## 2025-08-20 RX ADMIN — CALCIUM CARBONATE (ANTACID) CHEW TAB 500 MG 500 MG: 500 CHEW TAB at 22:36

## 2025-08-20 RX ADMIN — PHENYLEPHRINE HYDROCHLORIDE 100 MCG: 10 INJECTION INTRAVENOUS at 15:24

## 2025-08-20 RX ADMIN — FENTANYL CITRATE 50 MCG: 50 INJECTION INTRAMUSCULAR; INTRAVENOUS at 14:35

## 2025-08-20 RX ADMIN — TILMANOCEPT 0.54 MILLICURIE: KIT at 14:19

## 2025-08-20 RX ADMIN — FENTANYL CITRATE 50 MCG: 50 INJECTION, SOLUTION INTRAMUSCULAR; INTRAVENOUS at 16:56

## 2025-08-20 RX ADMIN — METHOCARBAMOL 500 MG: 500 TABLET ORAL at 19:56

## 2025-08-20 RX ADMIN — FENTANYL CITRATE 50 MCG: 50 INJECTION INTRAMUSCULAR; INTRAVENOUS at 14:57

## 2025-08-20 RX ADMIN — PHENYLEPHRINE HYDROCHLORIDE 50 MCG: 10 INJECTION INTRAVENOUS at 15:42

## 2025-08-20 RX ADMIN — ACETAMINOPHEN 650 MG: 325 TABLET ORAL at 19:56

## 2025-08-20 RX ADMIN — DEXAMETHASONE SODIUM PHOSPHATE 8 MG: 4 INJECTION, SOLUTION INTRA-ARTICULAR; INTRALESIONAL; INTRAMUSCULAR; INTRAVENOUS; SOFT TISSUE at 14:35

## 2025-08-20 RX ADMIN — LIDOCAINE HYDROCHLORIDE 80 MG: 20 INJECTION, SOLUTION INFILTRATION; PERINEURAL at 14:35

## 2025-08-20 RX ADMIN — SODIUM CHLORIDE, SODIUM LACTATE, POTASSIUM CHLORIDE, AND CALCIUM CHLORIDE: .6; .31; .03; .02 INJECTION, SOLUTION INTRAVENOUS at 20:00

## 2025-08-20 RX ADMIN — DEXMEDETOMIDINE HYDROCHLORIDE 12 MCG: 100 INJECTION, SOLUTION INTRAVENOUS at 15:07

## 2025-08-20 RX ADMIN — ONDANSETRON 4 MG: 2 INJECTION INTRAMUSCULAR; INTRAVENOUS at 16:04

## 2025-08-20 RX ADMIN — Medication 2 G: at 14:32

## 2025-08-20 RX ADMIN — PROPOFOL 200 MG: 10 INJECTION, EMULSION INTRAVENOUS at 14:35

## 2025-08-20 RX ADMIN — DEXMEDETOMIDINE HYDROCHLORIDE 8 MCG: 100 INJECTION, SOLUTION INTRAVENOUS at 15:17

## 2025-08-20 RX ADMIN — LOSARTAN POTASSIUM 25 MG: 25 TABLET, FILM COATED ORAL at 19:56

## 2025-08-20 ASSESSMENT — ACTIVITIES OF DAILY LIVING (ADL)
ADLS_ACUITY_SCORE: 15
ADLS_ACUITY_SCORE: 15
ADLS_ACUITY_SCORE: 18
ADLS_ACUITY_SCORE: 15
ADLS_ACUITY_SCORE: 15
ADLS_ACUITY_SCORE: 18
ADLS_ACUITY_SCORE: 15
ADLS_ACUITY_SCORE: 15

## 2025-08-20 ASSESSMENT — ENCOUNTER SYMPTOMS: SEIZURES: 0

## 2025-08-20 ASSESSMENT — LIFESTYLE VARIABLES: TOBACCO_USE: 0

## 2025-08-21 VITALS
HEIGHT: 63 IN | HEART RATE: 73 BPM | OXYGEN SATURATION: 99 % | SYSTOLIC BLOOD PRESSURE: 145 MMHG | BODY MASS INDEX: 30.65 KG/M2 | TEMPERATURE: 98.2 F | DIASTOLIC BLOOD PRESSURE: 86 MMHG | RESPIRATION RATE: 18 BRPM | WEIGHT: 173 LBS

## 2025-08-21 PROCEDURE — 250N000013 HC RX MED GY IP 250 OP 250 PS 637: Performed by: PHYSICIAN ASSISTANT

## 2025-08-21 RX ADMIN — SENNOSIDES AND DOCUSATE SODIUM 1 TABLET: 50; 8.6 TABLET ORAL at 08:02

## 2025-08-21 RX ADMIN — ACETAMINOPHEN 650 MG: 325 TABLET ORAL at 08:02

## 2025-08-21 RX ADMIN — OXYCODONE HYDROCHLORIDE 5 MG: 5 TABLET ORAL at 08:02

## 2025-08-21 RX ADMIN — FAMOTIDINE 40 MG: 20 TABLET, FILM COATED ORAL at 08:02

## 2025-08-21 RX ADMIN — ACETAMINOPHEN 650 MG: 325 TABLET ORAL at 13:23

## 2025-08-21 RX ADMIN — POLYETHYLENE GLYCOL 3350 17 G: 17 POWDER, FOR SOLUTION ORAL at 08:03

## 2025-08-21 RX ADMIN — METHOCARBAMOL 500 MG: 500 TABLET ORAL at 08:02

## 2025-08-21 RX ADMIN — LOSARTAN POTASSIUM 25 MG: 25 TABLET, FILM COATED ORAL at 08:02

## 2025-08-21 ASSESSMENT — ACTIVITIES OF DAILY LIVING (ADL)
ADLS_ACUITY_SCORE: 18

## 2025-08-26 LAB
PATH REPORT.COMMENTS IMP SPEC: ABNORMAL
PATH REPORT.COMMENTS IMP SPEC: YES
PATH REPORT.FINAL DX SPEC: ABNORMAL
PATH REPORT.GROSS SPEC: ABNORMAL
PATH REPORT.MICROSCOPIC SPEC OTHER STN: ABNORMAL
PATH REPORT.MICROSCOPIC SPEC OTHER STN: ABNORMAL
PATH REPORT.RELEVANT HX SPEC: ABNORMAL
PATHOLOGY SYNOPTIC REPORT: ABNORMAL
PHOTO IMAGE: ABNORMAL

## 2025-08-29 ENCOUNTER — OFFICE VISIT (OUTPATIENT)
Dept: SURGERY | Facility: CLINIC | Age: 60
End: 2025-08-29
Payer: COMMERCIAL

## 2025-08-29 DIAGNOSIS — C50.412 MALIGNANT NEOPLASM OF UPPER-OUTER QUADRANT OF LEFT BREAST IN FEMALE, ESTROGEN RECEPTOR POSITIVE (H): Primary | ICD-10-CM

## 2025-08-29 DIAGNOSIS — Z17.0 MALIGNANT NEOPLASM OF UPPER-OUTER QUADRANT OF LEFT BREAST IN FEMALE, ESTROGEN RECEPTOR POSITIVE (H): Primary | ICD-10-CM

## 2025-08-29 PROCEDURE — 99024 POSTOP FOLLOW-UP VISIT: CPT | Performed by: SURGERY

## 2025-09-02 ENCOUNTER — TELEPHONE (OUTPATIENT)
Dept: ONCOLOGY | Facility: CLINIC | Age: 60
End: 2025-09-02
Payer: COMMERCIAL

## 2025-09-02 ENCOUNTER — DOCUMENTATION ONLY (OUTPATIENT)
Dept: LAB | Facility: CLINIC | Age: 60
End: 2025-09-02
Payer: COMMERCIAL

## 2025-09-02 ENCOUNTER — TELEPHONE (OUTPATIENT)
Dept: SURGERY | Facility: CLINIC | Age: 60
End: 2025-09-02
Payer: COMMERCIAL

## 2025-09-03 ENCOUNTER — LAB (OUTPATIENT)
Dept: LAB | Facility: CLINIC | Age: 60
End: 2025-09-03
Payer: COMMERCIAL

## 2025-09-03 DIAGNOSIS — Z17.0 MALIGNANT NEOPLASM OF UPPER-OUTER QUADRANT OF LEFT BREAST IN FEMALE, ESTROGEN RECEPTOR POSITIVE (H): Primary | ICD-10-CM

## 2025-09-03 DIAGNOSIS — C50.412 MALIGNANT NEOPLASM OF UPPER-OUTER QUADRANT OF LEFT BREAST IN FEMALE, ESTROGEN RECEPTOR POSITIVE (H): Primary | ICD-10-CM

## 2025-09-03 PROCEDURE — 81479 UNLISTED MOLECULAR PATHOLOGY: CPT | Performed by: SURGERY

## 2025-09-04 ENCOUNTER — DOCUMENTATION ONLY (OUTPATIENT)
Dept: ONCOLOGY | Facility: CLINIC | Age: 60
End: 2025-09-04
Payer: COMMERCIAL

## (undated) DEVICE — LINEN TOWEL PACK X5 5464

## (undated) DEVICE — GLOVE PROTEXIS MICRO 6.0 LT BLUE 2D73PM60

## (undated) DEVICE — SLEEVE PROTECTIVE BREAST BIOPSY  GMSLV001-10

## (undated) DEVICE — GLOVE PROTEXIS BLUE W/NEU-THERA 6.5  2D73EB65

## (undated) DEVICE — SU VICRYL 3-0 SH 27" J316H

## (undated) DEVICE — GLOVE PROTEXIS MICRO 6.0  2D73PM60

## (undated) DEVICE — BLADE KNIFE SURG 10 371110

## (undated) DEVICE — SU SILK 2-0 FSL 18" 677G

## (undated) DEVICE — PREP CHLORAPREP 26ML TINTED ORANGE  260815

## (undated) DEVICE — SU MONOCRYL 4-0 PS-2 18" UND Y496G

## (undated) DEVICE — SUCTION MANIFOLD NEPTUNE 2 SYS 4 PORT 0702-020-000

## (undated) DEVICE — SYR EAR 3OZ BULB IRR STRL DISP BLU PVC 4173

## (undated) DEVICE — DRAPE SHEET REV FOLD 3/4 9349

## (undated) DEVICE — ESU ELEC BLADE 2.75" COATED/INSULATED E1455

## (undated) DEVICE — SOL WATER IRRIG 1000ML BOTTLE 2F7114

## (undated) DEVICE — PACK MINOR SBA15MIFSE

## (undated) DEVICE — DRSG STERI STRIP 1/4X3" R1541

## (undated) DEVICE — PREP CHLORAPREP 26ML TINTED HI-LITE ORANGE 930815

## (undated) DEVICE — KIT TURNOVER FAIRVIEW SOUTHDALE FULL SP3889

## (undated) DEVICE — DRSG BIOPATCH GERMICIDAL SPLIT SPONGE 7MM LG

## (undated) DEVICE — DRAPE BREAST/CHEST 29420

## (undated) DEVICE — CLIP ETHICON LIGACLIP SM BLUE LT100

## (undated) DEVICE — Device

## (undated) DEVICE — SU ETHILON 3-0 FS-1 18" 669H

## (undated) DEVICE — DRSG STERI STRIP 1/2X4" R1547

## (undated) DEVICE — BLADE KNIFE SURG 15 371115

## (undated) DEVICE — ENDO FORCEP ENDOJAW BIOPSY 2.8MMX230CM FB-220U

## (undated) DEVICE — DRSG GAUZE 4X4" 3033

## (undated) DEVICE — SUCTION CANISTER MEDIVAC LINER 3000ML W/LID 65651-530

## (undated) DEVICE — PACK MAJOR SBA15MAFSI

## (undated) DEVICE — SPONGE LAP 18X18" X8435

## (undated) DEVICE — SU VICRYL 4-0 PS-2 18" UND J496H

## (undated) DEVICE — DRAIN RESERVOIR 100ML JP 0070740

## (undated) DEVICE — DRSG TEGADERM 4X4 3/4" 1626

## (undated) DEVICE — PAD CHUX UNDERPAD 23X24" 7136

## (undated) DEVICE — SU TIE VICRYL+ 3-0 12X18IN VIO VCP104G

## (undated) DEVICE — SOL NACL 0.9% IRRIG 1000ML BOTTLE 2F7124

## (undated) RX ORDER — CEFAZOLIN SODIUM 2 G/100ML
INJECTION, SOLUTION INTRAVENOUS
Status: DISPENSED
Start: 2018-04-18

## (undated) RX ORDER — FENTANYL CITRATE 50 UG/ML
INJECTION, SOLUTION INTRAMUSCULAR; INTRAVENOUS
Status: DISPENSED
Start: 2025-08-20

## (undated) RX ORDER — HYDROCODONE BITARTRATE AND ACETAMINOPHEN 5; 325 MG/1; MG/1
TABLET ORAL
Status: DISPENSED
Start: 2018-04-18

## (undated) RX ORDER — FENTANYL CITRATE 50 UG/ML
INJECTION, SOLUTION INTRAMUSCULAR; INTRAVENOUS
Status: DISPENSED
Start: 2018-04-18

## (undated) RX ORDER — METOPROLOL TARTRATE 50 MG
TABLET ORAL
Status: DISPENSED
Start: 2023-05-10

## (undated) RX ORDER — IVABRADINE 5 MG/1
TABLET, FILM COATED ORAL
Status: DISPENSED
Start: 2023-05-10

## (undated) RX ORDER — FENTANYL CITRATE 50 UG/ML
INJECTION, SOLUTION INTRAMUSCULAR; INTRAVENOUS
Status: DISPENSED
Start: 2024-05-23

## (undated) RX ORDER — PROPOFOL 10 MG/ML
INJECTION, EMULSION INTRAVENOUS
Status: DISPENSED
Start: 2018-04-18

## (undated) RX ORDER — ONDANSETRON 2 MG/ML
INJECTION INTRAMUSCULAR; INTRAVENOUS
Status: DISPENSED
Start: 2018-04-18

## (undated) RX ORDER — CEFAZOLIN SODIUM/WATER 2 G/20 ML
SYRINGE (ML) INTRAVENOUS
Status: DISPENSED
Start: 2025-08-20

## (undated) RX ORDER — ONDANSETRON 2 MG/ML
INJECTION INTRAMUSCULAR; INTRAVENOUS
Status: DISPENSED
Start: 2024-05-23

## (undated) RX ORDER — FENTANYL CITRATE 50 UG/ML
INJECTION, SOLUTION INTRAMUSCULAR; INTRAVENOUS
Status: DISPENSED
Start: 2018-02-12

## (undated) RX ORDER — LIDOCAINE HYDROCHLORIDE 20 MG/ML
INJECTION, SOLUTION EPIDURAL; INFILTRATION; INTRACAUDAL; PERINEURAL
Status: DISPENSED
Start: 2018-04-18

## (undated) RX ORDER — FENTANYL CITRATE 0.05 MG/ML
INJECTION, SOLUTION INTRAMUSCULAR; INTRAVENOUS
Status: DISPENSED
Start: 2025-08-20

## (undated) RX ORDER — HYDROMORPHONE HYDROCHLORIDE 1 MG/ML
INJECTION, SOLUTION INTRAMUSCULAR; INTRAVENOUS; SUBCUTANEOUS
Status: DISPENSED
Start: 2018-04-18